# Patient Record
Sex: FEMALE | Race: WHITE | NOT HISPANIC OR LATINO | Employment: OTHER | URBAN - METROPOLITAN AREA
[De-identification: names, ages, dates, MRNs, and addresses within clinical notes are randomized per-mention and may not be internally consistent; named-entity substitution may affect disease eponyms.]

---

## 2023-02-20 LAB
C REACTIVE PROTEIN (MG/L) IN SER/PLAS: 2.32 MG/DL
ERYTHROCYTE DISTRIBUTION WIDTH (RATIO) BY AUTOMATED COUNT: 13.9 % (ref 11.5–14.5)
ERYTHROCYTE MEAN CORPUSCULAR HEMOGLOBIN CONCENTRATION (G/DL) BY AUTOMATED: 30.4 G/DL (ref 32–36)
ERYTHROCYTE MEAN CORPUSCULAR VOLUME (FL) BY AUTOMATED COUNT: 96 FL (ref 80–100)
ERYTHROCYTES (10*6/UL) IN BLOOD BY AUTOMATED COUNT: 4.6 X10E12/L (ref 4–5.2)
HEMATOCRIT (%) IN BLOOD BY AUTOMATED COUNT: 44.1 % (ref 36–46)
HEMOGLOBIN (G/DL) IN BLOOD: 13.4 G/DL (ref 12–16)
LEUKOCYTES (10*3/UL) IN BLOOD BY AUTOMATED COUNT: 7 X10E9/L (ref 4.4–11.3)
NRBC (PER 100 WBCS) BY AUTOMATED COUNT: 0 /100 WBC (ref 0–0)
PLATELETS (10*3/UL) IN BLOOD AUTOMATED COUNT: 192 X10E9/L (ref 150–450)

## 2023-02-27 LAB
C REACTIVE PROTEIN (MG/L) IN SER/PLAS: 1.8 MG/DL
ERYTHROCYTE DISTRIBUTION WIDTH (RATIO) BY AUTOMATED COUNT: 13.7 % (ref 11.5–14.5)
ERYTHROCYTE MEAN CORPUSCULAR HEMOGLOBIN CONCENTRATION (G/DL) BY AUTOMATED: 30.7 G/DL (ref 32–36)
ERYTHROCYTE MEAN CORPUSCULAR VOLUME (FL) BY AUTOMATED COUNT: 95 FL (ref 80–100)
ERYTHROCYTES (10*6/UL) IN BLOOD BY AUTOMATED COUNT: 4.61 X10E12/L (ref 4–5.2)
HEMATOCRIT (%) IN BLOOD BY AUTOMATED COUNT: 43.7 % (ref 36–46)
HEMOGLOBIN (G/DL) IN BLOOD: 13.4 G/DL (ref 12–16)
LEUKOCYTES (10*3/UL) IN BLOOD BY AUTOMATED COUNT: 6.4 X10E9/L (ref 4.4–11.3)
NRBC (PER 100 WBCS) BY AUTOMATED COUNT: 0 /100 WBC (ref 0–0)
PLATELETS (10*3/UL) IN BLOOD AUTOMATED COUNT: 218 X10E9/L (ref 150–450)

## 2023-03-06 LAB
C REACTIVE PROTEIN (MG/L) IN SER/PLAS: 1.05 MG/DL
ERYTHROCYTE DISTRIBUTION WIDTH (RATIO) BY AUTOMATED COUNT: 13.8 % (ref 11.5–14.5)
ERYTHROCYTE MEAN CORPUSCULAR HEMOGLOBIN CONCENTRATION (G/DL) BY AUTOMATED: 30.8 G/DL (ref 32–36)
ERYTHROCYTE MEAN CORPUSCULAR VOLUME (FL) BY AUTOMATED COUNT: 93 FL (ref 80–100)
ERYTHROCYTES (10*6/UL) IN BLOOD BY AUTOMATED COUNT: 4.84 X10E12/L (ref 4–5.2)
HEMATOCRIT (%) IN BLOOD BY AUTOMATED COUNT: 45.1 % (ref 36–46)
HEMOGLOBIN (G/DL) IN BLOOD: 13.9 G/DL (ref 12–16)
LEUKOCYTES (10*3/UL) IN BLOOD BY AUTOMATED COUNT: 5.8 X10E9/L (ref 4.4–11.3)
NRBC (PER 100 WBCS) BY AUTOMATED COUNT: 0 /100 WBC (ref 0–0)
PLATELETS (10*3/UL) IN BLOOD AUTOMATED COUNT: 213 X10E9/L (ref 150–450)

## 2023-03-27 LAB
C REACTIVE PROTEIN (MG/L) IN SER/PLAS: 1.13 MG/DL
ERYTHROCYTE DISTRIBUTION WIDTH (RATIO) BY AUTOMATED COUNT: 14.6 % (ref 11.5–14.5)
ERYTHROCYTE MEAN CORPUSCULAR HEMOGLOBIN CONCENTRATION (G/DL) BY AUTOMATED: 31 G/DL (ref 32–36)
ERYTHROCYTE MEAN CORPUSCULAR VOLUME (FL) BY AUTOMATED COUNT: 94 FL (ref 80–100)
ERYTHROCYTES (10*6/UL) IN BLOOD BY AUTOMATED COUNT: 4.67 X10E12/L (ref 4–5.2)
HEMATOCRIT (%) IN BLOOD BY AUTOMATED COUNT: 43.9 % (ref 36–46)
HEMOGLOBIN (G/DL) IN BLOOD: 13.6 G/DL (ref 12–16)
LEUKOCYTES (10*3/UL) IN BLOOD BY AUTOMATED COUNT: 6.3 X10E9/L (ref 4.4–11.3)
NRBC (PER 100 WBCS) BY AUTOMATED COUNT: 0 /100 WBC (ref 0–0)
PLATELETS (10*3/UL) IN BLOOD AUTOMATED COUNT: 177 X10E9/L (ref 150–450)

## 2023-05-10 LAB
C REACTIVE PROTEIN (MG/L) IN SER/PLAS: 1.1 MG/DL
ERYTHROCYTE DISTRIBUTION WIDTH (RATIO) BY AUTOMATED COUNT: 15.4 % (ref 11.5–14.5)
ERYTHROCYTE MEAN CORPUSCULAR HEMOGLOBIN CONCENTRATION (G/DL) BY AUTOMATED: 32.1 G/DL (ref 32–36)
ERYTHROCYTE MEAN CORPUSCULAR VOLUME (FL) BY AUTOMATED COUNT: 94 FL (ref 80–100)
ERYTHROCYTES (10*6/UL) IN BLOOD BY AUTOMATED COUNT: 4.51 X10E12/L (ref 4–5.2)
HEMATOCRIT (%) IN BLOOD BY AUTOMATED COUNT: 42.4 % (ref 36–46)
HEMOGLOBIN (G/DL) IN BLOOD: 13.6 G/DL (ref 12–16)
LEUKOCYTES (10*3/UL) IN BLOOD BY AUTOMATED COUNT: 5.6 X10E9/L (ref 4.4–11.3)
PLATELETS (10*3/UL) IN BLOOD AUTOMATED COUNT: 184 X10E9/L (ref 150–450)

## 2023-07-12 LAB — CANCER AG 125 (U/ML) IN SERUM: 6 U/ML (ref 0–30.2)

## 2023-08-31 ENCOUNTER — TELEPHONE (OUTPATIENT)
Dept: PRIMARY CARE | Facility: CLINIC | Age: 68
End: 2023-08-31

## 2023-09-07 LAB — THYROTROPIN (MIU/L) IN SER/PLAS BY DETECTION LIMIT <= 0.05 MIU/L: 0.92 MIU/L (ref 0.44–3.98)

## 2023-09-18 PROBLEM — F32.2 MAJOR DEPRESSIVE DISORDER, SINGLE EPISODE, SEVERE (MULTI): Status: ACTIVE | Noted: 2023-09-18

## 2023-09-18 PROBLEM — M75.21 TENDINITIS OF UPPER BICEPS TENDON OF RIGHT SHOULDER: Status: ACTIVE | Noted: 2023-09-18

## 2023-09-18 PROBLEM — I81 PORTAL VEIN THROMBOSIS: Status: ACTIVE | Noted: 2023-09-18

## 2023-09-18 PROBLEM — M16.11 OSTEOARTHRITIS OF RIGHT HIP: Status: ACTIVE | Noted: 2023-09-18

## 2023-09-18 PROBLEM — M54.31 SCIATICA OF RIGHT SIDE: Status: ACTIVE | Noted: 2023-09-18

## 2023-09-18 PROBLEM — M54.40 CHRONIC LOW BACK PAIN WITH SCIATICA: Status: ACTIVE | Noted: 2023-09-18

## 2023-09-18 PROBLEM — R41.89 COGNITIVE IMPAIRMENT: Status: ACTIVE | Noted: 2023-09-18

## 2023-09-18 PROBLEM — F50.812 BINGE-EATING DISORDER, SEVERE: Status: ACTIVE | Noted: 2017-03-23

## 2023-09-18 PROBLEM — D69.6 LOW PLATELET COUNT (CMS-HCC): Status: ACTIVE | Noted: 2023-09-18

## 2023-09-18 PROBLEM — N20.0 NEPHROLITHIASIS: Status: ACTIVE | Noted: 2023-09-18

## 2023-09-18 PROBLEM — D70.9 NEUTROPENIC FEVER (CMS-HCC): Status: ACTIVE | Noted: 2023-09-18

## 2023-09-18 PROBLEM — R52 DEAFFERENTATION PAIN: Status: ACTIVE | Noted: 2023-09-18

## 2023-09-18 PROBLEM — G89.29 CHRONIC LOW BACK PAIN WITH SCIATICA: Status: ACTIVE | Noted: 2023-09-18

## 2023-09-18 PROBLEM — M25.551 RIGHT HIP PAIN: Status: ACTIVE | Noted: 2023-09-18

## 2023-09-18 PROBLEM — R61 EXCESSIVE SWEATING: Status: ACTIVE | Noted: 2023-09-18

## 2023-09-18 PROBLEM — F32.9 MDD (MAJOR DEPRESSIVE DISORDER): Status: ACTIVE | Noted: 2023-09-18

## 2023-09-18 PROBLEM — E04.1 THYROID NODULE: Status: ACTIVE | Noted: 2023-09-18

## 2023-09-18 PROBLEM — R10.13 EPIGASTRIC PAIN: Status: ACTIVE | Noted: 2023-09-18

## 2023-09-18 PROBLEM — R42 DIZZINESS: Status: ACTIVE | Noted: 2023-09-18

## 2023-09-18 PROBLEM — Z98.890 HISTORY OF LASER REFRACTIVE SURGERY: Status: ACTIVE | Noted: 2023-09-18

## 2023-09-18 PROBLEM — Z85.72 HISTORY OF LYMPHOMA: Status: ACTIVE | Noted: 2023-09-18

## 2023-09-18 PROBLEM — M79.673 FOOT PAIN: Status: ACTIVE | Noted: 2023-09-18

## 2023-09-18 PROBLEM — C57.9: Status: ACTIVE | Noted: 2023-09-18

## 2023-09-18 PROBLEM — M79.89 SWELLING OF RIGHT LOWER EXTREMITY: Status: ACTIVE | Noted: 2023-09-18

## 2023-09-18 PROBLEM — M79.659 PAIN OF THIGH: Status: ACTIVE | Noted: 2023-09-18

## 2023-09-18 PROBLEM — N90.89 VULVAR LESION: Status: ACTIVE | Noted: 2023-09-18

## 2023-09-18 PROBLEM — K52.9 COLITIS: Status: ACTIVE | Noted: 2023-09-18

## 2023-09-18 PROBLEM — H00.019 HORDEOLUM EXTERNUM: Status: ACTIVE | Noted: 2023-09-18

## 2023-09-18 PROBLEM — T84.51XA INFECTION OF RIGHT PROSTHETIC HIP JOINT (CMS-HCC): Status: ACTIVE | Noted: 2023-09-18

## 2023-09-18 PROBLEM — M87.059 AVASCULAR NECROSIS OF BONE OF HIP (MULTI): Status: ACTIVE | Noted: 2023-09-18

## 2023-09-18 PROBLEM — N13.30 HYDRONEPHROSIS, LEFT: Status: ACTIVE | Noted: 2023-09-18

## 2023-09-18 PROBLEM — N13.2 HYDRONEPHROSIS WITH RENAL AND URETERAL CALCULUS OBSTRUCTION: Status: ACTIVE | Noted: 2023-09-18

## 2023-09-18 PROBLEM — G20.A1 PARKINSON'S DISEASE (MULTI): Status: ACTIVE | Noted: 2023-09-18

## 2023-09-18 PROBLEM — M53.3 SACROILIAC JOINT DYSFUNCTION OF RIGHT SIDE: Status: ACTIVE | Noted: 2023-09-18

## 2023-09-18 PROBLEM — G62.0 CHEMOTHERAPY-INDUCED PERIPHERAL NEUROPATHY (MULTI): Status: ACTIVE | Noted: 2023-09-18

## 2023-09-18 PROBLEM — M70.61 GREATER TROCHANTERIC BURSITIS OF RIGHT HIP: Status: ACTIVE | Noted: 2023-09-18

## 2023-09-18 PROBLEM — D07.1 VIN III (VULVAR INTRAEPITHELIAL NEOPLASIA III): Status: ACTIVE | Noted: 2023-09-18

## 2023-09-18 PROBLEM — E86.0 DEHYDRATION: Status: ACTIVE | Noted: 2023-09-18

## 2023-09-18 PROBLEM — F41.9 ANXIETY: Status: ACTIVE | Noted: 2023-09-18

## 2023-09-18 PROBLEM — Z98.890 S/P EXPLORATORY LAPAROTOMY: Status: ACTIVE | Noted: 2023-09-18

## 2023-09-18 PROBLEM — M70.60 TROCHANTERIC BURSITIS: Status: ACTIVE | Noted: 2023-09-18

## 2023-09-18 PROBLEM — R92.2 DENSE BREASTS: Status: ACTIVE | Noted: 2023-09-18

## 2023-09-18 PROBLEM — Z96.641 STATUS POST RIGHT HIP REPLACEMENT: Status: ACTIVE | Noted: 2023-09-18

## 2023-09-18 PROBLEM — E87.6 HYPOKALEMIA: Status: ACTIVE | Noted: 2023-09-18

## 2023-09-18 PROBLEM — R92.30 DENSE BREASTS: Status: ACTIVE | Noted: 2023-09-18

## 2023-09-18 PROBLEM — R26.89 SHUFFLING GAIT: Status: ACTIVE | Noted: 2023-09-18

## 2023-09-18 PROBLEM — M79.18 MYOFASCIAL PAIN: Status: ACTIVE | Noted: 2023-09-18

## 2023-09-18 PROBLEM — R45.89 DEPRESSED MOOD: Status: ACTIVE | Noted: 2023-09-18

## 2023-09-18 PROBLEM — R20.0 NUMBNESS OF LOWER EXTREMITY: Status: ACTIVE | Noted: 2023-09-18

## 2023-09-18 PROBLEM — M84.374A STRESS FRACTURE OF RIGHT FOOT: Status: ACTIVE | Noted: 2023-09-18

## 2023-09-18 PROBLEM — F33.0 MILD EPISODE OF RECURRENT MAJOR DEPRESSIVE DISORDER (CMS-HCC): Status: ACTIVE | Noted: 2023-09-18

## 2023-09-18 PROBLEM — E74.39 GLUCOSE INTOLERANCE: Status: ACTIVE | Noted: 2023-09-18

## 2023-09-18 PROBLEM — R53.83 FATIGUE: Status: ACTIVE | Noted: 2023-09-18

## 2023-09-18 PROBLEM — R59.0 SUBMANDIBULAR LYMPHADENOPATHY: Status: ACTIVE | Noted: 2023-09-18

## 2023-09-18 PROBLEM — I45.9 CARDIAC CONDUCTION DISORDER: Status: ACTIVE | Noted: 2017-03-09

## 2023-09-18 PROBLEM — G25.0 ESSENTIAL TREMOR: Status: ACTIVE | Noted: 2023-09-18

## 2023-09-18 PROBLEM — F40.240 CLAUSTROPHOBIA: Status: ACTIVE | Noted: 2023-09-18

## 2023-09-18 PROBLEM — R29.898 WEAKNESS OF EXTREMITY: Status: ACTIVE | Noted: 2023-09-18

## 2023-09-18 PROBLEM — G62.9 NEUROPATHY: Status: ACTIVE | Noted: 2023-09-18

## 2023-09-18 PROBLEM — M25.511 RIGHT SHOULDER PAIN: Status: ACTIVE | Noted: 2023-09-18

## 2023-09-18 PROBLEM — M85.80 OSTEOPENIA: Status: ACTIVE | Noted: 2023-09-18

## 2023-09-18 PROBLEM — R10.9 LEFT SIDED ABDOMINAL PAIN: Status: ACTIVE | Noted: 2023-09-18

## 2023-09-18 PROBLEM — H04.129 DRY EYE SYNDROME: Status: ACTIVE | Noted: 2023-09-18

## 2023-09-18 PROBLEM — I10 HYPERTENSION: Status: ACTIVE | Noted: 2023-09-18

## 2023-09-18 PROBLEM — R26.81 UNSTEADINESS ON FEET: Status: ACTIVE | Noted: 2023-09-18

## 2023-09-18 PROBLEM — G89.3 NEOPLASM RELATED PAIN: Status: ACTIVE | Noted: 2023-09-18

## 2023-09-18 PROBLEM — H33.322 PERIPHERAL RETINAL HOLE OF LEFT EYE: Status: ACTIVE | Noted: 2023-09-18

## 2023-09-18 PROBLEM — M76.31 ILIOTIBIAL BAND SYNDROME OF RIGHT SIDE: Status: ACTIVE | Noted: 2023-09-18

## 2023-09-18 PROBLEM — G89.29 CHRONIC PAIN: Status: ACTIVE | Noted: 2023-09-18

## 2023-09-18 PROBLEM — Z85.43 HISTORY OF OVARIAN CANCER: Status: ACTIVE | Noted: 2019-12-29

## 2023-09-18 PROBLEM — N20.0 CALCULUS OF KIDNEY: Status: ACTIVE | Noted: 2023-09-18

## 2023-09-18 PROBLEM — F32.A DEPRESSION: Status: ACTIVE | Noted: 2023-09-18

## 2023-09-18 PROBLEM — M62.81 MUSCLE WEAKNESS: Status: ACTIVE | Noted: 2023-09-18

## 2023-09-18 PROBLEM — T45.1X5A CHEMOTHERAPY-INDUCED PERIPHERAL NEUROPATHY (MULTI): Status: ACTIVE | Noted: 2023-09-18

## 2023-09-18 PROBLEM — R19.00 PELVIC MASS: Status: ACTIVE | Noted: 2023-09-18

## 2023-09-18 PROBLEM — M75.81 TENDINITIS OF RIGHT ROTATOR CUFF: Status: ACTIVE | Noted: 2023-09-18

## 2023-09-18 PROBLEM — F50.81 BINGE-EATING DISORDER, SEVERE: Status: ACTIVE | Noted: 2017-03-23

## 2023-09-18 PROBLEM — Z86.69 HISTORY OF PERIPHERAL NEUROPATHY: Status: ACTIVE | Noted: 2023-09-18

## 2023-09-18 PROBLEM — R50.81 NEUTROPENIC FEVER (CMS-HCC): Status: ACTIVE | Noted: 2023-09-18

## 2023-09-18 PROBLEM — M79.606 LEG PAIN: Status: ACTIVE | Noted: 2023-09-18

## 2023-09-18 PROBLEM — R10.2 PELVIC PAIN: Status: ACTIVE | Noted: 2023-09-18

## 2023-09-18 PROBLEM — R29.6 FALLS: Status: ACTIVE | Noted: 2023-09-18

## 2023-09-18 PROBLEM — H52.4 PRESBYOPIA: Status: ACTIVE | Noted: 2023-09-18
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
## 2023-09-18 PROBLEM — D49.7 THYROID INCIDENTALOMA: Status: ACTIVE | Noted: 2023-09-18

## 2023-09-18 PROBLEM — M53.3 SACROILIAC JOINT PAIN: Status: ACTIVE | Noted: 2023-09-18

## 2023-09-18 RX ORDER — PRIMIDONE 50 MG/1
TABLET ORAL
COMMUNITY
Start: 2023-06-21 | End: 2023-11-15 | Stop reason: SDUPTHER

## 2023-09-18 RX ORDER — ASPIRIN 81 MG/1
TABLET ORAL
COMMUNITY
Start: 2022-11-29 | End: 2023-09-21 | Stop reason: WASHOUT

## 2023-09-18 RX ORDER — PROPRANOLOL HYDROCHLORIDE 60 MG/1
1 CAPSULE, EXTENDED RELEASE ORAL DAILY
COMMUNITY
Start: 2015-12-30 | End: 2023-11-06

## 2023-09-18 RX ORDER — PREGABALIN 150 MG/1
CAPSULE ORAL
COMMUNITY
Start: 2023-06-19 | End: 2023-10-11 | Stop reason: DRUGHIGH

## 2023-09-18 RX ORDER — BUSPIRONE HYDROCHLORIDE 5 MG/1
1 TABLET ORAL 2 TIMES DAILY
COMMUNITY
Start: 2022-09-21 | End: 2023-12-14 | Stop reason: SDUPTHER

## 2023-09-18 RX ORDER — DULOXETIN HYDROCHLORIDE 60 MG/1
CAPSULE, DELAYED RELEASE ORAL
COMMUNITY
Start: 2015-11-16 | End: 2023-12-14 | Stop reason: SDUPTHER

## 2023-09-18 RX ORDER — AMOXICILLIN 250 MG
CAPSULE ORAL 2 TIMES DAILY
COMMUNITY
Start: 2022-11-26 | End: 2023-09-21 | Stop reason: WASHOUT

## 2023-09-18 RX ORDER — ACETAMINOPHEN 325 MG/1
TABLET ORAL EVERY 8 HOURS
COMMUNITY
Start: 2022-11-26

## 2023-09-18 RX ORDER — OXYCODONE HYDROCHLORIDE 5 MG/1
TABLET ORAL EVERY 6 HOURS
COMMUNITY
End: 2023-10-11 | Stop reason: ALTCHOICE

## 2023-09-18 RX ORDER — METHADONE HYDROCHLORIDE 10 MG/1
10 TABLET ORAL EVERY 12 HOURS
COMMUNITY
End: 2023-10-11 | Stop reason: SDUPTHER

## 2023-09-18 RX ORDER — BUPROPION HYDROCHLORIDE 100 MG/1
1 TABLET ORAL
COMMUNITY
Start: 2023-06-08 | End: 2023-09-21 | Stop reason: DRUGHIGH

## 2023-09-18 RX ORDER — MAG HYDROX/ALUMINUM HYD/SIMETH 200-200-20
SUSPENSION, ORAL (FINAL DOSE FORM) ORAL
COMMUNITY
Start: 2022-11-17 | End: 2023-09-21 | Stop reason: WASHOUT

## 2023-09-18 RX ORDER — ACETAMINOPHEN 500 MG
1 TABLET ORAL DAILY
COMMUNITY
Start: 2016-12-05

## 2023-09-18 RX ORDER — HYDROXYZINE HYDROCHLORIDE 25 MG/1
TABLET, FILM COATED ORAL
COMMUNITY
Start: 2022-05-31

## 2023-09-18 RX ORDER — CLONAZEPAM 0.5 MG/1
TABLET ORAL
COMMUNITY
Start: 2023-07-12

## 2023-09-18 RX ORDER — SELENIUM 50 MCG
TABLET ORAL
COMMUNITY
Start: 2022-11-28 | End: 2023-09-21 | Stop reason: WASHOUT

## 2023-09-21 ENCOUNTER — OFFICE VISIT (OUTPATIENT)
Dept: PRIMARY CARE | Facility: CLINIC | Age: 68
End: 2023-09-21
Payer: MEDICARE

## 2023-09-21 VITALS
DIASTOLIC BLOOD PRESSURE: 65 MMHG | HEART RATE: 60 BPM | WEIGHT: 154.4 LBS | SYSTOLIC BLOOD PRESSURE: 101 MMHG | HEIGHT: 63 IN | OXYGEN SATURATION: 99 % | BODY MASS INDEX: 27.36 KG/M2

## 2023-09-21 DIAGNOSIS — Z12.11 COLON CANCER SCREENING: ICD-10-CM

## 2023-09-21 DIAGNOSIS — E78.5 HYPERLIPIDEMIA, UNSPECIFIED HYPERLIPIDEMIA TYPE: ICD-10-CM

## 2023-09-21 DIAGNOSIS — Z00.00 MEDICARE ANNUAL WELLNESS VISIT, SUBSEQUENT: Primary | ICD-10-CM

## 2023-09-21 PROBLEM — R29.6 FALLS: Status: RESOLVED | Noted: 2023-09-18 | Resolved: 2023-09-21

## 2023-09-21 PROBLEM — E86.0 DEHYDRATION: Status: RESOLVED | Noted: 2023-09-18 | Resolved: 2023-09-21

## 2023-09-21 PROBLEM — E74.39 GLUCOSE INTOLERANCE: Status: RESOLVED | Noted: 2023-09-18 | Resolved: 2023-09-21

## 2023-09-21 PROBLEM — H00.019 HORDEOLUM EXTERNUM: Status: RESOLVED | Noted: 2023-09-18 | Resolved: 2023-09-21

## 2023-09-21 PROBLEM — R59.0 SUBMANDIBULAR LYMPHADENOPATHY: Status: RESOLVED | Noted: 2023-09-18 | Resolved: 2023-09-21

## 2023-09-21 PROBLEM — C57.9: Status: RESOLVED | Noted: 2023-09-18 | Resolved: 2023-09-21

## 2023-09-21 PROBLEM — I81 PORTAL VEIN THROMBOSIS: Status: RESOLVED | Noted: 2023-09-18 | Resolved: 2023-09-21

## 2023-09-21 PROBLEM — R10.9 LEFT SIDED ABDOMINAL PAIN: Status: RESOLVED | Noted: 2023-09-18 | Resolved: 2023-09-21

## 2023-09-21 PROBLEM — E87.6 HYPOKALEMIA: Status: RESOLVED | Noted: 2023-09-18 | Resolved: 2023-09-21

## 2023-09-21 PROBLEM — Z86.69 HISTORY OF PERIPHERAL NEUROPATHY: Status: RESOLVED | Noted: 2023-09-18 | Resolved: 2023-09-21

## 2023-09-21 PROBLEM — R61 EXCESSIVE SWEATING: Status: RESOLVED | Noted: 2023-09-18 | Resolved: 2023-09-21
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
## 2023-09-21 PROBLEM — R10.13 EPIGASTRIC PAIN: Status: RESOLVED | Noted: 2023-09-18 | Resolved: 2023-09-21

## 2023-09-21 PROBLEM — R42 DIZZINESS: Status: RESOLVED | Noted: 2023-09-18 | Resolved: 2023-09-21

## 2023-09-21 PROCEDURE — 1036F TOBACCO NON-USER: CPT | Performed by: NURSE PRACTITIONER

## 2023-09-21 PROCEDURE — 3078F DIAST BP <80 MM HG: CPT | Performed by: NURSE PRACTITIONER

## 2023-09-21 PROCEDURE — 1159F MED LIST DOCD IN RCRD: CPT | Performed by: NURSE PRACTITIONER

## 2023-09-21 PROCEDURE — G0439 PPPS, SUBSEQ VISIT: HCPCS | Performed by: NURSE PRACTITIONER

## 2023-09-21 PROCEDURE — 3074F SYST BP LT 130 MM HG: CPT | Performed by: NURSE PRACTITIONER

## 2023-09-21 PROCEDURE — 1170F FXNL STATUS ASSESSED: CPT | Performed by: NURSE PRACTITIONER

## 2023-09-21 PROCEDURE — 1125F AMNT PAIN NOTED PAIN PRSNT: CPT | Performed by: NURSE PRACTITIONER

## 2023-09-21 RX ORDER — BUPROPION HYDROCHLORIDE 150 MG/1
150 TABLET ORAL DAILY
COMMUNITY
End: 2023-11-30 | Stop reason: SDUPTHER

## 2023-09-21 ASSESSMENT — PATIENT HEALTH QUESTIONNAIRE - PHQ9
6. FEELING BAD ABOUT YOURSELF - OR THAT YOU ARE A FAILURE OR HAVE LET YOURSELF OR YOUR FAMILY DOWN: MORE THAN HALF THE DAYS
SUM OF ALL RESPONSES TO PHQ9 QUESTIONS 1 AND 2: 2
3. TROUBLE FALLING OR STAYING ASLEEP OR SLEEPING TOO MUCH: NEARLY EVERY DAY
7. TROUBLE CONCENTRATING ON THINGS, SUCH AS READING THE NEWSPAPER OR WATCHING TELEVISION: SEVERAL DAYS
9. THOUGHTS THAT YOU WOULD BE BETTER OFF DEAD, OR OF HURTING YOURSELF: NOT AT ALL
8. MOVING OR SPEAKING SO SLOWLY THAT OTHER PEOPLE COULD HAVE NOTICED. OR THE OPPOSITE, BEING SO FIGETY OR RESTLESS THAT YOU HAVE BEEN MOVING AROUND A LOT MORE THAN USUAL: NOT AT ALL
10. IF YOU CHECKED OFF ANY PROBLEMS, HOW DIFFICULT HAVE THESE PROBLEMS MADE IT FOR YOU TO DO YOUR WORK, TAKE CARE OF THINGS AT HOME, OR GET ALONG WITH OTHER PEOPLE: SOMEWHAT DIFFICULT
SUM OF ALL RESPONSES TO PHQ QUESTIONS 1-9: 10
4. FEELING TIRED OR HAVING LITTLE ENERGY: MORE THAN HALF THE DAYS
5. POOR APPETITE OR OVEREATING: NOT AT ALL
1. LITTLE INTEREST OR PLEASURE IN DOING THINGS: SEVERAL DAYS
2. FEELING DOWN, DEPRESSED OR HOPELESS: SEVERAL DAYS

## 2023-09-21 ASSESSMENT — ENCOUNTER SYMPTOMS
DEPRESSION: 1
LOSS OF SENSATION IN FEET: 1
OCCASIONAL FEELINGS OF UNSTEADINESS: 1

## 2023-09-21 ASSESSMENT — ACTIVITIES OF DAILY LIVING (ADL)
GROCERY_SHOPPING: INDEPENDENT
MANAGING_FINANCES: NEEDS ASSISTANCE
TAKING_MEDICATION: INDEPENDENT
DOING_HOUSEWORK: INDEPENDENT
BATHING: INDEPENDENT
DRESSING: INDEPENDENT

## 2023-09-21 NOTE — PROGRESS NOTES
Subjective   Reason for Visit: Saba Park is an 68 y.o. female here for a Medicare Wellness visit.     Reviewed all medications by prescribing practitioner or clinical pharmacist (such as prescriptions, OTCs, herbal therapies and supplements) and documented in the medical record.    HPI    Patient Care Team:  DEMETRIUS Castro-CNP as PCP - General  Dixon Mayen MD as PCP - [employer] Advantage PCP  Stu Flores MD as Consulting Physician (Orthopaedic Surgery)  Italo Harris MD as Referring Physician (Hematology and Oncology)  Krista Kenny MD as Obstetrician (Obstetrics and Gynecology)  DEMETRIUS Patel-CNP as Nurse Practitioner (Family Medicine)  Anabella Ugalde MD as Psychiatrist (Psychiatry)  Aasef G Shaikh, MD PhD as Consulting Physician (Neurology)  Mahnaz Lacy MD as Consulting Physician (Endocrinology)  Triny VINCENT MD as Consulting Physician (Infectious Diseases)  Lauren Cantu MD as Referring Physician (Physical Medicine and Rehabilitation)  Yisel Mccall MD as Surgeon (Colon and Rectal Surgery)  Karen Reddy MD as Surgeon (Urology)     Review of Systems  Gen: denies fever, chills, weight loss, fatigue  HEENT: denies sinus pressure, sinus congestion, runny nose, red eyes, itchy eyes, vision loss, ear pain, hearing loss, throat pain, trouble swallowing  Neck: denies neck pain, neck swelling or masses  Chest/breast: denies breast pain, breast lumps, nipple discharge  CV: denies chest pain, palpitations, fast heart rate, syncope  Resp: denies shortness of breath, cough, wheezing  GI: denies abdominal pain, nausea, diarrhea, constipation, hematochezia, melena  : denies dysuria, hematuria, vaginal discharge, frequency  Endo: denies polydipsia, polyuria, heat/cold intolerance, weight change, hair thinning  Heme: denies easy bruising, easy bleeding  Neuro: neuropathy. essential tremor. denies headache, numbness, tingling, memory loss, changes  "in vision  MSK: chronic right leg and hip pain with weakness. denies joint swelling  Psych: depression. denies suicidal ideation, homicidal ideation, anxiety, mood swings  Skin: denies rashes, abnormal lesions, itching, changes in moles      Objective   Vitals:  /65   Pulse 60   Ht 1.6 m (5' 3\")   Wt 70 kg (154 lb 6.4 oz)   SpO2 99%   BMI 27.35 kg/m²       Physical Exam  No physical exam was completed today.     Assessment/Plan   Medicare Annual Wellness Exam  - Due for TDAP vaccine, directed to pharmacy to complete for insurance purposes  - Mammogram due in December 2023 - states she already has an order  - Colonoscopy due in May 2024 - order given to get scheduled   - Check lipid panel, CMP  - Check CT cardiac score  - + depression screening, just had an appt with psychiatry this morning and meds were adjusted    RTC in 1 year for Medicare Wellness or sooner gary Mcdermott, APRN-CNP  [facility]          "

## 2023-10-11 ENCOUNTER — OFFICE VISIT (OUTPATIENT)
Dept: PALLIATIVE MEDICINE | Facility: CLINIC | Age: 68
End: 2023-10-11
Payer: MEDICARE

## 2023-10-11 VITALS
DIASTOLIC BLOOD PRESSURE: 70 MMHG | SYSTOLIC BLOOD PRESSURE: 102 MMHG | WEIGHT: 155.42 LBS | OXYGEN SATURATION: 96 % | HEART RATE: 66 BPM | BODY MASS INDEX: 27.53 KG/M2 | TEMPERATURE: 97.9 F | RESPIRATION RATE: 18 BRPM

## 2023-10-11 DIAGNOSIS — M79.2 NEUROPATHIC PAIN: Primary | ICD-10-CM

## 2023-10-11 DIAGNOSIS — Z51.5 PALLIATIVE CARE ENCOUNTER: ICD-10-CM

## 2023-10-11 DIAGNOSIS — G89.3 CHRONIC PAIN AFTER CANCER TREATMENT: ICD-10-CM

## 2023-10-11 PROCEDURE — 1125F AMNT PAIN NOTED PAIN PRSNT: CPT | Performed by: NURSE PRACTITIONER

## 2023-10-11 PROCEDURE — 1036F TOBACCO NON-USER: CPT | Performed by: NURSE PRACTITIONER

## 2023-10-11 PROCEDURE — 1159F MED LIST DOCD IN RCRD: CPT | Performed by: NURSE PRACTITIONER

## 2023-10-11 PROCEDURE — 99214 OFFICE O/P EST MOD 30 MIN: CPT | Performed by: NURSE PRACTITIONER

## 2023-10-11 PROCEDURE — 3078F DIAST BP <80 MM HG: CPT | Performed by: NURSE PRACTITIONER

## 2023-10-11 PROCEDURE — 3074F SYST BP LT 130 MM HG: CPT | Performed by: NURSE PRACTITIONER

## 2023-10-11 RX ORDER — METHADONE HYDROCHLORIDE 10 MG/1
10 TABLET ORAL EVERY 12 HOURS
Qty: 60 TABLET | Refills: 0 | Status: SHIPPED | OUTPATIENT
Start: 2023-10-11 | End: 2023-11-10

## 2023-10-11 RX ORDER — PREGABALIN 100 MG/1
100 CAPSULE ORAL 3 TIMES DAILY
Qty: 90 CAPSULE | Refills: 2 | Status: SHIPPED | OUTPATIENT
Start: 2023-10-11 | End: 2024-04-29 | Stop reason: DRUGHIGH

## 2023-10-11 ASSESSMENT — PAIN SCALES - GENERAL: PAINLEVEL: 4

## 2023-10-11 NOTE — PROGRESS NOTES
SUPPORTIVE AND PALLIATIVE ONCOLOGY OUTPATIENT FOLLOW-UP      SERVICE DATE: 10/11/2023    Subjective   HISTORY OF PRESENT ILLNESS: Saba Park is a 68 y.o. female who presents with history of CNS lymphoma and ovarian cancer. Currently in active surveillance and without  evidence of either of her cancers. Patient follows with Supportive Oncology/Palliative Care for chronic pain r/t disease history/treatment and further symptom management.    Pain Assessment:  Pain Score:   4  Location: Leg  Education:      Symptom Assessment:  Pain:somewhat - continues with chronic pain in the right hip. She has completed PT at this time, and both her orthopedic and physical therapist have discussed with her that due to chronic injury in the hip from necrosis and infection, she may forever have pain. Notes pain is heightened with ambulation, not able to ambulate far  Headache: none  Dizziness:none  Lack of energy: a little  Difficulty sleeping: none  Worrying: none  Anxiety: none  Depression: a little - continues to follow with Dr. Ugalde  Pain in mouth/swallowing: none  Dry mouth: a little  Taste changes: none  Shortness of breath: none  Lack of appetite: none   Nausea: none  Vomiting: none  Constipation: none  Diarrhea: none  Sore muscles: none  Numbness or tingling in hands/feet/other: somewhat - radiating pain down right leg w/ jolts of pain in b/l feet as well as numbness and tingling present. R foot > L foot  Weight loss: none  Other: none    Information obtained from: interview of patient  ______________________________________________________________________        Objective      PHYSICAL EXAMINATION   Vital Signs:   Vital signs reviewed  Vitals:    10/11/23 1138   BP: 102/70   Pulse: 66   Resp: 18   Temp: 36.6 °C (97.9 °F)   SpO2: 96%     Pain Score: 4       Physical Exam  Constitutional:       Appearance: Normal appearance.   Cardiovascular:      Rate and Rhythm: Normal rate and regular rhythm.   Pulmonary:       Effort: Pulmonary effort is normal.   Abdominal:      General: Abdomen is flat. Bowel sounds are normal.      Palpations: Abdomen is soft.   Musculoskeletal:         General: Tenderness present.      Comments: Right hip/leg   Skin:     General: Skin is warm and dry.   Neurological:      Mental Status: She is alert and oriented to person, place, and time.   Psychiatric:         Mood and Affect: Mood normal.         Behavior: Behavior normal.         Thought Content: Thought content normal.         Judgment: Judgment normal.       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 Completely anbx therapy  Type: neuropathic  Pain control: sub-optimally controlled  Home regimen:   - S/P [facility] 5/19/22  - Continue Ibuprofen PRN  - Completed PT/[facility] therapy  - Supportive care with rest, ice/heat as needed  - Continue Methadone 10mg BID - discussed adjusting to assess if contributing to fatigue  - EKG (4/5/23) QTc 431. Repeat with increase in dose  - Continue Pregabalin 100mg TID - notes increase back to 100mg TID make improvement in pain, but c/b fatigue   - Continue Duloxetine 60mg/daily  - Discussed Alpha Liproic Acid - patient would like to hold off at this time  - Completed program through chiropractor w/o change in pain  - Referral to Symptom Clinic - Dr Silverman (7/12/23) - scheduled in 1 week  - Consider Scrambler Referral  Intolerances/previously tried:   - Alpha Liproic Acid - not effective  - Gabapentin - caused excessive lethargy    Opioid Use  Medication Management:   - OARRS report reviewed with no aberrant behavior; consistent with  prescriptions/records and patient history  - MED  40.  Overdose Risk Score 270.   This has been discussed with patient.   - We will continue to closely monitor the patient for signs of prescription misuse including UDS, OARRS review and subjective reports at each visit.  - No concurrent benzodiazepine use   - I am a provider who either is or has consulted and collaborated with a provider certified in Hospice and Palliative Medicine and have conducted a face-face visit and examination for this patient.  - Routine Urine Drug Screen: 7/12/23 appropriately positive for opioids and negative for illicit substances  - Controlled Substance Agreement: 7/12/23  - Specifically discussed that controlled substance prescriptions will only be provided by our group as outlined in the completed agreement  - Prescribed naloxone: patient declined  - Red Flags: NA    Bowel  - Monitor - no concerns of diarrhea or constipation     Altered Mood  Chronic anxiety and depression related to health concerns   Home regimen:   - Continue Duloxetine 60mg once daily at bed  - Continue Buspirone 5mg TID  - Continue Buproprion 75mg daily  - Following with counselor weekly to every 2 weeks  - Established with Dr. Ugalde - Onco Psychiatry    Next Follow-Up Visit:  Return to clinic in 3 months    Signature and billing  Medical complexity was moderate level due to due to complexity of problems, extensive data review, and high risk of management/treatment.  Time was spent on the following: Prep Time, Time Directly with Patient/Family/Caregiver, Documentation Time. Total time spent: 32      Data    Some elements copied from Supportive Oncology note on 8/30/23, the elements have been updated and all reflect current decision making from today, 10/11/2023.      Plan of Care discussed with: Patient    SIGNATURE: DEMETRIUS Patel-CNP    Contact information:  [facility]ive [facility] Pall[facility]cology  Monday-Friday 8 AM-5 PM  Phone:  [phone], press option #5, then option #1.   Or Epic Secure Chat

## 2023-10-16 ENCOUNTER — ALLIED HEALTH (OUTPATIENT)
Dept: INTEGRATIVE MEDICINE | Facility: CLINIC | Age: 68
End: 2023-10-16
Payer: MEDICARE

## 2023-10-16 DIAGNOSIS — Z85.72 HISTORY OF LYMPHOMA: ICD-10-CM

## 2023-10-16 DIAGNOSIS — T45.1X5A CHEMOTHERAPY-INDUCED PERIPHERAL NEUROPATHY (MULTI): Primary | ICD-10-CM

## 2023-10-16 DIAGNOSIS — Z85.43 HISTORY OF OVARIAN CANCER: ICD-10-CM

## 2023-10-16 DIAGNOSIS — G89.3 NEOPLASM RELATED PAIN: ICD-10-CM

## 2023-10-16 DIAGNOSIS — R45.89 DEPRESSED MOOD: ICD-10-CM

## 2023-10-16 DIAGNOSIS — G62.0 CHEMOTHERAPY-INDUCED PERIPHERAL NEUROPATHY (MULTI): Primary | ICD-10-CM

## 2023-10-16 PROCEDURE — 99205 OFFICE O/P NEW HI 60 MIN: CPT | Performed by: HOSPITALIST

## 2023-10-16 NOTE — PROGRESS NOTES
Patient ID: Saba Park is a 68 y.o. female.  Referring Physician: No referring provider defined for this encounter.  Primary Care Provider: DEMETRIUS Castro-CNP    CANCER HISTORY:   67 yo woman with h/o CNS lymphoma and ovarian cancer    2014 CNS Lymphoma (Tyson Harris) - ritux/methotrexate, vincristine, procarbazine  Remission since 2015  C/b R sided weakness, R leg weakness, neuropathy, balance issues - saw PT  Chronic R leg pain, dependant on oxycodone, on methadone and seeing pain mgmt  AVN d/t steroids R hip    11/18 Ovarian ca - 2/19 lap surgery - chemo with taxol x 3 with good response  5/19 ROMY/BSO, rectal resection, ileostomy, adjuvant 3-4 mo chemo c/b C diff    5/22 THR f/b PT - c/b infection and replaced, needed long term IV abx  Kidney stones    INTEGRATIVE HISTORY:    Symptoms:  Depression - f/b psych - on welbutrin, buspar    Pain in R Hip - tried chiropractor, seen by acupuncture    Neuropathy R foot - ball of foot, toes, pain - methadone, gabapentin etc.    ROS:  no ha, visual symptoms, hearing loss  no sob, chest pain, palp  ROS o/w non contributory, please see HPI    Objective    BSA: There is no height or weight on file to calculate BSA.  There were no vitals taken for this visit.    PHYSICAL EXAM:  NAD, awake/alert  HEENT, NCAT, OP clear, no oral lesions  CTA bilat  RRR no mgr  Abd soft/nt/nd+bs  No c/c/e/ttp  Motor/sensory intact, CN 2-12 intact     RESULTS:  Lab Results   Component Value Date    WBC 5.6 05/10/2023    HGB 13.6 05/10/2023    HCT 42.4 05/10/2023     05/10/2023     10/09/2020    CREATININE 0.78 01/09/2023    AST 33 01/09/2023     6.0 07/12/2023     4.0 02/06/2023     6.3 11/27/2022     10.2 10/04/2022     8.5 07/13/2022     5.4 05/18/2022     5.6 11/17/2021     5.3 05/17/2021     4.4 02/01/2021     5.4 11/11/2020     6.3 07/16/2020     4.8 03/30/2020     3.6 12/20/2019     5.7 09/11/2019      4.7 08/19/2019     6.0 07/22/2019     5.5 07/03/2019     12.4 05/02/2019     14.1 04/11/2019     45.6 (H) 03/21/2019     151.9 (H) 02/27/2019     76.7 (H) 01/16/2019 7/21/23 MR Brain: RESULT:   Medial left perirolandic/parietal post biopsy/post treatment changes   tapering to abut the ependymal surface of the left lateral ventricle with   hemosiderin deposition/mineralization.     Mild central dominant volume loss.     Trace nonspecific foci of white matter T2/FLAIR hyperintensity.     Negative for restricted diffusion, acute hemorrhage, mass effect,   extra-axial collection, and abnormal enhancement.     The major intracranial vessels show signal characteristics typically   associated with flow voids suggesting patency.     Up to mild mastoid effusions.  Minimal paranasal sinus mucosal thickening.     Assessment/Plan   67 yo woman with h/o CNS lymphoma and ovarian cancer    2014 CNS Lymphoma (Tyson Harris) - ritux/methotrexate, vincristine, procarbazine  Remission since 2015  C/b R sided weakness, R leg weakness, neuropathy, balance issues - saw PT  Chronic R leg pain, dependant on oxycodone, on methadone and seeing pain mgmt  AVN d/t steroids R hip    11/18 Ovarian ca - 2/19 lap surgery - chemo with taxol x 3 with good response  5/19 ROMY/BSO, rectal resection, ileostomy, adjuvant 3-4 mo chemo c/b C diff    5/22 THR f/b PT - c/b infection and replaced, needed long term IV abx  Kidney stones    INTEGRATIVE HISTORY:    Symptoms:  Depression - f/b psych - on welbutrin, buspar, cont to see psych and follow up on medication reccs.    Pain in R Hip - tried chiropractor, seen by acupuncture - would set up for [facility]  Diff walking now  Has Medical Marijuana card, gummies, has CBD/THC - helps forget about it  May be also related to AVN - consider perhaps hyperbaric oxygen or cryotherapy or local cryotherapy at [facility]  Cont with pain  management    Neuropathy R foot - ball of foot, toes, pain - methadone, gabapentin etc.  Would recc acupuncture  Scrambler therapy    Balance is getting worse, 11/22 B12 780  Could be related to neuropathy, worse since ovarian ca  MR brain neg 7/23  Consider Reiki - consider at the [facility]  Meditation    Follow up:  in Symptom management clinic    Thank you for consulting me in for this patient  Denton Silverman MD

## 2023-11-01 ENCOUNTER — ALLIED HEALTH (OUTPATIENT)
Dept: INTEGRATIVE MEDICINE | Facility: CLINIC | Age: 68
End: 2023-11-01
Payer: MEDICARE

## 2023-11-01 DIAGNOSIS — G62.0 CHEMOTHERAPY-INDUCED PERIPHERAL NEUROPATHY (MULTI): ICD-10-CM

## 2023-11-01 DIAGNOSIS — R45.89 DEPRESSED MOOD: ICD-10-CM

## 2023-11-01 DIAGNOSIS — Z85.72 HISTORY OF LYMPHOMA: Primary | ICD-10-CM

## 2023-11-01 DIAGNOSIS — Z85.43 HISTORY OF OVARIAN CANCER: ICD-10-CM

## 2023-11-01 DIAGNOSIS — T45.1X5A CHEMOTHERAPY-INDUCED PERIPHERAL NEUROPATHY (MULTI): ICD-10-CM

## 2023-11-01 DIAGNOSIS — G89.3 NEOPLASM RELATED PAIN: ICD-10-CM

## 2023-11-01 PROCEDURE — 99214 OFFICE O/P EST MOD 30 MIN: CPT | Performed by: HOSPITALIST

## 2023-11-01 ASSESSMENT — ENCOUNTER SYMPTOMS
TROUBLE SWALLOWING: 0
WHEEZING: 0
FEVER: 0
CONSTIPATION: 0
COUGH: 0
ABDOMINAL DISTENTION: 0
FREQUENCY: 0
DIFFICULTY URINATING: 0
PALPITATIONS: 0
ARTHRALGIAS: 0
HEADACHES: 0
CHILLS: 0

## 2023-11-01 ASSESSMENT — PAIN SCALES - GENERAL: PAINLEVEL_OUTOF10: 4

## 2023-11-01 NOTE — PROGRESS NOTES
Acupuncture Visit:     Subjective   Patient ID: Saba Park is a 68 y.o. female who presents for No chief complaint on file.  R hip pain-  Constant, worse through the day  Worse when walking  Has had one THR then infection, then revision,   Pain in deep in hip/leg to buttock and back  3/10 now  Will get up to 7-8/10  Throbbing aching  Hx of AVN from chemo/steroids     Depression-  up and down.  Worse this past year since August  Working with psychiatrist, on meds     Neuropathy R foot- tingling, numbness, pain at night  Constant, can be worse at night  Has had PT, chiro- doing estim    Balance is poor, sometimes with dizziness, needs support  No falls    Chemo brain- trouble recalling things occ    Has essential tremors. Has gotten worse    MVI  D  Ca    Sleep- always fatigued due to meds, lyrica,   Fatigue is constant    Diet: good                  Review of Systems   Constitutional:  Negative for chills and fever.        Night sweats   HENT:  Negative for ear pain, tinnitus and trouble swallowing.    Eyes:  Negative for visual disturbance.   Respiratory:  Negative for cough and wheezing.    Cardiovascular:  Negative for chest pain and palpitations.   Gastrointestinal:  Negative for abdominal distention and constipation.   Genitourinary:  Negative for difficulty urinating and frequency.   Musculoskeletal:  Negative for arthralgias.   Skin:  Positive for rash.        psoriasisi   Neurological:  Negative for headaches.            Provider reviewed plan for the acupuncture session, precautions and contraindications. Patient/guardian/hospital staff has given consent to treat with full understanding of what to expect during the session. Before acupuncture began, provider explained to the patient to communicate at any time if the procedure was causing discomfort past their tolerance level. Patient agreed to advise acupuncturist. The acupuncturist counseled the patient on the risks of acupuncture treatment including  pain, infection, bleeding, and no relief of pain. The patient was positioned comfortably. There was no evidence of infection at the site of needle insertions.    Objective   Physical Exam              Acupuncture Treatment  Needle Guage: 36 guage /.20/ Blue seirin  Body Points: With retention  Body Points - Bilateral: Du 20, Li4, Si3, SP6, KI6, LR3  Body Points - Right: GB40, UB62, ba nadeem  Auricular Points: Yes  Auricular Points - Bilateral: BFA 1-3  Other Techniques Utilized: TDP Lamp  TDP Lamp Descripton: feet  Needle Count In: 22  Needle Count Out: 22  Needle Retention Time (min): 25 minutes  Total Face to Face Time (min): 45 minutes              Assessment/Plan

## 2023-11-01 NOTE — PROGRESS NOTES
Patient ID: Saba Park is a 68 y.o. female.  Referring Physician: No referring provider defined for this encounter.  Primary Care Provider: DEMETRIUS Castro-CNP    67 yo woman with h/o CNS lymphoma and ovarian cancer     2014 CNS Lymphoma (Tyson Harris) - ritux/methotrexate, vincristine, procarbazine  Remission since 2015  C/b R sided weakness, R leg weakness, neuropathy, balance issues - saw PT  Chronic R leg pain, dependant on oxycodone, on methadone and seeing pain mgmt  AVN d/t steroids R hip     11/18 Ovarian ca - 2/19 lap surgery - chemo with taxol x 3 with good response  5/19 ROMY/BSO, rectal resection, ileostomy, adjuvant 3-4 mo chemo c/b C diff     5/22 THR f/b PT - c/b infection and replaced, needed long term IV abx  Kidney stones     INTEGRATIVE HISTORY:     Symptoms:  Depression - f/b psych - on welbutrin, buspar     Pain in R Hip - tried chiropractor, seen by acupuncture     Neuropathy R foot - ball of foot, toes, pain - methadone, gabapentin etc.    ROS:  no ha, visual symptoms, hearing loss  no sob, chest pain, palp  ROS o/w non contributory, please see HPI    Objective    BSA: There is no height or weight on file to calculate BSA.  There were no vitals taken for this visit.    PHYSICAL EXAM:  NAD, awake/alert  HEENT, NCAT, OP clear, no oral lesions  CTA bilat  RRR no mgr  Abd soft/nt/nd+bs  No c/c/e/ttp  Motor/sensory intact, CN 2-12 intact     RESULTS:  Lab Results   Component Value Date    WBC 5.6 05/10/2023    HGB 13.6 05/10/2023    HCT 42.4 05/10/2023     05/10/2023     10/09/2020    CREATININE 0.78 01/09/2023    AST 33 01/09/2023     6.0 07/12/2023     4.0 02/06/2023     6.3 11/27/2022     10.2 10/04/2022     8.5 07/13/2022     5.4 05/18/2022     5.6 11/17/2021     5.3 05/17/2021     4.4 02/01/2021     5.4 11/11/2020     6.3 07/16/2020     4.8 03/30/2020     3.6 12/20/2019     5.7 09/11/2019     4.7  08/19/2019     6.0 07/22/2019     5.5 07/03/2019     12.4 05/02/2019     14.1 04/11/2019     45.6 (H) 03/21/2019     151.9 (H) 02/27/2019     76.7 (H) 01/16/2019       Assessment/Plan   67 yo woman with h/o CNS lymphoma and ovarian cancer     2014 CNS Lymphoma (Tyson Harris) - ritux/methotrexate, vincristine, procarbazine  Remission since 2015  C/b R sided weakness, R leg weakness, neuropathy, balance issues - saw PT  Chronic R leg pain, dependant on oxycodone, on methadone and seeing pain mgmt  AVN d/t steroids R hip     11/18 Ovarian ca - 2/19 lap surgery - chemo with taxol x 3 with good response  5/19 ROMY/BSO, rectal resection, ileostomy, adjuvant 3-4 mo chemo c/b C diff     5/22 THR f/b PT - c/b infection and replaced, needed long term IV abx  Kidney stones     INTEGRATIVE HISTORY:     Symptoms:  Depression - f/b psych - on welbutrin, buspar, cont to see psych and follow up on medication reccs.     Pain in R Hip - tried chiropractor, seen by acupuncture - would set up for [facility]  Diff walking now  Has Medical Marijuana card, gummies, has CBD/THC - helps forget about it  May be also related to AVN - consider perhaps hyperbaric oxygen or cryotherapy or local cryotherapy at [facility]  Cont with pain management     Neuropathy R foot - ball of foot, toes, pain - methadone, gabapentin etc.  Would recc acupuncture  Scrambler therapy     Balance is getting worse, 11/22 B12 780  Could be related to neuropathy, worse since ovarian ca  MR brain neg 7/23  Consider Reiki - consider at the [facility]  Meditation     Follow up:  in Symptom management clinic    SYMPTOM MANAGEMENT:  Integrative Oncology Symptom Management:    [facility] Integrative Oncology Symptom Management clinic offers multi-disciplinary supervised care of cancer patients using Integrative Modalities billed to insurance using NCCN and SIO/ASCO guideline-driven  practices.  ESAS is obtained prior to and after each treatment by the practitioner    Symptoms Managed:  R hip pain - constant, worse as day goes on    Depression - up/down    Neuropathy R foot - tingling, numbness, pain at night    Natural Products utilized:  Mvi, Vit D, ca    Integrative Treatment: Acupuncture  Session #: 1  Frequency: weekly    Referrals:   Recommendations:    Follow Up:  Symptom Management: weekly  Integrative Oncology:     I have personally seen the patient and supervised the treatment by the integrative practitioner during this visit.  Pt had symptoms discussed and I was present for the patient's 60 minutes of direct patient care.       Denton Silverman MD        Acitretin Pregnancy And Lactation Text: This medication is Pregnancy Category X and should not be given to women who are pregnant or may become pregnant in the future. This medication is excreted in breast milk.

## 2023-11-04 ENCOUNTER — ANCILLARY PROCEDURE (OUTPATIENT)
Dept: RADIOLOGY | Facility: CLINIC | Age: 68
End: 2023-11-04
Payer: MEDICARE

## 2023-11-04 DIAGNOSIS — G25.0 ESSENTIAL TREMOR: ICD-10-CM

## 2023-11-04 DIAGNOSIS — E78.5 HYPERLIPIDEMIA, UNSPECIFIED HYPERLIPIDEMIA TYPE: ICD-10-CM

## 2023-11-04 PROCEDURE — 75571 CT HRT W/O DYE W/CA TEST: CPT

## 2023-11-06 DIAGNOSIS — G25.0 ESSENTIAL TREMOR: ICD-10-CM

## 2023-11-06 RX ORDER — PROPRANOLOL HYDROCHLORIDE 60 MG/1
60 CAPSULE, EXTENDED RELEASE ORAL DAILY
Qty: 90 CAPSULE | Refills: 2 | Status: SHIPPED | OUTPATIENT
Start: 2023-11-06 | End: 2023-11-15 | Stop reason: SDUPTHER

## 2023-11-06 RX ORDER — PROPRANOLOL HYDROCHLORIDE 60 MG/1
60 CAPSULE, EXTENDED RELEASE ORAL DAILY
Qty: 90 CAPSULE | Refills: 2 | Status: SHIPPED | OUTPATIENT
Start: 2023-11-06 | End: 2023-11-06 | Stop reason: SDUPTHER

## 2023-11-15 ENCOUNTER — OFFICE VISIT (OUTPATIENT)
Dept: NEUROLOGY | Facility: CLINIC | Age: 68
End: 2023-11-15
Payer: MEDICARE

## 2023-11-15 ENCOUNTER — ANCILLARY PROCEDURE (OUTPATIENT)
Dept: RADIOLOGY | Facility: CLINIC | Age: 68
End: 2023-11-15
Payer: MEDICARE

## 2023-11-15 ENCOUNTER — OFFICE VISIT (OUTPATIENT)
Dept: ORTHOPEDIC SURGERY | Facility: CLINIC | Age: 68
End: 2023-11-15
Payer: MEDICARE

## 2023-11-15 VITALS — RESPIRATION RATE: 16 BRPM | SYSTOLIC BLOOD PRESSURE: 128 MMHG | DIASTOLIC BLOOD PRESSURE: 85 MMHG | HEART RATE: 62 BPM

## 2023-11-15 DIAGNOSIS — G25.0 ESSENTIAL TREMOR: ICD-10-CM

## 2023-11-15 DIAGNOSIS — M16.11 PRIMARY LOCALIZED OSTEOARTHRITIS OF RIGHT HIP: ICD-10-CM

## 2023-11-15 DIAGNOSIS — M25.559: ICD-10-CM

## 2023-11-15 DIAGNOSIS — M16.12 PRIMARY OSTEOARTHRITIS OF LEFT HIP: ICD-10-CM

## 2023-11-15 DIAGNOSIS — M16.12 PRIMARY OSTEOARTHRITIS OF LEFT HIP: Primary | ICD-10-CM

## 2023-11-15 DIAGNOSIS — R25.1 TREMOR: Primary | ICD-10-CM

## 2023-11-15 DIAGNOSIS — Z96.649: ICD-10-CM

## 2023-11-15 PROCEDURE — 99215 OFFICE O/P EST HI 40 MIN: CPT | Performed by: STUDENT IN AN ORGANIZED HEALTH CARE EDUCATION/TRAINING PROGRAM

## 2023-11-15 PROCEDURE — 1159F MED LIST DOCD IN RCRD: CPT | Performed by: ORTHOPAEDIC SURGERY

## 2023-11-15 PROCEDURE — 73502 X-RAY EXAM HIP UNI 2-3 VIEWS: CPT | Mod: RT

## 2023-11-15 PROCEDURE — 1160F RVW MEDS BY RX/DR IN RCRD: CPT | Performed by: STUDENT IN AN ORGANIZED HEALTH CARE EDUCATION/TRAINING PROGRAM

## 2023-11-15 PROCEDURE — 1125F AMNT PAIN NOTED PAIN PRSNT: CPT | Performed by: ORTHOPAEDIC SURGERY

## 2023-11-15 PROCEDURE — 3074F SYST BP LT 130 MM HG: CPT | Performed by: STUDENT IN AN ORGANIZED HEALTH CARE EDUCATION/TRAINING PROGRAM

## 2023-11-15 PROCEDURE — 1160F RVW MEDS BY RX/DR IN RCRD: CPT | Performed by: ORTHOPAEDIC SURGERY

## 2023-11-15 PROCEDURE — 73502 X-RAY EXAM HIP UNI 2-3 VIEWS: CPT | Mod: RIGHT SIDE | Performed by: RADIOLOGY

## 2023-11-15 PROCEDURE — 99214 OFFICE O/P EST MOD 30 MIN: CPT | Mod: 25 | Performed by: ORTHOPAEDIC SURGERY

## 2023-11-15 PROCEDURE — 1125F AMNT PAIN NOTED PAIN PRSNT: CPT | Performed by: STUDENT IN AN ORGANIZED HEALTH CARE EDUCATION/TRAINING PROGRAM

## 2023-11-15 PROCEDURE — 3079F DIAST BP 80-89 MM HG: CPT | Performed by: STUDENT IN AN ORGANIZED HEALTH CARE EDUCATION/TRAINING PROGRAM

## 2023-11-15 PROCEDURE — 1036F TOBACCO NON-USER: CPT | Performed by: ORTHOPAEDIC SURGERY

## 2023-11-15 PROCEDURE — 1159F MED LIST DOCD IN RCRD: CPT | Performed by: STUDENT IN AN ORGANIZED HEALTH CARE EDUCATION/TRAINING PROGRAM

## 2023-11-15 PROCEDURE — 99214 OFFICE O/P EST MOD 30 MIN: CPT | Performed by: ORTHOPAEDIC SURGERY

## 2023-11-15 PROCEDURE — 1036F TOBACCO NON-USER: CPT | Performed by: STUDENT IN AN ORGANIZED HEALTH CARE EDUCATION/TRAINING PROGRAM

## 2023-11-15 RX ORDER — BISMUTH SUBSALICYLATE 262 MG
1 TABLET,CHEWABLE ORAL DAILY
COMMUNITY

## 2023-11-15 RX ORDER — PROPRANOLOL HYDROCHLORIDE 60 MG/1
60 CAPSULE, EXTENDED RELEASE ORAL DAILY
Qty: 30 CAPSULE | Refills: 11 | Status: SHIPPED | OUTPATIENT
Start: 2023-11-15 | End: 2024-05-01 | Stop reason: CLARIF

## 2023-11-15 RX ORDER — PRIMIDONE 50 MG/1
TABLET ORAL
Qty: 150 TABLET | Refills: 11 | Status: SHIPPED | OUTPATIENT
Start: 2023-11-15 | End: 2024-03-22 | Stop reason: SDUPTHER

## 2023-11-15 RX ORDER — CALCIUM CARBONATE 500(1250)
1 TABLET ORAL
COMMUNITY

## 2023-11-15 RX ORDER — PRIMIDONE 50 MG/1
50 TABLET ORAL NIGHTLY
Qty: 30 TABLET | Refills: 11 | Status: SHIPPED | OUTPATIENT
Start: 2023-11-15 | End: 2023-11-15

## 2023-11-15 ASSESSMENT — PATIENT HEALTH QUESTIONNAIRE - PHQ9
SUM OF ALL RESPONSES TO PHQ9 QUESTIONS 1 AND 2: 0
2. FEELING DOWN, DEPRESSED OR HOPELESS: NOT AT ALL
1. LITTLE INTEREST OR PLEASURE IN DOING THINGS: NOT AT ALL

## 2023-11-15 ASSESSMENT — ENCOUNTER SYMPTOMS
DEPRESSION: 0
OCCASIONAL FEELINGS OF UNSTEADINESS: 0
LOSS OF SENSATION IN FEET: 0

## 2023-11-15 NOTE — PROGRESS NOTES
Subjective     Saba Park is a left handed  68 y.o. year old female who presents with Tremor.    HPIHistory of Present Illness:   Ms. Park is a 66 YO LH woman with h/o CNS lymphoma, neuropathy, ET here for f/u ET.      Past Hx: First seen by Dr. Ng in 2015 for dizziness and tremor. The tremor started in 2005 in the hands and sometimes head which worsened over time. She was on gabapentin for neuropathy without improvement in tremor. She was diagnosed with ET and started on propranolol. 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 Primidone was added in 2021 for ET. In April 2022 she was started on abilify and tremors worsened; she was noted to have parkinsonism so abilify was dc'd in Aug 2022 with improvement. Last visit Jan 2023 propranolol was lowered due to dizziness and low BP.     Interval Hx:  She still has tremor while eating and doing  make up and writing.Tremor is not constant and fluctuate.She has some imbalance but no fall.Higher dose of Primidone made her loopy.    Current ET meds:  Primidone 250mg qhs  Propranolol ER 60mg daily  Pregabalin 200mg BID          Past Medical/Surgical History:  - H/o CNS lymphoma with LF mass s/p cytarabine, procarbazine, vincristine, methotrexate [id number] c/b neuropathy  - ET  - bl avascular necrosis of the hips c/b infected R total hip replacement c/b pain and gait difficulty  - Depression  - Anxiety  - Bl nephrolithiasis with prior L ureteral stent  - Ovarian cancer s/p ileostomy in 2020  - Portal vein thrombosis     Home Meds:  - Oxycodone 5 mg q6h PRN hip pain  - Methadone 5 mg TID  - Clonazepam 0.25 mg qhs  - Duloxetine DR 60 mg qhs  - Buspar 5mg TID  - Bupropion 75mg daily  - Gabapentin 300 mg TID  - Ibuprofen 600 mg q6h PRN pain  - Propranolol 60 mg  qhs  - Vitamin D3 2000 IU daily  - Calcium citrate 500 mg daily  - Probiotic  - Medical marijuana     Social History:   - Living situation: Lives with   - Baseline function: Ambulates without assist device  - Occupation: Retired. Worked as a    - Tobacco use: Former  - Alcohol use: Drinks wine  - Illicit drug use: Medical marijuana     Family History:   - Maternal aunt and mother with PD              Patient Health Questionnaire-2 Score: 0          Review of Systems  All other system have been reviewed and are negative for complaint.  Objective   Neurological Exam    Physical Exam        Head tremor  L>R postural tremor  bl kinetic tremor  Gait is slow due to R hip pain when ambulating                        Assessment/Plan Ms. Park is a 68 YO LH woman with h/o CNS lymphoma, neuropathy, ET here for f/u ET. Tremor is stable on propranolol, primidone, pregabalin. Tremor is bothersome  cannot do at times writing and make up and eating bcz of tremor. Increasing dose of Primidone in the past made her loopy and  tired.We are not able to incrase Proparnolol bcz of HR 62.We discussed about DBS surgery. And we plan arrange educational visit with Max.  Plan:   -Take primidone to 100 mg qAM, 150 mg qPM   - Continue Propranolol ER 60mg daily  - Continue Pregabalin 200mg BID  -PT for balance  -Educational visit with Max about DBS  - RTC 6 months

## 2023-11-15 NOTE — PROGRESS NOTES
68-year-old female with a complicated history 1 year out from headliner exchange of her right hip replacement complicated by infection.  Since then she has been essentially the same since that surgery.  She is never really been happy after her hip replacement and has always had pain and a slow gait since surgery despite radiographic success.    The patient does not endorse fevers and chills. ~He/She~ does not endorse any change in her vision or hearing. ~He/She~ does not endorse chest pain, shortness of breath. ~He/She~ does not endorse any abdominal discomfort. ~He/She~ does not endorse any skin irritation or lesions. ~He/She~ does not endorse any new numbness and tingling or as otherwise stated in the history of present illness.  ~He/She~ is in no acute distress, alert and oriented x 3.    Mood and affect are appropriate.    Respirations are unlabored.    Distal limb is pink and well perfused.    Right lower extremity evaluation demonstrates well-healed surgical incision without signs of erythema or swelling.  Incision is mildly tender to palpation.  No pain to passive or active motion of the hip.  Sensation is intact to light touch in the tibial, sural, saphenous, superficial peroneal, and deep peroneal nerve distributions. Foot is warm and well-perfused.    Multiple x-rays taken of the hip and pelvis today demonstrate a well aligned well fixed total hip arthroplasty without signs of polyethylene wear.     68-year-old female 1 year out from the head/liner exchange for infection and postoperative pain after hip replacement.  She is done well since her most recent surgery and is not showing any signs of recurrence of infection but also not doing spectacular after atypically very successful operation.  I want to make sure that not missing anything and I will have her see one of my arthroplasty partners for evaluation.  She could have an occult infection which would require explantation of all of her components which  I would have to defer to one of my partners for.  Otherwise if things look good I am not sure what else could benefit her aside from may be following with the physical medicine and rehabilitation physician which I did offer her today.    Natural History reviewed. All questions answered. ~He/She~ was in agreement with the plan.      **This note was created using voice recognition software and was not corrected for typographical or grammatical errors.**    Natural History reviewed. All questions answered. ~He/She~ was in agreement with the plan.      **This note was created using voice recognition software and was not corrected for typographical or grammatical errors.**

## 2023-11-15 NOTE — PATIENT INSTRUCTIONS
Since you still have tremor which is bothersome.  The next option could be DBS surgery.  Think about that and we can arrange you with nurse practitioner and educate about DBS surgery.    We put a referral for physical therapy for balance as well.

## 2023-11-21 DIAGNOSIS — M16.12 PRIMARY OSTEOARTHRITIS OF LEFT HIP: Primary | ICD-10-CM

## 2023-11-27 ENCOUNTER — TELEPHONE (OUTPATIENT)
Dept: ADMISSION | Facility: HOSPITAL | Age: 68
End: 2023-11-27
Payer: MEDICARE

## 2023-11-27 DIAGNOSIS — G89.29 OTHER CHRONIC PAIN: Primary | ICD-10-CM

## 2023-11-27 DIAGNOSIS — T45.1X5A CHEMOTHERAPY-INDUCED PERIPHERAL NEUROPATHY (MULTI): ICD-10-CM

## 2023-11-27 DIAGNOSIS — G62.0 CHEMOTHERAPY-INDUCED PERIPHERAL NEUROPATHY (MULTI): ICD-10-CM

## 2023-11-27 RX ORDER — METHADONE HYDROCHLORIDE 10 MG/1
10 TABLET ORAL EVERY 12 HOURS
Qty: 60 TABLET | Refills: 0 | Status: SHIPPED | OUTPATIENT
Start: 2023-11-27 | End: 2023-12-27 | Stop reason: SDUPTHER

## 2023-11-27 NOTE — TELEPHONE ENCOUNTER
Pt requesting a refill of methadone 10mg every 12hrs.  She'd like prescription sent to [facility] in [facility].

## 2023-11-27 NOTE — TELEPHONE ENCOUNTER
OARRS report reviewed and reflects  prescription history, no aberrancy noted. Per OARRS, patient last filled Methadone#60/30 day supply on 10/25/23. Per last visit with Arin Castrejon NP  on 10/11/23 patient to continue Methadone. Patient with follow up visit  requested on 1/10/24 but not scheduled with Arin Castrejon  Patient updated that medication will be sent to requested Pharmacy. Refill request routed to provider.

## 2023-11-28 ENCOUNTER — APPOINTMENT (OUTPATIENT)
Dept: PAIN MEDICINE | Facility: CLINIC | Age: 68
End: 2023-11-28
Payer: MEDICARE

## 2023-11-28 PROBLEM — F32.A DEPRESSION, UNSPECIFIED: Status: ACTIVE | Noted: 2022-12-01

## 2023-11-28 PROBLEM — I10 ESSENTIAL (PRIMARY) HYPERTENSION: Status: ACTIVE | Noted: 2022-12-01

## 2023-11-28 PROBLEM — C85.89: Status: ACTIVE | Noted: 2022-12-01

## 2023-11-28 PROBLEM — Z79.891 LONG TERM (CURRENT) USE OF OPIATE ANALGESIC: Status: ACTIVE | Noted: 2022-12-01

## 2023-11-28 PROBLEM — M00.9: Status: ACTIVE | Noted: 2022-12-01

## 2023-11-28 PROBLEM — L03.115 CELLULITIS OF RIGHT LOWER LIMB: Status: ACTIVE | Noted: 2022-12-01

## 2023-11-28 PROBLEM — E78.5 HYPERLIPIDEMIA, UNSPECIFIED: Status: ACTIVE | Noted: 2022-12-01

## 2023-11-28 PROBLEM — C56.9 MALIGNANT NEOPLASM OF UNSPECIFIED OVARY (MULTI): Status: ACTIVE | Noted: 2022-12-01

## 2023-11-28 PROBLEM — G89.29 OTHER CHRONIC PAIN: Status: ACTIVE | Noted: 2022-12-01

## 2023-11-30 DIAGNOSIS — F33.0 MILD EPISODE OF RECURRENT MAJOR DEPRESSIVE DISORDER (CMS-HCC): ICD-10-CM

## 2023-11-30 RX ORDER — BUPROPION HYDROCHLORIDE 150 MG/1
150 TABLET ORAL DAILY
Qty: 90 TABLET | Refills: 0 | Status: SHIPPED | OUTPATIENT
Start: 2023-11-30 | End: 2023-12-14 | Stop reason: SDUPTHER

## 2023-12-01 ENCOUNTER — OFFICE VISIT (OUTPATIENT)
Dept: ORTHOPEDIC SURGERY | Facility: CLINIC | Age: 68
End: 2023-12-01
Payer: MEDICARE

## 2023-12-01 ENCOUNTER — LAB (OUTPATIENT)
Dept: LAB | Facility: LAB | Age: 68
End: 2023-12-01
Payer: MEDICARE

## 2023-12-01 VITALS — HEIGHT: 62 IN | WEIGHT: 150 LBS | BODY MASS INDEX: 27.6 KG/M2

## 2023-12-01 DIAGNOSIS — M25.551 RIGHT HIP PAIN: Primary | ICD-10-CM

## 2023-12-01 DIAGNOSIS — M25.551 RIGHT HIP PAIN: ICD-10-CM

## 2023-12-01 DIAGNOSIS — E78.5 HYPERLIPIDEMIA, UNSPECIFIED HYPERLIPIDEMIA TYPE: ICD-10-CM

## 2023-12-01 LAB
ALBUMIN SERPL BCP-MCNC: 4.1 G/DL (ref 3.4–5)
ALP SERPL-CCNC: 97 U/L (ref 33–136)
ALT SERPL W P-5'-P-CCNC: 22 U/L (ref 7–45)
ANION GAP SERPL CALC-SCNC: 15 MMOL/L (ref 10–20)
AST SERPL W P-5'-P-CCNC: 14 U/L (ref 9–39)
BILIRUB SERPL-MCNC: 0.5 MG/DL (ref 0–1.2)
BUN SERPL-MCNC: 20 MG/DL (ref 6–23)
CALCIUM SERPL-MCNC: 9.1 MG/DL (ref 8.6–10.3)
CHLORIDE SERPL-SCNC: 104 MMOL/L (ref 98–107)
CHOLEST SERPL-MCNC: 277 MG/DL (ref 0–199)
CHOLESTEROL/HDL RATIO: 4.6
CO2 SERPL-SCNC: 25 MMOL/L (ref 21–32)
CREAT SERPL-MCNC: 0.81 MG/DL (ref 0.5–1.05)
CRP SERPL-MCNC: 0.82 MG/DL
ERYTHROCYTE [SEDIMENTATION RATE] IN BLOOD BY WESTERGREN METHOD: 6 MM/H (ref 0–30)
GFR SERPL CREATININE-BSD FRML MDRD: 79 ML/MIN/1.73M*2
GLUCOSE SERPL-MCNC: 59 MG/DL (ref 74–99)
HDLC SERPL-MCNC: 60.4 MG/DL
LDLC SERPL CALC-MCNC: 187 MG/DL
NON HDL CHOLESTEROL: 217 MG/DL (ref 0–149)
POTASSIUM SERPL-SCNC: 5.3 MMOL/L (ref 3.5–5.3)
PROT SERPL-MCNC: 6.3 G/DL (ref 6.4–8.2)
SODIUM SERPL-SCNC: 139 MMOL/L (ref 136–145)
TRIGL SERPL-MCNC: 150 MG/DL (ref 0–149)
VLDL: 30 MG/DL (ref 0–40)

## 2023-12-01 PROCEDURE — 99215 OFFICE O/P EST HI 40 MIN: CPT | Performed by: STUDENT IN AN ORGANIZED HEALTH CARE EDUCATION/TRAINING PROGRAM

## 2023-12-01 PROCEDURE — 36415 COLL VENOUS BLD VENIPUNCTURE: CPT

## 2023-12-01 PROCEDURE — 1125F AMNT PAIN NOTED PAIN PRSNT: CPT | Performed by: STUDENT IN AN ORGANIZED HEALTH CARE EDUCATION/TRAINING PROGRAM

## 2023-12-01 PROCEDURE — 80053 COMPREHEN METABOLIC PANEL: CPT

## 2023-12-01 PROCEDURE — 1036F TOBACCO NON-USER: CPT | Performed by: STUDENT IN AN ORGANIZED HEALTH CARE EDUCATION/TRAINING PROGRAM

## 2023-12-01 PROCEDURE — 80061 LIPID PANEL: CPT

## 2023-12-01 PROCEDURE — 86140 C-REACTIVE PROTEIN: CPT

## 2023-12-01 PROCEDURE — 1159F MED LIST DOCD IN RCRD: CPT | Performed by: STUDENT IN AN ORGANIZED HEALTH CARE EDUCATION/TRAINING PROGRAM

## 2023-12-01 PROCEDURE — 1160F RVW MEDS BY RX/DR IN RCRD: CPT | Performed by: STUDENT IN AN ORGANIZED HEALTH CARE EDUCATION/TRAINING PROGRAM

## 2023-12-01 PROCEDURE — 99417 PROLNG OP E/M EACH 15 MIN: CPT | Performed by: STUDENT IN AN ORGANIZED HEALTH CARE EDUCATION/TRAINING PROGRAM

## 2023-12-01 PROCEDURE — 85652 RBC SED RATE AUTOMATED: CPT

## 2023-12-01 ASSESSMENT — PAIN SCALES - GENERAL: PAINLEVEL_OUTOF10: 3

## 2023-12-01 ASSESSMENT — PAIN - FUNCTIONAL ASSESSMENT: PAIN_FUNCTIONAL_ASSESSMENT: 0-10

## 2023-12-01 ASSESSMENT — PAIN DESCRIPTION - DESCRIPTORS: DESCRIPTORS: ACHING

## 2023-12-01 NOTE — PROGRESS NOTES
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 Stu Flores at )  11/22/2022: R NIKKI gluteal alonzo-troch abscess I&D, head/liner exchange, abx bead placement (Dr. Stu Flores at )  Intra-op cx NGF  Postop abx: 8wk IV vanc  R knee: N  L hip: N  L knee: N  Lumbar spine surgery/pathology: N  Hx of infxn in native or replaced hip/knee: N        CC/SUBJECTIVE/HPI  PCP: Zara Mcdermott, APRN-CNP  Referring provider: Dr. Stu Flores  Occupation: Retired  68yF presenting for R painful revision NIKKI.  Her index R NIKKI was 5/19/2022 for femoral head AVN, and her chronic R hip pain never subsided following surgery. On 11/20/2022, she presented to [facility] with increasing R hip pain and signs of systemic illness. She was given vanc/zosyn and transferred to [facility], where she was placed on vanc/cefepime. IR-guided aspiration of R gluteal peritrochanteric fluid collection showed purulent fluid but NGF (I see no cell count in system). Underwent 11/22/2022 R NIKKI I&D, head/liner exchange. Tissue cx results from this were also NGF. Given high clinical suspicion for infxn, it was thought that her pre-specimen broad spectrum abx may have confounded cx, and she received 8wk IV vanc with ID guidance. She most recently saw Dr. Flores 11/15/2023 with ongoing R NIKKI pain. No f/c/n/v or recurrent signs of infxn. He referred her to me for a second opinion. 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    Lower extremity MSK history also notable for  none  R hip symptoms  Pain: 3/10  Onset: chronic/gradual  Duration: >2yrs  Location: side of hip, buttock, front thigh, and back/spine  Quality: radiate  Associated symptoms: pain after activity, limp, weakness, feeling unstable, and activities the patient would like to do but can't (walk my dog)  Ambulation limit due to pain: unlimited but hurts later  Other symptoms: none  R hip treatment to date  Tried: PT and  narcotics  Most recent injection date: none  Assistive device: none  Treatment attempted for >2yrs and is never effective  Prior opinions: none        HISTORIES  Dental Health  Pt-reported on form today: Last visit 9/2023, no issues    PMH  Pt-reported on form today: Denies heart/lung/kidney/liver issues, diabetes, stroke, seizure, bariatric surgery, anticoagulants, MRSA, cancer, personal/familial coagulopathies except as noted: cancer (Type: CNS lymphoma 2020, ovarian 2020. Status: Both in remission. Ever metastatic: N)  System-generated (Note: PMH and problem list both included for completeness since recent EMR change caused discrepancies):   Past Medical History:   Diagnosis Date    Anxiety disorder, unspecified     Anxiety    Family history of malignant neoplasm of breast 02/28/2019    Family history of breast cancer    Gynecologic cancer (CMS/HCC) 09/18/2023    Other conditions influencing health status     Nephrolithiasis    Personal history of malignant neoplasm of other parts of uterus 08/14/2020    History of malignant neoplasm of other parts of uterus    Personal history of non-Hodgkin lymphomas 08/14/2020    History of non-Hodgkin's lymphoma    Personal history of other diseases of the circulatory system     History of hypertension    Personal history of other diseases of the digestive system     History of irritable bowel syndrome    Personal history of other diseases of the respiratory system 01/10/2014    History of acute bronchitis    Personal history of other endocrine, nutritional and metabolic disease     History of hypercholesterolemia    Personal history of other malignant neoplasm of skin     History of basal cell carcinoma    Personal history of other specified conditions 10/28/2014    History of fibrocystic disease of breast    Portal vein thrombosis 09/18/2023    PORTAL VEIN THROMBOSIS I81    Presence of spectacles and contact lenses 12/09/2014    Wears contact lenses      Patient Active  Problem List   Diagnosis    Anxiety    Avascular necrosis of bone of hip (CMS/HCC)    Binge-eating disorder, severe    Cardiac conduction disorder    Chemotherapy-induced peripheral neuropathy (CMS/HCC)    Chronic low back pain with sciatica    Chronic pain    Neoplasm related pain    Claustrophobia    Cognitive impairment    Colitis    Deafferentation pain    Dense breasts    Depressed mood    Dry eye syndrome    Essential tremor    Neuropathy    Fatigue    History of laser refractive surgery    History of lymphoma    History of ovarian cancer    Hydronephrosis with renal and ureteral calculus obstruction    Hydronephrosis, left    Hypertension    Infection of right prosthetic hip joint (CMS/HCC)    Osteoarthritis of right hip    Low platelet count (CMS/HCC)    MDD (major depressive disorder)    Depression    Mild episode of recurrent major depressive disorder (CMS/HCC)    Nephrolithiasis    Calculus of kidney    Weakness of extremity    Muscle weakness    Neutropenic fever (CMS/HCC)    Foot pain    Greater trochanteric bursitis of right hip    Leg pain    Myofascial pain    Numbness of lower extremity    Pain of thigh    Pelvic pain    Sciatica of right side    Trochanteric bursitis    Osteopenia    Parkinson's disease    Pelvic mass    Peripheral retinal hole of left eye    Presbyopia    Iliotibial band syndrome of right side    Right hip pain    Sacroiliac joint pain    Sacroiliac joint dysfunction of right side    S/P exploratory laparotomy    Right shoulder pain    Shuffling gait    Status post right hip replacement    Stress fracture of right foot    Swelling of right lower extremity    Tendinitis of right rotator cuff    Tendinitis of upper biceps tendon of right shoulder    Thyroid nodule    Thyroid incidentaloma    Unsteadiness on feet    RADHA III (vulvar intraepithelial neoplasia III)    Vulvar lesion    Tremor    Depression, unspecified    Hyperlipidemia, unspecified    Malignant neoplasm of unspecified  ovary (CMS/HCC)    Other chronic pain    Pyogenic arthritis, unspecified (CMS/HCC)    Essential (primary) hypertension    Long term (current) use of opiate analgesic    Oth types of non-hodg lymph, extrnod and solid organ sites (CMS/HCC)    Cellulitis of right lower limb     PSH  Pt-reported on form today: Per above.   System-generated:   Past Surgical History:   Procedure Laterality Date    LITHOTRIPSY  08/19/2013    Renal Lithotripsy    OTHER SURGICAL HISTORY  08/19/2013    Lithotomy    OTHER SURGICAL HISTORY  11/25/2019    Ileostomy closure     Family Hx  Pt-reported clot/coagulopathies on form today: none  System-generated:   Family History   Problem Relation Name Age of Onset    Breast cancer Mother      Stroke Mother      Colon cancer Father       Social Hx  Pt-reported substance use (etoh, tob, illicits) on form today: EtOH (1 glass wine/night)  System-generated:   Social History     Tobacco Use    Smoking status: Never    Smokeless tobacco: Never   Substance Use Topics    Alcohol use: Yes     Comment: 1 glass of wine daily    Drug use: Yes     Types: Marijuana     Comment: medical card / gummies     Allergies  Pt-reported (meds, metals) on form today: N  System-generated:   Allergies   Allergen Reactions    Erythromycin Other     Current Meds  Pt-reported narcotics on form today: methadone per oncology team  System-generated:   Current Outpatient Medications:     acetaminophen (Tylenol) 325 mg tablet, Take by mouth every 8 hours., Disp: , Rfl:     buPROPion XL (Wellbutrin XL) 150 mg 24 hr tablet, Take 1 tablet (150 mg) by mouth once daily. Do not crush, chew, or split., Disp: , Rfl:     busPIRone (Buspar) 5 mg tablet, Take 1 tablet (5 mg) by mouth twice a day., Disp: , Rfl:     calcium carbonate (Oscal) 500 mg calcium (1,250 mg) tablet, Take 1 tablet (1,250 mg) by mouth 2 times a day with meals., Disp: , Rfl:     cholecalciferol (Vitamin D-3) 50 mcg (2,000 unit) capsule, Take 1 capsule (50 mcg) by mouth once  "daily., Disp: , Rfl:     clonazePAM (KlonoPIN) 0.5 mg tablet, 1 TAB(S) ORALLY ONCE A DAY TO TAKE PRIOR TO MRI, Disp: , Rfl:     DULoxetine (Cymbalta) 60 mg DR capsule, Take by mouth once daily., Disp: , Rfl:     hydrOXYzine HCL (Atarax) 25 mg tablet, Take by mouth., Disp: , Rfl:     methadone (Dolophine) 10 mg tablet, Take 1 tablet (10 mg) by mouth every 12 hours., Disp: 60 tablet, Rfl: 0    multivitamin tablet, Take 1 tablet by mouth once daily., Disp: , Rfl:     pregabalin (Lyrica) 100 mg capsule, Take 1 capsule (100 mg) by mouth 3 times a day., Disp: 90 capsule, Rfl: 2    primidone (Mysoline) 50 mg tablet, Take 2 tablets in the morning and  3 tablets in the afternoon, Disp: 150 tablet, Rfl: 11    propranolol LA (Inderal LA) 60 mg 24 hr capsule, Take 1 capsule (60 mg) by mouth once daily., Disp: 30 capsule, Rfl: 11    ROS: Neg except HPI        OBJECTIVE  Physical exam  Estimated body mass index is 27.53 kg/m² as calculated from the following:    Height as of 9/21/23: 1.6 m (5' 3\").    Weight as of 10/11/23: 70.5 kg (155 lb 6.8 oz).  Gen/psych: NAD, conversational, appropriate    Ambulation  Gait: antalgic  Assistive device: none  Spine  Lumbar spine tenderness: neg  Limited ROM: neg, with no radiation or increased pain with flex/ex, lateral bending  Straight leg raise test: neg    Operated leg focused MSK exam: R  Hip  Trendelenberg test: mildly positive  Skin/incision: well healed, likely posterior approach  Tenderness: peritrochanteric only mildly  Pain with log roll: neg  ROM: within expected range, nonpainful  Neurovascular    Strength: 4+/5 hip/knee/ankle flexion and extension  Sensory (L2-S1): SILT throughout lower extremity except mild bilat distal neuropathy  Edema/stasis: no pitting edema  Pulse: DP 1+, PT 1+    Labs  12/1/ 2023  Systemic labs  CRP 0.82mg/L  ESR 6mm/h    Imaging  11/15/2023 R hip(s) radiographs (AP Pelvis, AP/Lateral) on my read: NIKKI components in acceptable position (mildly anteverted " cup) with no sign of gross implant/hardware failure.        ASSESSMENT & PLAN  Patient verbalized understanding of below A&P. All questions answered.  #1: R painful revision NIKKI  Differential dx  Unfortunately, it is not clear what continues to cause Saba's discomfort that has been present since her index procedure.  Possibly consistent with today's H&P  Unfortunately, a certain percentage of patients experience chronic pain for unknown reasons following arthroplasty. Although it is an active area of research, we do not currently have an effective way to help these patients.  Saba has never felt a window of relief following her index procedure, so this is likely playing at least some role.  With her infection, several other active health issues, and cancer concerns, Saba feels she never had a proper course of recovery from her surgery.  I agree with this, and I think deconditioning is playing a role.  Given her hx of DAIR and NGF presumed infxn, repeat and/or persistent infxn is a possibility. Off abx >2wks and using chronic PJI (>3wks known duration) criteria (serum CRP >10mg/L , serum ESR >30mm/hr), Saba has ~95% chance of not being infected.  Less consistent with today's H&P: Pain from an extra-articular source (radicular from spine, vascular claudication, neuropathy, peripheral nerve injury, hernia), alonzo-articular pain (GT bursitis, ITB syndrome, iliopsoas tendonitis, muscle weakness), instability/subluxation, component malposition, loosening, fracture, eccentric poly wear, material problem (metallosis, metal allergy), CRPS, nonorganic cause.  Shared decision making  I had a long discussion with Saba regarding these possibilities.  We are both happy to learn that she does not have signs of a recurrent infection.  She is going to continue to work hard with physical therapy.  F/u  Saba can follow-up with me or Dr. Flores at any time.  Time  I spent 90min preparing to see pt, obtaining/reviewing separately obtained hx,  performing necessary/appropriate PE/eval, independently interpreting results and communicating them with counseling/education to pt/fam/caregiver, placing orders, communicating/coordinating care with other providers, and documenting in EMR.

## 2023-12-06 DIAGNOSIS — E78.5 DYSLIPIDEMIA: Primary | ICD-10-CM

## 2023-12-13 ENCOUNTER — ALLIED HEALTH (OUTPATIENT)
Dept: INTEGRATIVE MEDICINE | Facility: CLINIC | Age: 68
End: 2023-12-13
Payer: MEDICARE

## 2023-12-13 DIAGNOSIS — R45.89 DEPRESSED MOOD: ICD-10-CM

## 2023-12-13 DIAGNOSIS — G89.3 NEOPLASM RELATED PAIN: Primary | ICD-10-CM

## 2023-12-13 DIAGNOSIS — Z85.43 HISTORY OF OVARIAN CANCER: ICD-10-CM

## 2023-12-13 DIAGNOSIS — T45.1X5A CHEMOTHERAPY-INDUCED PERIPHERAL NEUROPATHY (MULTI): ICD-10-CM

## 2023-12-13 DIAGNOSIS — G62.0 CHEMOTHERAPY-INDUCED PERIPHERAL NEUROPATHY (MULTI): ICD-10-CM

## 2023-12-13 DIAGNOSIS — Z85.72 HISTORY OF LYMPHOMA: ICD-10-CM

## 2023-12-13 PROCEDURE — 99213 OFFICE O/P EST LOW 20 MIN: CPT | Performed by: HOSPITALIST

## 2023-12-13 ASSESSMENT — ENCOUNTER SYMPTOMS
TROUBLE SWALLOWING: 0
WHEEZING: 0
ARTHRALGIAS: 0
PALPITATIONS: 0
COUGH: 0
FREQUENCY: 0
CHILLS: 0
ABDOMINAL DISTENTION: 0
DIFFICULTY URINATING: 0
CONSTIPATION: 0
HEADACHES: 0
FEVER: 0

## 2023-12-13 ASSESSMENT — PAIN SCALES - GENERAL: PAINLEVEL_OUTOF10: 3

## 2023-12-13 NOTE — PROGRESS NOTES
Acupuncture Visit:     Subjective   Patient ID: Saba Park is a 68 y.o. female who presents for No chief complaint on file.  Feeling similar  3/10  Similar location  Bad at the end of the day still.  Right foot neuropathy  Sleep good  Energy- good.         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  Worse this past year since August  Working with psychiatrist, on meds     Neuropathy R foot- tingling, numbness, pain at night  Constant, can be worse at night  Has had PT, chiro- doing estim    Balance is poor, sometimes with dizziness, needs support  No falls    Chemo brain- trouble recalling things occ    Has essential tremors. Has gotten worse    MVI  D  Ca    Sleep- always fatigued due to meds, lyrica,   Fatigue is constant    Diet: good             Pre-treatment Assessment  Pain Score: 3  Anxiety Level (0-10): 6  Stress Level (0-10): 6  Coping Level (0-10): 6  Depression Level (0-10): 5  Fatigue Level (0-10): 5  Nausea Level (0-10): 0  Wellbeing Level (0-10): 7    Review of Systems   Constitutional:  Negative for chills and fever.        Night sweats   HENT:  Negative for ear pain, tinnitus and trouble swallowing.    Eyes:  Negative for visual disturbance.   Respiratory:  Negative for cough and wheezing.    Cardiovascular:  Negative for chest pain and palpitations.   Gastrointestinal:  Negative for abdominal distention and constipation.   Genitourinary:  Negative for difficulty urinating and frequency.   Musculoskeletal:  Negative for arthralgias.   Skin:  Positive for rash.        psoriasisi   Neurological:  Negative for headaches.            Provider reviewed plan for the acupuncture session, precautions and contraindications. Patient/guardian/hospital staff has given  consent to treat with full understanding of what to expect during the session. Before acupuncture began, provider explained to the patient to communicate at any time if the procedure was causing discomfort past their tolerance level. Patient agreed to advise acupuncturist. The acupuncturist counseled the patient on the risks of acupuncture treatment including pain, infection, bleeding, and no relief of pain. The patient was positioned comfortably. There was no evidence of infection at the site of needle insertions.    Objective   Physical Exam       Acupuncture Treatment  Needle Guage: 36 guage /.20/ Blue seirin  Body Points: With retention  Body Points - Bilateral: Du 20, Li4, Si3, SP6, KI6, LR3  Body Points - Right: GB40, UB62, ba nadeem  Auricular Points: Yes  Auricular Points - Bilateral: BFA 1-3  Other Techniques Utilized: TDP Lamp  TDP Lamp Descripton: feet  Needle Count In: 22  Needle Count Out: 22  Needle Retention Time (min): 25 minutes  Total Face to Face Time (min): 45 minutes                 Post-treatment Assessment  Pain Score: 3  Anxiety Level (0-10): 3  Stress Level (0-10): 3  Coping Level (0-10): 5  Depression Level (0-10): 3  Fatigue Level (0-10): 3  Nausea Level (0-10): 0  Wellbeing Level (0-10): 5    Assessment/Plan

## 2023-12-13 NOTE — PROGRESS NOTES
Patient ID: Saba Park is a 68 y.o. female.  Referring Physician: No referring provider defined for this encounter.  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) - ritux/methotrexate, vincristine, procarbazine  Remission since 2015  C/b R sided weakness, R leg weakness, neuropathy, balance issues - saw PT  Chronic R leg pain, dependant on oxycodone, on methadone and seeing pain mgmt  AVN d/t steroids R hip     11/18 Ovarian ca - 2/19 lap surgery - chemo with taxol x 3 with good response  5/19 ROMY/BSO, rectal resection, ileostomy, adjuvant 3-4 mo chemo c/b C diff     5/22 THR f/b PT - c/b infection and replaced, needed long term IV abx  Kidney stones     INTEGRATIVE HISTORY:     Symptoms:  Depression - f/b psych - on welbutrin, buspar     Pain in R Hip - tried chiropractor, seen by acupuncture     Neuropathy R foot - ball of foot, toes, pain - methadone, gabapentin etc.    ROS:  no ha, visual symptoms, hearing loss  no sob, chest pain, palp  ROS o/w non contributory, please see HPI    Objective    BSA: There is no height or weight on file to calculate BSA.  There were no vitals taken for this visit.    PHYSICAL EXAM:  NAD, awake/alert  HEENT, NCAT, OP clear, no oral lesions  CTA bilat  RRR no mgr  Abd soft/nt/nd+bs  No c/c/e/ttp  Motor/sensory intact, CN 2-12 intact     RESULTS:  Lab Results   Component Value Date    WBC 5.6 05/10/2023    HGB 13.6 05/10/2023    HCT 42.4 05/10/2023     05/10/2023     10/09/2020    CREATININE 0.81 12/01/2023    AST 14 12/01/2023     6.0 07/12/2023     4.0 02/06/2023     6.3 11/27/2022     10.2 10/04/2022     8.5 07/13/2022     5.4 05/18/2022     5.6 11/17/2021     5.3 05/17/2021     4.4 02/01/2021     5.4 11/11/2020     6.3 07/16/2020     4.8 03/30/2020     3.6 12/20/2019     5.7  09/11/2019     4.7 08/19/2019     6.0 07/22/2019     5.5 07/03/2019     12.4 05/02/2019     14.1 04/11/2019     45.6 (H) 03/21/2019     151.9 (H) 02/27/2019     76.7 (H) 01/16/2019       Assessment/Plan   Cancer Staging   No matching staging information was found for the patient.    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 ([facility]) - ritux/methotrexate, vincristine, procarbazine  Remission since 2015  C/b R sided weakness, R leg weakness, neuropathy, balance issues - saw PT  Chronic R leg pain, dependant on oxycodone, on methadone and seeing pain mgmt  AVN d/t steroids R hip     11/18 Ovarian ca - 2/19 lap surgery - chemo with taxol x 3 with good response  5/19 ROMY/BSO, rectal resection, ileostomy, adjuvant 3-4 mo chemo c/b C diff     5/22 THR f/b PT - c/b infection and replaced, needed long term IV abx  Kidney stones     INTEGRATIVE HISTORY:     Symptoms:  Depression - f/b psych - on welbutrin, buspar, cont to see psych and follow up on medication reccs.     Pain in R Hip - tried chiropractor, seen by acupuncture - would set up for [facility]  Diff walking now  Has Medical Marijuana card, gummies, has CBD/THC - helps forget about it  May be also related to AVN - consider perhaps hyperbaric oxygen or cryotherapy or local cryotherapy at [facility]  Cont with pain management  Hurts at end of the day     Neuropathy R foot - ball of foot, toes, pain - methadone, gabapentin etc.  Would recc acupuncture  Scrambler therapy - pending soon in Jan  Bottom of foot worse     Balance is getting worse, 11/22 B12 [id number]  Could be related to neuropathy, worse since ovarian ca  MR brain neg 7/23  Consider Reiki - consider at the [facility]  Meditation     Follow up:  in Symptom management clinic     SYMPTOM MANAGEMENT:  Integrative Oncology Symptom Management:     [facility]  Integrative Oncology Symptom Management clinic offers multi-disciplinary supervised care of cancer patients using Integrative Modalities billed to insurance using NCCN and SIO/ASCO guideline-driven practices.  ESAS is obtained prior to and after each treatment by the practitioner     Symptoms Managed:  R hip pain - constant, worse as day goes on     Depression - up/down     Neuropathy R foot - tingling, numbness, pain at night     Natural Products utilized:  Mvi, Vit D, ca     Integrative Treatment: Acupuncture  Session #: 2  Frequency: weekly     Referrals:  Scrambler Therapy  Recommendations:     Follow Up:  Symptom Management: weekly  Integrative Oncology:      I have personally seen the patient and supervised the treatment by the integrative practitioner during this visit.  Pt had symptoms discussed and I was present for the patient's 45 minutes of direct patient care.   Denton Silverman MD

## 2023-12-14 ENCOUNTER — TELEMEDICINE (OUTPATIENT)
Dept: BEHAVIORAL HEALTH | Facility: HOSPITAL | Age: 68
End: 2023-12-14
Payer: MEDICARE

## 2023-12-14 DIAGNOSIS — F41.9 ANXIETY: ICD-10-CM

## 2023-12-14 DIAGNOSIS — F33.0 MILD EPISODE OF RECURRENT MAJOR DEPRESSIVE DISORDER (CMS-HCC): ICD-10-CM

## 2023-12-14 PROCEDURE — 90833 PSYTX W PT W E/M 30 MIN: CPT | Performed by: PSYCHIATRY & NEUROLOGY

## 2023-12-14 PROCEDURE — 99214 OFFICE O/P EST MOD 30 MIN: CPT | Performed by: PSYCHIATRY & NEUROLOGY

## 2023-12-14 RX ORDER — BUPROPION HYDROCHLORIDE 150 MG/1
150 TABLET ORAL DAILY
Qty: 90 TABLET | Refills: 1 | Status: SHIPPED | OUTPATIENT
Start: 2023-12-14 | End: 2024-03-21 | Stop reason: DRUGHIGH

## 2023-12-14 RX ORDER — BUSPIRONE HYDROCHLORIDE 5 MG/1
TABLET ORAL
Qty: 90 TABLET | Refills: 2 | Status: SHIPPED | OUTPATIENT
Start: 2023-12-14 | End: 2024-02-01 | Stop reason: SDUPTHER

## 2023-12-14 RX ORDER — DULOXETIN HYDROCHLORIDE 60 MG/1
60 CAPSULE, DELAYED RELEASE ORAL
Qty: 90 CAPSULE | Refills: 1 | Status: SHIPPED | OUTPATIENT
Start: 2023-12-14 | End: 2024-05-10

## 2023-12-14 ASSESSMENT — PATIENT HEALTH QUESTIONNAIRE - PHQ9
4. FEELING TIRED OR HAVING LITTLE ENERGY: MORE THAN HALF THE DAYS
1. LITTLE INTEREST OR PLEASURE IN DOING THINGS: SEVERAL DAYS
3. TROUBLE FALLING OR STAYING ASLEEP OR SLEEPING TOO MUCH: NEARLY EVERY DAY
2. FEELING DOWN, DEPRESSED OR HOPELESS: MORE THAN HALF THE DAYS
7. TROUBLE CONCENTRATING ON THINGS, SUCH AS READING THE NEWSPAPER OR WATCHING TELEVISION: MORE THAN HALF THE DAYS
9. THOUGHTS THAT YOU WOULD BE BETTER OFF DEAD, OR OF HURTING YOURSELF: NOT AT ALL
6. FEELING BAD ABOUT YOURSELF - OR THAT YOU ARE A FAILURE OR HAVE LET YOURSELF OR YOUR FAMILY DOWN: SEVERAL DAYS
8. MOVING OR SPEAKING SO SLOWLY THAT OTHER PEOPLE COULD HAVE NOTICED. OR THE OPPOSITE, BEING SO FIGETY OR RESTLESS THAT YOU HAVE BEEN MOVING AROUND A LOT MORE THAN USUAL: NOT AT ALL
5. POOR APPETITE OR OVEREATING: NOT AT ALL

## 2023-12-14 ASSESSMENT — ANXIETY QUESTIONNAIRES
GAD7 TOTAL SCORE: 5
IF YOU CHECKED OFF ANY PROBLEMS ON THIS QUESTIONNAIRE, HOW DIFFICULT HAVE THESE PROBLEMS MADE IT FOR YOU TO DO YOUR WORK, TAKE CARE OF THINGS AT HOME, OR GET ALONG WITH OTHER PEOPLE: SOMEWHAT DIFFICULT
1. FEELING NERVOUS, ANXIOUS, OR ON EDGE: NOT AT ALL
6. BECOMING EASILY ANNOYED OR IRRITABLE: SEVERAL DAYS
4. TROUBLE RELAXING: NOT AT ALL
2. NOT BEING ABLE TO STOP OR CONTROL WORRYING: MORE THAN HALF THE DAYS
3. WORRYING TOO MUCH ABOUT DIFFERENT THINGS: MORE THAN HALF THE DAYS
7. FEELING AFRAID AS IF SOMETHING AWFUL MIGHT HAPPEN: NOT AT ALL
5. BEING SO RESTLESS THAT IT IS HARD TO SIT STILL: NOT AT ALL

## 2023-12-14 NOTE — PROGRESS NOTES
"Outpatient Psychiatry FUV      Subjective   Saba Park, a 68 y.o. female, for virtual FUV        Assessment/Plan   Patient Discussion:  CONTINUE duloxetine 60mg daily at bedtime   INCREASE  buspirone 5mg 2 tablets in the AM and 1 in the PM  CONTINUE bupropion 150mg daily    CONTINUE therapy    RETURN to clinic Thursday 2/1 at 11:30AM for virtual FUV    Call with questions/concerns [phone]    Assessment:   68 y.o.  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 AVN R hip dx 2016. 2019 dx with ovarian CA in surveillance s/p resection and chemo. Pt had been see several time in 2015 ahead of MD maternity leave and transferred care to Dr. Nilay Farr, who has since left . 2021 pt re-established care and here for follow up    Virtual FUV today. Since last appt, pt reports increased ruminations and irritability/mood shifts. Will titrate buspar. Follow up 1-2 months, sooner if needed.     Diagnosis:   MDD, recurrent, mild-moderate  Other spec anxiety r/o adjustment vs part of depression     Treatment Plan/Recommendations:   1. Safety Assessment: denies active thoughts of death/self harm though feels \"dark thoughts\". no h/o SI/SA. passive death wish in the past but not recently . RF include age, W, pain, cancer. PF include , engaged in care, no hx, no substance abuse, no guns. Currently low imminent risk    2. MDD, other spec anxiety r/o element of MDD vs ISIS  CONTINUE duloxetine 60mg PO QHS   INCREASE to buspar 10mg/5mg PO BID for now  CONTINUE bupropion XL 150mg PO daily, monitor anxiety.     Continue with therapy, supportive therapy during appt--pt has also   stopped seeing therapist related to worse health and not able to keep up, discussed to re-engage    3. Medical: notes and labs reviewed, noted to have stable volume loss on MRI brain with encephalomalacia of fronto-parietal lxn from CNS " "lymphoma  currently working with Arin Castrejon for pain and seeing NM specialist, PMR for neuropathy  limits to function affect coping for patient  recent hip replacement, notes continued pain and c/b infection, now in PT, reports improvement    4. Social: lives with , continue to encourage behavioral activation. notes, \"going through the motions\"     Reason for Visit:     FUV for depression, anxiety      Subjective:  Last appt 9/2023  More anxious, feels stuck in her head, especially when alone  Mood shifts with some dysregulation  Does not feel got worse with with adjustment to bupropion  Discussed  being depression or anxiety, not clear  Agreeable to adjust buspar to target ruminative anxiety  No feelings of not wanting to go on  PHQ9/GAD7 mild sx    Notes hot flashes, wonder is from meds. Discussed less likely from psych meds    Current Medications:    Current Outpatient Medications:     acetaminophen (Tylenol) 325 mg tablet, Take by mouth every 8 hours., Disp: , Rfl:     buPROPion XL (Wellbutrin XL) 150 mg 24 hr tablet, Take 1 tablet (150 mg) by mouth once daily. Do not crush, chew, or split., Disp: 90 tablet, Rfl: 0    busPIRone (Buspar) 5 mg tablet, Take 1 tablet (5 mg) by mouth twice a day., Disp: , Rfl:     calcium carbonate (Oscal) 500 mg calcium (1,250 mg) tablet, Take 1 tablet (1,250 mg) by mouth 2 times a day with meals., Disp: , Rfl:     cholecalciferol (Vitamin D-3) 50 mcg (2,000 unit) capsule, Take 1 capsule (50 mcg) by mouth once daily., Disp: , Rfl:     clonazePAM (KlonoPIN) 0.5 mg tablet, 1 TAB(S) ORALLY ONCE A DAY TO TAKE PRIOR TO MRI, Disp: , Rfl:     DULoxetine (Cymbalta) 60 mg DR capsule, Take by mouth once daily., Disp: , Rfl:     hydrOXYzine HCL (Atarax) 25 mg tablet, Take by mouth., Disp: , Rfl:     methadone (Dolophine) 10 mg tablet, Take 1 tablet (10 mg) by mouth every 12 hours., Disp: 60 tablet, Rfl: 0    multivitamin tablet, Take 1 tablet by mouth once daily., Disp: , Rfl: "     pregabalin (Lyrica) 100 mg capsule, Take 1 capsule (100 mg) by mouth 3 times a day., Disp: 90 capsule, Rfl: 2    primidone (Mysoline) 50 mg tablet, Take 2 tablets in the morning and  3 tablets in the afternoon, Disp: 150 tablet, Rfl: 11    propranolol LA (Inderal LA) 60 mg 24 hr capsule, Take 1 capsule (60 mg) by mouth once daily., Disp: 30 capsule, Rfl: 11  Medical History:  Past Medical History:   Diagnosis Date    Anxiety disorder, unspecified     Anxiety    Family history of malignant neoplasm of breast 02/28/2019    Family history of breast cancer    Gynecologic cancer (CMS/HCC) 09/18/2023    Other conditions influencing health status     Nephrolithiasis    Personal history of malignant neoplasm of other parts of uterus 08/14/2020    History of malignant neoplasm of other parts of uterus    Personal history of non-Hodgkin lymphomas 08/14/2020    History of non-Hodgkin's lymphoma    Personal history of other diseases of the circulatory system     History of hypertension    Personal history of other diseases of the digestive system     History of irritable bowel syndrome    Personal history of other diseases of the respiratory system 01/10/2014    History of acute bronchitis    Personal history of other endocrine, nutritional and metabolic disease     History of hypercholesterolemia    Personal history of other malignant neoplasm of skin     History of basal cell carcinoma    Personal history of other specified conditions 10/28/2014    History of fibrocystic disease of breast    Portal vein thrombosis 09/18/2023    PORTAL VEIN THROMBOSIS I81    Presence of spectacles and contact lenses 12/09/2014    Wears contact lenses       Surgical History:  Past Surgical History:   Procedure Laterality Date    LITHOTRIPSY  08/19/2013    Renal Lithotripsy    OTHER SURGICAL HISTORY  08/19/2013    Lithotomy    OTHER SURGICAL HISTORY  11/25/2019    Ileostomy closure       Family History:  Family History   Problem Relation  "Name Age of Onset    Breast cancer Mother      Stroke Mother      Colon cancer Father         Social History:  Social History     Socioeconomic History    Marital status:      Spouse name: Not on file    Number of children: Not on file    Years of education: Not on file    Highest education level: Not on file   Occupational History    Not on file   Tobacco Use    Smoking status: Never    Smokeless tobacco: Never   Substance and Sexual Activity    Alcohol use: Yes     Comment: 1 glass of wine daily    Drug use: Yes     Types: Marijuana     Comment: medical card / gummies    Sexual activity: Not on file   Other Topics Concern    Not on file   Social History Narrative    Not on file     Social Determinants of Health     Financial Resource Strain: Not on file   Food Insecurity: Not on file   Transportation Needs: Not on file   Physical Activity: Not on file   Stress: Not on file   Social Connections: Not on file   Intimate Partner Violence: Not on file   Housing Stability: Not on file              Medical Review Of Systems:  +neuropathy    Psychiatric Review Of Systems:  +anxious ruminations       Objective   Mental Status Exam:     Appearance: dressed neat and clean.   Attitude: cooperative and engaged.   Behavior: appropriate eye contact via video.   Motor Activity: mild truncal tremor noted.   Speech: regular volume, prosody, no aphasia.   Mood: \"ok\".   Affect: congruent, constricted, anxious.   Thought Process: on topic.   Thought Content: no delusions, no SI/HI, less hopeless.   Thought Perception: no AVH.   Cognition: alert, oriented to person, place, time. attn intact.   Insight: fair.   Judgment: fair.       Vitals:  There were no vitals filed for this visit.  No results found for this or any previous visit (from the past 4464 hour(s)).  Lab Results   Component Value Date    WBC 5.6 05/10/2023    HGB 13.6 05/10/2023    HCT 42.4 05/10/2023    MCV 94 05/10/2023     05/10/2023     Lab Results "   Component Value Date    GLUCOSE 59 (L) 12/01/2023    CALCIUM 9.1 12/01/2023     12/01/2023    K 5.3 12/01/2023    CO2 25 12/01/2023     12/01/2023    BUN 20 12/01/2023    CREATININE 0.81 12/01/2023       Time spent in therapy 21 minutes  Type: supportive  Target: mood, anxiety  Techniques: problem solving  Goal: improved sx management  Follow up: next appt  Response: therese Ugalde MD

## 2023-12-27 ENCOUNTER — TELEPHONE (OUTPATIENT)
Dept: HEMATOLOGY/ONCOLOGY | Facility: CLINIC | Age: 68
End: 2023-12-27
Payer: MEDICARE

## 2023-12-27 ENCOUNTER — TELEPHONE (OUTPATIENT)
Dept: ADMISSION | Facility: HOSPITAL | Age: 68
End: 2023-12-27
Payer: MEDICARE

## 2023-12-27 DIAGNOSIS — G62.0 CHEMOTHERAPY-INDUCED PERIPHERAL NEUROPATHY (MULTI): ICD-10-CM

## 2023-12-27 DIAGNOSIS — G89.29 OTHER CHRONIC PAIN: ICD-10-CM

## 2023-12-27 DIAGNOSIS — T45.1X5A CHEMOTHERAPY-INDUCED PERIPHERAL NEUROPATHY (MULTI): ICD-10-CM

## 2023-12-27 RX ORDER — METHADONE HYDROCHLORIDE 10 MG/1
10 TABLET ORAL EVERY 12 HOURS
Qty: 60 TABLET | Refills: 0 | Status: SHIPPED | OUTPATIENT
Start: 2023-12-27 | End: 2024-01-29 | Stop reason: SDUPTHER

## 2023-12-27 NOTE — TELEPHONE ENCOUNTER
Patient wanted to confirm she does not have an appointment on 1/10. I let her know an order was placed for this, but it was not scheduled. DEMETRIUS Castrejon is now booked. Patient agreeable to keep her 2/14 appointment. I offered a visit on 2/7, but patient declined.

## 2023-12-27 NOTE — TELEPHONE ENCOUNTER
Medication Refill-  Methadone      Pharmacy-  [facility] [id number] in [facility]

## 2023-12-27 NOTE — TELEPHONE ENCOUNTER
OARRS report reviewed and reflects  prescription history, no aberrancy noted. Per OARRS, patient due for Methadone. Per last visit with Arin Castrejon CNP on 10/11/23 patient to continue Methadone 10mg #60/30. Patient with follow up visit scheduled with Arin Castrejon CNP on 2/14/24. Patient updated that medication will be sent to [facility]. Refill request routed to provider.

## 2023-12-29 ENCOUNTER — ANCILLARY PROCEDURE (OUTPATIENT)
Dept: RADIOLOGY | Facility: CLINIC | Age: 68
End: 2023-12-29
Payer: MEDICARE

## 2023-12-29 DIAGNOSIS — Z12.31 ENCOUNTER FOR SCREENING MAMMOGRAM FOR MALIGNANT NEOPLASM OF BREAST: ICD-10-CM

## 2023-12-29 PROCEDURE — 77067 SCR MAMMO BI INCL CAD: CPT | Performed by: RADIOLOGY

## 2023-12-29 PROCEDURE — 77067 SCR MAMMO BI INCL CAD: CPT

## 2023-12-29 PROCEDURE — 77063 BREAST TOMOSYNTHESIS BI: CPT | Performed by: RADIOLOGY

## 2024-01-02 NOTE — PROGRESS NOTES
Subjective     Saba Park is a 68 y.o. year old female who presents with Tremors, here for follow up visit.    HPI  Here with friend Gayathri.     Tremor is not great but meds are sedating, very bothersome.     Tremor only noticed in LUE (she is left handed).  Also bothered by head tremor.     PT after brain surgery helped some but balance has always been off since tumor. She did not start PT.  Treatment through [facility] for neuropathy. Will be getting accupuncture. Will stop Lyrica.     Current ET meds:  Primidone 100mg in the morning and 150mg in the evening (instead of 250mg nightly)  Propranolol ER 60mg daily  Pregabalin 200mg BID       Patient Health Questionnaire-2 Score: 0            Current Outpatient Medications:     acetaminophen (Tylenol) 325 mg tablet, Take by mouth every 8 hours., Disp: , Rfl:     buPROPion XL (Wellbutrin XL) 150 mg 24 hr tablet, Take 1 tablet (150 mg) by mouth once daily. Do not crush, chew, or split., Disp: 90 tablet, Rfl: 1    busPIRone (Buspar) 5 mg tablet, TAKE 2 TABLETS BY MOUTH IN THE AM AND 1 TABLET BY MOUTH IN THE EVENING, Disp: 90 tablet, Rfl: 2    calcium carbonate (Oscal) 500 mg calcium (1,250 mg) tablet, Take 1 tablet (1,250 mg) by mouth 2 times a day with meals., Disp: , Rfl:     cholecalciferol (Vitamin D-3) 50 mcg (2,000 unit) capsule, Take 1 capsule (50 mcg) by mouth once daily., Disp: , Rfl:     clonazePAM (KlonoPIN) 0.5 mg tablet, 1 TAB(S) ORALLY ONCE A DAY TO TAKE PRIOR TO MRI, Disp: , Rfl:     DULoxetine (Cymbalta) 60 mg DR capsule, Take 1 capsule (60 mg) by mouth once daily., Disp: 90 capsule, Rfl: 1    hydrOXYzine HCL (Atarax) 25 mg tablet, Take by mouth., Disp: , Rfl:     methadone (Dolophine) 10 mg tablet, Take 1 tablet (10 mg) by mouth every 12 hours., Disp: 60 tablet, Rfl: 0    multivitamin tablet, Take 1 tablet by mouth once daily., Disp: , Rfl:     pregabalin (Lyrica) 100 mg capsule, Take 1 capsule (100  mg) by mouth 3 times a day., Disp: 90 capsule, Rfl: 2    primidone (Mysoline) 50 mg tablet, Take 2 tablets in the morning and  3 tablets in the afternoon, Disp: 150 tablet, Rfl: 11    propranolol LA (Inderal LA) 60 mg 24 hr capsule, Take 1 capsule (60 mg) by mouth once daily., Disp: 30 capsule, Rfl: 11       Objective   Visit Vitals  /74 (BP Location: Left arm, Patient Position: Sitting, BP Cuff Size: Adult)   Pulse 66   Resp 14   OB Status Postmenopausal   Smoking Status Never                Physical Exam  Awake, alert, asking/answering questions appropriately.       Assessment/Plan   Ms. Saba Park is a 68 y.o. F who presented for Deep Brain Stimulation educational visit. See my note from  1/16/23 for more detailed patient hx. Today we reviewed a PowerPoint on DBS to discuss risks, benefits, treatment plan and followup required that may take 6 to 12 months to achieve stable benefit, also showed images of the lead and battery, discussed the 3 types of systems available and allowed the patient to ask any questions-summary in patient AVS. All questions were answered.    She wants to lower primidone for fatigue, instructions provided, she will keep me updated.     Diagnoses and all orders for this visit:  Essential tremor      #To see if helpful with sedation, decrease primidone slowly as follows:    Week 1: 100mg 2 times a day  If needed, still sedated, tremor not bothersome decrease further  Week 2: 50mg in the morning and 100mg in the evening      #Deep Brain Stimulation (DBS) helps treat tremor in essential tremor. It is best for the hand tremor, though can also help head and voice tremor too. DBS stimulation can worsen balance/falls, cognition/memory/thinking, and speech - if these are major issues, then DBS may not be the best treatment. After DBS surgery, people are usually able to reduce and sometimes eliminate their tremor medications. It may take up to 6-12 months to optimize DBS settings after  surgery. There are the steps needed prior to getting DBS  1) Neuropsychological testing (several hours of cognitive testing to evaluate for mild cognitive impairment or dementia)  2 Consultation with the neurosurgeon  3) Discussion of your case at our bi-monthly DBS meeting to evaluate if you are a candidate    Let me know if you want me to start the process for 1&2 above/send consults.     We may ask that you see psychiatry if there are significant mood issues (anxiety/depression) prior to surgery.     More information can be found at:  -<https://[email or web address]/deep-brain-stimulation/>  -<[email or web address]://[email or web address].org/en/Patients/Neurosurgical-Conditions-and-Treatments/Deep-Brain-Stimulation>       IAREN, personally performed discussion of care/treatment options taking a total time of 45 minutes for today's visit.      Max Croft NP-C  Adult/Gerontological Nurse Practitioner   [facility], Department of Neurology  [facility]  [id number] [place] [address] [place] [id number]  Phone: [phone]  Fax: [phone]

## 2024-01-03 ENCOUNTER — OFFICE VISIT (OUTPATIENT)
Dept: NEUROLOGY | Facility: CLINIC | Age: 69
End: 2024-01-03
Payer: MEDICARE

## 2024-01-03 ENCOUNTER — TELEPHONE (OUTPATIENT)
Dept: PALLIATIVE MEDICINE | Facility: HOSPITAL | Age: 69
End: 2024-01-03

## 2024-01-03 VITALS — SYSTOLIC BLOOD PRESSURE: 113 MMHG | RESPIRATION RATE: 14 BRPM | DIASTOLIC BLOOD PRESSURE: 74 MMHG | HEART RATE: 66 BPM

## 2024-01-03 DIAGNOSIS — G25.0 ESSENTIAL TREMOR: Primary | ICD-10-CM

## 2024-01-03 PROCEDURE — 3074F SYST BP LT 130 MM HG: CPT | Performed by: NURSE PRACTITIONER

## 2024-01-03 PROCEDURE — 1159F MED LIST DOCD IN RCRD: CPT | Performed by: NURSE PRACTITIONER

## 2024-01-03 PROCEDURE — 1036F TOBACCO NON-USER: CPT | Performed by: NURSE PRACTITIONER

## 2024-01-03 PROCEDURE — 3078F DIAST BP <80 MM HG: CPT | Performed by: NURSE PRACTITIONER

## 2024-01-03 PROCEDURE — 99215 OFFICE O/P EST HI 40 MIN: CPT | Performed by: NURSE PRACTITIONER

## 2024-01-03 PROCEDURE — 1125F AMNT PAIN NOTED PAIN PRSNT: CPT | Performed by: NURSE PRACTITIONER

## 2024-01-03 PROCEDURE — 1160F RVW MEDS BY RX/DR IN RCRD: CPT | Performed by: NURSE PRACTITIONER

## 2024-01-03 ASSESSMENT — ENCOUNTER SYMPTOMS
DEPRESSION: 0
LOSS OF SENSATION IN FEET: 0
OCCASIONAL FEELINGS OF UNSTEADINESS: 0

## 2024-01-03 NOTE — PATIENT INSTRUCTIONS
To see if helpful with sedation, decrease primidone slowly as follows:    Week 1: 100mg 2 times a day  If needed, still sedated, tremor not bothersome decrease further  Week 2: 50mg in the morning and 100mg in the evening      Deep Brain Stimulation (DBS) helps treat tremor in essential tremor. It is best for the hand tremor, though can also help head and voice tremor too. DBS stimulation can worsen balance/falls, cognition/memory/thinking, and speech - if these are major issues, then DBS may not be the best treatment. After DBS surgery, people are usually able to reduce and sometimes eliminate their tremor medications. It may take up to 6-12 months to optimize DBS settings after surgery. There are the steps needed prior to getting DBS  1) Neuropsychological testing (several hours of cognitive testing to evaluate for mild cognitive impairment or dementia)  2 Consultation with the neurosurgeon  3) Discussion of your case at our bi-monthly DBS meeting to evaluate if you are a candidate    Let me know if you want me to start the process for 1&2 above/send consults.     We may ask that you see psychiatry if there are significant mood issues (anxiety/depression) prior to surgery.     More information can be found at:  -<https://[email or web address]/resource/deep-brain-stimulation/>  -<https://www.aans.org/en/Patients/Neurosurgical-Conditions-and-Treatments/Deep-Brain-Stimulation>

## 2024-01-05 ENCOUNTER — CLINICAL SUPPORT (OUTPATIENT)
Dept: PRIMARY CARE | Facility: CLINIC | Age: 69
End: 2024-01-05
Payer: MEDICARE

## 2024-01-05 DIAGNOSIS — Z23 NEED FOR TDAP VACCINATION: ICD-10-CM

## 2024-01-05 DIAGNOSIS — Z11.1 PPD SCREENING TEST: ICD-10-CM

## 2024-01-05 DIAGNOSIS — Z11.1 TUBERCULOSIS SCREENING: Primary | ICD-10-CM

## 2024-01-05 PROCEDURE — 36415 COLL VENOUS BLD VENIPUNCTURE: CPT

## 2024-01-05 PROCEDURE — 90471 IMMUNIZATION ADMIN: CPT | Performed by: NURSE PRACTITIONER

## 2024-01-05 PROCEDURE — 90715 TDAP VACCINE 7 YRS/> IM: CPT | Performed by: NURSE PRACTITIONER

## 2024-01-05 PROCEDURE — 86481 TB AG RESPONSE T-CELL SUSP: CPT

## 2024-01-07 LAB
NIL(NEG) CONTROL SPOT COUNT: NORMAL
PANEL A SPOT COUNT: 0
PANEL B SPOT COUNT: 4
POS CONTROL SPOT COUNT: NORMAL
T-SPOT. TB INTERPRETATION: NEGATIVE

## 2024-01-09 ENCOUNTER — APPOINTMENT (OUTPATIENT)
Dept: PAIN MEDICINE | Facility: CLINIC | Age: 69
End: 2024-01-09
Payer: MEDICARE

## 2024-01-09 PROBLEM — R39.9 SYMPTOMS INVOLVING URINARY SYSTEM: Status: ACTIVE | Noted: 2022-11-30

## 2024-01-09 PROBLEM — R51.9 HEADACHE: Status: ACTIVE | Noted: 2024-01-09

## 2024-01-09 PROBLEM — Z20.822 CONTACT WITH AND (SUSPECTED) EXPOSURE TO COVID-19: Status: ACTIVE | Noted: 2022-11-20

## 2024-01-09 PROBLEM — F32.2 MAJOR DEPRESSIVE DISORDER, SINGLE EPISODE, SEVERE (MULTI): Status: ACTIVE | Noted: 2022-12-01

## 2024-01-09 PROBLEM — Z86.79 HISTORY OF HYPERTENSION: Status: ACTIVE | Noted: 2024-01-09

## 2024-01-09 PROBLEM — M00.9: Status: ACTIVE | Noted: 2022-11-20

## 2024-01-09 PROBLEM — C80.1 PRIMARY MALIGNANT NEOPLASM (MULTI): Status: ACTIVE | Noted: 2024-01-09

## 2024-01-09 PROBLEM — G89.3: Status: ACTIVE | Noted: 2023-07-12

## 2024-01-09 PROBLEM — J20.9 ACUTE BRONCHITIS WITH BRONCHOSPASM: Status: ACTIVE | Noted: 2024-01-09

## 2024-01-09 PROBLEM — R50.9 FEVER: Status: ACTIVE | Noted: 2022-11-20

## 2024-01-09 PROBLEM — M79.2 NEUROPATHIC PAIN: Status: ACTIVE | Noted: 2023-10-11

## 2024-01-09 PROBLEM — Z86.39 HISTORY OF HYPERCHOLESTEROLEMIA: Status: ACTIVE | Noted: 2024-01-09

## 2024-01-09 PROBLEM — R19.7 DIARRHEA: Status: ACTIVE | Noted: 2022-11-30

## 2024-01-09 PROBLEM — Z93.2 ILEOSTOMY PRESENT (MULTI): Status: ACTIVE | Noted: 2024-01-09

## 2024-01-09 PROBLEM — A04.72 CLOSTRIDIOIDES DIFFICILE DIARRHEA: Status: ACTIVE | Noted: 2024-01-09

## 2024-01-09 PROBLEM — Z85.828 HISTORY OF BASAL CELL CARCINOMA (BCC): Status: ACTIVE | Noted: 2024-01-09

## 2024-01-09 PROBLEM — E66.9 OBESITY WITH BODY MASS INDEX 30 OR GREATER: Status: ACTIVE | Noted: 2024-01-09

## 2024-01-11 ENCOUNTER — OFFICE VISIT (OUTPATIENT)
Dept: PAIN MEDICINE | Facility: CLINIC | Age: 69
End: 2024-01-11
Payer: MEDICARE

## 2024-01-11 VITALS
BODY MASS INDEX: 26.58 KG/M2 | WEIGHT: 150 LBS | HEIGHT: 63 IN | RESPIRATION RATE: 16 BRPM | SYSTOLIC BLOOD PRESSURE: 104 MMHG | DIASTOLIC BLOOD PRESSURE: 62 MMHG

## 2024-01-11 DIAGNOSIS — G89.29 OTHER CHRONIC PAIN: ICD-10-CM

## 2024-01-11 DIAGNOSIS — T45.1X5A CHEMOTHERAPY-INDUCED PERIPHERAL NEUROPATHY (MULTI): Primary | ICD-10-CM

## 2024-01-11 DIAGNOSIS — M79.671 RIGHT FOOT PAIN: ICD-10-CM

## 2024-01-11 DIAGNOSIS — G62.0 CHEMOTHERAPY-INDUCED PERIPHERAL NEUROPATHY (MULTI): Primary | ICD-10-CM

## 2024-01-11 PROCEDURE — 3074F SYST BP LT 130 MM HG: CPT | Performed by: ANESTHESIOLOGY

## 2024-01-11 PROCEDURE — 1125F AMNT PAIN NOTED PAIN PRSNT: CPT | Performed by: ANESTHESIOLOGY

## 2024-01-11 PROCEDURE — 3078F DIAST BP <80 MM HG: CPT | Performed by: ANESTHESIOLOGY

## 2024-01-11 PROCEDURE — 99214 OFFICE O/P EST MOD 30 MIN: CPT | Performed by: ANESTHESIOLOGY

## 2024-01-11 PROCEDURE — 1159F MED LIST DOCD IN RCRD: CPT | Performed by: ANESTHESIOLOGY

## 2024-01-11 PROCEDURE — 99204 OFFICE O/P NEW MOD 45 MIN: CPT | Performed by: ANESTHESIOLOGY

## 2024-01-11 PROCEDURE — 1160F RVW MEDS BY RX/DR IN RCRD: CPT | Performed by: ANESTHESIOLOGY

## 2024-01-11 PROCEDURE — 1036F TOBACCO NON-USER: CPT | Performed by: ANESTHESIOLOGY

## 2024-01-11 ASSESSMENT — ENCOUNTER SYMPTOMS
HEMATOLOGIC/LYMPHATIC NEGATIVE: 1
GASTROINTESTINAL NEGATIVE: 1
ALLERGIC/IMMUNOLOGIC NEGATIVE: 1
CARDIOVASCULAR NEGATIVE: 1
WEAKNESS: 1
ENDOCRINE NEGATIVE: 1
EYES NEGATIVE: 1
NUMBNESS: 1
RESPIRATORY NEGATIVE: 1
CONSTITUTIONAL NEGATIVE: 1
PSYCHIATRIC NEGATIVE: 1
MYALGIAS: 1

## 2024-01-11 ASSESSMENT — PAIN DESCRIPTION - DESCRIPTORS: DESCRIPTORS: BURNING;NUMBNESS;TINGLING

## 2024-01-11 ASSESSMENT — PAIN - FUNCTIONAL ASSESSMENT: PAIN_FUNCTIONAL_ASSESSMENT: 0-10

## 2024-01-11 ASSESSMENT — PATIENT HEALTH QUESTIONNAIRE - PHQ9
1. LITTLE INTEREST OR PLEASURE IN DOING THINGS: NOT AT ALL
SUM OF ALL RESPONSES TO PHQ9 QUESTIONS 1 AND 2: 0
2. FEELING DOWN, DEPRESSED OR HOPELESS: NOT AT ALL

## 2024-01-11 ASSESSMENT — LIFESTYLE VARIABLES: TOTAL SCORE: 1

## 2024-01-11 ASSESSMENT — PAIN SCALES - GENERAL: PAINLEVEL_OUTOF10: 8

## 2024-01-11 NOTE — PROGRESS NOTES
Subjective   Patient ID: Saba Park is a 68 y.o. female who presents for evaluate for scrambler therapy.  HPI  Patient here today for a new patient evaluation of her lower extremity neuropathy.  She has a history of two cancers int he past.  She had CNS lymphoma of the brain and Ovarian Cancer.  She has undergone numerous round of chemotherapy in the past.  When she came out of her brain surgery she had complete numbness and paralysis on the right leg.  She was in the hospital for several months with extensive rehab.  She had severe pain in the right hip and the neuropathy in the right mid-shin to the foot.  Her symptoms are ont he lateral shin and she will get severe pain in the bottom of her foot and on the outside of her foot and toes.  Walking and standing make the pain elevated.  She always has numbness in the foot and toes.  She has had two hip replacements but still has pain.  She had a post-op complications and had a infection and had to be revised.  She has been on pregablin 100 mg tid.  She has been seeing Dr. Silverman for acupuncture and it has not be helpful yet for her.  Her pain is worse in the evening for her.  She has not found anything helpful for this pain,  She t  Review of Systems   Constitutional: Negative.    HENT: Negative.     Eyes: Negative.    Respiratory: Negative.     Cardiovascular: Negative.    Gastrointestinal: Negative.    Endocrine: Negative.    Genitourinary: Negative.    Musculoskeletal:  Positive for myalgias.   Skin: Negative.    Allergic/Immunologic: Negative.    Neurological:  Positive for weakness and numbness.   Hematological: Negative.    Psychiatric/Behavioral: Negative.         Objective   Physical Exam  Vitals and nursing note reviewed.   Constitutional:       Appearance: Normal appearance.   HENT:      Head: Normocephalic and atraumatic.      Right Ear: Ear canal and external ear normal.      Left Ear: Ear canal and external ear normal.      Nose: Nose normal.       Mouth/Throat:      Mouth: Mucous membranes are moist.      Pharynx: Oropharynx is clear.   Eyes:      Conjunctiva/sclera: Conjunctivae normal.      Pupils: Pupils are equal, round, and reactive to light.   Cardiovascular:      Rate and Rhythm: Normal rate.   Pulmonary:      Effort: Pulmonary effort is normal. No respiratory distress.   Musculoskeletal:      Cervical back: Normal range of motion and neck supple.      Lumbar back: Tenderness present. Normal range of motion.        Feet:    Feet:      Comments: Tenderness with palpation deep into the right heel.  Skin:     General: Skin is warm and dry.   Neurological:      Mental Status: She is alert and oriented to person, place, and time.      Sensory: Sensory deficit (right foot and leg stocking glove at the shin) present.      Motor: Weakness (right hip) present.      Coordination: Coordination is intact.      Gait: Gait is intact.   Psychiatric:         Mood and Affect: Mood normal.         Thought Content: Thought content normal.       Assessment/Plan   Problem List Items Addressed This Visit             ICD-10-CM       Neuro    Chemotherapy-induced peripheral neuropathy (CMS/HCC) - Primary G62.0, T45.1X5A    Other chronic pain G89.29          I nice discussion with the patient today our plan will be as follows.    Radiology: All available imaging was reviewed today.    Physically: Patient continue home exercise program.    Psychologically: No issues at this time.    Medication: Patient will need to wean off pregabalin before scrambler therapy.    Duration: Greater than 5 years.    Intervention: Patient is an excellent candidate for scrambler therapy on the right lower extremity targeting the L5 and S1 dermatomes.  Risks, benefit, and alternatives of the procedure were discussed with the patient.  Oswestry score has been compelted and recorded.    I will refer the patient to podiatry as I do think she may have some component of plantar fasciitis.    Abner AWAD  MD Dorian 01/11/24 2:07 PM

## 2024-01-12 ENCOUNTER — TELEPHONE (OUTPATIENT)
Dept: PAIN MEDICINE | Facility: CLINIC | Age: 69
End: 2024-01-12

## 2024-01-17 ENCOUNTER — TELEPHONE (OUTPATIENT)
Dept: BEHAVIORAL HEALTH | Facility: CLINIC | Age: 69
End: 2024-01-17

## 2024-01-17 ENCOUNTER — APPOINTMENT (OUTPATIENT)
Dept: INTEGRATIVE MEDICINE | Facility: CLINIC | Age: 69
End: 2024-01-17
Payer: MEDICARE

## 2024-01-22 ENCOUNTER — TELEPHONE (OUTPATIENT)
Dept: NEUROLOGY | Facility: HOSPITAL | Age: 69
End: 2024-01-22
Payer: MEDICARE

## 2024-01-22 NOTE — TELEPHONE ENCOUNTER
I spoke with patient.     Taking 50mg in the am and 100mg at night    Much more aware and alert BUT her tremors are worse though some days better than others    PLAN  Increase hs dose as tolerated--try 150mg at hs only, then if tolerating can increase to 3.5tabs at hs x 1 week, then 4 tabs the following week if tolerated.

## 2024-01-22 NOTE — TELEPHONE ENCOUNTER
Saba Park called.  She would like to give you an update on her Primidone. Please call back at [phone]

## 2024-01-29 ENCOUNTER — TELEPHONE (OUTPATIENT)
Dept: ADMISSION | Facility: HOSPITAL | Age: 69
End: 2024-01-29
Payer: MEDICARE

## 2024-01-29 DIAGNOSIS — T45.1X5A CHEMOTHERAPY-INDUCED PERIPHERAL NEUROPATHY (MULTI): ICD-10-CM

## 2024-01-29 DIAGNOSIS — G62.0 CHEMOTHERAPY-INDUCED PERIPHERAL NEUROPATHY (MULTI): ICD-10-CM

## 2024-01-29 DIAGNOSIS — G89.29 OTHER CHRONIC PAIN: ICD-10-CM

## 2024-01-29 RX ORDER — METHADONE HYDROCHLORIDE 10 MG/1
10 TABLET ORAL EVERY 12 HOURS
Qty: 60 TABLET | Refills: 0 | Status: SHIPPED | OUTPATIENT
Start: 2024-01-29 | End: 2024-02-14 | Stop reason: SDUPTHER

## 2024-01-29 NOTE — TELEPHONE ENCOUNTER
Patient last seen by DEMETRIUS Castrejon on 10/23 with plan to continue methadone 10mg BID. Follow up visit is scheduled for 2/14. OARRS reviewed and no aberrancy noted. Patient last filled Methadone 10mg #60/30 days on 12/27. Prescription pended to provider to approve.

## 2024-01-31 ENCOUNTER — ALLIED HEALTH (OUTPATIENT)
Dept: INTEGRATIVE MEDICINE | Facility: CLINIC | Age: 69
End: 2024-01-31
Payer: MEDICARE

## 2024-01-31 DIAGNOSIS — G89.3 NEOPLASM RELATED PAIN: ICD-10-CM

## 2024-01-31 DIAGNOSIS — Z85.72 HISTORY OF LYMPHOMA: ICD-10-CM

## 2024-01-31 DIAGNOSIS — Z85.43 HISTORY OF OVARIAN CANCER: Primary | ICD-10-CM

## 2024-01-31 DIAGNOSIS — R45.89 DEPRESSED MOOD: ICD-10-CM

## 2024-01-31 DIAGNOSIS — G62.0 CHEMOTHERAPY-INDUCED PERIPHERAL NEUROPATHY (MULTI): ICD-10-CM

## 2024-01-31 DIAGNOSIS — T45.1X5A CHEMOTHERAPY-INDUCED PERIPHERAL NEUROPATHY (MULTI): ICD-10-CM

## 2024-01-31 PROCEDURE — 99213 OFFICE O/P EST LOW 20 MIN: CPT | Performed by: HOSPITALIST

## 2024-01-31 ASSESSMENT — PAIN SCALES - GENERAL: PAINLEVEL_OUTOF10: 5 - MODERATE PAIN

## 2024-01-31 ASSESSMENT — ENCOUNTER SYMPTOMS
PALPITATIONS: 0
FREQUENCY: 0
CHILLS: 0
ABDOMINAL DISTENTION: 0
COUGH: 0
HEADACHES: 0
FEVER: 0
DIFFICULTY URINATING: 0
ARTHRALGIAS: 0
TROUBLE SWALLOWING: 0
CONSTIPATION: 0
WHEEZING: 0

## 2024-01-31 NOTE — PROGRESS NOTES
Patient ID: Saba Park is a 68 y.o. female.  Referring Physician: No referring provider defined for this encounter.  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) - ritux/methotrexate, vincristine, procarbazine  Remission since 2015  C/b R sided weakness, R leg weakness, neuropathy, balance issues - saw PT  Chronic R leg pain, dependant on oxycodone, on methadone and seeing pain mgmt  AVN d/t steroids R hip     11/18 Ovarian ca - 2/19 lap surgery - chemo with taxol x 3 with good response  5/19 ROMY/BSO, rectal resection, ileostomy, adjuvant 3-4 mo chemo c/b C diff     5/22 THR f/b PT - c/b infection and replaced, needed long term IV abx  Kidney stones     INTEGRATIVE HISTORY:     Symptoms:  Depression - f/b psych - on welbutrin, buspar     Pain in R Hip - tried chiropractor, seen by acupuncture     Neuropathy R foot - ball of foot, toes, pain - methadone, gabapentin etc.    ROS:  no ha, visual symptoms, hearing loss  no sob, chest pain, palp  ROS o/w non contributory, please see HPI    Objective    BSA: There is no height or weight on file to calculate BSA.  There were no vitals taken for this visit.    PHYSICAL EXAM:  NAD, awake/alert  HEENT, NCAT, OP clear, no oral lesions  CTA bilat  RRR no mgr  Abd soft/nt/nd+bs  No c/c/e/ttp  Motor/sensory intact, CN 2-12 intact     RESULTS:  Lab Results   Component Value Date    WBC 5.6 05/10/2023    HGB 13.6 05/10/2023    HCT 42.4 05/10/2023     05/10/2023     10/09/2020    CREATININE 0.81 12/01/2023    AST 14 12/01/2023     6.0 07/12/2023     4.0 02/06/2023     6.3 11/27/2022     10.2 10/04/2022     8.5 07/13/2022     5.4 05/18/2022     5.6 11/17/2021     5.3 05/17/2021     4.4 02/01/2021     5.4 11/11/2020     6.3 07/16/2020     4.8 03/30/2020     3.6 12/20/2019     5.7  09/11/2019     4.7 08/19/2019     6.0 07/22/2019     5.5 07/03/2019     12.4 05/02/2019     14.1 04/11/2019     45.6 (H) 03/21/2019     151.9 (H) 02/27/2019     76.7 (H) 01/16/2019       Assessment/Plan   Cancer Staging   No matching staging information was found for the patient.    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 (Tyson Harris) - ritux/methotrexate, vincristine, procarbazine  Remission since 2015  C/b R sided weakness, R leg weakness, neuropathy, balance issues - saw PT  Chronic R leg pain, dependant on oxycodone, on methadone and seeing pain mgmt  AVN d/t steroids R hip     11/18 Ovarian ca - 2/19 lap surgery - chemo with taxol x 3 with good response  5/19 ROMY/BSO, rectal resection, ileostomy, adjuvant 3-4 mo chemo c/b C diff     5/22 THR f/b PT - c/b infection and replaced, needed long term IV abx  Kidney stones     INTEGRATIVE HISTORY:     Symptoms:  Depression - f/b psych - on welbutrin, buspar, cont to see psych and follow up on medication reccs.     Pain in R Hip - tried chiropractor, seen by acupuncture - would set up for [facility]  Diff walking now  Has Medical Marijuana card, gummies, has CBD/THC - helps forget about it  May be also related to AVN - consider perhaps hyperbaric oxygen or cryotherapy or local cryotherapy at [facility]  Cont with pain management  Hurts at end of the day     Neuropathy R foot - ball of foot, toes, pain - methadone, gabapentin etc.  Would recc acupuncture  Scrambler therapy - pending soon in Jan  Bottom of foot worse     Balance is getting worse, 11/22 B12 780  Could be related to neuropathy, worse since ovarian ca  MR brain neg 7/23  Consider Reiki - consider at the [facility]  Meditation     Follow up:  in Symptom management clinic     SYMPTOM MANAGEMENT:  Integrative Oncology Symptom Management:     [facility]  Integrative Oncology Symptom Management clinic offers multi-disciplinary supervised care of cancer patients using Integrative Modalities billed to insurance using NCCN and SIO/ASCO guideline-driven practices.  ESAS is obtained prior to and after each treatment by the practitioner     Symptoms Managed:  R hip pain - constant, worse as day goes on, continued     Depression - doing ok right now    Gets sweats often     Neuropathy R foot - tingling, numbness, pain at night  Bottom of foot hurting - saw podiatry, on steroids and shot, wearing a boot     Natural Products utilized:  Mvi, Vit D, ca     Integrative Treatment: Acupuncture  Session #: 3  Frequency: weekly     Referrals:  Scrambler Therapy - planning 7/24  Recommendations:  Reduced primodone, tremors worsening     Follow Up:  Symptom Management: weekly  Integrative Oncology:      I have personally seen the patient and supervised the treatment by the integrative practitioner during this visit.  Pt had symptoms discussed and I was present for the patient's 45 minutes of direct patient care.   Denton Silverman MD

## 2024-01-31 NOTE — PROGRESS NOTES
Acupuncture Visit:     Subjective   Patient ID: Saba Park is a 68 y.o. female who presents for No chief complaint on file.  Still a lot of foot pain  Saw Dr. Alarcon, referred to [facility]  Had cortisone injection, prednisone burst, started boot  Better with boot overall  Right foot only  Considering plantar fasciitis vs neuropathy          Initial visit:  R hip pain-  Constant, worse through the day  Worse when walking  Has had one THR then infection, then revision,   Pain in deep in hip/leg to buttock and back  3/10 now  Will get up to 7-8/10  Throbbing aching  Hx of AVN from chemo/steroids     Depression-  up and down.  Worse this past year since August  Working with psychiatrist, on meds     Neuropathy R foot- tingling, numbness, pain at night  Constant, can be worse at night  Has had PT, chiro- doing estim    Balance is poor, sometimes with dizziness, needs support  No falls    Chemo brain- trouble recalling things occ    Has essential tremors. Has gotten worse    MVI  D  Ca    Sleep- always fatigued due to meds, lyrica,   Fatigue is constant    Diet: good             Pre-treatment Assessment  Pain Score: 7  Anxiety Level (0-10): 6  Stress Level (0-10): 6  Coping Level (0-10): 6  Depression Level (0-10): 6  Fatigue Level (0-10): 8  Nausea Level (0-10): 0  Wellbeing Level (0-10): 3    Review of Systems   Constitutional:  Negative for chills and fever.        Night sweats   HENT:  Negative for ear pain, tinnitus and trouble swallowing.    Eyes:  Negative for visual disturbance.   Respiratory:  Negative for cough and wheezing.    Cardiovascular:  Negative for chest pain and palpitations.   Gastrointestinal:  Negative for abdominal distention and constipation.   Genitourinary:  Negative for difficulty urinating and frequency.   Musculoskeletal:  Negative for arthralgias.   Skin:  Positive for rash.        psoriasisi   Neurological:  Negative for headaches.            Provider reviewed plan for the acupuncture  session, precautions and contraindications. Patient/guardian/hospital staff has given consent to treat with full understanding of what to expect during the session. Before acupuncture began, provider explained to the patient to communicate at any time if the procedure was causing discomfort past their tolerance level. Patient agreed to advise acupuncturist. The acupuncturist counseled the patient on the risks of acupuncture treatment including pain, infection, bleeding, and no relief of pain. The patient was positioned comfortably. There was no evidence of infection at the site of needle insertions.    Objective   Physical Exam       Acupuncture Treatment  Needle Guage: 36 guage /.20/ Blue seirin  Body Points: With retention  Body Points - Bilateral: Du 20, Li4, Si3, SP6, KI6, LR3  Body Points - Right: GB40, UB62, ba nadeem  Auricular Points: Yes  Auricular Points - Bilateral: BFA 1-3  Other Techniques Utilized: TDP Lamp  TDP Lamp Descripton: feet  Needle Count In: 22  Needle Count Out: 22  Needle Retention Time (min): 25 minutes  Total Face to Face Time (min): 45 minutes                 Post-treatment Assessment  Pain Score: 5 - Moderate pain  Anxiety Level (0-10): 4  Stress Level (0-10): 5  Coping Level (0-10): 7  Depression Level (0-10): 5  Fatigue Level (0-10): 5  Nausea Level (0-10): 0  Wellbeing Level (0-10): 2    Assessment/Plan

## 2024-02-01 ENCOUNTER — TELEMEDICINE (OUTPATIENT)
Dept: BEHAVIORAL HEALTH | Facility: HOSPITAL | Age: 69
End: 2024-02-01
Payer: MEDICARE

## 2024-02-01 DIAGNOSIS — F41.9 ANXIETY: ICD-10-CM

## 2024-02-01 DIAGNOSIS — F33.0 MILD EPISODE OF RECURRENT MAJOR DEPRESSIVE DISORDER (CMS-HCC): ICD-10-CM

## 2024-02-01 PROCEDURE — 90833 PSYTX W PT W E/M 30 MIN: CPT | Performed by: PSYCHIATRY & NEUROLOGY

## 2024-02-01 PROCEDURE — 99214 OFFICE O/P EST MOD 30 MIN: CPT | Performed by: PSYCHIATRY & NEUROLOGY

## 2024-02-01 RX ORDER — BUSPIRONE HYDROCHLORIDE 5 MG/1
TABLET ORAL
Qty: 90 TABLET | Refills: 2 | Status: SHIPPED | OUTPATIENT
Start: 2024-02-01 | End: 2024-03-21 | Stop reason: SDUPTHER

## 2024-02-01 RX ORDER — BUPROPION HYDROCHLORIDE 75 MG/1
75 TABLET ORAL DAILY
Qty: 30 TABLET | Refills: 2 | Status: SHIPPED | OUTPATIENT
Start: 2024-02-01 | End: 2024-03-21 | Stop reason: DRUGHIGH

## 2024-02-01 NOTE — PROGRESS NOTES
"Outpatient Psychiatry FUV      Subjective   Saba Park, a 68 y.o. female, for virtual FUV        Assessment/Plan   Patient Discussion:  CONTINUE duloxetine 60mg daily at bedtime   CONTINUE  buspirone 5mg 2 tablets in the AM and 1 in the PM  INCREASE to  bupropion 225mg daily (150mg +75mg)    RETURN to clinic Thursday 3/21 at 11AM for virtual FUV    Call with questions/concerns [phone]    Assessment:   68 y.o.  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 AVN R hip dx 2016. 2019 dx with ovarian CA in surveillance s/p resection and chemo. Pt had been see several time in 2015 ahead of MD maternity leave and transferred care to Dr. Nilay Farr, who has since left . 2021 pt re-established care and here for follow up    Virtual FUV today. Since last appt, pt reports some improvement with buspar, however, still negative thoughts. Will titrate bupropion and consider CBT.  Follow up 1-2 months, sooner if needed.     Diagnosis:   MDD, recurrent, mild-moderate  Other spec anxiety r/o adjustment vs part of depression     Treatment Plan/Recommendations:   1. Safety Assessment: denies active thoughts of death/self harm though feels \"dark thoughts\". no h/o SI/SA. passive death wish in the past but not recently . RF include age, W, pain, cancer. PF include , engaged in care, no hx, no substance abuse, no guns. Currently low imminent risk    2. MDD, other spec anxiety r/o element of MDD vs ISIS  CONTINUE duloxetine 60mg PO QHS   CONTINUE buspar 10mg/5mg PO BID for now  INCREASE to bupropion XL 150mg + 75mg PO daily, monitor anxiety.     Continue with therapy, supportive therapy during appt--pt has also   stopped seeing therapist related to worse health and not able to keep up, discussed to re-engage vs CBT worksheets    3. 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 Social: lives with , continue to encourage behavioral activation. Does better when out and about  Reason for Visit:     FUV for depression, anxiety      Subjective:  Last appt 11/2023  Since then, had increased the buspar and has noted benefit for anxiety  Notes had been confused about the directions and had called to speak with the RN  Racing of own voice in head that affects mood, worse in the AM, trouble getting going  Ok when out and about more when alone  Anxious thoughts not worse after taking bupropion  Difficult to redirect, lasts up to an hour  Can be negative, focused on limitations  Agreed to titrate bupropion to see if improves mood, previous higher dose of buspar caused emotional blunting  Discussed behavioral techniques  Sleep is good, too much    Tremor med was causing sedation    Hasn't been going to therapy since was doing better    No feelings of not wanting to go on, no SI  No a/e to meds    Current Medications:    Current Outpatient Medications:     acetaminophen (Tylenol) 325 mg tablet, Take by mouth every 8 hours., Disp: , Rfl:     buPROPion XL (Wellbutrin XL) 150 mg 24 hr tablet, Take 1 tablet (150 mg) by mouth once daily. Do not crush, chew, or split., Disp: 90 tablet, Rfl: 1    busPIRone (Buspar) 5 mg tablet, TAKE 2 TABLETS BY MOUTH IN THE AM AND 1 TABLET BY MOUTH IN THE EVENING, Disp: 90 tablet, Rfl: 2    calcium carbonate (Oscal) 500 mg calcium (1,250 mg) tablet, Take 1 tablet (1,250 mg) by mouth 2 times a day with meals., Disp: , Rfl:     cholecalciferol (Vitamin D-3) 50 mcg (2,000 unit) capsule, Take 1 capsule (50 mcg) by mouth once daily.,  Disp: , Rfl:     clonazePAM (KlonoPIN) 0.5 mg tablet, 1 TAB(S) ORALLY ONCE A DAY TO TAKE PRIOR TO MRI, Disp: , Rfl:     DULoxetine (Cymbalta) 60 mg DR capsule, Take 1 capsule (60 mg) by mouth once daily., Disp: 90 capsule, Rfl: 1    hydrOXYzine HCL (Atarax) 25 mg tablet, Take by mouth., Disp: , Rfl:     methadone (Dolophine) 10 mg tablet, Take 1 tablet (10 mg) by mouth every 12 hours., Disp: 60 tablet, Rfl: 0    multivitamin tablet, Take 1 tablet by mouth once daily., Disp: , Rfl:     pregabalin (Lyrica) 100 mg capsule, Take 1 capsule (100 mg) by mouth 3 times a day., Disp: 90 capsule, Rfl: 2    primidone (Mysoline) 50 mg tablet, Take 2 tablets in the morning and  3 tablets in the afternoon, Disp: 150 tablet, Rfl: 11    propranolol LA (Inderal LA) 60 mg 24 hr capsule, Take 1 capsule (60 mg) by mouth once daily., Disp: 30 capsule, Rfl: 11  Medical History:  Past Medical History:   Diagnosis Date    Anxiety disorder, unspecified     Anxiety    Family history of malignant neoplasm of breast 02/28/2019    Family history of breast cancer    Gynecologic cancer (CMS/HCC) 09/18/2023    Other conditions influencing health status     Nephrolithiasis    Ovarian cancer (CMS/HCC)     Personal history of malignant neoplasm of other parts of uterus 08/14/2020    History of malignant neoplasm of other parts of uterus    Personal history of non-Hodgkin lymphomas 08/14/2020    History of non-Hodgkin's lymphoma    Personal history of other diseases of the circulatory system     History of hypertension    Personal history of other diseases of the digestive system     History of irritable bowel syndrome    Personal history of other diseases of the respiratory system 01/10/2014    History of acute bronchitis    Personal history of other endocrine, nutritional and metabolic disease     History of hypercholesterolemia    Personal history of other malignant neoplasm of skin     History of basal cell carcinoma    Personal history of other  specified conditions 10/28/2014    History of fibrocystic disease of breast    Portal vein thrombosis 09/18/2023    PORTAL VEIN THROMBOSIS I81    Presence of spectacles and contact lenses 12/09/2014    Wears contact lenses       Surgical History:  Past Surgical History:   Procedure Laterality Date    LITHOTRIPSY  08/19/2013    Renal Lithotripsy    OTHER SURGICAL HISTORY  08/19/2013    Lithotomy    OTHER SURGICAL HISTORY  11/25/2019    Ileostomy closure       Family History:  Family History   Problem Relation Name Age of Onset    Breast cancer Mother  60    Stroke Mother      Colon cancer Father      Breast cancer Mother's Sister  40    Breast cancer Cousin  50       Social History:  Social History     Socioeconomic History    Marital status:      Spouse name: Not on file    Number of children: Not on file    Years of education: Not on file    Highest education level: Not on file   Occupational History    Not on file   Tobacco Use    Smoking status: Never    Smokeless tobacco: Never   Substance and Sexual Activity    Alcohol use: Yes     Comment: 1 glass of wine daily    Drug use: Yes     Types: Marijuana     Comment: medical card / gummies    Sexual activity: Not on file   Other Topics Concern    Not on file   Social History Narrative    Not on file     Social Determinants of Health     Financial Resource Strain: Not on file   Food Insecurity: Not on file   Transportation Needs: Not on file   Physical Activity: Not on file   Stress: Not on file   Social Connections: Not on file   Intimate Partner Violence: Not on file   Housing Stability: Not on file              Medical Review Of Systems:  +neuropathy    Psychiatric Review Of Systems:  +anxious ruminations       Objective   Mental Status Exam:     Appearance: dressed neat and clean.   Attitude: cooperative and engaged.   Behavior: appropriate eye contact via video.   Motor Activity: mild truncal tremor noted.   Speech: regular volume, prosody, no aphasia.  "  Mood: \"ok\".   Affect: congruent, constricted, anxious.   Thought Process: on topic.   Thought Content: no delusions, no SI/HI, less hopeless.   Thought Perception: no AVH.   Cognition: alert, oriented to person, place, time. attn intact.   Insight: fair.   Judgment: fair.       Vitals:  There were no vitals filed for this visit.  No results found for this or any previous visit (from the past 4464 hour(s)).  Lab Results   Component Value Date    WBC 5.6 05/10/2023    HGB 13.6 05/10/2023    HCT 42.4 05/10/2023    MCV 94 05/10/2023     05/10/2023     Lab Results   Component Value Date    GLUCOSE 59 (L) 12/01/2023    CALCIUM 9.1 12/01/2023     12/01/2023    K 5.3 12/01/2023    CO2 25 12/01/2023     12/01/2023    BUN 20 12/01/2023    CREATININE 0.81 12/01/2023       Psychotherapy   Time: 22 minutes  Type: supportive  Target: mood, anxiety  Techniques: problem solving  Goal: improved sx management  Follow up: next appt  Response: fair        Anabella Ugalde MD  "

## 2024-02-07 ENCOUNTER — ALLIED HEALTH (OUTPATIENT)
Dept: INTEGRATIVE MEDICINE | Facility: CLINIC | Age: 69
End: 2024-02-07
Payer: MEDICARE

## 2024-02-07 DIAGNOSIS — G62.0 CHEMOTHERAPY-INDUCED PERIPHERAL NEUROPATHY (MULTI): ICD-10-CM

## 2024-02-07 DIAGNOSIS — G89.3 NEOPLASM RELATED PAIN: ICD-10-CM

## 2024-02-07 DIAGNOSIS — T45.1X5A CHEMOTHERAPY-INDUCED PERIPHERAL NEUROPATHY (MULTI): ICD-10-CM

## 2024-02-07 DIAGNOSIS — Z85.43 HISTORY OF OVARIAN CANCER: ICD-10-CM

## 2024-02-07 DIAGNOSIS — Z85.72 HISTORY OF LYMPHOMA: ICD-10-CM

## 2024-02-07 DIAGNOSIS — R45.89 DEPRESSED MOOD: Primary | ICD-10-CM

## 2024-02-07 PROCEDURE — 99213 OFFICE O/P EST LOW 20 MIN: CPT | Performed by: HOSPITALIST

## 2024-02-07 ASSESSMENT — ENCOUNTER SYMPTOMS
COUGH: 0
ABDOMINAL DISTENTION: 0
CHILLS: 0
DIFFICULTY URINATING: 0
PALPITATIONS: 0
CONSTIPATION: 0
HEADACHES: 0
ARTHRALGIAS: 0
WHEEZING: 0
FREQUENCY: 0
TROUBLE SWALLOWING: 0
FEVER: 0

## 2024-02-07 ASSESSMENT — PAIN SCALES - GENERAL: PAINLEVEL_OUTOF10: 2

## 2024-02-07 NOTE — PROGRESS NOTES
Patient ID: Saba Park is a 68 y.o. female.  Referring Physician: No referring provider defined for this encounter.  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 (Tyson Harris) - ritux/methotrexate, vincristine, procarbazine  Remission since 2015  C/b R sided weakness, R leg weakness, neuropathy, balance issues - saw PT  Chronic R leg pain, dependant on oxycodone, on methadone and seeing pain mgmt  AVN d/t steroids R hip     11/18 Ovarian ca - 2/19 lap surgery - chemo with taxol x 3 with good response  5/19 ROMY/BSO, rectal resection, ileostomy, adjuvant 3-4 mo chemo c/b C diff     5/22 THR f/b PT - c/b infection and replaced, needed long term IV abx  Kidney stones     INTEGRATIVE HISTORY:     Symptoms:  Depression - f/b psych - on welbutrin, buspar     Pain in R Hip - tried chiropractor, seen by acupuncture     Neuropathy R foot - ball of foot, toes, pain - methadone, gabapentin etc.    ROS:  no ha, visual symptoms, hearing loss  no sob, chest pain, palp  ROS o/w non contributory, please see HPI    Objective    BSA: There is no height or weight on file to calculate BSA.  There were no vitals taken for this visit.    PHYSICAL EXAM:  NAD, awake/alert  HEENT, NCAT, OP clear, no oral lesions  CTA bilat  RRR no mgr  Abd soft/nt/nd+bs  No c/c/e/ttp  Motor/sensory intact, CN 2-12 intact     RESULTS:  Lab Results   Component Value Date    WBC 5.6 05/10/2023    HGB 13.6 05/10/2023    HCT 42.4 05/10/2023     05/10/2023     10/09/2020    CREATININE 0.81 12/01/2023    AST 14 12/01/2023     6.0 07/12/2023     4.0 02/06/2023     6.3 11/27/2022     10.2 10/04/2022     8.5 07/13/2022     5.4 05/18/2022     5.6 11/17/2021     5.3 05/17/2021     4.4 02/01/2021     5.4 11/11/2020     6.3 07/16/2020     4.8 03/30/2020     3.6 12/20/2019     5.7  09/11/2019     4.7 08/19/2019     6.0 07/22/2019     5.5 07/03/2019     12.4 05/02/2019     14.1 04/11/2019     45.6 (H) 03/21/2019     151.9 (H) 02/27/2019     76.7 (H) 01/16/2019       Assessment/Plan   Cancer Staging   No matching staging information was found for the patient.    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 (Tyson Harris) - ritux/methotrexate, vincristine, procarbazine  Remission since 2015  C/b R sided weakness, R leg weakness, neuropathy, balance issues - saw PT  Chronic R leg pain, dependant on oxycodone, on methadone and seeing pain mgmt  AVN d/t steroids R hip     11/18 Ovarian ca - 2/19 lap surgery - chemo with taxol x 3 with good response  5/19 ROMY/BSO, rectal resection, ileostomy, adjuvant 3-4 mo chemo c/b C diff     5/22 THR f/b PT - c/b infection and replaced, needed long term IV abx  Kidney stones     INTEGRATIVE HISTORY:     Symptoms:  Depression - f/b psych - on welbutrin, buspar, cont to see psych and follow up on medication reccs.     Pain in R Hip - tried chiropractor, seen by acupuncture - would set up for [facility]  Diff walking now  Has Medical Marijuana card, gummies, has CBD/THC - helps forget about it  May be also related to AVN - consider perhaps hyperbaric oxygen or cryotherapy or local cryotherapy at [facility]  Cont with pain management  Hurts at end of the day     Neuropathy R foot - ball of foot, toes, pain - methadone, gabapentin etc.  Would recc acupuncture  Scrambler therapy - pending soon in Jan  Bottom of foot worse     Balance is getting worse, 11/22 B12 780  Could be related to neuropathy, worse since ovarian ca  MR brain neg 7/23  Consider Reiki - consider at the [facility]  Meditation     Follow up:  in Symptom management clinic     SYMPTOM MANAGEMENT:  Integrative Oncology Symptom Management:     [facility]  Integrative Oncology Symptom Management clinic offers multi-disciplinary supervised care of cancer patients using Integrative Modalities billed to insurance using NCCN and SIO/ASCO guideline-driven practices.  ESAS is obtained prior to and after each treatment by the practitioner     Symptoms Managed:  R hip pain - constant, worse as day goes on, improved     Depression - doing ok right now     Gets sweats often     Neuropathy R foot - tingling, numbness, pain at night  Bottom of foot hurting - saw podiatry, on steroids and shot, wearing a boot and improving since     Natural Products utilized:  Mvi, Vit D, ca     Integrative Treatment: Acupuncture  Session #: 4  Frequency: weekly     Referrals:  Scrambler Therapy - planning 7/24  Recommendations:  Reduced primodone, tremors worsening     Follow Up:  Symptom Management: weekly  Integrative Oncology:      I have personally seen the patient and supervised the treatment by the integrative practitioner during this visit.  Denton Silverman MD

## 2024-02-08 ENCOUNTER — TELEPHONE (OUTPATIENT)
Dept: PALLIATIVE MEDICINE | Facility: CLINIC | Age: 69
End: 2024-02-08
Payer: MEDICARE

## 2024-02-08 DIAGNOSIS — R20.0 NUMBNESS AND TINGLING OF BOTH LOWER EXTREMITIES: Primary | ICD-10-CM

## 2024-02-08 DIAGNOSIS — R20.2 POSITIVE TINEL'S SIGN: ICD-10-CM

## 2024-02-08 DIAGNOSIS — R20.2 NUMBNESS AND TINGLING OF BOTH LOWER EXTREMITIES: Primary | ICD-10-CM

## 2024-02-14 ENCOUNTER — TELEMEDICINE (OUTPATIENT)
Dept: PALLIATIVE MEDICINE | Facility: HOSPITAL | Age: 69
End: 2024-02-14
Payer: MEDICARE

## 2024-02-14 ENCOUNTER — ALLIED HEALTH (OUTPATIENT)
Dept: INTEGRATIVE MEDICINE | Facility: CLINIC | Age: 69
End: 2024-02-14
Payer: MEDICARE

## 2024-02-14 DIAGNOSIS — R53.83 OTHER FATIGUE: ICD-10-CM

## 2024-02-14 DIAGNOSIS — G62.9 NEUROPATHY: ICD-10-CM

## 2024-02-14 DIAGNOSIS — C85.89: ICD-10-CM

## 2024-02-14 DIAGNOSIS — Z51.5 PALLIATIVE CARE ENCOUNTER: ICD-10-CM

## 2024-02-14 DIAGNOSIS — G89.3 NEOPLASM RELATED PAIN: ICD-10-CM

## 2024-02-14 DIAGNOSIS — G89.29 OTHER CHRONIC PAIN: ICD-10-CM

## 2024-02-14 DIAGNOSIS — Z85.72 HISTORY OF LYMPHOMA: ICD-10-CM

## 2024-02-14 DIAGNOSIS — T45.1X5A CHEMOTHERAPY-INDUCED PERIPHERAL NEUROPATHY (MULTI): Primary | ICD-10-CM

## 2024-02-14 DIAGNOSIS — M79.2 NEUROPATHIC PAIN: ICD-10-CM

## 2024-02-14 DIAGNOSIS — G62.0 CHEMOTHERAPY-INDUCED PERIPHERAL NEUROPATHY (MULTI): ICD-10-CM

## 2024-02-14 DIAGNOSIS — C56.9 MALIGNANT NEOPLASM OF OVARY, UNSPECIFIED LATERALITY (MULTI): ICD-10-CM

## 2024-02-14 DIAGNOSIS — F33.1 MODERATE EPISODE OF RECURRENT MAJOR DEPRESSIVE DISORDER (MULTI): ICD-10-CM

## 2024-02-14 DIAGNOSIS — R45.89 DEPRESSED MOOD: ICD-10-CM

## 2024-02-14 DIAGNOSIS — Z85.43 HISTORY OF OVARIAN CANCER: ICD-10-CM

## 2024-02-14 DIAGNOSIS — T45.1X5A CHEMOTHERAPY-INDUCED PERIPHERAL NEUROPATHY (MULTI): ICD-10-CM

## 2024-02-14 DIAGNOSIS — G89.3 CHRONIC PAIN AFTER CANCER TREATMENT: Primary | ICD-10-CM

## 2024-02-14 DIAGNOSIS — G62.0 CHEMOTHERAPY-INDUCED PERIPHERAL NEUROPATHY (MULTI): Primary | ICD-10-CM

## 2024-02-14 PROCEDURE — 1159F MED LIST DOCD IN RCRD: CPT | Performed by: NURSE PRACTITIONER

## 2024-02-14 PROCEDURE — 1160F RVW MEDS BY RX/DR IN RCRD: CPT | Performed by: NURSE PRACTITIONER

## 2024-02-14 PROCEDURE — 1036F TOBACCO NON-USER: CPT | Performed by: NURSE PRACTITIONER

## 2024-02-14 PROCEDURE — 1157F ADVNC CARE PLAN IN RCRD: CPT | Performed by: NURSE PRACTITIONER

## 2024-02-14 PROCEDURE — 99215 OFFICE O/P EST HI 40 MIN: CPT | Performed by: NURSE PRACTITIONER

## 2024-02-14 PROCEDURE — 1125F AMNT PAIN NOTED PAIN PRSNT: CPT | Performed by: NURSE PRACTITIONER

## 2024-02-14 PROCEDURE — 99213 OFFICE O/P EST LOW 20 MIN: CPT | Performed by: HOSPITALIST

## 2024-02-14 RX ORDER — METHADONE HYDROCHLORIDE 10 MG/1
10 TABLET ORAL EVERY 12 HOURS
Qty: 60 TABLET | Refills: 0 | Status: SHIPPED | OUTPATIENT
Start: 2024-02-14 | End: 2024-03-22 | Stop reason: SDUPTHER

## 2024-02-14 ASSESSMENT — ENCOUNTER SYMPTOMS
PALPITATIONS: 0
CONSTIPATION: 0
COUGH: 0
FEVER: 0
FREQUENCY: 0
TROUBLE SWALLOWING: 0
DIFFICULTY URINATING: 0
HEADACHES: 0
ABDOMINAL DISTENTION: 0
WHEEZING: 0
CHILLS: 0
ARTHRALGIAS: 0

## 2024-02-14 ASSESSMENT — PAIN SCALES - GENERAL: PAINLEVEL_OUTOF10: 3

## 2024-02-14 NOTE — PROGRESS NOTES
SUPPORTIVE AND PALLIATIVE ONCOLOGY OUTPATIENT FOLLOW-UP    Virtual or Telephone Consent    An interactive audio and video telecommunication system which permits real time communications between the patient (at the originating site) and provider (at the distant site) was utilized to provide this telehealth service.   Verbal consent was requested and obtained from Saba Park on this date, 02/14/24 for a telehealth visit.     SERVICE DATE: 2/14/2024    Subjective   HISTORY OF PRESENT ILLNESS: Saba Park is a 68 y.o. female who presents with history of CNS lymphoma and ovarian cancer. Currently in active surveillance and without  evidence of either of her cancers. Patient follows with [facility]/Palliative Care for chronic pain r/t disease history/treatment and further symptom management.      Pain Assessment:  Pain Score:  4  Location:  hip/right foot  Description:      Symptom Assessment:  Pain:somewhat - continues with right hip pain which is ongoing since she had her hip surgery and many complications following. She notes it to be no better or worse but generally able to tolerate  Numbness or Tingling in hands/feet/other: somewhat - she continues with neuropathic pain and numbness/tingling of b/l feet (R>L) but generally tolerable. Since last visit she has established with Dr. Silverman and has been pursuing acupuncture which has made a notable difference, as well as continuing Methadone and Pregabalin. She also has met with a podiatrist who has been working with her on different show inserts and other interventions to try and assist but she has not noticed much difference from this yet. She also has established with Dr. Alarcon and is planning to pursue Scrambler Therapy but is scheduled out through July. Overall she is hopeful to continue to work on symptoms. She would like to start the wean process of Pregabalin to prepare for scrambler, she is concerned she is going to have side effects with  "weaning  Sore Muscles/Spasms: none  Headache: none  Dizziness:none  Constipation: none  Diarrhea: none  Nausea: none  Vomiting: none  Lack of Appetite: none   Weight Loss: none  Taste changes: none  Dry Mouth: none  Pain in Mouth/Swallowing: none  Lack of Energy: a little - general weakness and fatigue, she recently did decrease her dose of Primidone and notes it did help with her fatigue but she is now experiencing more tremors again, continues working with neurology   Difficulty Sleeping: none  Worrying: none  Anxiety: none  Depression: somewhat - she is noticing herself getting into a depressive state again, she states she feels like there is someone in her brain fighting her at all times. She continues to work with Dr. Ugalde in [facility] and notes her Wellbutrin dose was just recently increased  Shortness of breath: none  Other: none      Information obtained from: chart review and interview of patient  ______________________________________________________________________        Objective     PHYSICAL EXAMINATION   Vital Signs:       9/8/2023     1:13 PM 9/21/2023     1:39 PM 10/11/2023    11:38 AM 11/15/2023    11:17 AM 12/1/2023     1:15 PM 1/3/2024     1:10 PM 1/11/2024     2:01 PM   Vitals   Systolic 124 101 102 128  113 104   Diastolic 64 65 70 85  74 62   Heart Rate 80 60 66 62  66    Temp   36.6 °C (97.9 °F)       Resp 16  18 16  14 16   Height (in) 1.6 m (5' 3\") 1.6 m (5' 3\")   1.575 m (5' 2\")  1.588 m (5' 2.5\")   Weight (lb) 153.2 154.4 155.42  150  150   BMI 27.14 kg/m2 27.35 kg/m2 27.53 kg/m2  27.44 kg/m2  27 kg/m2   BSA (m2) 1.76 m2 1.76 m2 1.77 m2  1.72 m2  1.73 m2   Visit Report  Report Report Report    Report Report Report Report      Physical Exam  Not complete dt virtual visit    ASSESSMENT/PLAN    Chronic Right Hip/Leg Pain and Neuropathic Pain  Pain is: chronic - 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 Completely anbx therapy  Type: neuropathic  Pain control: sub-optimally controlled  Home regimen:   - S/P [facility] 5/19/22  - Continue Ibuprofen PRN  - Completed PT/[facility] therapy  - Supportive care with rest, ice/heat as needed  - Continue Methadone 10mg BID - discussed adjusting to assess if contributing to fatigue  - EKG (4/5/23) QTc 431. Repeat with increase in dose  - Continue Pregabalin 100mg BID - NEXT FILL with decrease to 75mg BID to work on wean for prep to undergo Scrambler Therapy  - Continue Duloxetine 60mg/daily  - Discussed Alpha Liproic Acid - patient would like to hold off at this time  - Completed program through chiropractor w/o change in pain  - Referral to Symptom Clinic - Dr Silverman - pursuing acupuncture with notable change in neuropathy/pain  - Referral to Dr. Alarcon - scheduled to pursue Scrambler Therapy but not scheduled until July   - Continue following with podiatry   Intolerances/previously tried:   - Alpha Liproic Acid - not effective  - Gabapentin - caused excessive lethargy     Opioid Use  Medication Management:   - OARRS report reviewed with no aberrant behavior; consistent with  prescriptions/records and patient history  - MED 40.  Overdose Risk Score 270.   This has been discussed with patient.   - We will continue to closely monitor the patient for signs of prescription misuse including UDS, OARRS review and subjective reports at each visit.  - No concurrent benzodiazepine use   - I am a provider who either is or has consulted and collaborated with a provider certified in Hospice and Palliative Medicine and have conducted a face-face visit and examination for this patient.  - Routine Urine Drug Screen: 7/12/23 appropriately positive for opioids and negative for illicit substances  - Controlled Substance  Agreement: 7/12/23  - Specifically discussed that controlled substance prescriptions will only be provided by our group as outlined in the completed agreement  - Prescribed naloxone: patient declined  - Red Flags: NA     Bowel  - Monitor - no concerns of diarrhea or constipation      Altered Mood  Chronic anxiety and depression related to health concerns   Home regimen:   - Continue Duloxetine 60mg once daily at bed  - Continue Buspirone 5mg TID  - Continue Buproprion XL 150mg daily  - Following with counselor weekly to every 2 weeks  - Established with Dr. Ugalde - [facility]    Next Follow-Up Visit:  Return to clinic in 3 months in person    Signature and billing  Medical complexity was moderate level due to due to complexity of problems, extensive data review, and high risk of management/treatment.  Time was spent on the following: Prep Time, Time Directly with Patient/Family/Caregiver, Documentation Time. Total time spent: 50    Data    Some elements copied from Palliative Care note on 10/11/23, the elements have been updated and all reflect current decision making from today, 2/14/2024.      Plan of Care discussed with: Patient    SIGNATURE: DEMETRIUS Patel-CNP    Contact information:  [facility]  Monday-Friday 8 AM-5 PM  Phone:  [phone], press option #5, then option #1.   Or Epic Secure Chat

## 2024-02-14 NOTE — PROGRESS NOTES
Acupuncture Visit:     Subjective   Patient ID: Saba Park is a 68 y.o. female who presents for No chief complaint on file.  Feeling improvement after last visit  Pain in leg improved  Foot improved, ball of foot and toes  Has been wearing boot  Fatigue is still poor  continued issues with balance            Initial visit:  R hip pain-  Constant, worse through the day  Worse when walking  Has had one THR then infection, then revision,   Pain in deep in hip/leg to buttock and back  3/10 now  Will get up to 7-8/10  Throbbing aching  Hx of AVN from chemo/steroids     Depression-  up and down.  Worse this past year since August  Working with psychiatrist, on meds     Neuropathy R foot- tingling, numbness, pain at night  Constant, can be worse at night  Has had PT, chiro- doing estim    Balance is poor, sometimes with dizziness, needs support  No falls    Chemo brain- trouble recalling things occ    Has essential tremors. Has gotten worse    MVI  D  Ca    Sleep- always fatigued due to meds, lyrica,   Fatigue is constant    Diet: good             Pre-treatment Assessment  Pain Score: 3  Anxiety Level (0-10): 3  Stress Level (0-10): 4  Coping Level (0-10): 7  Depression Level (0-10): 3  Fatigue Level (0-10): 6  Nausea Level (0-10): 0  Wellbeing Level (0-10): 3    Review of Systems   Constitutional:  Negative for chills and fever.        Night sweats   HENT:  Negative for ear pain, tinnitus and trouble swallowing.    Eyes:  Negative for visual disturbance.   Respiratory:  Negative for cough and wheezing.    Cardiovascular:  Negative for chest pain and palpitations.   Gastrointestinal:  Negative for abdominal distention and constipation.   Genitourinary:  Negative for difficulty urinating and frequency.   Musculoskeletal:  Negative for arthralgias.   Skin:  Positive for rash.        psoriasisi   Neurological:  Negative for headaches.            Provider reviewed plan for the acupuncture session, precautions and  contraindications. Patient/guardian/hospital staff has given consent to treat with full understanding of what to expect during the session. Before acupuncture began, provider explained to the patient to communicate at any time if the procedure was causing discomfort past their tolerance level. Patient agreed to advise acupuncturist. The acupuncturist counseled the patient on the risks of acupuncture treatment including pain, infection, bleeding, and no relief of pain. The patient was positioned comfortably. There was no evidence of infection at the site of needle insertions.    Objective   Physical Exam       Acupuncture Treatment  Needle Guage: 36 guage /.20/ Blue seirin  Body Points: With retention  Body Points - Bilateral: Du 20, Li4, Si3, SP6, KI6, LR3  Body Points - Right: GB40, UB62, ba nadeem  Auricular Points: Yes  Auricular Points - Bilateral: BFA 1-3  Other Techniques Utilized: TDP Lamp  TDP Lamp Descripton: feet  Needle Count In: 22  Needle Count Out: 22  Needle Retention Time (min): 25 minutes  Total Face to Face Time (min): 45 minutes                 Post-treatment Assessment  Pain Score: 3  Anxiety Level (0-10): 5  Stress Level (0-10): 4  Coping Level (0-10): 4  Depression Level (0-10): 3  Fatigue Level (0-10): 5  Nausea Level (0-10): 0  Wellbeing Level (0-10): 5    Assessment/Plan

## 2024-02-14 NOTE — PROGRESS NOTES
Patient ID: Saba Park is a 68 y.o. female.  Referring Physician: No referring provider defined for this encounter.  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) - ritux/methotrexate, vincristine, procarbazine  Remission since 2015  C/b R sided weakness, R leg weakness, neuropathy, balance issues - saw PT  Chronic R leg pain, dependant on oxycodone, on methadone and seeing pain mgmt  AVN d/t steroids R hip     11/18 Ovarian ca - 2/19 lap surgery - chemo with taxol x 3 with good response  5/19 ROMY/BSO, rectal resection, ileostomy, adjuvant 3-4 mo chemo c/b C diff     5/22 THR f/b PT - c/b infection and replaced, needed long term IV abx  Kidney stones     INTEGRATIVE HISTORY:     Symptoms:  Depression - f/b psych - on welbutrin, buspar     Pain in R Hip - tried chiropractor, seen by acupuncture     Neuropathy R foot - ball of foot, toes, pain - methadone, gabapentin etc.    ROS:  no ha, visual symptoms, hearing loss  no sob, chest pain, palp  ROS o/w non contributory, please see HPI    Objective    BSA: There is no height or weight on file to calculate BSA.  There were no vitals taken for this visit.    PHYSICAL EXAM:  NAD, awake/alert  HEENT, NCAT, OP clear, no oral lesions  CTA bilat  RRR no mgr  Abd soft/nt/nd+bs  No c/c/e/ttp  Motor/sensory intact, CN 2-12 intact     RESULTS:  Lab Results   Component Value Date    WBC 5.6 05/10/2023    HGB 13.6 05/10/2023    HCT 42.4 05/10/2023     05/10/2023     10/09/2020    CREATININE 0.81 12/01/2023    AST 14 12/01/2023     6.0 07/12/2023     4.0 02/06/2023     6.3 11/27/2022     10.2 10/04/2022     8.5 07/13/2022     5.4 05/18/2022     5.6 11/17/2021     5.3 05/17/2021     4.4 02/01/2021     5.4 11/11/2020     6.3 07/16/2020     4.8 03/30/2020     3.6 12/20/2019     5.7  09/11/2019     4.7 08/19/2019     6.0 07/22/2019     5.5 07/03/2019     12.4 05/02/2019     14.1 04/11/2019     45.6 (H) 03/21/2019     151.9 (H) 02/27/2019     76.7 (H) 01/16/2019       Assessment/Plan   Cancer Staging   No matching staging information was found for the patient.    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 (Tyson Harris) - ritux/methotrexate, vincristine, procarbazine  Remission since 2015  C/b R sided weakness, R leg weakness, neuropathy, balance issues - saw PT  Chronic R leg pain, dependant on oxycodone, on methadone and seeing pain mgmt  AVN d/t steroids R hip     11/18 Ovarian ca - 2/19 lap surgery - chemo with taxol x 3 with good response  5/19 ROMY/BSO, rectal resection, ileostomy, adjuvant 3-4 mo chemo c/b C diff     5/22 THR f/b PT - c/b infection and replaced, needed long term IV abx  Kidney stones     INTEGRATIVE HISTORY:     Symptoms:  Depression - f/b psych - on welbutrin, buspar, cont to see psych and follow up on medication reccs.     Pain in R Hip - tried chiropractor, seen by acupuncture - would set up for [facility]  Diff walking now  Has Medical Marijuana card, gummies, has CBD/THC - helps forget about it  May be also related to AVN - consider perhaps hyperbaric oxygen or cryotherapy or local cryotherapy at [facility]  Cont with pain management  Hurts at end of the day     Neuropathy R foot - ball of foot, toes, pain - methadone, gabapentin etc.  Would recc acupuncture  Scrambler therapy - pending soon in Jan  Bottom of foot worse     Balance is getting worse, 11/22 B12 780  Could be related to neuropathy, worse since ovarian ca  MR brain neg 7/23  Consider Reiki - consider at the [facility]  Meditation     Follow up:  in Symptom management clinic     SYMPTOM MANAGEMENT:  Integrative Oncology Symptom Management:     [facility]  Integrative Oncology Symptom Management clinic offers multi-disciplinary supervised care of cancer patients using Integrative Modalities billed to insurance using NCCN and SIO/ASCO guideline-driven practices.  ESAS is obtained prior to and after each treatment by the practitioner     Symptoms Managed:  R hip pain - constant, worse as day goes on, improved more     Depression - doing ok right now     Gets sweats often     Neuropathy R foot - tingling, numbness, pain at night  Bottom of foot hurting - saw podiatry, on steroids and shot, wearing a boot and improving since even more  Wearing an arch which is helping     Natural Products utilized:  Mvi, Vit D, ca     Integrative Treatment: Acupuncture  Session #: 5  Frequency: weekly     Referrals:  Scrambler Therapy - planning 7/24  Recommendations:  Reduced primodone, tremors worsening     Follow Up:  Symptom Management: weekly  Integrative Oncology:      I have personally seen the patient and supervised the treatment by the integrative practitioner during this visit.  Denton Silverman MD

## 2024-02-15 ENCOUNTER — LAB (OUTPATIENT)
Dept: LAB | Facility: LAB | Age: 69
End: 2024-02-15
Payer: MEDICARE

## 2024-02-15 DIAGNOSIS — C56.9 MALIGNANT NEOPLASM OF UNSPECIFIED OVARY (MULTI): Primary | ICD-10-CM

## 2024-02-15 LAB — CANCER AG125 SERPL-ACNC: 5.6 U/ML (ref 0–30.2)

## 2024-02-15 PROCEDURE — 86304 IMMUNOASSAY TUMOR CA 125: CPT

## 2024-02-15 PROCEDURE — 36415 COLL VENOUS BLD VENIPUNCTURE: CPT

## 2024-02-20 ENCOUNTER — HOSPITAL ENCOUNTER (OUTPATIENT)
Dept: NEUROLOGY | Facility: CLINIC | Age: 69
Discharge: HOME | End: 2024-02-20
Payer: MEDICARE

## 2024-02-20 DIAGNOSIS — R20.2 POSITIVE TINEL'S SIGN: ICD-10-CM

## 2024-02-20 DIAGNOSIS — R20.2 NUMBNESS AND TINGLING OF BOTH LOWER EXTREMITIES: ICD-10-CM

## 2024-02-20 DIAGNOSIS — R20.0 NUMBNESS AND TINGLING OF BOTH LOWER EXTREMITIES: ICD-10-CM

## 2024-02-20 PROCEDURE — 95909 NRV CNDJ TST 5-6 STUDIES: CPT | Performed by: PSYCHIATRY & NEUROLOGY

## 2024-02-20 PROCEDURE — 95886 MUSC TEST DONE W/N TEST COMP: CPT | Performed by: PSYCHIATRY & NEUROLOGY

## 2024-02-23 ENCOUNTER — APPOINTMENT (OUTPATIENT)
Dept: NEUROLOGY | Facility: CLINIC | Age: 69
End: 2024-02-23
Payer: MEDICARE

## 2024-02-29 ENCOUNTER — OFFICE VISIT (OUTPATIENT)
Dept: GYNECOLOGIC ONCOLOGY | Facility: CLINIC | Age: 69
End: 2024-02-29
Payer: MEDICARE

## 2024-02-29 VITALS
BODY MASS INDEX: 27.54 KG/M2 | OXYGEN SATURATION: 95 % | DIASTOLIC BLOOD PRESSURE: 67 MMHG | SYSTOLIC BLOOD PRESSURE: 97 MMHG | HEART RATE: 68 BPM | TEMPERATURE: 98.1 F | WEIGHT: 153 LBS | RESPIRATION RATE: 18 BRPM

## 2024-02-29 DIAGNOSIS — D07.1 VIN III (VULVAR INTRAEPITHELIAL NEOPLASIA III): ICD-10-CM

## 2024-02-29 DIAGNOSIS — C56.9 MALIGNANT NEOPLASM OF OVARY, UNSPECIFIED LATERALITY (MULTI): Primary | ICD-10-CM

## 2024-02-29 PROCEDURE — 1125F AMNT PAIN NOTED PAIN PRSNT: CPT | Performed by: NURSE PRACTITIONER

## 2024-02-29 PROCEDURE — 3074F SYST BP LT 130 MM HG: CPT | Performed by: NURSE PRACTITIONER

## 2024-02-29 PROCEDURE — 99215 OFFICE O/P EST HI 40 MIN: CPT | Performed by: NURSE PRACTITIONER

## 2024-02-29 PROCEDURE — 1157F ADVNC CARE PLAN IN RCRD: CPT | Performed by: NURSE PRACTITIONER

## 2024-02-29 PROCEDURE — 1036F TOBACCO NON-USER: CPT | Performed by: NURSE PRACTITIONER

## 2024-02-29 PROCEDURE — 3078F DIAST BP <80 MM HG: CPT | Performed by: NURSE PRACTITIONER

## 2024-02-29 PROCEDURE — 1159F MED LIST DOCD IN RCRD: CPT | Performed by: NURSE PRACTITIONER

## 2024-02-29 PROCEDURE — 1160F RVW MEDS BY RX/DR IN RCRD: CPT | Performed by: NURSE PRACTITIONER

## 2024-02-29 ASSESSMENT — PAIN SCALES - GENERAL: PAINLEVEL: 4

## 2024-02-29 NOTE — PROGRESS NOTES
Patient ID: Saba Park is a 68 y.o. female.  Referring Physician: No referring provider defined for this encounter.  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 Italo Harris      Cancer History:     -Dx 2019 -high grade serous ovarian cancerCT (1/24/19): mixed solid cystic mass in her right ovary for malignancy. Questionable invasion into the uterus.  Questionable mesenteric implants.  It looks like one of them is small bowel mesentery, maybe omentum.   The rest look more omental to me. kidney stone.  Complex lesion in her left kidney presumably cystic but can't rule out solid area.   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  Felt to be undebulkable on laparoscopy, plan for NACT  - 3 cycles NACT  -Genetics: SLX-4 gene of unk significance.  - May 2019 debulking, included ileostomy.  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     Interval History:  remains normal. Continues to have a lot of right leg pain. She is scheduled to start scrambler therapy in July. Currently doing acupuncture and reports some improvement, however overdid it with walking one day and pain returned. Denies fevers chills chest pain or shortness of breath. No changes to bowel or bladder habits. No weight loss or weight gain. No vaginal bleeding. No new vulvar lesions. Has external hemorrhoids that are currently bothersome.      PMH: Lymphoma, AVN, paralysis on the right, tendonitis, neuropathy, vestibular hypofunction, hyperesthesia, tremors, bursitits, nephrolithiasis, ovarian cancer. ileostomy reversal 11/2019     Past Surgical History: Brain biopsy, lithotripsy, L/s left salpingectomy and peritoneal biopsies. Interval exploratory laparotomy,  modified radical hysterectomy, bilateral salpingo oophorectomy, bilateral pelvic lymph node sampling, infragastric omentectomy,  loop ileostomy with low anterior resection with coloanal anastomosis on 19      Family History:  dad had colon CA in his 50s, mom with breast cancer in her 73. maternal cousin breast cancer 44yo, neg BRCA testing. Sister had testing and was BRCA negative (no cancer history in her).  Maternal aunt with late 52 breast cancer.  Otherwise  denies a history of gyn related cancers including ovarian, endometrial, breast, pancreas, and GI cancers.      Social History: Denies a history of smoking, alcohol use or recreational drug use.       OBGYN History:  The patient is a .  Entered menopause at 50 year old.  She has used OCP for 4 years.  She has never used HRT.       Screening:  -Pap smear: 3/2/20 neg / neg hpv   -Mammogram: 18 negative     Objective    BSA: 1.75 meters squared  BP 97/67   Pulse 68   Temp 36.7 °C (98.1 °F)   Resp 18   Wt 69.4 kg (153 lb)   SpO2 95%   BMI 27.54 kg/m²      Physical Exam  Vitals and nursing note reviewed.   Constitutional:       Appearance: Normal appearance. She is normal weight.   HENT:      Mouth/Throat:      Mouth: Mucous membranes are moist.      Pharynx: Oropharynx is clear.   Eyes:      Conjunctiva/sclera: Conjunctivae normal.      Pupils: Pupils are equal, round, and reactive to light.   Cardiovascular:      Rate and Rhythm: Normal rate and regular rhythm.   Pulmonary:      Effort: Pulmonary effort is normal.      Breath sounds: Normal breath sounds.   Abdominal:      General: Abdomen is flat. There is no distension.      Palpations: Abdomen is soft. There is no mass.      Tenderness: There is no abdominal tenderness.      Comments: 2 cm elongated firmness along distal portion of incision, consistent with keloid or scar tissue. Stable from prior exam.   Genitourinary:     General: Normal vulva.      Vagina: Normal.      Uterus: Absent.        Rectum: Normal.      Comments: External hemorrhoids present  Musculoskeletal:         General: Normal range of motion.   Skin:     General: Skin is warm and dry.   Neurological:      Mental Status: She is alert.   Psychiatric:         Mood and Affect: Mood normal.         Behavior: Behavior normal.       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 No evidence of disease on today's evaluation.    Plan:      1) Ovarian cancer  - CA-125 normal at 5.6. Will repeat CA-125 in 6 months.   - Physical examination was within normal limits today.  She is currently MANE.  We reviewed signs and symptoms of possible recurrence with the patient and she will call our office should she experience any of these.  - Firm area midline incision consistent with keloid or scar tissue, instructed to call office if growing in size or becoming painful. Would consider CT. Has been stable since visit 6 months ago.     2) VIN3  - no evidence of lesions on today's exam  - continue surveillance    She will follow up in 6 months for cancer surveillance.

## 2024-03-06 ENCOUNTER — ALLIED HEALTH (OUTPATIENT)
Dept: INTEGRATIVE MEDICINE | Facility: CLINIC | Age: 69
End: 2024-03-06
Payer: MEDICARE

## 2024-03-06 DIAGNOSIS — Z85.72 HISTORY OF LYMPHOMA: Primary | ICD-10-CM

## 2024-03-06 DIAGNOSIS — G89.3 NEOPLASM RELATED PAIN: ICD-10-CM

## 2024-03-06 DIAGNOSIS — R45.89 DEPRESSED MOOD: ICD-10-CM

## 2024-03-06 DIAGNOSIS — Z85.43 HISTORY OF OVARIAN CANCER: ICD-10-CM

## 2024-03-06 DIAGNOSIS — T45.1X5A CHEMOTHERAPY-INDUCED PERIPHERAL NEUROPATHY (MULTI): ICD-10-CM

## 2024-03-06 DIAGNOSIS — G62.0 CHEMOTHERAPY-INDUCED PERIPHERAL NEUROPATHY (MULTI): ICD-10-CM

## 2024-03-06 PROCEDURE — 99213 OFFICE O/P EST LOW 20 MIN: CPT | Performed by: HOSPITALIST

## 2024-03-06 ASSESSMENT — ENCOUNTER SYMPTOMS
FREQUENCY: 0
ABDOMINAL DISTENTION: 0
DIFFICULTY URINATING: 0
WHEEZING: 0
ARTHRALGIAS: 0
TROUBLE SWALLOWING: 0
CONSTIPATION: 0
FEVER: 0
COUGH: 0
CHILLS: 0
HEADACHES: 0
PALPITATIONS: 0

## 2024-03-06 ASSESSMENT — PAIN SCALES - GENERAL: PAINLEVEL_OUTOF10: 3

## 2024-03-06 NOTE — PROGRESS NOTES
Acupuncture Visit:     Subjective   Patient ID: Saba Park is a 68 y.o. female who presents for No chief complaint on file.  Had tough 2 weeks  Was on feet too much had severe neuropathy toes to mid shin, thigh to buttocks;  Took 5 days to resolve  Better today today sole of foot lateral, still pain in buttock   Did EMG- no change.                 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  Worse this past year since August  Working with psychiatrist, on meds     Neuropathy R foot- tingling, numbness, pain at night  Constant, can be worse at night  Has had PT, chiro- doing estim    Balance is poor, sometimes with dizziness, needs support  No falls    Chemo brain- trouble recalling things occ    Has essential tremors. Has gotten worse    MVI  D  Ca    Sleep- always fatigued due to meds, lyrica,   Fatigue is constant    Diet: good             Pre-treatment Assessment  Pain Score: 5 - Moderate pain  Anxiety Level (0-10): 1  Stress Level (0-10): 2  Coping Level (0-10): 6  Depression Level (0-10): 5  Fatigue Level (0-10): 3  Nausea Level (0-10): 0  Wellbeing Level (0-10): 5    Review of Systems   Constitutional:  Negative for chills and fever.        Night sweats   HENT:  Negative for ear pain, tinnitus and trouble swallowing.    Eyes:  Negative for visual disturbance.   Respiratory:  Negative for cough and wheezing.    Cardiovascular:  Negative for chest pain and palpitations.   Gastrointestinal:  Negative for abdominal distention and constipation.   Genitourinary:  Negative for difficulty urinating and frequency.   Musculoskeletal:  Negative for arthralgias.   Skin:  Positive for rash.        psoriasisi   Neurological:  Negative for headaches.            Provider reviewed  plan for the acupuncture session, precautions and contraindications. Patient/guardian/hospital staff has given consent to treat with full understanding of what to expect during the session. Before acupuncture began, provider explained to the patient to communicate at any time if the procedure was causing discomfort past their tolerance level. Patient agreed to advise acupuncturist. The acupuncturist counseled the patient on the risks of acupuncture treatment including pain, infection, bleeding, and no relief of pain. The patient was positioned comfortably. There was no evidence of infection at the site of needle insertions.    Objective   Physical Exam       Acupuncture Treatment  Needle Guage: 36 guage /.20/ Blue seirin  Body Points: With retention  Body Points - Bilateral: Du 20, Li4, Si3, SP6, KI6, LR3  Body Points - Right: GB40, UB62, ba nadeem  Auricular Points: Yes  Auricular Points - Bilateral: BFA 1-3  Other Techniques Utilized: TDP Lamp  TDP Lamp Descripton: feet  Needle Count In: 22  Needle Count Out: 22  Needle Retention Time (min): 25 minutes  Total Face to Face Time (min): 45 minutes                 Post-treatment Assessment  Pain Score: 3  Anxiety Level (0-10): 1  Stress Level (0-10): 2  Coping Level (0-10): 5  Depression Level (0-10): 3  Fatigue Level (0-10): 3  Nausea Level (0-10): 0  Wellbeing Level (0-10): 3    Assessment/Plan   Diagnoses and all orders for this visit:  History of lymphoma (Primary)  Chemotherapy-induced peripheral neuropathy (CMS/HCC)  Depressed mood  Neoplasm related pain  History of ovarian cancer

## 2024-03-06 NOTE — PROGRESS NOTES
Patient ID: Saba Park is a 68 y.o. female.  Referring Physician: No referring provider defined for this encounter.  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 ([facility]) - ritux/methotrexate, vincristine, procarbazine  Remission since 2015  C/b R sided weakness, R leg weakness, neuropathy, balance issues - saw PT  Chronic R leg pain, dependant on oxycodone, on methadone and seeing pain mgmt  AVN d/t steroids R hip     11/18 Ovarian ca - 2/19 lap surgery - chemo with taxol x 3 with good response  5/19 ROMY/BSO, rectal resection, ileostomy, adjuvant 3-4 mo chemo c/b C diff     5/22 THR f/b PT - c/b infection and replaced, needed long term IV abx  Kidney stones     INTEGRATIVE HISTORY:     Symptoms:  Depression - f/b psych - on welbutrin, buspar     Pain in R Hip - tried chiropractor, seen by acupuncture     Neuropathy R foot - ball of foot, toes, pain - methadone, gabapentin etc.    ROS:  no ha, visual symptoms, hearing loss  no sob, chest pain, palp  ROS o/w non contributory, please see HPI    Objective    BSA: There is no height or weight on file to calculate BSA.  There were no vitals taken for this visit.    PHYSICAL EXAM:  NAD, awake/alert  HEENT, NCAT, OP clear, no oral lesions  CTA bilat  RRR no mgr  Abd soft/nt/nd+bs  No c/c/e/ttp  Motor/sensory intact, CN 2-12 intact     RESULTS:  Lab Results   Component Value Date    WBC 5.6 05/10/2023    HGB 13.6 05/10/2023    HCT 42.4 05/10/2023     05/10/2023     10/09/2020    CREATININE 0.81 12/01/2023    AST 14 12/01/2023     5.6 02/15/2024     6.0 07/12/2023     4.0 02/06/2023     6.3 11/27/2022     10.2 10/04/2022     8.5 07/13/2022     5.4 05/18/2022     5.6 11/17/2021     5.3 05/17/2021     4.4 02/01/2021     5.4 11/11/2020     6.3 07/16/2020     4.8 03/30/2020     3.6  12/20/2019     5.7 09/11/2019     4.7 08/19/2019     6.0 07/22/2019     5.5 07/03/2019     12.4 05/02/2019     14.1 04/11/2019     45.6 (H) 03/21/2019     151.9 (H) 02/27/2019     76.7 (H) 01/16/2019       Assessment/Plan   Cancer Staging   No matching staging information was found for the patient.    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 ([facility]) - ritux/methotrexate, vincristine, procarbazine  Remission since 2015  C/b R sided weakness, R leg weakness, neuropathy, balance issues - saw PT  Chronic R leg pain, dependant on oxycodone, on methadone and seeing pain mgmt  AVN d/t steroids R hip     11/18 Ovarian ca - 2/19 lap surgery - chemo with taxol x 3 with good response  5/19 ROMY/BSO, rectal resection, ileostomy, adjuvant 3-4 mo chemo c/b C diff     5/22 THR f/b PT - c/b infection and replaced, needed long term IV abx  Kidney stones     INTEGRATIVE HISTORY:     Symptoms:  Depression - f/b psych - on welbutrin, buspar, cont to see psych and follow up on medication reccs.     Pain in R Hip - tried chiropractor, seen by acupuncture - would set up for [facility]  Diff walking now  Has Medical Marijuana card, gummies, has CBD/THC - helps forget about it  May be also related to AVN - consider perhaps hyperbaric oxygen or cryotherapy or local cryotherapy at [facility]  Cont with pain management  Hurts at end of the day     Neuropathy R foot - ball of foot, toes, pain - methadone, gabapentin etc.  Would recc acupuncture  Scrambler therapy - pending soon in Jan  Bottom of foot worse     Balance is getting worse, 11/22 B12 780  Could be related to neuropathy, worse since ovarian ca  MR brain neg 7/23  Consider Reiki - consider at the [facility]  Meditation     Follow up:  in Symptom management clinic     SYMPTOM MANAGEMENT:  Integrative Oncology Symptom Management:     The  [facility]ative Oncology Symptom Management clinic offers multi-disciplinary supervised care of cancer patients using Integrative Modalities billed to insurance using NCCN and SIO/ASCO guideline-driven practices.  ESAS is obtained prior to and after each treatment by the practitioner     Symptoms Managed:  R hip pain - constant, worse as day goes on, improved more     Depression - doing ok right now but worse after getting pain with volunteering     Gets sweats often     Neuropathy R foot - tingling, numbness, pain at night  Bottom of foot hurting - saw podiatry, on steroids and shot, wearing a boot and improving since even more  Wearing an arch which is helping  Had setback a few weeks ago on her feet a lot     Natural Products utilized:  Mvi, Vit D, ca     Integrative Treatment: Acupuncture  Session #: 6  Frequency: weekly     Referrals:  Scrambler Therapy - planning 7/24  Recommendations:  Reduced primodone, tremors worsening     Follow Up:  Symptom Management: weekly  Integrative Oncology:      I have personally seen the patient and supervised the treatment by the integrative practitioner during this visit.  Denton Silverman MD

## 2024-03-20 ENCOUNTER — ALLIED HEALTH (OUTPATIENT)
Dept: INTEGRATIVE MEDICINE | Facility: CLINIC | Age: 69
End: 2024-03-20
Payer: MEDICARE

## 2024-03-20 DIAGNOSIS — G89.3 NEOPLASM RELATED PAIN: Primary | ICD-10-CM

## 2024-03-20 DIAGNOSIS — R45.89 DEPRESSED MOOD: ICD-10-CM

## 2024-03-20 DIAGNOSIS — T45.1X5A CHEMOTHERAPY-INDUCED PERIPHERAL NEUROPATHY (MULTI): ICD-10-CM

## 2024-03-20 DIAGNOSIS — G62.0 CHEMOTHERAPY-INDUCED PERIPHERAL NEUROPATHY (MULTI): ICD-10-CM

## 2024-03-20 DIAGNOSIS — Z85.72 HISTORY OF LYMPHOMA: ICD-10-CM

## 2024-03-20 DIAGNOSIS — Z85.43 HISTORY OF OVARIAN CANCER: ICD-10-CM

## 2024-03-20 PROCEDURE — 99213 OFFICE O/P EST LOW 20 MIN: CPT | Performed by: HOSPITALIST

## 2024-03-20 ASSESSMENT — ENCOUNTER SYMPTOMS
CHILLS: 0
ARTHRALGIAS: 0
PALPITATIONS: 0
TROUBLE SWALLOWING: 0
HEADACHES: 0
FREQUENCY: 0
COUGH: 0
CONSTIPATION: 0
DIFFICULTY URINATING: 0
WHEEZING: 0
ABDOMINAL DISTENTION: 0
FEVER: 0

## 2024-03-20 NOTE — PROGRESS NOTES
Patient ID: Saba Park is a 68 y.o. female.  Referring Physician: No referring provider defined for this encounter.  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 ([facility]) - ritux/methotrexate, vincristine, procarbazine  Remission since 2015  C/b R sided weakness, R leg weakness, neuropathy, balance issues - saw PT  Chronic R leg pain, dependant on oxycodone, on methadone and seeing pain mgmt  AVN d/t steroids R hip     11/18 Ovarian ca - 2/19 lap surgery - chemo with taxol x 3 with good response  5/19 ROMY/BSO, rectal resection, ileostomy, adjuvant 3-4 mo chemo c/b C diff     5/22 THR f/b PT - c/b infection and replaced, needed long term IV abx  Kidney stones     INTEGRATIVE HISTORY:     Symptoms:  Depression - f/b psych - on welbutrin, buspar     Pain in R Hip - tried chiropractor, seen by acupuncture     Neuropathy R foot - ball of foot, toes, pain - methadone, gabapentin etc.       ROS:  no ha, visual symptoms, hearing loss  no sob, chest pain, palp  ROS o/w non contributory, please see HPI    Objective    BSA: There is no height or weight on file to calculate BSA.  There were no vitals taken for this visit.    PHYSICAL EXAM:  NAD, awake/alert  HEENT, NCAT, OP clear, no oral lesions  CTA bilat  RRR no mgr  Abd soft/nt/nd+bs  No c/c/e/ttp  Motor/sensory intact, CN 2-12 intact     RESULTS:  Lab Results   Component Value Date    WBC 5.6 05/10/2023    HGB 13.6 05/10/2023    HCT 42.4 05/10/2023     05/10/2023     10/09/2020    CREATININE 0.81 12/01/2023    AST 14 12/01/2023     5.6 02/15/2024     6.0 07/12/2023     4.0 02/06/2023     6.3 11/27/2022     10.2 10/04/2022     8.5 07/13/2022     5.4 05/18/2022     5.6 11/17/2021     5.3 05/17/2021     4.4 02/01/2021     5.4 11/11/2020     6.3 07/16/2020     4.8 03/30/2020     3.6  12/20/2019     5.7 09/11/2019     4.7 08/19/2019     6.0 07/22/2019     5.5 07/03/2019     12.4 05/02/2019     14.1 04/11/2019     45.6 (H) 03/21/2019     151.9 (H) 02/27/2019     76.7 (H) 01/16/2019       Assessment/Plan   Cancer Staging   No matching staging information was found for the patient.    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 ([facility]) - ritux/methotrexate, vincristine, procarbazine  Remission since 2015  C/b R sided weakness, R leg weakness, neuropathy, balance issues - saw PT  Chronic R leg pain, dependant on oxycodone, on methadone and seeing pain mgmt  AVN d/t steroids R hip     11/18 Ovarian ca - 2/19 lap surgery - chemo with taxol x 3 with good response  5/19 ROMY/BSO, rectal resection, ileostomy, adjuvant 3-4 mo chemo c/b C diff     5/22 THR f/b PT - c/b infection and replaced, needed long term IV abx  Kidney stones     INTEGRATIVE HISTORY:     Symptoms:  Depression - f/b psych - on welbutrin, buspar, cont to see psych and follow up on medication reccs.     Pain in R Hip - tried chiropractor, seen by acupuncture - would set up for [facility]  Diff walking now  Has Medical Marijuana card, gummies, has CBD/THC - helps forget about it  May be also related to AVN - consider perhaps hyperbaric oxygen or cryotherapy or local cryotherapy at [facility]  Cont with pain management  Hurts at end of the day severiano with walking, intermittent     Neuropathy R foot - ball of foot, toes, pain - methadone, gabapentin etc.  Would recc acupuncture  Scrambler therapy - will discuss with Dr. Alarcon further  EMG sugg not neuropathy - CRPS likely     Balance is getting worse, 11/22 B12 780  Could be related to neuropathy, worse since ovarian ca  MR brain neg 7/23  Consider Reiki - consider at the [facility]  Meditation     Follow up:  in Symptom management clinic     SYMPTOM  MANAGEMENT:  Integrative Oncology Symptom Management:     The [facility] Integrative Oncology Symptom Management clinic offers multi-disciplinary supervised care of cancer patients using Integrative Modalities billed to insurance using NCCN and SIO/ASCO guideline-driven practices.  ESAS is obtained prior to and after each treatment by the practitioner     Symptoms Managed:  R hip pain - constant, worse as day goes on, improved more     Depression - doing ok right now but worse after getting pain with volunteering     Gets sweats often     Neuropathy R foot - tingling, numbness, pain at night  Bottom of foot hurting - saw podiatry, on steroids and shot, wearing a boot and improving since even more  Wearing an arch which is helping  Had setback a few weeks ago on her feet a lot     Natural Products utilized:  Mvi, Vit D, ca     Integrative Treatment: Acupuncture  Session #: 7  Frequency: weekly     Referrals:  Scrambler Therapy - planning 7/24  Recommendations:  Reduced primodone, tremors worsening     Follow Up:  Symptom Management: weekly  Integrative Oncology:      I have personally seen the patient and supervised the treatment by the integrative practitioner during this visit.  Denton Silverman MD

## 2024-03-20 NOTE — PROGRESS NOTES
Acupuncture Visit:     Subjective   Patient ID: Saba Park is a 68 y.o. female who presents for No chief complaint on file.  Foot/Leg pain still on right  Worse when walking     .                 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  Worse this past year since August  Working with psychiatrist, on meds     Neuropathy R foot- tingling, numbness, pain at night  Constant, can be worse at night  Has had PT, chiro- doing estim    Balance is poor, sometimes with dizziness, needs support  No falls    Chemo brain- trouble recalling things occ    Has essential tremors. Has gotten worse    MVI  D  Ca    Sleep- always fatigued due to meds, lyrica,   Fatigue is constant    Diet: good             Pre-treatment Assessment  Pain Score: 5 - Moderate pain  Anxiety Level (0-10): 5  Stress Level (0-10): 5  Coping Level (0-10): 5  Depression Level (0-10): 5  Fatigue Level (0-10): 6  Nausea Level (0-10): 0  Wellbeing Level (0-10): 5    Review of Systems   Constitutional:  Negative for chills and fever.        Night sweats   HENT:  Negative for ear pain, tinnitus and trouble swallowing.    Eyes:  Negative for visual disturbance.   Respiratory:  Negative for cough and wheezing.    Cardiovascular:  Negative for chest pain and palpitations.   Gastrointestinal:  Negative for abdominal distention and constipation.   Genitourinary:  Negative for difficulty urinating and frequency.   Musculoskeletal:  Negative for arthralgias.   Skin:  Positive for rash.        psoriasisi   Neurological:  Negative for headaches.            Provider reviewed plan for the acupuncture session, precautions and contraindications. Patient/guardian/hospital staff has given consent to treat with full understanding of  what to expect during the session. Before acupuncture began, provider explained to the patient to communicate at any time if the procedure was causing discomfort past their tolerance level. Patient agreed to advise acupuncturist. The acupuncturist counseled the patient on the risks of acupuncture treatment including pain, infection, bleeding, and no relief of pain. The patient was positioned comfortably. There was no evidence of infection at the site of needle insertions.    Objective   Physical Exam       Acupuncture Treatment  Needle Guage: 36 guage /.20/ Blue seirin  Body Points: With retention  Body Points - Bilateral: Du 20, Li4, Si3, SP6, KI6, LR3  Body Points - Right: GB40, UB62, ba nadeem  Auricular Points: Yes  Auricular Points - Bilateral: BFA 1-3  Other Techniques Utilized: TDP Lamp  TDP Lamp Descripton: feet  Needle Count In: 22  Needle Count Out: 22  Needle Retention Time (min): 25 minutes  Total Face to Face Time (min): 45 minutes                 Post-treatment Assessment  Pain Score: 4  Anxiety Level (0-10): 3  Stress Level (0-10): 5  Coping Level (0-10): 5  Depression Level (0-10): 4  Fatigue Level (0-10): 6  Nausea Level (0-10): 0  Wellbeing Level (0-10): 3    Assessment/Plan   Diagnoses and all orders for this visit:  Neoplasm related pain (Primary)  History of lymphoma  History of ovarian cancer  Depressed mood  Chemotherapy-induced peripheral neuropathy (CMS/HCC)

## 2024-03-21 ENCOUNTER — TELEMEDICINE (OUTPATIENT)
Dept: BEHAVIORAL HEALTH | Facility: HOSPITAL | Age: 69
End: 2024-03-21
Payer: MEDICARE

## 2024-03-21 DIAGNOSIS — F33.0 MILD EPISODE OF RECURRENT MAJOR DEPRESSIVE DISORDER (CMS-HCC): ICD-10-CM

## 2024-03-21 DIAGNOSIS — F41.9 ANXIETY: ICD-10-CM

## 2024-03-21 PROCEDURE — 1160F RVW MEDS BY RX/DR IN RCRD: CPT | Performed by: PSYCHIATRY & NEUROLOGY

## 2024-03-21 PROCEDURE — 1036F TOBACCO NON-USER: CPT | Performed by: PSYCHIATRY & NEUROLOGY

## 2024-03-21 PROCEDURE — 90833 PSYTX W PT W E/M 30 MIN: CPT | Performed by: PSYCHIATRY & NEUROLOGY

## 2024-03-21 PROCEDURE — 99214 OFFICE O/P EST MOD 30 MIN: CPT | Performed by: PSYCHIATRY & NEUROLOGY

## 2024-03-21 PROCEDURE — 1159F MED LIST DOCD IN RCRD: CPT | Performed by: PSYCHIATRY & NEUROLOGY

## 2024-03-21 PROCEDURE — 1157F ADVNC CARE PLAN IN RCRD: CPT | Performed by: PSYCHIATRY & NEUROLOGY

## 2024-03-21 RX ORDER — BUPROPION HYDROCHLORIDE 300 MG/1
300 TABLET ORAL DAILY
Qty: 90 TABLET | Refills: 1 | Status: SHIPPED | OUTPATIENT
Start: 2024-03-21

## 2024-03-21 RX ORDER — BUSPIRONE HYDROCHLORIDE 5 MG/1
5 TABLET ORAL 2 TIMES DAILY
Qty: 60 TABLET | Refills: 2 | Status: SHIPPED | OUTPATIENT
Start: 2024-03-21 | End: 2024-04-29

## 2024-03-21 ASSESSMENT — PAIN SCALES - GENERAL: PAINLEVEL_OUTOF10: 4

## 2024-03-21 NOTE — PROGRESS NOTES
"Outpatient Psychiatry FUV      Subjective   Saba Park, a 68 y.o. female, for virtual FUV        Assessment/Plan   Patient Discussion:  CONTINUE duloxetine 60mg daily at bedtime   CONTINUE  buspirone 5mg 2x/day  INCREASE to  bupropion XL 300mg daily     RETURN to clinic Thursday 5/22 at 4:30PM for virtual FUV    Call with questions/concerns [phone]    Assessment:   68 y.o.  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 AVN R hip dx 2016. 2019 dx with ovarian CA in surveillance s/p resection and chemo. Pt had been see several time in 2015 ahead of MD maternity leave and transferred care to Dr. Nilay Farr, who has since left . 2021 pt re-established care and here for follow up    Virtual FUV today. Since last appt, reports depressive periods for 2 weeks in Feb, discussed not likely sufficient time on medication. Still more down so will further titrate wellbutrin, hold buspar dose, has been taking lower dose with no change to anxiety.  Follow up 2 months, sooner if needed.     Diagnosis:   MDD, recurrent, mild-moderate  Other spec anxiety r/o adjustment vs part of depression     Treatment Plan/Recommendations:   1. Safety Assessment: denies active thoughts of death/self harm though feels \"dark thoughts\". no h/o SI/SA. passive death wish in the past but not recently . RF include age, W, pain, cancer. PF include , engaged in care, no hx, no substance abuse, no guns. Currently low imminent risk    2. MDD, other spec anxiety r/o element of MDD vs ISIS  CONTINUE duloxetine 60mg PO QHS   CONTINUE buspar 5mg PO BID for now--consider tapering  INCREASE to bupropion XL 300mg PO daily, monitor anxiety.     Continue with therapy, supportive therapy during appt--pt has also   stopped seeing therapist related to worse health and not able to keep up, discussed to re-engage vs CBT worksheets    3. Medical: notes and " labs reviewed, noted to have stable volume loss on MRI brain with encephalomalacia of fronto-parietal lxn from CNS lymphoma  currently working with Arin Castrejno for pain and seeing NM specialist, PMR for neuropathy  limits to function affect coping for patient  recent hip replacement, notes continued pain and c/b infection, now in PT, reports improvement  Neuro adjusted med for tremor, fatigue better    4. Social: lives with , continue to encourage behavioral activation. Does better when out and about but frustrated by physical limitations    Reason for Visit:     FUV for depression, anxiety      Subjective:  Last appt 2/2024  Since then notes depressed period in Feb and disappointed that meds didn't keep mood up.  Started after trying to volunteer, unable to be on feet  Was depressed about 2 weeks.   Discussed was only on higher dose 2 weeks prior so insufficient time  However can further titrate since tolerated  Resumed lower dose of buspar, taking from old rx bottle  No change in anxiety so will continue this dose  Could consider tapering    Occ feeling overwhelmed about circumstance but no SI   No a/e to meds    Current Medications:    Current Outpatient Medications:     acetaminophen (Tylenol) 325 mg tablet, Take by mouth every 8 hours., Disp: , Rfl:     buPROPion (Wellbutrin) 75 mg tablet, Take 1 tablet (75 mg) by mouth once daily. Take with bupropion 150mg, Disp: 30 tablet, Rfl: 2    buPROPion XL (Wellbutrin XL) 150 mg 24 hr tablet, Take 1 tablet (150 mg) by mouth once daily. Do not crush, chew, or split., Disp: 90 tablet, Rfl: 1    busPIRone (Buspar) 5 mg tablet, TAKE 2 TABLETS BY MOUTH IN THE AM AND 1 TABLET BY MOUTH IN THE EVENING, Disp: 90 tablet, Rfl: 2    calcium carbonate (Oscal) 500 mg calcium (1,250 mg) tablet, Take 1 tablet (1,250 mg) by mouth 2 times a day with meals., Disp: , Rfl:     cholecalciferol (Vitamin D-3) 50 mcg (2,000 unit) capsule, Take 1 capsule (50 mcg) by mouth once daily.,  Disp: , Rfl:     clonazePAM (KlonoPIN) 0.5 mg tablet, 1 TAB(S) ORALLY ONCE A DAY TO TAKE PRIOR TO MRI, Disp: , Rfl:     DULoxetine (Cymbalta) 60 mg DR capsule, Take 1 capsule (60 mg) by mouth once daily., Disp: 90 capsule, Rfl: 1    hydrOXYzine HCL (Atarax) 25 mg tablet, Take by mouth., Disp: , Rfl:     multivitamin tablet, Take 1 tablet by mouth once daily., Disp: , Rfl:     pregabalin (Lyrica) 100 mg capsule, Take 1 capsule (100 mg) by mouth 3 times a day., Disp: 90 capsule, Rfl: 2    primidone (Mysoline) 50 mg tablet, Take 2 tablets in the morning and  3 tablets in the afternoon, Disp: 150 tablet, Rfl: 11    propranolol LA (Inderal LA) 60 mg 24 hr capsule, Take 1 capsule (60 mg) by mouth once daily., Disp: 30 capsule, Rfl: 11  Medical History:  Past Medical History:   Diagnosis Date    Anxiety disorder, unspecified     Anxiety    Family history of malignant neoplasm of breast 02/28/2019    Family history of breast cancer    Gynecologic cancer (CMS/HCC) 09/18/2023    Other conditions influencing health status     Nephrolithiasis    Ovarian cancer (CMS/HCC)     Personal history of malignant neoplasm of other parts of uterus 08/14/2020    History of malignant neoplasm of other parts of uterus    Personal history of non-Hodgkin lymphomas 08/14/2020    History of non-Hodgkin's lymphoma    Personal history of other diseases of the circulatory system     History of hypertension    Personal history of other diseases of the digestive system     History of irritable bowel syndrome    Personal history of other diseases of the respiratory system 01/10/2014    History of acute bronchitis    Personal history of other endocrine, nutritional and metabolic disease     History of hypercholesterolemia    Personal history of other malignant neoplasm of skin     History of basal cell carcinoma    Personal history of other specified conditions 10/28/2014    History of fibrocystic disease of breast    Portal vein thrombosis 09/18/2023  "   PORTAL VEIN THROMBOSIS I81    Presence of spectacles and contact lenses 12/09/2014    Wears contact lenses       Surgical History:  Past Surgical History:   Procedure Laterality Date    LITHOTRIPSY  08/19/2013    Renal Lithotripsy    OTHER SURGICAL HISTORY  08/19/2013    Lithotomy    OTHER SURGICAL HISTORY  11/25/2019    Ileostomy closure       Family History:  Family History   Problem Relation Name Age of Onset    Breast cancer Mother  60    Stroke Mother      Colon cancer Father      Breast cancer Mother's Sister  40    Breast cancer Cousin  50       Social History:  Social History     Socioeconomic History    Marital status:      Spouse name: Not on file    Number of children: Not on file    Years of education: Not on file    Highest education level: Not on file   Occupational History    Not on file   Tobacco Use    Smoking status: Never    Smokeless tobacco: Never   Substance and Sexual Activity    Alcohol use: Yes     Comment: 1 glass of wine daily    Drug use: Yes     Types: Marijuana     Comment: medical card / gummies    Sexual activity: Not on file   Other Topics Concern    Not on file   Social History Narrative    Not on file     Social Determinants of Health     Financial Resource Strain: Not on file   Food Insecurity: Not on file   Transportation Needs: Not on file   Physical Activity: Not on file   Stress: Not on file   Social Connections: Not on file   Intimate Partner Violence: Not on file   Housing Stability: Not on file              Medical Review Of Systems:  +neuropathy    Psychiatric Review Of Systems:  +anxious ruminations       Objective   Mental Status Exam:     Appearance: dressed neat and clean.   Attitude: cooperative and engaged.   Behavior: appropriate eye contact via video.   Motor Activity: mild truncal tremor noted.   Speech: regular volume, prosody, no aphasia.   Mood: \"ok\".   Affect: congruent, constricted, dysthymic  Thought Process: on topic.   Thought Content: no " delusions, no SI/HI, less hopeless.   Thought Perception: no AVH.   Cognition: alert, oriented to person, place, time. attn intact.   Insight: fair.   Judgment: fair.       Vitals:  There were no vitals filed for this visit.  No results found. However, due to the size of the patient record, not all encounters were searched. Please check Results Review for a complete set of results.  Lab Results   Component Value Date    WBC 5.6 05/10/2023    HGB 13.6 05/10/2023    HCT 42.4 05/10/2023    MCV 94 05/10/2023     05/10/2023     Lab Results   Component Value Date    GLUCOSE 59 (L) 12/01/2023    CALCIUM 9.1 12/01/2023     12/01/2023    K 5.3 12/01/2023    CO2 25 12/01/2023     12/01/2023    BUN 20 12/01/2023    CREATININE 0.81 12/01/2023       Psychotherapy   Time: 23 minutes  Type: supportive  Target: mood, anxiety  Techniques: problem solving  Goal: improved sx management  Follow up: next appt  Response: fair        Anabella Ugalde MD

## 2024-03-22 ENCOUNTER — TELEPHONE (OUTPATIENT)
Dept: ADMISSION | Facility: HOSPITAL | Age: 69
End: 2024-03-22
Payer: MEDICARE

## 2024-03-22 ENCOUNTER — TELEPHONE (OUTPATIENT)
Dept: NEUROLOGY | Facility: HOSPITAL | Age: 69
End: 2024-03-22
Payer: MEDICARE

## 2024-03-22 DIAGNOSIS — G89.29 OTHER CHRONIC PAIN: ICD-10-CM

## 2024-03-22 DIAGNOSIS — Z51.81 THERAPEUTIC DRUG MONITORING: Primary | ICD-10-CM

## 2024-03-22 DIAGNOSIS — T45.1X5A CHEMOTHERAPY-INDUCED PERIPHERAL NEUROPATHY (MULTI): ICD-10-CM

## 2024-03-22 DIAGNOSIS — G25.0 ESSENTIAL TREMOR: ICD-10-CM

## 2024-03-22 DIAGNOSIS — G62.0 CHEMOTHERAPY-INDUCED PERIPHERAL NEUROPATHY (MULTI): ICD-10-CM

## 2024-03-22 RX ORDER — METHADONE HYDROCHLORIDE 10 MG/1
10 TABLET ORAL EVERY 12 HOURS
Qty: 60 TABLET | Refills: 0 | Status: SHIPPED | OUTPATIENT
Start: 2024-03-22 | End: 2024-04-22 | Stop reason: SDUPTHER

## 2024-03-22 RX ORDER — PRIMIDONE 50 MG/1
TABLET ORAL
Qty: 315 TABLET | Refills: 0 | Status: SHIPPED | OUTPATIENT
Start: 2024-03-22

## 2024-03-22 NOTE — TELEPHONE ENCOUNTER
"OARRS report reviewed and reflects  prescription history, no aberrancy noted. Per OARRS, patient due for Methadone 10mg BID on 3/26/24. Per last visit with Arin Hirsch CNP on 2/14/24 patient to \"Continue Methadone 10mg BID - discussed adjusting to assess if contributing to fatigue.\" Patient with follow up visit scheduled with Arin Hirsch CNP on 5/17/24 at [facility]. Patient updated that medication will be sent to Pharmacy and confirmed she would like to stay at current Methadone dose still as doesn't think it is contributing to fatigue. Refill request routed to provider.   "

## 2024-03-22 NOTE — TELEPHONE ENCOUNTER
Saba Park called. She would like to request a refill of Primidone 50mg 3.5 tabs at [facility] only. She is quite drowsy and would rather not increase further at this time but her tremor is not resolved. Please send 30 day supply to [facility] (on file) with a few refills

## 2024-03-22 NOTE — TELEPHONE ENCOUNTER
Saba Park called the refill line for Methadone 10mg. Would like refills to be sent to [facility] pharmacy on file. Message sent to Palliative Care team to send in.

## 2024-03-28 ENCOUNTER — TELEPHONE (OUTPATIENT)
Dept: NEUROLOGY | Facility: CLINIC | Age: 69
End: 2024-03-28
Payer: MEDICARE

## 2024-03-28 NOTE — TELEPHONE ENCOUNTER
I called both the pts cell and home phones today and left aVM regarding the labs ordered by JEANNETTE Croft NP      Primidone and labs    From  DEMETRIUS Nuñez-QUYNH To  Saba Park Sent and Delivered  3/22/2024  4:38 PM       Hi Ms Park,     I have ordered folate and CBC labs to be monitored while taking primidone (can cause low levels of both), in December you had recent liver panel checked so I will not reorder that. Refill provided.      You can go to any  lab, the order is there.      Thank you,     Max      [facility] [place]

## 2024-04-03 ENCOUNTER — ALLIED HEALTH (OUTPATIENT)
Dept: INTEGRATIVE MEDICINE | Facility: CLINIC | Age: 69
End: 2024-04-03
Payer: MEDICARE

## 2024-04-03 ENCOUNTER — LAB (OUTPATIENT)
Dept: LAB | Facility: CLINIC | Age: 69
End: 2024-04-03
Payer: MEDICARE

## 2024-04-03 DIAGNOSIS — T45.1X5A CHEMOTHERAPY-INDUCED PERIPHERAL NEUROPATHY (MULTI): ICD-10-CM

## 2024-04-03 DIAGNOSIS — Z85.43 HISTORY OF OVARIAN CANCER: ICD-10-CM

## 2024-04-03 DIAGNOSIS — R45.89 DEPRESSED MOOD: ICD-10-CM

## 2024-04-03 DIAGNOSIS — G25.0 ESSENTIAL TREMOR: ICD-10-CM

## 2024-04-03 DIAGNOSIS — Z85.72 HISTORY OF LYMPHOMA: ICD-10-CM

## 2024-04-03 DIAGNOSIS — Z51.81 THERAPEUTIC DRUG MONITORING: ICD-10-CM

## 2024-04-03 DIAGNOSIS — G89.3 NEOPLASM RELATED PAIN: Primary | ICD-10-CM

## 2024-04-03 DIAGNOSIS — G62.0 CHEMOTHERAPY-INDUCED PERIPHERAL NEUROPATHY (MULTI): ICD-10-CM

## 2024-04-03 LAB
BASOPHILS # BLD AUTO: 0.04 X10*3/UL (ref 0–0.1)
BASOPHILS NFR BLD AUTO: 0.7 %
EOSINOPHIL # BLD AUTO: 0.08 X10*3/UL (ref 0–0.7)
EOSINOPHIL NFR BLD AUTO: 1.3 %
ERYTHROCYTE [DISTWIDTH] IN BLOOD BY AUTOMATED COUNT: 12.3 % (ref 11.5–14.5)
FOLATE SERPL-MCNC: >24 NG/ML
HCT VFR BLD AUTO: 41.6 % (ref 36–46)
HGB BLD-MCNC: 13.6 G/DL (ref 12–16)
IMM GRANULOCYTES # BLD AUTO: 0 X10*3/UL (ref 0–0.7)
IMM GRANULOCYTES NFR BLD AUTO: 0 % (ref 0–0.9)
LYMPHOCYTES # BLD AUTO: 2.2 X10*3/UL (ref 1.2–4.8)
LYMPHOCYTES NFR BLD AUTO: 36.5 %
MCH RBC QN AUTO: 31.9 PG (ref 26–34)
MCHC RBC AUTO-ENTMCNC: 32.7 G/DL (ref 32–36)
MCV RBC AUTO: 98 FL (ref 80–100)
MONOCYTES # BLD AUTO: 0.52 X10*3/UL (ref 0.1–1)
MONOCYTES NFR BLD AUTO: 8.6 %
NEUTROPHILS # BLD AUTO: 3.19 X10*3/UL (ref 1.2–7.7)
NEUTROPHILS NFR BLD AUTO: 52.9 %
NRBC BLD-RTO: NORMAL /100{WBCS}
PLATELET # BLD AUTO: 195 X10*3/UL (ref 150–450)
RBC # BLD AUTO: 4.26 X10*6/UL (ref 4–5.2)
WBC # BLD AUTO: 6 X10*3/UL (ref 4.4–11.3)

## 2024-04-03 PROCEDURE — 85025 COMPLETE CBC W/AUTO DIFF WBC: CPT

## 2024-04-03 PROCEDURE — 36415 COLL VENOUS BLD VENIPUNCTURE: CPT

## 2024-04-03 PROCEDURE — 99213 OFFICE O/P EST LOW 20 MIN: CPT | Performed by: HOSPITALIST

## 2024-04-03 PROCEDURE — 82746 ASSAY OF FOLIC ACID SERUM: CPT

## 2024-04-03 ASSESSMENT — ENCOUNTER SYMPTOMS
ABDOMINAL DISTENTION: 0
TROUBLE SWALLOWING: 0
ARTHRALGIAS: 0
FREQUENCY: 0
CONSTIPATION: 0
WHEEZING: 0
FEVER: 0
PALPITATIONS: 0
DIFFICULTY URINATING: 0
COUGH: 0
HEADACHES: 0
CHILLS: 0

## 2024-04-03 ASSESSMENT — PAIN SCALES - GENERAL: PAINLEVEL_OUTOF10: 3

## 2024-04-03 NOTE — PROGRESS NOTES
Acupuncture Visit:     Subjective   Patient ID: Saba Park is a 68 y.o. female who presents for No chief complaint on file.  Numbness in toes is better  Foot still an issue    Hip is similar, changes though the day.                   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  Worse this past year since August  Working with psychiatrist, on meds     Neuropathy R foot- tingling, numbness, pain at night  Constant, can be worse at night  Has had PT, chiro- doing estim    Balance is poor, sometimes with dizziness, needs support  No falls    Chemo brain- trouble recalling things occ    Has essential tremors. Has gotten worse    MVI  D  Ca    Sleep- always fatigued due to meds, lyrica,   Fatigue is constant    Diet: good             Pre-treatment Assessment  Pain Score: 5 - Moderate pain  Anxiety Level (0-10): 4  Stress Level (0-10): 2  Coping Level (0-10): 3  Depression Level (0-10): 3  Fatigue Level (0-10): 7  Nausea Level (0-10): 0  Wellbeing Level (0-10): 7    Review of Systems   Constitutional:  Negative for chills and fever.        Night sweats   HENT:  Negative for ear pain, tinnitus and trouble swallowing.    Eyes:  Negative for visual disturbance.   Respiratory:  Negative for cough and wheezing.    Cardiovascular:  Negative for chest pain and palpitations.   Gastrointestinal:  Negative for abdominal distention and constipation.   Genitourinary:  Negative for difficulty urinating and frequency.   Musculoskeletal:  Negative for arthralgias.   Skin:  Positive for rash.        psoriasisi   Neurological:  Negative for headaches.            Provider reviewed plan for the acupuncture session, precautions and contraindications. Patient/guardian/hospital staff has given consent  to treat with full understanding of what to expect during the session. Before acupuncture began, provider explained to the patient to communicate at any time if the procedure was causing discomfort past their tolerance level. Patient agreed to advise acupuncturist. The acupuncturist counseled the patient on the risks of acupuncture treatment including pain, infection, bleeding, and no relief of pain. The patient was positioned comfortably. There was no evidence of infection at the site of needle insertions.    Objective   Physical Exam       Acupuncture Treatment  Needle Guage: 36 guage /.20/ Blue seirin  Body Points: With retention  Body Points - Bilateral: Du 20, Li4, Si3, SP6, KI6, LR3  Body Points - Right: GB40, UB62, ba nadeem  Auricular Points: Yes  Auricular Points - Bilateral: BFA 1-3  Other Techniques Utilized: TDP Lamp  TDP Lamp Descripton: feet  Needle Count In: 22  Needle Count Out: 22  Needle Retention Time (min): 25 minutes  Total Face to Face Time (min): 45 minutes                 Post-treatment Assessment  Pain Score: 3  Anxiety Level (0-10): 2  Stress Level (0-10): 1  Coping Level (0-10): 5  Depression Level (0-10): 3  Fatigue Level (0-10): 3  Nausea Level (0-10): 0  Wellbeing Level (0-10): 2    Assessment/Plan   Diagnoses and all orders for this visit:  Neoplasm related pain (Primary)  Depressed mood  History of lymphoma  Chemotherapy-induced peripheral neuropathy (CMS/HCC)  History of ovarian cancer

## 2024-04-03 NOTE — PROGRESS NOTES
Patient ID: Saba Park is a 68 y.o. female.  Referring Physician: No referring provider defined for this encounter.  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) - ritux/methotrexate, vincristine, procarbazine  Remission since 2015  C/b R sided weakness, R leg weakness, neuropathy, balance issues - saw PT  Chronic R leg pain, dependant on oxycodone, on methadone and seeing pain mgmt  AVN d/t steroids R hip     11/18 Ovarian ca - 2/19 lap surgery - chemo with taxol x 3 with good response  5/19 ROMY/BSO, rectal resection, ileostomy, adjuvant 3-4 mo chemo c/b C diff     5/22 THR f/b PT - c/b infection and replaced, needed long term IV abx  Kidney stones     INTEGRATIVE HISTORY:     Symptoms:  Depression - f/b psych - on welbutrin, buspar     Pain in R Hip - tried chiropractor, seen by acupuncture     Neuropathy R foot - ball of foot, toes, pain - methadone, gabapentin etc.    ROS:  no ha, visual symptoms, hearing loss  no sob, chest pain, palp  ROS o/w non contributory, please see HPI    Objective    BSA: There is no height or weight on file to calculate BSA.  There were no vitals taken for this visit.    PHYSICAL EXAM:  NAD, awake/alert  HEENT, NCAT, OP clear, no oral lesions  CTA bilat  RRR no mgr  Abd soft/nt/nd+bs  No c/c/e/ttp  Motor/sensory intact, CN 2-12 intact     RESULTS:  Lab Results   Component Value Date    WBC 5.6 05/10/2023    HGB 13.6 05/10/2023    HCT 42.4 05/10/2023     05/10/2023     10/09/2020    CREATININE 0.81 12/01/2023    AST 14 12/01/2023     5.6 02/15/2024     6.0 07/12/2023     4.0 02/06/2023     6.3 11/27/2022     10.2 10/04/2022     8.5 07/13/2022     5.4 05/18/2022     5.6 11/17/2021     5.3 05/17/2021     4.4 02/01/2021     5.4 11/11/2020     6.3 07/16/2020     4.8 03/30/2020     3.6  12/20/2019     5.7 09/11/2019     4.7 08/19/2019     6.0 07/22/2019     5.5 07/03/2019     12.4 05/02/2019     14.1 04/11/2019     45.6 (H) 03/21/2019     151.9 (H) 02/27/2019     76.7 (H) 01/16/2019       Assessment/Plan   Cancer Staging   No matching staging information was found for the patient.    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 (Tyson Harris) - ritux/methotrexate, vincristine, procarbazine  Remission since 2015  C/b R sided weakness, R leg weakness, neuropathy, balance issues - saw PT  Chronic R leg pain, dependant on oxycodone, on methadone and seeing pain mgmt  AVN d/t steroids R hip     11/18 Ovarian ca - 2/19 lap surgery - chemo with taxol x 3 with good response  5/19 ROMY/BSO, rectal resection, ileostomy, adjuvant 3-4 mo chemo c/b C diff     5/22 THR f/b PT - c/b infection and replaced, needed long term IV abx  Kidney stones     INTEGRATIVE HISTORY:     Symptoms:  Depression - f/b psych - on welbutrin, buspar, cont to see psych and follow up on medication reccs.     Pain in R Hip - tried chiropractor, seen by acupuncture - would set up for [facility]  Diff walking now  Has Medical Marijuana card, gummies, has CBD/THC - helps forget about it  May be also related to AVN - consider perhaps hyperbaric oxygen or cryotherapy or local cryotherapy at [facility]  Cont with pain management  Hurts at end of the day severiano with walking, intermittent     Neuropathy R foot - ball of foot, toes, pain - methadone, gabapentin etc.  Would recc acupuncture  Scrambler therapy - will discuss with Dr. Alarcon further  EMG sugg not neuropathy - CRPS likely     Balance is getting worse, 11/22 B12 780  Could be related to neuropathy, worse since ovarian ca  MR brain neg 7/23  Consider Reiki - consider at the [facility]  Meditation     Follow up:  in Symptom management clinic     SYMPTOM  MANAGEMENT:  Integrative Oncology Symptom Management:     The [facility] Integrative Oncology Symptom Management clinic offers multi-disciplinary supervised care of cancer patients using Integrative Modalities billed to insurance using NCCN and SIO/ASCO guideline-driven practices.  ESAS is obtained prior to and after each treatment by the practitioner     Symptoms Managed:  R hip pain - constant, worse as day goes on, improved more, same today     Depression - doing ok right now but worse after getting pain with volunteering     Gets sweats often     Neuropathy R foot - tingling, numbness, pain at night  Bottom of foot hurting - saw podiatry, on steroids and shot, wearing a boot and improving since even more  Wearing an arch which is helping  Had setback a few weeks ago on her feet a lot  Less numbness in toes     Natural Products utilized:  Mvi, Vit D, ca     Integrative Treatment: Acupuncture  Session #: 8  Frequency: weekly     Referrals:  Scrambler Therapy - planning 7/24  Recommendations:  Reduced primodone, tremors worsening     Follow Up:  Symptom Management: weekly  Integrative Oncology:      I have personally seen the patient and supervised the treatment by the integrative practitioner during this visit.  Denton Silverman MD

## 2024-04-17 ENCOUNTER — ALLIED HEALTH (OUTPATIENT)
Dept: INTEGRATIVE MEDICINE | Facility: CLINIC | Age: 69
End: 2024-04-17

## 2024-04-17 DIAGNOSIS — G62.0 CHEMOTHERAPY-INDUCED PERIPHERAL NEUROPATHY (MULTI): Primary | ICD-10-CM

## 2024-04-17 DIAGNOSIS — T45.1X5A CHEMOTHERAPY-INDUCED PERIPHERAL NEUROPATHY (MULTI): Primary | ICD-10-CM

## 2024-04-17 PROCEDURE — 97139 UNLISTED THERAPEUTIC PX: CPT | Performed by: NATUROPATH

## 2024-04-17 ASSESSMENT — ENCOUNTER SYMPTOMS
FEVER: 0
CONSTIPATION: 0
DIFFICULTY URINATING: 0
WHEEZING: 0
FREQUENCY: 0
CHILLS: 0
TROUBLE SWALLOWING: 0
COUGH: 0
ABDOMINAL DISTENTION: 0
HEADACHES: 0
ARTHRALGIAS: 0
PALPITATIONS: 0

## 2024-04-17 NOTE — PROGRESS NOTES
Acupuncture Visit:     Subjective   Patient ID: Saba Park is a 68 y.o. female who presents for No chief complaint on file.  Feeling better  Numbness in toes is less, mostly lateral right foot  Hip no change  Feel pain pain in thigh.                       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  Worse this past year since August  Working with psychiatrist, on meds     Neuropathy R foot- tingling, numbness, pain at night  Constant, can be worse at night  Has had PT, chiro- doing estim    Balance is poor, sometimes with dizziness, needs support  No falls    Chemo brain- trouble recalling things occ    Has essential tremors. Has gotten worse    MVI  D  Ca    Sleep- always fatigued due to meds, lyrica,   Fatigue is constant    Diet: good             Pre-treatment Assessment  Pain Score: 5 - Moderate pain  Anxiety Level (0-10): 5  Stress Level (0-10): 3  Coping Level (0-10): 7  Depression Level (0-10): 4  Fatigue Level (0-10): 4  Nausea Level (0-10): 0  Wellbeing Level (0-10): 3    Review of Systems   Constitutional:  Negative for chills and fever.        Night sweats   HENT:  Negative for ear pain, tinnitus and trouble swallowing.    Eyes:  Negative for visual disturbance.   Respiratory:  Negative for cough and wheezing.    Cardiovascular:  Negative for chest pain and palpitations.   Gastrointestinal:  Negative for abdominal distention and constipation.   Genitourinary:  Negative for difficulty urinating and frequency.   Musculoskeletal:  Negative for arthralgias.   Skin:  Positive for rash.        psoriasisi   Neurological:  Negative for headaches.            Provider reviewed plan for the acupuncture session, precautions and contraindications. Patient/guardian/hospital  staff has given consent to treat with full understanding of what to expect during the session. Before acupuncture began, provider explained to the patient to communicate at any time if the procedure was causing discomfort past their tolerance level. Patient agreed to advise acupuncturist. The acupuncturist counseled the patient on the risks of acupuncture treatment including pain, infection, bleeding, and no relief of pain. The patient was positioned comfortably. There was no evidence of infection at the site of needle insertions.    Objective   Physical Exam       Acupuncture Treatment  Needle Guage: 36 guage /.20/ Blue seirin  Body Points: With retention  Body Points - Bilateral: Du 20, Li4, Si3, SP6, KI6, LR3  Body Points - Right: GB40, UB62, ba nadeem  Auricular Points: Yes  Auricular Points - Bilateral: BFA 1-3  Other Techniques Utilized: TDP Lamp  TDP Lamp Descripton: feet  Needle Count In: 22  Needle Count Out: 22  Needle Retention Time (min): 25 minutes  Total Face to Face Time (min): 45 minutes                 Post-treatment Assessment  Pain Score: 6  Anxiety Level (0-10): 3  Stress Level (0-10): 5  Coping Level (0-10): 8  Depression Level (0-10): 5  Fatigue Level (0-10): 6  Nausea Level (0-10): 0  Wellbeing Level (0-10): 2    Assessment/Plan   Diagnoses and all orders for this visit:  Chemotherapy-induced peripheral neuropathy (Multi) (Primary)

## 2024-04-18 ENCOUNTER — OFFICE VISIT (OUTPATIENT)
Dept: PAIN MEDICINE | Facility: CLINIC | Age: 69
End: 2024-04-18
Payer: MEDICARE

## 2024-04-18 VITALS
BODY MASS INDEX: 27.11 KG/M2 | RESPIRATION RATE: 16 BRPM | HEART RATE: 63 BPM | WEIGHT: 153 LBS | SYSTOLIC BLOOD PRESSURE: 108 MMHG | HEIGHT: 63 IN | DIASTOLIC BLOOD PRESSURE: 70 MMHG | OXYGEN SATURATION: 94 %

## 2024-04-18 DIAGNOSIS — G89.29 OTHER CHRONIC PAIN: ICD-10-CM

## 2024-04-18 DIAGNOSIS — G90.521 COMPLEX REGIONAL PAIN SYNDROME I OF RIGHT LOWER LIMB: ICD-10-CM

## 2024-04-18 DIAGNOSIS — T45.1X5A CHEMOTHERAPY-INDUCED PERIPHERAL NEUROPATHY (MULTI): Primary | ICD-10-CM

## 2024-04-18 DIAGNOSIS — G62.0 CHEMOTHERAPY-INDUCED PERIPHERAL NEUROPATHY (MULTI): Primary | ICD-10-CM

## 2024-04-18 PROCEDURE — 1157F ADVNC CARE PLAN IN RCRD: CPT | Performed by: ANESTHESIOLOGY

## 2024-04-18 PROCEDURE — 1036F TOBACCO NON-USER: CPT | Performed by: ANESTHESIOLOGY

## 2024-04-18 PROCEDURE — 1160F RVW MEDS BY RX/DR IN RCRD: CPT | Performed by: ANESTHESIOLOGY

## 2024-04-18 PROCEDURE — 3078F DIAST BP <80 MM HG: CPT | Performed by: ANESTHESIOLOGY

## 2024-04-18 PROCEDURE — 1125F AMNT PAIN NOTED PAIN PRSNT: CPT | Performed by: ANESTHESIOLOGY

## 2024-04-18 PROCEDURE — 3074F SYST BP LT 130 MM HG: CPT | Performed by: ANESTHESIOLOGY

## 2024-04-18 PROCEDURE — 99214 OFFICE O/P EST MOD 30 MIN: CPT | Performed by: ANESTHESIOLOGY

## 2024-04-18 PROCEDURE — 1159F MED LIST DOCD IN RCRD: CPT | Performed by: ANESTHESIOLOGY

## 2024-04-18 ASSESSMENT — ENCOUNTER SYMPTOMS
CONSTITUTIONAL NEGATIVE: 1
PSYCHIATRIC NEGATIVE: 1
HEMATOLOGIC/LYMPHATIC NEGATIVE: 1
RESPIRATORY NEGATIVE: 1
MYALGIAS: 1
ALLERGIC/IMMUNOLOGIC NEGATIVE: 1
EYES NEGATIVE: 1
WEAKNESS: 1
CARDIOVASCULAR NEGATIVE: 1
GASTROINTESTINAL NEGATIVE: 1
NUMBNESS: 1
ENDOCRINE NEGATIVE: 1

## 2024-04-18 ASSESSMENT — PAIN - FUNCTIONAL ASSESSMENT: PAIN_FUNCTIONAL_ASSESSMENT: 0-10

## 2024-04-18 ASSESSMENT — PAIN SCALES - GENERAL: PAINLEVEL_OUTOF10: 4

## 2024-04-18 NOTE — PROGRESS NOTES
Subjective   Patient ID: Saba Park is a 68 y.o. female who presents for right foot.  HPI  Patient here today for follow-up.  She suffers with right lower extremity pain that occurred after her chemotherapy treatment.  However she had EMG that did show no large fiber neuropathy but the neurologist felt that she has several symptoms of complex regional pain syndrome.  She has no pain in the left lower extremity.  She has severe pain in the top of the foot.  She does report shiny atrophic skin.  She has had skin color changes in the past.  She has been working with Dr. Jama of podiatry to help with some of the tendon issues and support with different shoes.  She is scheduled for scrambler therapy in July and wanted instructions on how to wean off of her gabapentin.  Review of Systems   Constitutional: Negative.    HENT: Negative.     Eyes: Negative.    Respiratory: Negative.     Cardiovascular: Negative.    Gastrointestinal: Negative.    Endocrine: Negative.    Genitourinary: Negative.    Musculoskeletal:  Positive for myalgias.   Skin: Negative.    Allergic/Immunologic: Negative.    Neurological:  Positive for weakness and numbness.   Hematological: Negative.    Psychiatric/Behavioral: Negative.         Objective   Physical Exam  Vitals and nursing note reviewed.   Constitutional:       Appearance: Normal appearance.   HENT:      Head: Normocephalic and atraumatic.      Right Ear: Ear canal and external ear normal.      Left Ear: Ear canal and external ear normal.      Nose: Nose normal.      Mouth/Throat:      Mouth: Mucous membranes are moist.      Pharynx: Oropharynx is clear.   Eyes:      Conjunctiva/sclera: Conjunctivae normal.      Pupils: Pupils are equal, round, and reactive to light.   Cardiovascular:      Rate and Rhythm: Normal rate.   Pulmonary:      Effort: Pulmonary effort is normal. No respiratory distress.   Musculoskeletal:      Cervical back: Normal range of motion and neck supple.       Lumbar back: Tenderness present. Normal range of motion.        Feet:    Feet:      Comments: Tenderness with palpation deep into the right heel.  There skin around the ankle and calf is shiney an atrophic appearing. L   Skin:     General: Skin is warm and dry.   Neurological:      Mental Status: She is alert and oriented to person, place, and time.      Sensory: Sensory deficit (right foot and leg stocking glove at the shin) present.      Motor: Weakness (right hip) present.      Coordination: Coordination is intact.      Gait: Gait is intact.   Psychiatric:         Mood and Affect: Mood normal.         Thought Content: Thought content normal.       Assessment/Plan   Problem List Items Addressed This Visit             ICD-10-CM    Chemotherapy-induced peripheral neuropathy (Multi) - Primary G62.0, T45.1X5A    Other chronic pain G89.29     Other Visit Diagnoses         Codes    Complex regional pain syndrome i of right lower limb     G90.521          I nice discussion with the patient today our plan will be as follows.    Radiology: Patient to have EMG and there was no evidence of SFN or Large Fiber neuropathy.     Physically:  Continue home exercise program.     Psychologically:  No issues at this time.     Medication: Patient to wean off her Gabapentin 3 weeks before starting scrambler therapy.     Duration:  > 2 years    Intervention:  Patient scheduled for Scrambler therapy in July.           Abner Alarcon MD 04/18/24 1:14 PM

## 2024-04-19 ASSESSMENT — PAIN SCALES - GENERAL: PAINLEVEL_OUTOF10: 6

## 2024-04-22 ENCOUNTER — TELEPHONE (OUTPATIENT)
Dept: HEMATOLOGY/ONCOLOGY | Facility: HOSPITAL | Age: 69
End: 2024-04-22
Payer: MEDICARE

## 2024-04-22 DIAGNOSIS — G62.0 CHEMOTHERAPY-INDUCED PERIPHERAL NEUROPATHY (MULTI): ICD-10-CM

## 2024-04-22 DIAGNOSIS — G89.29 OTHER CHRONIC PAIN: ICD-10-CM

## 2024-04-22 DIAGNOSIS — T45.1X5A CHEMOTHERAPY-INDUCED PERIPHERAL NEUROPATHY (MULTI): ICD-10-CM

## 2024-04-22 RX ORDER — METHADONE HYDROCHLORIDE 10 MG/1
10 TABLET ORAL EVERY 12 HOURS
Qty: 60 TABLET | Refills: 0 | Status: SHIPPED | OUTPATIENT
Start: 2024-04-22 | End: 2024-05-17 | Stop reason: SDUPTHER

## 2024-04-22 NOTE — TELEPHONE ENCOUNTER
Refill request received for Methadone 10mg.  Preferred pharmacy is [facility] in [employer] at [address] in [place].  Message sent to Palliative Care team.

## 2024-04-22 NOTE — TELEPHONE ENCOUNTER
OARRS report reviewed and reflects  prescription history, no aberrancy noted. Per OARRS, patient last filled Methadone #60/30 day supply on 3/26/24. Per last visit with Arin Hirsch NP on 2/14/24 patient to continue Methadone 10 mg BID . Patient with follow up visit scheduled with Arin Hirsch NP on 5/17/24. Refill request routed to provider.

## 2024-04-24 ENCOUNTER — ALLIED HEALTH (OUTPATIENT)
Dept: INTEGRATIVE MEDICINE | Facility: CLINIC | Age: 69
End: 2024-04-24

## 2024-04-24 DIAGNOSIS — T45.1X5A CHEMOTHERAPY-INDUCED PERIPHERAL NEUROPATHY (MULTI): Primary | ICD-10-CM

## 2024-04-24 DIAGNOSIS — G62.0 CHEMOTHERAPY-INDUCED PERIPHERAL NEUROPATHY (MULTI): Primary | ICD-10-CM

## 2024-04-24 PROCEDURE — 97139 UNLISTED THERAPEUTIC PX: CPT | Performed by: NATUROPATH

## 2024-04-24 ASSESSMENT — ENCOUNTER SYMPTOMS
DIFFICULTY URINATING: 0
COUGH: 0
CONSTIPATION: 0
ABDOMINAL DISTENTION: 0
FEVER: 0
PALPITATIONS: 0
HEADACHES: 0
TROUBLE SWALLOWING: 0
ARTHRALGIAS: 0
WHEEZING: 0
CHILLS: 0
FREQUENCY: 0

## 2024-04-24 NOTE — PROGRESS NOTES
Acupuncture Visit:     Subjective   Patient ID: Saba Park is a 68 y.o. female who presents for No chief complaint on file.  Numbness is OK, felt ok walking today  Hip- good today but uips and downs.   Energy- lower  Sleep- good        Feeling better  Numbness in toes is less, mostly lateral right foot  Hip no change  Feel pain pain in thigh.                       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  Worse this past year since August  Working with psychiatrist, on meds     Neuropathy R foot- tingling, numbness, pain at night  Constant, can be worse at night  Has had PT, chiro- doing estim    Balance is poor, sometimes with dizziness, needs support  No falls    Chemo brain- trouble recalling things occ    Has essential tremors. Has gotten worse    MVI  D  Ca    Sleep- always fatigued due to meds, lyrica,   Fatigue is constant    Diet: good             Pre-treatment Assessment  Pain Score: 3  Anxiety Level (0-10): 3  Stress Level (0-10): 2  Coping Level (0-10): 5  Depression Level (0-10): 3  Fatigue Level (0-10): 4  Nausea Level (0-10): 0  Wellbeing Level (0-10): 3    Review of Systems   Constitutional:  Negative for chills and fever.        Night sweats   HENT:  Negative for ear pain, tinnitus and trouble swallowing.    Eyes:  Negative for visual disturbance.   Respiratory:  Negative for cough and wheezing.    Cardiovascular:  Negative for chest pain and palpitations.   Gastrointestinal:  Negative for abdominal distention and constipation.   Genitourinary:  Negative for difficulty urinating and frequency.   Musculoskeletal:  Negative for arthralgias.   Skin:  Positive for rash.        psoriasisi   Neurological:  Negative for headaches.            Provider reviewed  plan for the acupuncture session, precautions and contraindications. Patient/guardian/hospital staff has given consent to treat with full understanding of what to expect during the session. Before acupuncture began, provider explained to the patient to communicate at any time if the procedure was causing discomfort past their tolerance level. Patient agreed to advise acupuncturist. The acupuncturist counseled the patient on the risks of acupuncture treatment including pain, infection, bleeding, and no relief of pain. The patient was positioned comfortably. There was no evidence of infection at the site of needle insertions.    Objective   Physical Exam       Acupuncture Treatment  Needle Guage: 36 guage /.20/ Blue seirin  Body Points: With retention  Body Points - Bilateral: Du 20, Li4, Si3, SP6, KI6, LR3  Body Points - Right: GB40, UB62, ba nadeem  Auricular Points: Yes  Auricular Points - Bilateral: BFA 1-3  Other Techniques Utilized: TDP Lamp  TDP Lamp Descripton: feet  Needle Count In: 22  Needle Count Out: 22  Needle Retention Time (min): 25 minutes  Total Face to Face Time (min): 45 minutes                 Post-treatment Assessment  Pain Score: 3  Anxiety Level (0-10): 1  Stress Level (0-10): 0  Coping Level (0-10): 6  Depression Level (0-10): 2  Fatigue Level (0-10): 4  Nausea Level (0-10): 0  Wellbeing Level (0-10): 2    Assessment/Plan   Diagnoses and all orders for this visit:  Chemotherapy-induced peripheral neuropathy (Multi) (Primary)

## 2024-04-29 ENCOUNTER — TELEPHONE (OUTPATIENT)
Dept: PALLIATIVE MEDICINE | Facility: CLINIC | Age: 69
End: 2024-04-29
Payer: MEDICARE

## 2024-04-29 DIAGNOSIS — M79.2 NEUROPATHIC PAIN: ICD-10-CM

## 2024-04-29 DIAGNOSIS — F33.0 MILD EPISODE OF RECURRENT MAJOR DEPRESSIVE DISORDER (CMS-HCC): ICD-10-CM

## 2024-04-29 DIAGNOSIS — F41.9 ANXIETY: ICD-10-CM

## 2024-04-29 DIAGNOSIS — G89.3 CHRONIC PAIN AFTER CANCER TREATMENT: ICD-10-CM

## 2024-04-29 DIAGNOSIS — Z51.5 PALLIATIVE CARE ENCOUNTER: ICD-10-CM

## 2024-04-29 RX ORDER — BUSPIRONE HYDROCHLORIDE 5 MG/1
5 TABLET ORAL 2 TIMES DAILY
Qty: 180 TABLET | Refills: 1 | Status: SHIPPED | OUTPATIENT
Start: 2024-04-29

## 2024-04-29 RX ORDER — PREGABALIN 75 MG/1
75 CAPSULE ORAL 2 TIMES DAILY
Qty: 60 CAPSULE | Refills: 0 | Status: SHIPPED | OUTPATIENT
Start: 2024-04-29 | End: 2024-05-17 | Stop reason: SDUPTHER

## 2024-04-29 NOTE — TELEPHONE ENCOUNTER
Patient last seen by DEMETRIUS Hirsch on 2/14 with plan to continue lyrica 100mg BID with plan to decrease to 75mg BID in preparation for Scrambler therapy. Follow up visit is scheduled for 5/17. OARRS reviewed and no aberrancy noted. Patient last filled lyrica 100mg #90/30 days on 3/8. Prescription pended to provider to approve.

## 2024-04-30 ASSESSMENT — PAIN SCALES - GENERAL: PAINLEVEL_OUTOF10: 3

## 2024-05-01 ENCOUNTER — OFFICE VISIT (OUTPATIENT)
Dept: NEUROLOGY | Facility: CLINIC | Age: 69
End: 2024-05-01
Payer: MEDICARE

## 2024-05-01 ENCOUNTER — DOCUMENTATION (OUTPATIENT)
Dept: NEUROLOGY | Facility: CLINIC | Age: 69
End: 2024-05-01

## 2024-05-01 VITALS
DIASTOLIC BLOOD PRESSURE: 74 MMHG | RESPIRATION RATE: 14 BRPM | BODY MASS INDEX: 27.36 KG/M2 | SYSTOLIC BLOOD PRESSURE: 114 MMHG | HEART RATE: 66 BPM | WEIGHT: 152 LBS

## 2024-05-01 DIAGNOSIS — R25.1 TREMOR: Primary | ICD-10-CM

## 2024-05-01 PROCEDURE — 1157F ADVNC CARE PLAN IN RCRD: CPT | Performed by: STUDENT IN AN ORGANIZED HEALTH CARE EDUCATION/TRAINING PROGRAM

## 2024-05-01 PROCEDURE — 3074F SYST BP LT 130 MM HG: CPT | Performed by: STUDENT IN AN ORGANIZED HEALTH CARE EDUCATION/TRAINING PROGRAM

## 2024-05-01 PROCEDURE — 1159F MED LIST DOCD IN RCRD: CPT | Performed by: STUDENT IN AN ORGANIZED HEALTH CARE EDUCATION/TRAINING PROGRAM

## 2024-05-01 PROCEDURE — 3078F DIAST BP <80 MM HG: CPT | Performed by: STUDENT IN AN ORGANIZED HEALTH CARE EDUCATION/TRAINING PROGRAM

## 2024-05-01 PROCEDURE — 99214 OFFICE O/P EST MOD 30 MIN: CPT | Performed by: STUDENT IN AN ORGANIZED HEALTH CARE EDUCATION/TRAINING PROGRAM

## 2024-05-01 PROCEDURE — 1160F RVW MEDS BY RX/DR IN RCRD: CPT | Performed by: STUDENT IN AN ORGANIZED HEALTH CARE EDUCATION/TRAINING PROGRAM

## 2024-05-01 RX ORDER — PROPRANOLOL HYDROCHLORIDE 60 MG/1
TABLET ORAL
Qty: 60 TABLET | Refills: 11 | Status: SHIPPED | OUTPATIENT
Start: 2024-05-01

## 2024-05-01 ASSESSMENT — PATIENT HEALTH QUESTIONNAIRE - PHQ9
1. LITTLE INTEREST OR PLEASURE IN DOING THINGS: NOT AT ALL
2. FEELING DOWN, DEPRESSED OR HOPELESS: NOT AT ALL
SUM OF ALL RESPONSES TO PHQ9 QUESTIONS 1 AND 2: 0

## 2024-05-01 ASSESSMENT — ENCOUNTER SYMPTOMS
DEPRESSION: 0
LOSS OF SENSATION IN FEET: 0
OCCASIONAL FEELINGS OF UNSTEADINESS: 1

## 2024-05-01 NOTE — PROGRESS NOTES
Subjective     Saba Park is Lt handed   68 y.o. year old female who presents with FUV of ET.  Visit type: follow up visit     HPI  Interval HX:  Still has tremor mostly in Lt hand,doing make up and eating soup is challenging.  Primidone during day made her sleepy and switched it to bedtime.  Had educational visit with Max about DBS and is still hesitant about DBS.          Prior HX First seen by Dr. Ng in 2015 for dizziness and tremor. The tremor started in 2005 in the hands and sometimes head which worsened over time. She was on gabapentin for neuropathy without improvement in tremor. She was diagnosed with ET and started on propranolol. 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 Primidone was added in 2021 for ET. In April 2022 she was started on abilify and tremors worsened; she was noted to have parkinsonism so abilify was dc'd in Aug 2022 with improvement. Last visit Jan 2023 propranolol was lowered due to dizziness and low BP.      Interval Hx:  She still has tremor while eating and doing  make up and writing.Tremor is not constant and fluctuate.She has some imbalance but no fall.Higher dose of Primidone made her loopy.     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 c/b pain and gait difficulty  - Depression  - Anxiety  - Bl nephrolithiasis with prior L ureteral stent  - Ovarian cancer s/p ileostomy in 2020  - Portal vein thrombosis     Home Meds:  - Oxycodone 5 mg q6h PRN hip pain  - Methadone 5 mg TID  - Clonazepam 0.25  mg qhs  - Duloxetine DR 60 mg qhs  - Buspar 5mg TID  - Bupropion 75mg daily  - Gabapentin 300 mg TID  - Ibuprofen 600 mg q6h PRN pain  - Propranolol 60 mg qhs  - Vitamin D3 2000 IU daily  - Calcium citrate 500 mg daily  - Probiotic  - Medical marijuana     Social History:   - Living situation: Lives with   - Baseline function: Ambulates without assist device  - Occupation: Retired. Worked as a    - Tobacco use: Former  - Alcohol use: Drinks wine  - Illicit drug use: Medical marijuana     Family History:   - Maternal aunt and mother with PD                      Review of Systems  All other system have been reviewed and are negative for complaint.  Objective   Neurological Exam    Physical Exam                 Head tremor  L>R postural tremor  bl kinetic tremor  No leg tremor                              Assessment/Plan Ms. Park is a 68 YO LH woman with h/o CNS lymphoma, neuropathy, ET here for f/u ET. Tremor is stable on propranolol, primidone, pregabalin. Tremor is bothersome  cannot do at times writing and make up and eating bcz of tremor. Increasing dose of Primidone in the past made her loopy and  tired.We plan increase Propranolol to 60 mg BID.  Plan:   -Continue  primidone to 175  mg at bedtime(3.5 tablet at bedtime)  - Increase Propranolol regular  60mg BID      - RTC 6 months

## 2024-05-01 NOTE — RESEARCH NOTES
"Saba Park is a 68 y.o. year old female who was enrolled today in the [employer] funded study: \"Saliva collection for RT-QuIC analysis in parkinsonism and controls and other neurodegenerative disorders\" as a healthy volunteer. She gave written informed consent and completed the screening/baseline visit. About 6.0cc of saliva was collected to be sent to Dr. Jaydon Hamilton's laboratory at [facility] for RT-QuIC analysis. Seen and registered by Darcie Potter.  "

## 2024-05-01 NOTE — PATIENT INSTRUCTIONS
First week take propranolol 60 mg in the morning and half tablet in the after noon(3 PM))  Check your blood pressure and heart rate  regularly  If the heart rate the third number in your device is above 60 or around 60 then increase propranolol to 60 mg twice a day one in the morning and one in the afternoon  second week   Continue Primidone 3.5 tablet at bedtime  Follow up in 4 months

## 2024-05-10 DIAGNOSIS — F41.9 ANXIETY: ICD-10-CM

## 2024-05-10 DIAGNOSIS — F33.0 MILD EPISODE OF RECURRENT MAJOR DEPRESSIVE DISORDER (CMS-HCC): ICD-10-CM

## 2024-05-10 RX ORDER — DULOXETIN HYDROCHLORIDE 60 MG/1
CAPSULE, DELAYED RELEASE ORAL
Qty: 90 CAPSULE | Refills: 2 | Status: SHIPPED | OUTPATIENT
Start: 2024-05-10

## 2024-05-14 NOTE — PROGRESS NOTES
SUPPORTIVE AND PALLIATIVE ONCOLOGY OUTPATIENT FOLLOW-UP      SERVICE DATE: 5/17/2024    Subjective   HISTORY OF PRESENT ILLNESS: Saba Park is a 68 y.o. female who presents with history of CNS lymphoma and ovarian cancer. Currently in active surveillance and without evidence of either of her cancers. Patient follows with Supportive Oncology/Palliative Care for chronic pain r/t disease history/treatment and further symptom management.      Pain Assessment:  Pain Score:  5  Location:  right leg  Description:  constant throb, intermittent sharp in upper thigh and right hip    Symptom Assessment:  Pain:somewhat - patient reports overall doing well, pain in the right leg and hip continues to be bothersome when she is more active. The other day she did take an Oxycodone to help with after trying to walk through [facility] and it was just too much for her. She continues Methadone BID, and has been working to wean Lyrica in preparation for scrambler therapy. Continues to go to acupuncture which she truly finds helpful for her neuropathy in her feet. She is scheduled for Scrambler in July  Numbness or Tingling in hands/feet/other: a little  Sore Muscles/Spasms: none  Headache: none  Dizziness:none  Constipation: none  Diarrhea: none  Nausea: none  Vomiting: none  Lack of Appetite: none   Weight Loss: none  Taste changes: none  Dry Mouth: none  Pain in Mouth/Swallowing: none  Lack of Energy: a little  Difficulty Sleeping: none  Worrying: none  Anxiety: none  Depression: a little  Shortness of breath: none  Other: none      Information obtained from: chart review and interview of patient  ______________________________________________________________________        Objective   Lab on 04/03/2024   Component Date Value Ref Range Status    WBC 04/03/2024 6.0  4.4 - 11.3 x10*3/uL Final    nRBC 04/03/2024    Final    RBC 04/03/2024 4.26  4.00 - 5.20 x10*6/uL Final    Hemoglobin 04/03/2024 13.6  12.0 - 16.0 g/dL Final     "Hematocrit 04/03/2024 41.6  36.0 - 46.0 % Final    MCV 04/03/2024 98  80 - 100 fL Final    MCH 04/03/2024 31.9  26.0 - 34.0 pg Final    MCHC 04/03/2024 32.7  32.0 - 36.0 g/dL Final    RDW 04/03/2024 12.3  11.5 - 14.5 % Final    Platelets 04/03/2024 195  150 - 450 x10*3/uL Final    Neutrophils % 04/03/2024 52.9  40.0 - 80.0 % Final    Immature Granulocytes %, Automated 04/03/2024 0.0  0.0 - 0.9 % Final    Lymphocytes % 04/03/2024 36.5  13.0 - 44.0 % Final    Monocytes % 04/03/2024 8.6  2.0 - 10.0 % Final    Eosinophils % 04/03/2024 1.3  0.0 - 6.0 % Final    Basophils % 04/03/2024 0.7  0.0 - 2.0 % Final    Neutrophils Absolute 04/03/2024 3.19  1.20 - 7.70 x10*3/uL Final    Immature Granulocytes Absolute, Au* 04/03/2024 0.00  0.00 - 0.70 x10*3/uL Final    Lymphocytes Absolute 04/03/2024 2.20  1.20 - 4.80 x10*3/uL Final    Monocytes Absolute 04/03/2024 0.52  0.10 - 1.00 x10*3/uL Final    Eosinophils Absolute 04/03/2024 0.08  0.00 - 0.70 x10*3/uL Final    Basophils Absolute 04/03/2024 0.04  0.00 - 0.10 x10*3/uL Final    Folate, Serum 04/03/2024 >24.0  >5.0 ng/mL Final     PHYSICAL EXAMINATION   Vital Signs:   Vital signs reviewed      12/1/2023     1:15 PM 1/3/2024     1:10 PM 1/11/2024     2:01 PM 2/29/2024     1:21 PM 4/18/2024    12:54 PM 5/1/2024     1:05 PM 5/17/2024    11:49 AM   Vitals   Systolic  113 104 97 108 114 122   Diastolic  74 62 67 70 74 71   Heart Rate  66  68 63 66 67   Temp    36.7 °C (98.1 °F)   36.1 °C (97 °F)   Resp  14 16 18 16 14 16   Height (in) 1.575 m (5' 2\")  1.588 m (5' 2.5\")  1.588 m (5' 2.5\")     Weight (lb) 150  150 153 153 152    BMI 27.44 kg/m2  27 kg/m2 27.54 kg/m2 27.54 kg/m2 27.36 kg/m2    BSA (m2) 1.72 m2  1.73 m2 1.75 m2 1.75 m2 1.74 m2    Visit Report Report Report Report Report Report Report Report      Physical Exam  Vitals reviewed.   Constitutional:       Appearance: Normal appearance.   HENT:      Head: Normocephalic.   Cardiovascular:      Rate and Rhythm: Normal rate and " regular rhythm.   Pulmonary:      Effort: Pulmonary effort is normal.   Abdominal:      General: Abdomen is flat. Bowel sounds are normal.      Palpations: Abdomen is soft.   Musculoskeletal:         General: Normal range of motion.   Skin:     General: Skin is warm and dry.   Neurological:      General: No focal deficit present.      Mental Status: She is alert and oriented to person, place, and time. Mental status is at baseline.   Psychiatric:         Mood and Affect: Mood normal.         Behavior: Behavior normal.         Thought Content: Thought content normal.         Judgment: Judgment normal.       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 Completely anbx therapy  Type: neuropathic  Pain control: sub-optimally controlled  Home regimen:   - S/P [facility] 5/19/22  - Continue Ibuprofen PRN  - Completed PT/[facility] therapy  - Supportive care with rest, ice/heat as needed  - Continue Methadone 10mg BID  - EKG (4/5/23) QTc 431. Repeat with increase in dose  - Continue Pregabalin 75mg BID - NEXT FILL with decrease to 50mg BID to work on wean for prep to undergo Scrambler Therapy  - Start Oxycodone 5-10mg [id number] PRN for breakthrough pain  - Continue Duloxetine 60mg/daily  - Discussed Alpha Liproic Acid - patient would like to hold off at this time  - Completed program through chiropractor w/o change in pain  - Referral to [facility] - Dr Silverman - pursuing acupuncture with notable change in neuropathy/pain  - Referral to Dr. Alarcon - scheduled to pursue Scrambler Therapy but not scheduled until July   - Continue following with podiatry   Intolerances/previously tried:   - Alpha  Liproic Acid - not effective  - Gabapentin - caused excessive lethargy     Opioid Use  Medication Management:   - OARRS report reviewed with no aberrant behavior; consistent with  prescriptions/records and patient history  - MED 90.  Overdose Risk Score 100.   This has been discussed with patient.   - We will continue to closely monitor the patient for signs of prescription misuse including UDS, OARRS review and subjective reports at each visit.  - No concurrent benzodiazepine use   - I am a provider who either is or has consulted and collaborated with a provider certified in Hospice and Palliative Medicine and have conducted a face-face visit and examination for this patient.  - Routine Urine Drug Screen: 7/12/23 appropriately positive for opioids and negative for illicit substances  - Controlled Substance Agreement: 7/12/23  - Specifically discussed that controlled substance prescriptions will only be provided by our group as outlined in the completed agreement  - Prescribed naloxone: patient declined  - Red Flags: NA     Bowel  - Monitor - no concerns of diarrhea or constipation      Altered Mood  Chronic anxiety and depression related to health concerns   Home regimen:   - Continue Duloxetine 60mg once daily at bed  - Continue Buspirone 5mg TID  - Continue Buproprion XL 150mg daily  - Following with counselor weekly to every 2 weeks  - Established with Dr. Ugalde - Onco Psychiatry    Next Follow-Up Visit:  Return to clinic in 3 months    Signature and billing  Medical complexity was moderate level due to due to complexity of problems, extensive data review, and high risk of management/treatment.  Time was spent on the following: Prep Time, Time Directly with Patient/Family/Caregiver, Documentation Time. Total time spent: 45    Data  Diagnostic tests and information reviewed for today's visit:  Most recent labs and imaging results, Medications     Some elements copied from Palliative Care note on  2/14/2024, the elements have been updated and all reflect current decision making from today, 5/17/2024.    Plan of Care discussed with: Patient    SIGNATURE: DANILO Orourke    Contact information:  [facility] Pall[facility]cology  Monday-Friday 8 AM-5 PM  Phone:  [phone], press option #5, then option #1.   Or Epic Secure Chat

## 2024-05-17 ENCOUNTER — OFFICE VISIT (OUTPATIENT)
Dept: PALLIATIVE MEDICINE | Facility: CLINIC | Age: 69
End: 2024-05-17
Payer: MEDICARE

## 2024-05-17 VITALS
SYSTOLIC BLOOD PRESSURE: 122 MMHG | TEMPERATURE: 97 F | DIASTOLIC BLOOD PRESSURE: 71 MMHG | RESPIRATION RATE: 16 BRPM | HEART RATE: 67 BPM | OXYGEN SATURATION: 96 %

## 2024-05-17 DIAGNOSIS — C56.9 MALIGNANT NEOPLASM OF OVARY, UNSPECIFIED LATERALITY (MULTI): ICD-10-CM

## 2024-05-17 DIAGNOSIS — Z51.5 PALLIATIVE CARE ENCOUNTER: Primary | ICD-10-CM

## 2024-05-17 DIAGNOSIS — C85.89: ICD-10-CM

## 2024-05-17 DIAGNOSIS — G62.9 NEUROPATHY: ICD-10-CM

## 2024-05-17 DIAGNOSIS — M79.2 NEUROPATHIC PAIN: ICD-10-CM

## 2024-05-17 DIAGNOSIS — T45.1X5A CHEMOTHERAPY-INDUCED PERIPHERAL NEUROPATHY (MULTI): ICD-10-CM

## 2024-05-17 DIAGNOSIS — G89.3 CHRONIC PAIN AFTER CANCER TREATMENT: ICD-10-CM

## 2024-05-17 DIAGNOSIS — F33.1 MODERATE EPISODE OF RECURRENT MAJOR DEPRESSIVE DISORDER (MULTI): ICD-10-CM

## 2024-05-17 DIAGNOSIS — G62.0 CHEMOTHERAPY-INDUCED PERIPHERAL NEUROPATHY (MULTI): ICD-10-CM

## 2024-05-17 DIAGNOSIS — R53.83 OTHER FATIGUE: ICD-10-CM

## 2024-05-17 DIAGNOSIS — G89.29 OTHER CHRONIC PAIN: ICD-10-CM

## 2024-05-17 PROCEDURE — 1159F MED LIST DOCD IN RCRD: CPT | Performed by: NURSE PRACTITIONER

## 2024-05-17 PROCEDURE — 3074F SYST BP LT 130 MM HG: CPT | Performed by: NURSE PRACTITIONER

## 2024-05-17 PROCEDURE — 1160F RVW MEDS BY RX/DR IN RCRD: CPT | Performed by: NURSE PRACTITIONER

## 2024-05-17 PROCEDURE — 99215 OFFICE O/P EST HI 40 MIN: CPT | Performed by: NURSE PRACTITIONER

## 2024-05-17 PROCEDURE — 1157F ADVNC CARE PLAN IN RCRD: CPT | Performed by: NURSE PRACTITIONER

## 2024-05-17 PROCEDURE — 3078F DIAST BP <80 MM HG: CPT | Performed by: NURSE PRACTITIONER

## 2024-05-17 PROCEDURE — 1125F AMNT PAIN NOTED PAIN PRSNT: CPT | Performed by: NURSE PRACTITIONER

## 2024-05-17 RX ORDER — IBUPROFEN 600 MG/1
600 TABLET ORAL 4 TIMES DAILY PRN
Qty: 120 TABLET | Refills: 2 | Status: SHIPPED | OUTPATIENT
Start: 2024-05-17 | End: 2024-08-15

## 2024-05-17 RX ORDER — OXYCODONE HYDROCHLORIDE 5 MG/1
5-10 TABLET ORAL EVERY 8 HOURS PRN
Qty: 90 TABLET | Refills: 0 | Status: SHIPPED | OUTPATIENT
Start: 2024-05-17 | End: 2024-06-01

## 2024-05-17 RX ORDER — PREGABALIN 50 MG/1
50 CAPSULE ORAL 2 TIMES DAILY
Qty: 60 CAPSULE | Refills: 0 | Status: SHIPPED | OUTPATIENT
Start: 2024-05-17 | End: 2024-06-10 | Stop reason: DRUGHIGH

## 2024-05-17 RX ORDER — METHADONE HYDROCHLORIDE 10 MG/1
10 TABLET ORAL EVERY 12 HOURS
Qty: 60 TABLET | Refills: 0 | Status: SHIPPED | OUTPATIENT
Start: 2024-05-17 | End: 2024-06-16

## 2024-05-17 ASSESSMENT — PAIN SCALES - GENERAL: PAINLEVEL: 5

## 2024-05-22 ENCOUNTER — TELEMEDICINE (OUTPATIENT)
Dept: BEHAVIORAL HEALTH | Facility: CLINIC | Age: 69
End: 2024-05-22
Payer: MEDICARE

## 2024-05-22 DIAGNOSIS — F41.9 ANXIETY: ICD-10-CM

## 2024-05-22 DIAGNOSIS — F33.0 MILD EPISODE OF RECURRENT MAJOR DEPRESSIVE DISORDER (CMS-HCC): ICD-10-CM

## 2024-05-22 DIAGNOSIS — F33.41 RECURRENT MAJOR DEPRESSIVE DISORDER, IN PARTIAL REMISSION (CMS-HCC): ICD-10-CM

## 2024-05-22 PROCEDURE — 1157F ADVNC CARE PLAN IN RCRD: CPT | Performed by: PSYCHIATRY & NEUROLOGY

## 2024-05-22 PROCEDURE — 1036F TOBACCO NON-USER: CPT | Performed by: PSYCHIATRY & NEUROLOGY

## 2024-05-22 PROCEDURE — 99214 OFFICE O/P EST MOD 30 MIN: CPT | Performed by: PSYCHIATRY & NEUROLOGY

## 2024-05-22 PROCEDURE — 1160F RVW MEDS BY RX/DR IN RCRD: CPT | Performed by: PSYCHIATRY & NEUROLOGY

## 2024-05-22 PROCEDURE — 90833 PSYTX W PT W E/M 30 MIN: CPT | Performed by: PSYCHIATRY & NEUROLOGY

## 2024-05-22 PROCEDURE — 1159F MED LIST DOCD IN RCRD: CPT | Performed by: PSYCHIATRY & NEUROLOGY

## 2024-05-22 NOTE — PROGRESS NOTES
"Outpatient Psychiatry FUV      Subjective   Saba Park, a 68 y.o. female, for virtual FUV        Assessment/Plan   Patient Discussion:  CONTINUE duloxetine 60mg daily at bedtime   CONTINUE  buspirone 5mg 2x/day  CONTINUE  bupropion XL 300mg daily     RETURN to clinic Thursday 8/28 at 4:30PM for virtual FUV    Call with questions/concerns [phone]    Assessment:   68 y.o.  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 AVN R hip dx 2016. 2019 dx with ovarian CA in surveillance s/p resection and chemo. Pt had been see several time in 2015 ahead of MD maternity leave and transferred care to Dr. Nilay Farr, who has since left . 2021 pt re-established care and here for follow up    Virtual FUV today. Since last appt, reports improved mood on higher dose of bupropion. Going to have scrambler therapy in July. Holding off on DBS for ET though this has worsened.  Follow up 3 months, sooner if needed.     Diagnosis:   MDD, recurrent, p remission  Other spec anxiety r/o adjustment vs part of depression     Treatment Plan/Recommendations:   1. Safety Assessment: denies active thoughts of death/self harm though feels \"dark thoughts\". no h/o SI/SA. passive death wish in the past but not recently . RF include age, W, pain, cancer. PF include , engaged in care, no hx, no substance abuse, no guns. Currently low imminent risk    2. MDD, other spec anxiety r/o element of MDD vs ISIS  CONTINUE duloxetine 60mg PO QHS   CONTINUE buspar 5mg PO BID for now--consider tapering  CONTIUE bupropion XL 300mg PO daily, monitor anxiety.     Continue with therapy, supportive therapy during appt--pt has also   stopped seeing therapist related to worse health and not able to keep up, discussed to re-engage vs CBT worksheets    3. Medical: notes and labs reviewed, noted to have stable volume loss on MRI brain with encephalomalacia of " fronto-parietal lxn from CNS lymphoma  currently working with Arin Castrejon for pain and seeing NM specialist, PMR for neuropathy  limits to function affect coping for patient  S/p hip replacement, notes continued pain and c/b infection, now in PT, reports improvement  Neuro adjusted med for tremor, fatigue better considering DBS  Scrambler therapy in July 4. Social: lives with , continue to encourage behavioral activation. Does better when out and about but frustrated by physical limitations    Reason for Visit:     FUV for depression, anxiety      Subjective:  Last appt 3/2024  Since then notes mood has been better on higher dose of bupropion  Anxiety stable on buspar  Sleep fine    Leg still bothers, referred to scrambler, will have done in July, daily for 2 weeks  Can stand but not walk for extended periods    Tremors getting worse, discussed DBS with neuro  Primidone caused fog  Doesn't feel ready for DBS  Notes able to go out with friends for lunch    Notes much better in terms of feeling overwhelmed, no SI  No a/e to meds    Current Medications:    Current Outpatient Medications:     acetaminophen (Tylenol) 325 mg tablet, Take by mouth every 8 hours., Disp: , Rfl:     buPROPion XL (Wellbutrin XL) 300 mg 24 hr tablet, Take 1 tablet (300 mg) by mouth once daily. Do not crush, chew, or split., Disp: 90 tablet, Rfl: 1    busPIRone (Buspar) 5 mg tablet, TAKE 1 TABLET BY MOUTH TWICE A DAY, Disp: 180 tablet, Rfl: 1    calcium carbonate (Oscal) 500 mg calcium (1,250 mg) tablet, Take 1 tablet (1,250 mg) by mouth 2 times daily (morning and late afternoon)., Disp: , Rfl:     cholecalciferol (Vitamin D-3) 50 mcg (2,000 unit) capsule, Take 1 capsule (50 mcg) by mouth once daily., Disp: , Rfl:     clonazePAM (KlonoPIN) 0.5 mg tablet, 1 TAB(S) ORALLY ONCE A DAY TO TAKE PRIOR TO MRI, Disp: , Rfl:     DULoxetine (Cymbalta) 60 mg DR capsule, TAKE 1 CAPSULE DAILY IN THE EVENING, Disp: 90 capsule, Rfl: 2    hydrOXYzine  HCL (Atarax) 25 mg tablet, Take by mouth., Disp: , Rfl:     ibuprofen 600 mg tablet, Take 1 tablet (600 mg) by mouth 4 times a day as needed for mild pain (1 - 3) (pain)., Disp: 120 tablet, Rfl: 2    methadone (Dolophine) 10 mg tablet, Take 1 tablet (10 mg) by mouth every 12 hours., Disp: 60 tablet, Rfl: 0    multivitamin tablet, Take 1 tablet by mouth once daily., Disp: , Rfl:     oxyCODONE (Roxicodone) 5 mg immediate release tablet, Take 1-2 tablets (5-10 mg) by mouth every 8 hours if needed for severe pain (7 - 10) for up to 15 days., Disp: 90 tablet, Rfl: 0    pregabalin (Lyrica) 50 mg capsule, Take 1 capsule (50 mg) by mouth 2 times a day., Disp: 60 capsule, Rfl: 0    primidone (Mysoline) 50 mg tablet, 3.5 tabs at bedtime., Disp: 315 tablet, Rfl: 0    propranolol (Inderal) 60 mg tablet, Take one tablet in the morning and half tablet in the afternoon after a week if heart rate is above 60 after that increase to one tablet twice a day after one week, Disp: 60 tablet, Rfl: 11  Medical History:  Past Medical History:   Diagnosis Date    Anxiety disorder, unspecified     Anxiety    Family history of malignant neoplasm of breast 02/28/2019    Family history of breast cancer    Gynecologic cancer (Multi) 09/18/2023    Other conditions influencing health status     Nephrolithiasis    Ovarian cancer (Multi)     Personal history of malignant neoplasm of other parts of uterus 08/14/2020    History of malignant neoplasm of other parts of uterus    Personal history of non-Hodgkin lymphomas 08/14/2020    History of non-Hodgkin's lymphoma    Personal history of other diseases of the circulatory system     History of hypertension    Personal history of other diseases of the digestive system     History of irritable bowel syndrome    Personal history of other diseases of the respiratory system 01/10/2014    History of acute bronchitis    Personal history of other endocrine, nutritional and metabolic disease     History of  hypercholesterolemia    Personal history of other malignant neoplasm of skin     History of basal cell carcinoma    Personal history of other specified conditions 10/28/2014    History of fibrocystic disease of breast    Portal vein thrombosis 09/18/2023    PORTAL VEIN THROMBOSIS I81    Presence of spectacles and contact lenses 12/09/2014    Wears contact lenses       Surgical History:  Past Surgical History:   Procedure Laterality Date    LITHOTRIPSY  08/19/2013    Renal Lithotripsy    OTHER SURGICAL HISTORY  08/19/2013    Lithotomy    OTHER SURGICAL HISTORY  11/25/2019    Ileostomy closure       Family History:  Family History   Problem Relation Name Age of Onset    Breast cancer Mother  60    Stroke Mother      Colon cancer Father      Breast cancer Mother's Sister  40    Breast cancer Cousin  50       Social History:  Social History     Socioeconomic History    Marital status:      Spouse name: Not on file    Number of children: Not on file    Years of education: Not on file    Highest education level: Not on file   Occupational History    Not on file   Tobacco Use    Smoking status: Never    Smokeless tobacco: Never   Substance and Sexual Activity    Alcohol use: Yes     Comment: 1 glass of wine daily    Drug use: Yes     Types: Marijuana     Comment: medical card / gummies    Sexual activity: Not on file   Other Topics Concern    Not on file   Social History Narrative    Not on file     Social Determinants of Health     Financial Resource Strain: Not on file   Food Insecurity: Not on file   Transportation Needs: Not on file   Physical Activity: Not on file   Stress: Not on file   Social Connections: Not on file   Intimate Partner Violence: Not on file   Housing Stability: Not on file              Medical Review Of Systems:  +neuropathy    Psychiatric Review Of Systems:  +anxious ruminations       Objective   Mental Status Exam:     Appearance: dressed neat and clean.   Attitude: cooperative and engaged.  "  Behavior: appropriate eye contact via video.   Motor Activity: mild truncal tremor noted.   Speech: regular volume, prosody, no aphasia.   Mood: \"ok\".   Affect: congruent, improved range  Thought Process: on topic.   Thought Content: no delusions, no SI/HI, less hopeless.   Thought Perception: no AVH.   Cognition: alert, oriented to person, place, time. attn intact.   Insight: fair.   Judgment: fair.       Vitals:  There were no vitals filed for this visit.  No results found. However, due to the size of the patient record, not all encounters were searched. Please check Results Review for a complete set of results.  Lab Results   Component Value Date    WBC 6.0 04/03/2024    HGB 13.6 04/03/2024    HCT 41.6 04/03/2024    MCV 98 04/03/2024     04/03/2024     Lab Results   Component Value Date    GLUCOSE 59 (L) 12/01/2023    CALCIUM 9.1 12/01/2023     12/01/2023    K 5.3 12/01/2023    CO2 25 12/01/2023     12/01/2023    BUN 20 12/01/2023    CREATININE 0.81 12/01/2023       Psychotherapy   Time: 17 minutes  Type: supportive  Target: mood, anxiety  Techniques: problem solving  Goal: improved sx management  Follow up: next appt  Response: therese Ugalde MD  "

## 2024-06-10 ENCOUNTER — TELEPHONE (OUTPATIENT)
Dept: PALLIATIVE MEDICINE | Facility: CLINIC | Age: 69
End: 2024-06-10
Payer: MEDICARE

## 2024-06-10 DIAGNOSIS — G89.3 CANCER RELATED PAIN: ICD-10-CM

## 2024-06-10 RX ORDER — PREGABALIN 25 MG/1
25 CAPSULE ORAL DAILY
Qty: 17 CAPSULE | Refills: 0 | Status: SHIPPED | OUTPATIENT
Start: 2024-06-10 | End: 2024-06-27

## 2024-06-10 NOTE — TELEPHONE ENCOUNTER
Patient reports she starts the scrambler therapy in July. She needs to be weaned off of her lyrica. She met with her pharmacist who suggested the following schedule. She is currently on lyrica 50mg BID. She will decrease today to 50mg daily through 6/15. She will then decrease to 25mg daily through 6/25. She will then take 25mg every other day through 7/7. She needs our team to send in 25mg tablets. Ok per DEMETRIUS Hirsch to wean per this schedule. Prescription pended to provider for approval.

## 2024-06-17 ENCOUNTER — TELEPHONE (OUTPATIENT)
Dept: PALLIATIVE MEDICINE | Facility: CLINIC | Age: 69
End: 2024-06-17
Payer: MEDICARE

## 2024-06-17 ENCOUNTER — APPOINTMENT (OUTPATIENT)
Dept: PAIN MEDICINE | Facility: CLINIC | Age: 69
End: 2024-06-17
Payer: MEDICARE

## 2024-06-17 DIAGNOSIS — R11.0 NAUSEA: ICD-10-CM

## 2024-06-17 RX ORDER — ONDANSETRON HYDROCHLORIDE 8 MG/1
8 TABLET, FILM COATED ORAL EVERY 8 HOURS PRN
Qty: 60 TABLET | Refills: 0 | Status: SHIPPED | OUTPATIENT
Start: 2024-06-17

## 2024-06-17 RX ORDER — ONDANSETRON HYDROCHLORIDE 8 MG/1
8 TABLET, FILM COATED ORAL EVERY 8 HOURS PRN
COMMUNITY
End: 2024-06-17 | Stop reason: SDUPTHER

## 2024-06-17 NOTE — TELEPHONE ENCOUNTER
Patient reports she is weaning off of the lyrica for scrambler therapy. She is having nausea and her neuropathy is terrible. The nausea has improved slightly but she has no appetite. She is currently taking lyrica 25mg daily. She starts scrambler therapy in July. She has tried oxycodone for pain which was helpful, but is unsure if she is allowed to continue this. Per DEMETRIUS Hirsch, patient can continue oxycodone for pain. We will send in a prescription for zofran for nausea. The withdrawal can last 10-14 days. Patient verbalized understanding of plan.

## 2024-06-18 ENCOUNTER — APPOINTMENT (OUTPATIENT)
Dept: PAIN MEDICINE | Facility: CLINIC | Age: 69
End: 2024-06-18
Payer: MEDICARE

## 2024-06-19 ENCOUNTER — APPOINTMENT (OUTPATIENT)
Dept: PAIN MEDICINE | Facility: CLINIC | Age: 69
End: 2024-06-19
Payer: MEDICARE

## 2024-06-19 ENCOUNTER — APPOINTMENT (OUTPATIENT)
Dept: INTEGRATIVE MEDICINE | Facility: CLINIC | Age: 69
End: 2024-06-19

## 2024-06-19 ENCOUNTER — TELEPHONE (OUTPATIENT)
Dept: PAIN MEDICINE | Facility: CLINIC | Age: 69
End: 2024-06-19

## 2024-06-20 ENCOUNTER — APPOINTMENT (OUTPATIENT)
Dept: PAIN MEDICINE | Facility: CLINIC | Age: 69
End: 2024-06-20
Payer: MEDICARE

## 2024-06-20 ENCOUNTER — TELEPHONE (OUTPATIENT)
Dept: ADMISSION | Facility: HOSPITAL | Age: 69
End: 2024-06-20
Payer: MEDICARE

## 2024-06-20 DIAGNOSIS — T45.1X5A CHEMOTHERAPY-INDUCED PERIPHERAL NEUROPATHY (MULTI): ICD-10-CM

## 2024-06-20 DIAGNOSIS — G62.0 CHEMOTHERAPY-INDUCED PERIPHERAL NEUROPATHY (MULTI): ICD-10-CM

## 2024-06-20 DIAGNOSIS — G89.29 OTHER CHRONIC PAIN: ICD-10-CM

## 2024-06-20 DIAGNOSIS — G25.0 ESSENTIAL TREMOR: ICD-10-CM

## 2024-06-20 RX ORDER — METHADONE HYDROCHLORIDE 10 MG/1
10 TABLET ORAL EVERY 12 HOURS
Qty: 60 TABLET | Refills: 0 | Status: SHIPPED | OUTPATIENT
Start: 2024-06-23 | End: 2024-07-23

## 2024-06-20 RX ORDER — PRIMIDONE 50 MG/1
175 TABLET ORAL NIGHTLY
Qty: 315 TABLET | Refills: 3 | Status: SHIPPED | OUTPATIENT
Start: 2024-06-20 | End: 2025-06-20

## 2024-06-20 NOTE — TELEPHONE ENCOUNTER
[facility] reviewed. Last Methadone fill 5/25/24. Follow-up scheduled 8/23/24. Refill submitted to for fill on 6/23/24

## 2024-06-24 ENCOUNTER — APPOINTMENT (OUTPATIENT)
Dept: PAIN MEDICINE | Facility: CLINIC | Age: 69
End: 2024-06-24
Payer: MEDICARE

## 2024-06-25 ENCOUNTER — APPOINTMENT (OUTPATIENT)
Dept: PAIN MEDICINE | Facility: CLINIC | Age: 69
End: 2024-06-25
Payer: MEDICARE

## 2024-06-26 ENCOUNTER — APPOINTMENT (OUTPATIENT)
Dept: PAIN MEDICINE | Facility: CLINIC | Age: 69
End: 2024-06-26
Payer: MEDICARE

## 2024-06-26 ENCOUNTER — APPOINTMENT (OUTPATIENT)
Dept: INTEGRATIVE MEDICINE | Facility: CLINIC | Age: 69
End: 2024-06-26
Payer: MEDICARE

## 2024-06-27 ENCOUNTER — APPOINTMENT (OUTPATIENT)
Dept: PAIN MEDICINE | Facility: CLINIC | Age: 69
End: 2024-06-27
Payer: MEDICARE

## 2024-07-02 PROBLEM — M79.671 RIGHT FOOT PAIN: Status: ACTIVE | Noted: 2024-07-02

## 2024-07-02 PROBLEM — M25.551 PAIN IN RIGHT HIP: Status: ACTIVE | Noted: 2023-09-18

## 2024-07-02 PROBLEM — Z96.649 INFECTION OF PROSTHETIC TOTAL HIP JOINT (CMS-HCC): Status: ACTIVE | Noted: 2023-09-18

## 2024-07-02 PROBLEM — R20.2 PARESTHESIA OF LOWER EXTREMITY: Status: ACTIVE | Noted: 2024-02-20

## 2024-07-02 PROBLEM — E07.9 MASS OF THYROID GLAND: Status: ACTIVE | Noted: 2023-09-18

## 2024-07-02 PROBLEM — Z51.5 ENCOUNTER FOR PALLIATIVE CARE: Status: ACTIVE | Noted: 2023-10-11

## 2024-07-02 PROBLEM — M54.30 SCIATICA: Status: ACTIVE | Noted: 2023-09-18

## 2024-07-02 PROBLEM — L03.115 CELLULITIS OF RIGHT LOWER EXTREMITY: Status: ACTIVE | Noted: 2022-12-01

## 2024-07-02 PROBLEM — T84.59XA INFECTION OF PROSTHETIC TOTAL HIP JOINT (CMS-HCC): Status: ACTIVE | Noted: 2023-09-18

## 2024-07-02 PROBLEM — E78.5 HYPERLIPIDEMIA: Status: ACTIVE | Noted: 2022-12-01

## 2024-07-02 RX ORDER — CEPHALEXIN 500 MG/1
TABLET ORAL EVERY 12 HOURS
Status: ON HOLD | COMMUNITY
Start: 2015-03-30 | End: 2024-07-25 | Stop reason: WASHOUT

## 2024-07-02 RX ORDER — CHLORHEXIDINE GLUCONATE 40 MG/ML
118 SOLUTION TOPICAL
Status: ON HOLD | COMMUNITY
Start: 2015-01-21 | End: 2024-07-25 | Stop reason: WASHOUT

## 2024-07-02 RX ORDER — INDOMETHACIN 25 MG/1
CAPSULE ORAL
Status: ON HOLD | COMMUNITY
Start: 2017-02-23 | End: 2024-07-25 | Stop reason: WASHOUT

## 2024-07-02 RX ORDER — BISACODYL 10 MG/1
SUPPOSITORY RECTAL
Status: ON HOLD | COMMUNITY
Start: 2014-12-15 | End: 2024-07-25 | Stop reason: WASHOUT

## 2024-07-02 RX ORDER — IMIQUIMOD 12.5 MG/.25G
CREAM TOPICAL
COMMUNITY
Start: 2024-04-04

## 2024-07-02 RX ORDER — HYDROCHLOROTHIAZIDE 25 MG/1
TABLET ORAL
Status: ON HOLD | COMMUNITY
Start: 2014-12-23 | End: 2024-07-25 | Stop reason: WASHOUT

## 2024-07-02 RX ORDER — FLUOXETINE 10 MG/1
TABLET ORAL
Status: ON HOLD | COMMUNITY
Start: 2022-09-15 | End: 2024-07-25 | Stop reason: WASHOUT

## 2024-07-02 RX ORDER — CLOBETASOL PROPIONATE 0.5 MG/G
CREAM TOPICAL
COMMUNITY
Start: 2024-04-02

## 2024-07-02 RX ORDER — HYDROCODONE BITARTRATE AND ACETAMINOPHEN 5; 325 MG/1; MG/1
TABLET ORAL EVERY 6 HOURS
COMMUNITY
Start: 2013-06-05 | End: 2024-07-08 | Stop reason: WASHOUT

## 2024-07-02 RX ORDER — ESCITALOPRAM OXALATE 5 MG/1
TABLET ORAL
Status: ON HOLD | COMMUNITY
End: 2024-07-25 | Stop reason: ALTCHOICE

## 2024-07-08 ENCOUNTER — OFFICE VISIT (OUTPATIENT)
Dept: PAIN MEDICINE | Facility: CLINIC | Age: 69
End: 2024-07-08
Payer: MEDICARE

## 2024-07-08 VITALS
HEIGHT: 62 IN | BODY MASS INDEX: 27.97 KG/M2 | DIASTOLIC BLOOD PRESSURE: 62 MMHG | SYSTOLIC BLOOD PRESSURE: 106 MMHG | HEART RATE: 76 BPM | OXYGEN SATURATION: 99 % | WEIGHT: 152 LBS

## 2024-07-08 DIAGNOSIS — T45.1X5A CHEMOTHERAPY-INDUCED PERIPHERAL NEUROPATHY (MULTI): Primary | ICD-10-CM

## 2024-07-08 DIAGNOSIS — G62.0 CHEMOTHERAPY-INDUCED PERIPHERAL NEUROPATHY (MULTI): Primary | ICD-10-CM

## 2024-07-08 PROCEDURE — 99215 OFFICE O/P EST HI 40 MIN: CPT | Performed by: NURSE PRACTITIONER

## 2024-07-08 PROCEDURE — 1159F MED LIST DOCD IN RCRD: CPT | Performed by: NURSE PRACTITIONER

## 2024-07-08 PROCEDURE — 1125F AMNT PAIN NOTED PAIN PRSNT: CPT | Performed by: NURSE PRACTITIONER

## 2024-07-08 PROCEDURE — 3078F DIAST BP <80 MM HG: CPT | Performed by: NURSE PRACTITIONER

## 2024-07-08 PROCEDURE — 1036F TOBACCO NON-USER: CPT | Performed by: NURSE PRACTITIONER

## 2024-07-08 PROCEDURE — 1157F ADVNC CARE PLAN IN RCRD: CPT | Performed by: NURSE PRACTITIONER

## 2024-07-08 PROCEDURE — 3074F SYST BP LT 130 MM HG: CPT | Performed by: NURSE PRACTITIONER

## 2024-07-08 RX ORDER — OXYCODONE HYDROCHLORIDE 5 MG/1
5 CAPSULE ORAL AS NEEDED
COMMUNITY

## 2024-07-08 ASSESSMENT — PATIENT HEALTH QUESTIONNAIRE - PHQ9
SUM OF ALL RESPONSES TO PHQ9 QUESTIONS 1 AND 2: 2
1. LITTLE INTEREST OR PLEASURE IN DOING THINGS: NOT AT ALL
2. FEELING DOWN, DEPRESSED OR HOPELESS: SEVERAL DAYS
SUM OF ALL RESPONSES TO PHQ9 QUESTIONS 1 AND 2: 0
2. FEELING DOWN, DEPRESSED OR HOPELESS: NOT AT ALL
1. LITTLE INTEREST OR PLEASURE IN DOING THINGS: SEVERAL DAYS
10. IF YOU CHECKED OFF ANY PROBLEMS, HOW DIFFICULT HAVE THESE PROBLEMS MADE IT FOR YOU TO DO YOUR WORK, TAKE CARE OF THINGS AT HOME, OR GET ALONG WITH OTHER PEOPLE: VERY DIFFICULT

## 2024-07-08 ASSESSMENT — PAIN SCALES - GENERAL
PAINLEVEL: 7
PAINLEVEL_OUTOF10: 7

## 2024-07-08 ASSESSMENT — PAIN DESCRIPTION - DESCRIPTORS: DESCRIPTORS: NUMBNESS;TINGLING

## 2024-07-08 ASSESSMENT — PAIN - FUNCTIONAL ASSESSMENT: PAIN_FUNCTIONAL_ASSESSMENT: 0-10

## 2024-07-08 NOTE — PROGRESS NOTES
Subjective   Patient ID: Saba Park is a 69 y.o. female who presents for Scrambler Therapy.    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 Her neuropathy is primarily in her Lower extremities. Saba underwent several rounds of chemotherapy as well as surgery. She reports when she came out of her brain surgery, she had complete numbness and paralysis to the right leg. She was in the hospital for several months with extensive rehab.     Saba reports severe pain to the right hip and neuropathy in the right mid-shin casper through to the foot. She reports pain to the lateral shin as well as severe pain to the bottom of her foot, the outside of her foot and her toes. Prolonged sitting &/or standing causes the pain to worsen. The numbness to her foot and toes is constant. She has undergone 2 total hip replacements but continues to have pain. She has tried acupuncture as well as Pregabalin. She has not seen any true benefit from either. Her pain is the worst at night.     She presents today to start Scrambler Therapy during which we will be specifically addressing the right L5 an S1 dermatomes. She reports her last dose of Pregabalin was last Friday. She has been fully off for at least 72 hours.     Review of Systems Unless noted in the HPI all other systems have been reviewed and are negative for complaint.     Objective   Physical Exam  General- No acute distress, well appearing and well nourished.   Eyes Conjunctiva and lids: No erythema, swelling or discharge  Neck - Supple, no cervical lymphadenopathy.   Pulmonary - Respiratory effort: Normal respiration.   Cardiovascular - Normal rate and rhythm.  Examination of extremities for edema and/or varicosities: TTP to the right heel.   Abdomen: Soft, Non-tender, non-distended, no abdominal masses.   Musculoskeletal - Range of motion: weakness to the right hip. Decreased ROM to the right hip and lower  "extremity.   Skin - Skin and subcutaneous tissue: Normal without rashes or lesions.  Neurologic - Coordination: Antalgic gait. Sensory deficit: stocking glove loss of sensation at the right thigh, shin and foot is present.   Psychiatric - Orientation to person, place, and time: Normal. Mood and affect: Normal.    Assessment/Plan   Problem List Items Addressed This Visit       Chemotherapy-induced peripheral neuropathy (Multi) - Primary     TREATMENT PLAN:  Day 1 of Scrambler Therapy.   Verbal consent obtained.  Session initiated by myself with proper electrode/lead placement to applicable dermatomes.   Patient tolerated treatment well (see \"procedure note\" below).  Encouraged patient to keep a diary or log of symptoms to monitor for any changes in pain, sensation, etc.   >30 minutes spent face-to-face with patient.     PROCEDURE NOTE:  Treatment 1 with Scrambler therapy was initiated by myself.   Verbal consent obtained from patient.  Therapy start time:   Leads were applied to: 1100  RLE:  Base of great toe and ball of foot (L5); therapeutic dose 1600.  Lateral side of foot and posterior calf (S1); therapeutic dose 1600.  Therapy end time: 1200  Patient tolerated treatment well.  Pain reported at start of therapy session was about a 7/10.  Pain reported as \"about the same´´ at end of therapy session.  Leads were removed and patient left the office without any difficulty.  Patient will return tomorrow for additional treatment.   60 min total scrambler treatment time.    "

## 2024-07-09 ENCOUNTER — OFFICE VISIT (OUTPATIENT)
Dept: PAIN MEDICINE | Facility: CLINIC | Age: 69
End: 2024-07-09
Payer: MEDICARE

## 2024-07-09 VITALS
DIASTOLIC BLOOD PRESSURE: 75 MMHG | HEART RATE: 63 BPM | OXYGEN SATURATION: 96 % | WEIGHT: 152 LBS | BODY MASS INDEX: 27.97 KG/M2 | HEIGHT: 62 IN | SYSTOLIC BLOOD PRESSURE: 101 MMHG

## 2024-07-09 DIAGNOSIS — T45.1X5A CHEMOTHERAPY-INDUCED PERIPHERAL NEUROPATHY (MULTI): Primary | ICD-10-CM

## 2024-07-09 DIAGNOSIS — G62.0 CHEMOTHERAPY-INDUCED PERIPHERAL NEUROPATHY (MULTI): Primary | ICD-10-CM

## 2024-07-09 PROCEDURE — 1159F MED LIST DOCD IN RCRD: CPT | Performed by: NURSE PRACTITIONER

## 2024-07-09 PROCEDURE — 3074F SYST BP LT 130 MM HG: CPT | Performed by: NURSE PRACTITIONER

## 2024-07-09 PROCEDURE — 99215 OFFICE O/P EST HI 40 MIN: CPT | Performed by: NURSE PRACTITIONER

## 2024-07-09 PROCEDURE — 1036F TOBACCO NON-USER: CPT | Performed by: NURSE PRACTITIONER

## 2024-07-09 PROCEDURE — 1157F ADVNC CARE PLAN IN RCRD: CPT | Performed by: NURSE PRACTITIONER

## 2024-07-09 PROCEDURE — 1125F AMNT PAIN NOTED PAIN PRSNT: CPT | Performed by: NURSE PRACTITIONER

## 2024-07-09 PROCEDURE — 3078F DIAST BP <80 MM HG: CPT | Performed by: NURSE PRACTITIONER

## 2024-07-09 PROCEDURE — 99417 PROLNG OP E/M EACH 15 MIN: CPT | Performed by: NURSE PRACTITIONER

## 2024-07-09 ASSESSMENT — PAIN - FUNCTIONAL ASSESSMENT: PAIN_FUNCTIONAL_ASSESSMENT: 0-10

## 2024-07-09 ASSESSMENT — PAIN SCALES - GENERAL
PAINLEVEL_OUTOF10: 8
PAINLEVEL: 8

## 2024-07-09 NOTE — PROGRESS NOTES
Subjective   Patient ID: Saba Park is a 69 y.o. female who presents for Scrambler Therapy.     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 Her neuropathy is primarily in her Lower extremities. Saba underwent several rounds of chemotherapy as well as surgery. She reports when she came out of her brain surgery, she had complete numbness and paralysis to the right leg. She was in the hospital for several months with extensive rehab.     Saba reports severe pain to the right hip and neuropathy in the right mid-shin casper through to the foot. She reports pain to the lateral shin as well as severe pain to the bottom of her foot, the outside of her foot and her toes. Prolonged sitting &/or standing causes the pain to worsen. The numbness to her foot and toes is constant. She has undergone 2 total hip replacements but continues to have pain. She has tried acupuncture as well as Pregabalin. She has not seen any true benefit from either. Her pain is the worst at night.     She presents today for Scrambler Therapy during which we will be specifically addressing the right L5 an S1 dermatomes. She reports her last dose of Pregabalin was last Friday. She has been fully off for at least 72 hours. Yesterday was her first session. She reports some increased pain yesterday though she has been having this since weaning off the Gabapentin. She also did not take her Oxy as she thought it may interfere with the efficacy of the Scrambler Therapy.  She does report that the anterior, lateral aspect of her foot is sore today and she notes that it was a bit uncomfortable during the treatment yesterday (despite me telling her to let me know if it was uncomfortable at any point during her session). She would still like to proceed with today's treatment.    Review of Systems Unless noted in the HPI all other systems have been reviewed and are negative for complaint.  "    Objective   Physical Exam  General- No acute distress, well appearing and well nourished.   Eyes Conjunctiva and lids: No erythema, swelling or discharge  Neck - Supple, no cervical lymphadenopathy.   Pulmonary - Respiratory effort: Normal respiration.   Cardiovascular - Normal rate and rhythm.  Examination of extremities for edema and/or varicosities: TTP to the right heel.   Abdomen: Soft, Non-tender, non-distended, no abdominal masses.   Musculoskeletal - Range of motion: weakness to the right hip. Decreased ROM to the right hip and lower extremity.   Skin - Skin and subcutaneous tissue: Normal without rashes or lesions.  Neurologic - Coordination: Antalgic gait. Sensory deficit: stocking glove loss of sensation at the right thigh, shin and foot is present.   Psychiatric - Orientation to person, place, and time: Normal. Mood and affect: Normal.    Assessment/Plan   Problem List Items Addressed This Visit       Chemotherapy-induced peripheral neuropathy (Multi) - Primary     TREATMENT PLAN:  Day 2 of Scrambler Therapy.   Verbal consent obtained.  Session initiated by myself with proper electrode/lead placement to applicable dermatomes.   Patient tolerated treatment well (see \"procedure note\" below).  Encouraged patient to keep a diary or log of symptoms to monitor for any changes in pain, sensation, etc.   >30 minutes spent face-to-face with patient.     PROCEDURE NOTE:  Treatment 2 with Scrambler therapy was initiated by myself.   Verbal consent obtained from patient.  Therapy start time: 1100.  Leads were applied to:   RLE:  2 fingerbreadth's above the lateral malleolus and the ball of foot (L5); therapeutic dose 1600.  Lateral side of calf and posterior calf (S1); therapeutic dose 1600.  Therapy end time: 1200  Patient tolerated treatment well.  Pain reported at start of therapy session was about a 7-8/10.  Pain reported as \"about the same´´ at end of therapy session.  Leads were removed and patient left " the office without any difficulty.  Patient will return tomorrow for additional treatment.   60 min total scrambler treatment time.

## 2024-07-10 ENCOUNTER — OFFICE VISIT (OUTPATIENT)
Dept: PAIN MEDICINE | Facility: CLINIC | Age: 69
End: 2024-07-10
Payer: MEDICARE

## 2024-07-10 VITALS
HEIGHT: 62 IN | SYSTOLIC BLOOD PRESSURE: 115 MMHG | OXYGEN SATURATION: 98 % | HEART RATE: 64 BPM | WEIGHT: 152 LBS | BODY MASS INDEX: 27.97 KG/M2 | DIASTOLIC BLOOD PRESSURE: 72 MMHG

## 2024-07-10 DIAGNOSIS — T45.1X5A CHEMOTHERAPY-INDUCED PERIPHERAL NEUROPATHY (MULTI): Primary | ICD-10-CM

## 2024-07-10 DIAGNOSIS — G62.0 CHEMOTHERAPY-INDUCED PERIPHERAL NEUROPATHY (MULTI): Primary | ICD-10-CM

## 2024-07-10 PROCEDURE — 3074F SYST BP LT 130 MM HG: CPT | Performed by: NURSE PRACTITIONER

## 2024-07-10 PROCEDURE — 1157F ADVNC CARE PLAN IN RCRD: CPT | Performed by: NURSE PRACTITIONER

## 2024-07-10 PROCEDURE — 1125F AMNT PAIN NOTED PAIN PRSNT: CPT | Performed by: NURSE PRACTITIONER

## 2024-07-10 PROCEDURE — 1159F MED LIST DOCD IN RCRD: CPT | Performed by: NURSE PRACTITIONER

## 2024-07-10 PROCEDURE — 3078F DIAST BP <80 MM HG: CPT | Performed by: NURSE PRACTITIONER

## 2024-07-10 PROCEDURE — 99215 OFFICE O/P EST HI 40 MIN: CPT | Performed by: NURSE PRACTITIONER

## 2024-07-10 PROCEDURE — 1036F TOBACCO NON-USER: CPT | Performed by: NURSE PRACTITIONER

## 2024-07-10 ASSESSMENT — PATIENT HEALTH QUESTIONNAIRE - PHQ9
SUM OF ALL RESPONSES TO PHQ9 QUESTIONS 1 AND 2: 0
1. LITTLE INTEREST OR PLEASURE IN DOING THINGS: NOT AT ALL
2. FEELING DOWN, DEPRESSED OR HOPELESS: NOT AT ALL

## 2024-07-10 ASSESSMENT — PAIN SCALES - GENERAL
PAINLEVEL: 4
PAINLEVEL_OUTOF10: 4

## 2024-07-10 NOTE — PROGRESS NOTES
Subjective   Patient ID: Saba Park is a 69 y.o. female who presents for Scrambler Therapy.     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 Her neuropathy is primarily in her Lower extremities. Saba underwent several rounds of chemotherapy as well as surgery. She reports when she came out of her brain surgery, she had complete numbness and paralysis to the right leg. She was in the hospital for several months with extensive rehab.     Saba reports severe pain to the right hip and neuropathy in the right mid-shin casper through to the foot. She reports pain to the lateral shin as well as severe pain to the bottom of her foot, the outside of her foot and her toes. Prolonged sitting &/or standing causes the pain to worsen. The numbness to her foot and toes is constant. She has undergone 2 total hip replacements but continues to have pain. She has tried acupuncture as well as Pregabalin. She has not seen any true benefit from either. Her pain is the worst at night.     She presents today for Scrambler Therapy during which we will be specifically addressing the right L5 an S1 dermatomes. She reports her last dose of Pregabalin was last Friday. She has been fully off for at least 72 hours. Yesterday was her second session. She reports that the lower haf of her right leg is starting to show some improvement in pain. Unfortunately she reports significant discomfort to the thigh and buttock region of the RLE. She would still like to proceed with today's treatment though as she does feel that changes are happening.     Review of Systems Unless noted in the HPI all other systems have been reviewed and are negative for complaint.     Objective   Physical Exam  General- No acute distress, well appearing and well nourished.   Eyes Conjunctiva and lids: No erythema, swelling or discharge  Neck - Supple, no cervical lymphadenopathy.   Pulmonary - Respiratory effort:  "Normal respiration.   Cardiovascular - Normal rate and rhythm.  Examination of extremities for edema and/or varicosities: TTP to the right heel.   Abdomen: Soft, Non-tender, non-distended, no abdominal masses.   Musculoskeletal - Range of motion: weakness to the right hip. Decreased ROM to the right hip and lower extremity.   Skin - Skin and subcutaneous tissue: Normal without rashes or lesions.  Neurologic - Coordination: Antalgic gait. Sensory deficit: stocking glove loss of sensation at the right thigh, shin and foot is present.   Psychiatric - Orientation to person, place, and time: Normal. Mood and affect: Normal.    Assessment/Plan   Problem List Items Addressed This Visit       Chemotherapy-induced peripheral neuropathy (Multi) - Primary     TREATMENT PLAN:  Day 3 of Scrambler Therapy.   Verbal consent obtained.  Session initiated by myself with proper electrode/lead placement to applicable dermatomes.   Patient tolerated treatment well (see \"procedure note\" below).  Encouraged patient to keep a diary or log of symptoms to monitor for any changes in pain, sensation, etc.   >30 minutes spent face-to-face with patient.     PROCEDURE NOTE:  Treatment 3 with Scrambler therapy was initiated by myself.   Verbal consent obtained from patient.  Therapy start time: 1100.  Leads were applied to:   RLE:  2 fingerbreadth's above the lateral malleolus and the ball of foot (L5); therapeutic dose 1600.  Lateral side of calf and posterior calf (S1); therapeutic dose 1600.  *Added today* Right lower buttock and right lateral hip (S1); therapeutic deose 1600.  Therapy end time: 1200  Patient tolerated treatment well.  Pain reported at start of therapy session was about a 4/10.  Pain reported as \"about the same´´ at end of therapy session.  Leads were removed and patient left the office without any difficulty.  Patient will return tomorrow for additional treatment.   60 min total scrambler treatment time.    "

## 2024-07-11 ENCOUNTER — OFFICE VISIT (OUTPATIENT)
Dept: PAIN MEDICINE | Facility: CLINIC | Age: 69
End: 2024-07-11
Payer: MEDICARE

## 2024-07-11 VITALS
WEIGHT: 152 LBS | BODY MASS INDEX: 27.97 KG/M2 | HEART RATE: 66 BPM | SYSTOLIC BLOOD PRESSURE: 135 MMHG | HEIGHT: 62 IN | DIASTOLIC BLOOD PRESSURE: 80 MMHG

## 2024-07-11 DIAGNOSIS — T45.1X5A CHEMOTHERAPY-INDUCED PERIPHERAL NEUROPATHY (MULTI): Primary | ICD-10-CM

## 2024-07-11 DIAGNOSIS — G62.0 CHEMOTHERAPY-INDUCED PERIPHERAL NEUROPATHY (MULTI): Primary | ICD-10-CM

## 2024-07-11 PROCEDURE — 1159F MED LIST DOCD IN RCRD: CPT | Performed by: NURSE PRACTITIONER

## 2024-07-11 PROCEDURE — 99215 OFFICE O/P EST HI 40 MIN: CPT | Performed by: NURSE PRACTITIONER

## 2024-07-11 PROCEDURE — 3075F SYST BP GE 130 - 139MM HG: CPT | Performed by: NURSE PRACTITIONER

## 2024-07-11 PROCEDURE — 3079F DIAST BP 80-89 MM HG: CPT | Performed by: NURSE PRACTITIONER

## 2024-07-11 PROCEDURE — 1125F AMNT PAIN NOTED PAIN PRSNT: CPT | Performed by: NURSE PRACTITIONER

## 2024-07-11 PROCEDURE — 1157F ADVNC CARE PLAN IN RCRD: CPT | Performed by: NURSE PRACTITIONER

## 2024-07-11 PROCEDURE — 1036F TOBACCO NON-USER: CPT | Performed by: NURSE PRACTITIONER

## 2024-07-11 ASSESSMENT — PAIN SCALES - GENERAL
PAINLEVEL_OUTOF10: 2
PAINLEVEL_OUTOF10: 3
PAINLEVEL: 2

## 2024-07-11 ASSESSMENT — PATIENT HEALTH QUESTIONNAIRE - PHQ9
2. FEELING DOWN, DEPRESSED OR HOPELESS: NOT AT ALL
1. LITTLE INTEREST OR PLEASURE IN DOING THINGS: NOT AT ALL
SUM OF ALL RESPONSES TO PHQ9 QUESTIONS 1 AND 2: 0

## 2024-07-11 ASSESSMENT — PAIN DESCRIPTION - DESCRIPTORS: DESCRIPTORS: NUMBNESS;TINGLING

## 2024-07-11 NOTE — PROGRESS NOTES
Subjective   Patient ID: Saba Park is a 69 y.o. female who presents for Scrambler Therapy.     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 Her neuropathy is primarily in her Lower extremities. Saba underwent several rounds of chemotherapy as well as surgery. She reports when she came out of her brain surgery, she had complete numbness and paralysis to the right leg. She was in the hospital for several months with extensive rehab.     Saba reports severe pain to the right hip and neuropathy in the right mid-shin casper through to the foot. She reports pain to the lateral shin as well as severe pain to the bottom of her foot, the outside of her foot and her toes. Prolonged sitting &/or standing causes the pain to worsen. The numbness to her foot and toes is constant. She has undergone 2 total hip replacements but continues to have pain. She has tried acupuncture as well as Pregabalin. She has not seen any true benefit from either. Her pain is the worst at night.     She presents today for Scrambler Therapy during which we will be specifically addressing the right L5 an S1 dermatomes. She reports her last dose of Pregabalin was last Friday. She has been fully off for at least 72 hours. Yesterday was her third session. She reports that the lower haf of her right leg continues to show some improvement in pain. Unfortunately she reports significant discomfort to the thigh and buttock region of the RLE. She would still like to proceed with today's treatment though as she does feel that changes are happening.     Review of Systems Unless noted in the HPI all other systems have been reviewed and are negative for complaint.     Objective   Physical Exam  General- No acute distress, well appearing and well nourished.   Eyes Conjunctiva and lids: No erythema, swelling or discharge  Neck - Supple, no cervical lymphadenopathy.   Pulmonary - Respiratory effort:  "Normal respiration.   Cardiovascular - Normal rate and rhythm.  Examination of extremities for edema and/or varicosities: TTP to the right heel.   Abdomen: Soft, Non-tender, non-distended, no abdominal masses.   Musculoskeletal - Range of motion: weakness to the right hip. Decreased ROM to the right hip and lower extremity.   Skin - Skin and subcutaneous tissue: Normal without rashes or lesions.  Neurologic - Coordination: Antalgic gait. Sensory deficit: stocking glove loss of sensation at the right thigh, shin and foot is present.   Psychiatric - Orientation to person, place, and time: Normal. Mood and affect: Normal.    Assessment/Plan   Problem List Items Addressed This Visit       Chemotherapy-induced peripheral neuropathy (Multi) - Primary     TREATMENT PLAN:  Day 4 of Scrambler Therapy.   Verbal consent obtained.  Session initiated by myself with proper electrode/lead placement to applicable dermatomes.   Patient tolerated treatment well (see \"procedure note\" below).  Encouraged patient to keep a diary or log of symptoms to monitor for any changes in pain, sensation, etc.   >30 minutes spent face-to-face with patient.     PROCEDURE NOTE:  Treatment 4 with Scrambler therapy was initiated by myself.   Verbal consent obtained from patient.  Therapy start time: 1120.  Leads were applied to:   RLE:  2 fingerbreadth's above the lateral malleolus and the ball of foot (L5); therapeutic dose 1600.  Lateral side of calf and posterior calf (S1); therapeutic dose 1600.  Right lower buttock and right lateral hip (S1); therapeutic deose 1600.  Therapy end time: 1220  Patient tolerated treatment well.  Pain reported at start of therapy session was about a 2/10.  Pain reported as \"about the same´´ at end of therapy session.  Leads were removed and patient left the office without any difficulty.  Patient will return tomorrow for additional treatment.   60 min total scrambler treatment time.    "

## 2024-07-15 ENCOUNTER — OFFICE VISIT (OUTPATIENT)
Dept: PAIN MEDICINE | Facility: CLINIC | Age: 69
End: 2024-07-15
Payer: MEDICARE

## 2024-07-15 VITALS
BODY MASS INDEX: 27.97 KG/M2 | HEART RATE: 64 BPM | DIASTOLIC BLOOD PRESSURE: 78 MMHG | RESPIRATION RATE: 18 BRPM | WEIGHT: 152 LBS | HEIGHT: 62 IN | SYSTOLIC BLOOD PRESSURE: 127 MMHG

## 2024-07-15 DIAGNOSIS — G62.0 CHEMOTHERAPY-INDUCED PERIPHERAL NEUROPATHY (MULTI): Primary | ICD-10-CM

## 2024-07-15 DIAGNOSIS — T45.1X5A CHEMOTHERAPY-INDUCED PERIPHERAL NEUROPATHY (MULTI): Primary | ICD-10-CM

## 2024-07-15 PROCEDURE — 1125F AMNT PAIN NOTED PAIN PRSNT: CPT | Performed by: ANESTHESIOLOGY

## 2024-07-15 PROCEDURE — 1159F MED LIST DOCD IN RCRD: CPT | Performed by: ANESTHESIOLOGY

## 2024-07-15 PROCEDURE — 99215 OFFICE O/P EST HI 40 MIN: CPT | Performed by: ANESTHESIOLOGY

## 2024-07-15 PROCEDURE — 3078F DIAST BP <80 MM HG: CPT | Performed by: ANESTHESIOLOGY

## 2024-07-15 PROCEDURE — 1036F TOBACCO NON-USER: CPT | Performed by: ANESTHESIOLOGY

## 2024-07-15 PROCEDURE — 3074F SYST BP LT 130 MM HG: CPT | Performed by: ANESTHESIOLOGY

## 2024-07-15 PROCEDURE — 1157F ADVNC CARE PLAN IN RCRD: CPT | Performed by: ANESTHESIOLOGY

## 2024-07-15 ASSESSMENT — PAIN SCALES - GENERAL
PAINLEVEL: 8
PAINLEVEL_OUTOF10: 8

## 2024-07-15 ASSESSMENT — PAIN - FUNCTIONAL ASSESSMENT: PAIN_FUNCTIONAL_ASSESSMENT: 0-10

## 2024-07-15 ASSESSMENT — PAIN DESCRIPTION - DESCRIPTORS: DESCRIPTORS: NUMBNESS;TINGLING

## 2024-07-15 NOTE — PROGRESS NOTES
Treatment 5 with Scrambler therapy was initiated.   Verbal consent obtained from patient.  Therapy start time: 1113.  Leads were applied to:   RLE:  2 fingerbreadth's above the lateral malleolus and the ball of foot (L5); therapeutic dose 1600.  Lateral side of calf and posterior calf (S1); therapeutic dose 1600.  Right lower buttock and right lateral hip (S1); therapeutic dose 1600.  Medial aspect of the right lower extremity  Therapy end time: 1213  Patient tolerated treatment well.  Pain reported at start of therapy session was about a 2/10.  Pain reported as somewhat improved at end of 5 sessions  Leads were removed and patient left the office without any difficulty.  Patient will return tomorrow for additional treatment.   60 min total scrambler treatment time.

## 2024-07-16 ENCOUNTER — OFFICE VISIT (OUTPATIENT)
Dept: PAIN MEDICINE | Facility: CLINIC | Age: 69
End: 2024-07-16
Payer: MEDICARE

## 2024-07-16 VITALS
SYSTOLIC BLOOD PRESSURE: 104 MMHG | BODY MASS INDEX: 27.97 KG/M2 | WEIGHT: 152 LBS | DIASTOLIC BLOOD PRESSURE: 64 MMHG | HEART RATE: 66 BPM | HEIGHT: 62 IN | OXYGEN SATURATION: 99 %

## 2024-07-16 DIAGNOSIS — T45.1X5A CHEMOTHERAPY-INDUCED PERIPHERAL NEUROPATHY (MULTI): Primary | ICD-10-CM

## 2024-07-16 DIAGNOSIS — G62.0 CHEMOTHERAPY-INDUCED PERIPHERAL NEUROPATHY (MULTI): Primary | ICD-10-CM

## 2024-07-16 PROCEDURE — 3074F SYST BP LT 130 MM HG: CPT | Performed by: NURSE PRACTITIONER

## 2024-07-16 PROCEDURE — 99215 OFFICE O/P EST HI 40 MIN: CPT | Performed by: NURSE PRACTITIONER

## 2024-07-16 PROCEDURE — 1159F MED LIST DOCD IN RCRD: CPT | Performed by: NURSE PRACTITIONER

## 2024-07-16 PROCEDURE — 3008F BODY MASS INDEX DOCD: CPT | Performed by: NURSE PRACTITIONER

## 2024-07-16 PROCEDURE — 1157F ADVNC CARE PLAN IN RCRD: CPT | Performed by: NURSE PRACTITIONER

## 2024-07-16 PROCEDURE — 3078F DIAST BP <80 MM HG: CPT | Performed by: NURSE PRACTITIONER

## 2024-07-16 ASSESSMENT — PAIN DESCRIPTION - DESCRIPTORS: DESCRIPTORS: ACHING;TIGHTNESS;NAGGING

## 2024-07-16 ASSESSMENT — PAIN - FUNCTIONAL ASSESSMENT: PAIN_FUNCTIONAL_ASSESSMENT: 0-10

## 2024-07-16 ASSESSMENT — PAIN SCALES - GENERAL
PAINLEVEL_OUTOF10: 5 - MODERATE PAIN
PAINLEVEL: 5

## 2024-07-17 ENCOUNTER — TELEPHONE (OUTPATIENT)
Dept: PALLIATIVE MEDICINE | Facility: HOSPITAL | Age: 69
End: 2024-07-17

## 2024-07-17 ENCOUNTER — OFFICE VISIT (OUTPATIENT)
Dept: PAIN MEDICINE | Facility: CLINIC | Age: 69
End: 2024-07-17
Payer: MEDICARE

## 2024-07-17 VITALS — HEART RATE: 79 BPM | WEIGHT: 152 LBS | HEIGHT: 62 IN | BODY MASS INDEX: 27.97 KG/M2 | OXYGEN SATURATION: 100 %

## 2024-07-17 DIAGNOSIS — G62.0 CHEMOTHERAPY-INDUCED PERIPHERAL NEUROPATHY (MULTI): Primary | ICD-10-CM

## 2024-07-17 DIAGNOSIS — T45.1X5A CHEMOTHERAPY-INDUCED PERIPHERAL NEUROPATHY (MULTI): Primary | ICD-10-CM

## 2024-07-17 DIAGNOSIS — R11.0 NAUSEA: ICD-10-CM

## 2024-07-17 PROCEDURE — 1159F MED LIST DOCD IN RCRD: CPT | Performed by: NURSE PRACTITIONER

## 2024-07-17 PROCEDURE — 99215 OFFICE O/P EST HI 40 MIN: CPT | Performed by: NURSE PRACTITIONER

## 2024-07-17 PROCEDURE — 3008F BODY MASS INDEX DOCD: CPT | Performed by: NURSE PRACTITIONER

## 2024-07-17 PROCEDURE — 1157F ADVNC CARE PLAN IN RCRD: CPT | Performed by: NURSE PRACTITIONER

## 2024-07-17 PROCEDURE — 1036F TOBACCO NON-USER: CPT | Performed by: NURSE PRACTITIONER

## 2024-07-17 RX ORDER — METOCLOPRAMIDE 10 MG/1
10 TABLET ORAL 4 TIMES DAILY PRN
Qty: 40 TABLET | Refills: 0 | Status: SHIPPED | OUTPATIENT
Start: 2024-07-17 | End: 2024-07-27

## 2024-07-17 ASSESSMENT — PAIN DESCRIPTION - DESCRIPTORS
DESCRIPTORS: THROBBING;TINGLING
DESCRIPTORS: ACHING;THROBBING

## 2024-07-17 ASSESSMENT — PAIN - FUNCTIONAL ASSESSMENT: PAIN_FUNCTIONAL_ASSESSMENT: 0-10

## 2024-07-17 ASSESSMENT — PAIN SCALES - GENERAL
PAINLEVEL_OUTOF10: 6
PAINLEVEL_OUTOF10: 5 - MODERATE PAIN
PAINLEVEL: 6

## 2024-07-17 NOTE — TELEPHONE ENCOUNTER
Saba is calling. DEMETRIUS Hirsch had prescribed zofran 8mg for nausea and she spoke with a friend who suggested reglan. She has had issues with constipation, so she started a stool softener. She feels the nausea/lack of appetite worsened/weight loss when she stopped the lyrica. She took her last dose 6/7. Per DEMETRIUS Hirsch, we will send in Reglan 10mg QID PRN. This may help with constipation and nausea. I have also arranged a phone visit next week. Saba verbalized understanding of plan.

## 2024-07-17 NOTE — PROGRESS NOTES
Subjective   Patient ID: Saba Park is a 69 y.o. female who presents for Scrambler Therapy.     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 Her neuropathy is primarily in her Lower extremities. Saba underwent several rounds of chemotherapy as well as surgery. She reports when she came out of her brain surgery, she had complete numbness and paralysis to the right leg. She was in the hospital for several months with extensive rehab.     Saba reports severe pain to the right hip and neuropathy in the right mid-shin casper through to the foot. She reports pain to the lateral shin as well as severe pain to the bottom of her foot, the outside of her foot and her toes. Prolonged sitting &/or standing causes the pain to worsen. The numbness to her foot and toes is constant. She has undergone 2 total hip replacements but continues to have pain. She has tried acupuncture as well as Pregabalin. She has not seen any true benefit from either. Her pain is the worst at night.     She presents today for Scrambler Therapy during which we will be specifically addressing the right L5 an S1 dermatomes as well as adding L2 and L3.  She reports that the lower haf of her right leg continues to show some improvement in pain. Unfortunately she reports significant discomfort to the thigh and buttock region of the RLE. She would still like to proceed with today's treatment though as she does feel that changes are happening.     Review of Systems Unless noted in the HPI all other systems have been reviewed and are negative for complaint.     Objective   Physical Exam  General- No acute distress, well appearing and well nourished.   Eyes Conjunctiva and lids: No erythema, swelling or discharge  Neck - Supple, no cervical lymphadenopathy.   Pulmonary - Respiratory effort: Normal respiration.   Cardiovascular - Normal rate and rhythm.  Examination of extremities for edema and/or  "varicosities: TTP to the right heel.   Abdomen: Soft, Non-tender, non-distended, no abdominal masses.   Musculoskeletal - Range of motion: weakness to the right hip. Decreased ROM to the right hip and lower extremity.   Skin - Skin and subcutaneous tissue: Normal without rashes or lesions.  Neurologic - Coordination: Antalgic gait. Sensory deficit: stocking glove loss of sensation at the right thigh, shin and foot is present.   Psychiatric - Orientation to person, place, and time: Normal. Mood and affect: Normal.    Assessment/Plan   Problem List Items Addressed This Visit       Chemotherapy-induced peripheral neuropathy (Multi) - Primary     TREATMENT PLAN:  Day 6 of Scrambler Therapy.   Verbal consent obtained.  Session initiated by myself with proper electrode/lead placement to applicable dermatomes.   Patient tolerated treatment well (see \"procedure note\" below).  Encouraged patient to keep a diary or log of symptoms to monitor for any changes in pain, sensation, etc.   >30 minutes spent face-to-face with patient.     PROCEDURE NOTE:  Treatment 6 with Scrambler therapy was initiated by myself.   Verbal consent obtained from patient.  Therapy start time: 1120.  Leads were applied to:   RLE:  Upper Medial thigh, Lateral Thigh (L2); therapeutic dose 1600.   Medial Thigh and Lateral Thigh (L3); therapeutic dose 1600.  2 fingerbreadth's above the lateral malleolus and the top of foot (L5); therapeutic dose 1600.  Bottom of heel and posterior calf (S1); therapeutic dose 1600.  Right lower buttock and right lateral hip (S1); therapeutic deose 1600.  Therapy end time: 1220  Patient tolerated treatment well.  Pain reported at start of therapy session was about a 5/10.  Pain reported as \"about the same´´ at end of therapy session.  Leads were removed and patient left the office without any difficulty.  Patient will return tomorrow for additional treatment.   60 min total scrambler treatment time.    "

## 2024-07-17 NOTE — PROGRESS NOTES
Subjective   Patient ID: Saba Park is a 69 y.o. female who presents for Scrambler Therapy.     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 Her neuropathy is primarily in her Lower extremities. Saba underwent several rounds of chemotherapy as well as surgery. She reports when she came out of her brain surgery, she had complete numbness and paralysis to the right leg. She was in the hospital for several months with extensive rehab.     Saba reports severe pain to the right hip and neuropathy in the right mid-shin casper through to the foot. She reports pain to the lateral shin as well as severe pain to the bottom of her foot, the outside of her foot and her toes. Prolonged sitting &/or standing causes the pain to worsen. The numbness to her foot and toes is constant. She has undergone 2 total hip replacements but continues to have pain. She has tried acupuncture as well as Pregabalin. She has not seen any true benefit from either. Her pain is the worst at night.     She presents today for Scrambler Therapy during which we will be specifically addressing the right L2, L3,. L5 and S1 dermatomes. She reports that the lower half of her right leg continues to show some improvement in pain. Unfortunately she reports significant discomfort to the thigh and buttock region of the RLE. She would still like to proceed with today's treatment though as she does feel that changes are happening.     Review of Systems Unless noted in the HPI all other systems have been reviewed and are negative for complaint.     Objective   Physical Exam  General- No acute distress, well appearing and well nourished.   Eyes Conjunctiva and lids: No erythema, swelling or discharge  Neck - Supple, no cervical lymphadenopathy.   Pulmonary - Respiratory effort: Normal respiration.   Cardiovascular - Normal rate and rhythm.  Examination of extremities for edema and/or varicosities: TTP  "to the right heel.   Abdomen: Soft, Non-tender, non-distended, no abdominal masses.   Musculoskeletal - Range of motion: weakness to the right hip. Decreased ROM to the right hip and lower extremity.   Skin - Skin and subcutaneous tissue: Normal without rashes or lesions.  Neurologic - Coordination: Antalgic gait. Sensory deficit: stocking glove loss of sensation at the right thigh, shin and foot is present.   Psychiatric - Orientation to person, place, and time: Normal. Mood and affect: Normal.    Assessment/Plan   Problem List Items Addressed This Visit       Chemotherapy-induced peripheral neuropathy (Multi) - Primary     TREATMENT PLAN:  Day 7 of Scrambler Therapy.   Verbal consent obtained.  Session initiated by myself with proper electrode/lead placement to applicable dermatomes.   Patient tolerated treatment well (see \"procedure note\" below).  Encouraged patient to keep a diary or log of symptoms to monitor for any changes in pain, sensation, etc.   >30 minutes spent face-to-face with patient.     PROCEDURE NOTE:  Treatment 7 with Scrambler therapy was initiated by myself.   Verbal consent obtained from patient.  Therapy start time: 1120.  Leads were applied to:   RLE:  Upper Medial thigh, Lateral Thigh (L2); therapeutic dose 1600.   Medial Thigh and Lateral Thigh (L3); therapeutic dose 1600.  2 fingerbreadth's above the lateral malleolus and the top of foot (L5); therapeutic dose 1600.  Bottom of heel and posterior calf (S1); therapeutic dose 1600.  Right lower buttock and right lateral hip (S1); therapeutic deose 1600.  Therapy end time: 1220  Patient tolerated treatment well.  Pain reported at start of therapy session was about a 6/10.  Pain reported as \"about the same´´ at end of therapy session.  Leads were removed and patient left the office without any difficulty.  Patient will return tomorrow for additional treatment.   60 min total scrambler treatment time.    "

## 2024-07-17 NOTE — TELEPHONE ENCOUNTER
Patient stated she had questions regarding her medications, she would like to discuss with the care team.

## 2024-07-18 ENCOUNTER — OFFICE VISIT (OUTPATIENT)
Dept: PAIN MEDICINE | Facility: CLINIC | Age: 69
End: 2024-07-18
Payer: MEDICARE

## 2024-07-18 VITALS
HEIGHT: 62 IN | WEIGHT: 113 LBS | BODY MASS INDEX: 20.8 KG/M2 | HEART RATE: 80 BPM | DIASTOLIC BLOOD PRESSURE: 64 MMHG | SYSTOLIC BLOOD PRESSURE: 113 MMHG | OXYGEN SATURATION: 98 %

## 2024-07-18 DIAGNOSIS — T45.1X5A CHEMOTHERAPY-INDUCED PERIPHERAL NEUROPATHY (MULTI): Primary | ICD-10-CM

## 2024-07-18 DIAGNOSIS — G62.0 CHEMOTHERAPY-INDUCED PERIPHERAL NEUROPATHY (MULTI): Primary | ICD-10-CM

## 2024-07-18 PROCEDURE — 1036F TOBACCO NON-USER: CPT | Performed by: NURSE PRACTITIONER

## 2024-07-18 PROCEDURE — 1159F MED LIST DOCD IN RCRD: CPT | Performed by: NURSE PRACTITIONER

## 2024-07-18 PROCEDURE — 1157F ADVNC CARE PLAN IN RCRD: CPT | Performed by: NURSE PRACTITIONER

## 2024-07-18 PROCEDURE — 3078F DIAST BP <80 MM HG: CPT | Performed by: NURSE PRACTITIONER

## 2024-07-18 PROCEDURE — 99215 OFFICE O/P EST HI 40 MIN: CPT | Performed by: NURSE PRACTITIONER

## 2024-07-18 PROCEDURE — 3008F BODY MASS INDEX DOCD: CPT | Performed by: NURSE PRACTITIONER

## 2024-07-18 PROCEDURE — 1125F AMNT PAIN NOTED PAIN PRSNT: CPT | Performed by: NURSE PRACTITIONER

## 2024-07-18 PROCEDURE — 3074F SYST BP LT 130 MM HG: CPT | Performed by: NURSE PRACTITIONER

## 2024-07-18 ASSESSMENT — PAIN SCALES - GENERAL
PAINLEVEL: 3
PAINLEVEL_OUTOF10: 3

## 2024-07-18 ASSESSMENT — PAIN DESCRIPTION - DESCRIPTORS: DESCRIPTORS: TINGLING;NUMBNESS

## 2024-07-18 ASSESSMENT — PATIENT HEALTH QUESTIONNAIRE - PHQ9
2. FEELING DOWN, DEPRESSED OR HOPELESS: NOT AT ALL
SUM OF ALL RESPONSES TO PHQ9 QUESTIONS 1 AND 2: 0
1. LITTLE INTEREST OR PLEASURE IN DOING THINGS: NOT AT ALL

## 2024-07-18 NOTE — PROGRESS NOTES
Subjective   Patient ID: Saba Park is a 69 y.o. female who presents for Scrambler Therapy.     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 Her neuropathy is primarily in her Lower extremities. Saba underwent several rounds of chemotherapy as well as surgery. She reports when she came out of her brain surgery, she had complete numbness and paralysis to the right leg. She was in the hospital for several months with extensive rehab.     Saba reports severe pain to the right hip and neuropathy in the right mid-shin casper through to the foot. She reports pain to the lateral shin as well as severe pain to the bottom of her foot, the outside of her foot and her toes. Prolonged sitting &/or standing causes the pain to worsen. The numbness to her foot and toes is constant. She has undergone 2 total hip replacements but continues to have pain. She has tried acupuncture as well as Pregabalin. She has not seen any true benefit from either. Her pain is the worst at night.     She presents today for Scrambler Therapy during which we will be specifically addressing the right L2, L3,. L5 and S1 dermatomes. She reports that the lower half of her right leg continues to show definite improvement in pain. She continues to have discomfort to the thigh and buttock region of the RLE though she does feel the new lead placements are in the correct spots. She would like to proceed with today's treatment as she does feel that changes are happening.     Review of Systems Unless noted in the HPI all other systems have been reviewed and are negative for complaint.     Objective   Physical Exam  General- No acute distress, well appearing and well nourished.   Eyes Conjunctiva and lids: No erythema, swelling or discharge  Neck - Supple, no cervical lymphadenopathy.   Pulmonary - Respiratory effort: Normal respiration.   Cardiovascular - Normal rate and rhythm.  Examination of  "extremities for edema and/or varicosities: TTP to the right heel.   Abdomen: Soft, Non-tender, non-distended, no abdominal masses.   Musculoskeletal - Range of motion: weakness to the right hip. Decreased ROM to the right hip and lower extremity.   Skin - Skin and subcutaneous tissue: Normal without rashes or lesions.  Neurologic - Coordination: Antalgic gait. Sensory deficit: stocking glove loss of sensation at the right thigh, shin and foot is present.   Psychiatric - Orientation to person, place, and time: Normal. Mood and affect: Normal.    Assessment/Plan   Problem List Items Addressed This Visit       Chemotherapy-induced peripheral neuropathy (Multi) - Primary     TREATMENT PLAN:  Day 7 of Scrambler Therapy.   Verbal consent obtained.  Session initiated by myself with proper electrode/lead placement to applicable dermatomes.   Patient tolerated treatment well (see \"procedure note\" below).  Encouraged patient to keep a diary or log of symptoms to monitor for any changes in pain, sensation, etc.   >30 minutes spent face-to-face with patient.     PROCEDURE NOTE:  Treatment 7 with Scrambler therapy was initiated by myself.   Verbal consent obtained from patient.  Therapy start time: 1115.  Leads were applied to:   RLE:  Upper Medial thigh, Lateral Thigh (L2); therapeutic dose 1600.   Medial Thigh and Lateral Thigh (L3); therapeutic dose 1600.  2 fingerbreadth's above the lateral malleolus and the top of foot (L5); therapeutic dose 1600.  Bottom of heel and posterior calf (S1); therapeutic dose 1600.  Right lower buttock and right lateral hip (S1); therapeutic deose 1600.  Therapy end time: 1215  Patient tolerated treatment well.  Pain reported at start of therapy session was about a 3/10.  Pain reported as \"about the same´´ at end of therapy session.  Leads were removed and patient left the office without any difficulty.  Patient will return next week for additional treatment.   60 min total scrambler treatment " time.

## 2024-07-19 ENCOUNTER — TELEPHONE (OUTPATIENT)
Dept: PALLIATIVE MEDICINE | Facility: CLINIC | Age: 69
End: 2024-07-19
Payer: MEDICARE

## 2024-07-19 NOTE — TELEPHONE ENCOUNTER
Called Saba to check on her pain. I spoke with her spouse who reports her pain is now tolerable. She took her second oxycodone/tylenol at 345 and is doing much better. They were very appreciative of the call. They will continue the oxy/tylenol and ibuprofen rotation through this weekend and have a virtual FUV with Arin on Tuesday.

## 2024-07-19 NOTE — TELEPHONE ENCOUNTER
Kasi states patient is in extreme pain, her pain level is 10.  He would like a call back to discuss.      Thanks

## 2024-07-19 NOTE — TELEPHONE ENCOUNTER
I returned Saba/ Ron call. She is aduibly in severe pain, she and her spouse report she has not been able to get comfortable. Her pain is 10/10 and shooting down her right leg, with numbness and tingling. She took 10mg oxycodone last night with minimal relief. She said her pain starting getting worse mid-week but every day is different. She said it has not been this bad in a while. She is still taking her methadone, duloxetine. Currently off her pregabalin due to being on scrambler therapy, she has had 8 sessions so far. She also called Dr. Collinstics office to make them aware. She has not taken any ibuprofen/ tylenol yet.  Discussed with Arin Hirsch who would like her to take 10mg oxy plus 500mg [id number] and rotate with 600mg ibuprofen ([id number]).  Patient/ spouse decline ED due to her being unable to sit still due to severity of pain  I   will call her this afternoon to check on her pain.

## 2024-07-19 NOTE — PROGRESS NOTES
SUPPORTIVE AND PALLIATIVE ONCOLOGY OUTPATIENT FOLLOW-UP    Virtual or Telephone Consent    A telephone visit (audio only) between the patient (at the originating site) and the provider (at the distant site) was utilized to provide this telehealth service.   Verbal consent was requested and obtained from Saba Park on this date, 7/23/2024 for a telehealth visit.     SERVICE DATE: 7/23/2024    Subjective   HISTORY OF PRESENT ILLNESS: Saba Park is a 69 y.o. female who presents with history of CNS lymphoma and ovarian cancer. Currently in active surveillance and without evidence of either of her cancers. Patient follows with Supportive Oncology/Palliative Care for chronic pain r/t disease history/treatment and further symptom management.      Pain Assessment:  Pain Score:  8  Location:  right hip down right leg  Description:  sharp, shooting    Symptom Assessment:  Pain:very much  - as below  Numbness or Tingling in hands/feet/other: very much - reports since starting Scrambler she had 1 bad reaction to the Scrambler the first week which caused her to be in a wheelchair unable to walk. Electrodes were rearranged and this helped some but following on the second week pain incredibly spiked to 10/10. Started in right buttock and shot down her leg, patient called this office wincing in pain. Was told to increase Oxycodone to 10mg [id number] paired with Acetaminophen 500mg, and alternate with Motrin 600mg PRN. This assisted until midday Saturday and extreme pain came back. Over the weekend she had taken Pregabalin 25mg 1x which she felt helped some but she did not take it again, and pain returned. At this point she has canceled the remainder of her Scrambler treatments and is planning to see Dr. Alarcon on 8/5 for other considerations for assistance in pain  Sore Muscles/Spasms: none  Headache: none  Dizziness:none  Constipation: somewhat - started taking a stool softener every day and this helped  Diarrhea:  none  Nausea: a little - comes and goes throughout the day and she has felt this started ever since she stopped the Pregabalin about 1 month ago. Over the last month she has had overall decrease in appetite due to nausea, and has lost about 10lbs. She also reports feeling more depressed due to pain as well as she had to put her 16 year old dog down which was very hard on her  Vomiting: none  Lack of Appetite: somewhat   Weight Loss: somewhat  Taste changes: a little  Dry Mouth: none  Pain in Mouth/Swallowing: none  Lack of Energy: somewhat  Difficulty Sleeping: none  Worrying: a little  Anxiety: a little  Depression: somewhat - plans to further talk with Dr. Ugalde regarding worsening depression  Shortness of breath: none  Other: none      Information obtained from: chart review and interview of patient  ______________________________________________________________________        Objective     Lab on 04/03/2024   Component Date Value Ref Range Status    WBC 04/03/2024 6.0  4.4 - 11.3 x10*3/uL Final    nRBC 04/03/2024    Final    RBC 04/03/2024 4.26  4.00 - 5.20 x10*6/uL Final    Hemoglobin 04/03/2024 13.6  12.0 - 16.0 g/dL Final    Hematocrit 04/03/2024 41.6  36.0 - 46.0 % Final    MCV 04/03/2024 98  80 - 100 fL Final    MCH 04/03/2024 31.9  26.0 - 34.0 pg Final    MCHC 04/03/2024 32.7  32.0 - 36.0 g/dL Final    RDW 04/03/2024 12.3  11.5 - 14.5 % Final    Platelets 04/03/2024 195  150 - 450 x10*3/uL Final    Neutrophils % 04/03/2024 52.9  40.0 - 80.0 % Final    Immature Granulocytes %, Automated 04/03/2024 0.0  0.0 - 0.9 % Final    Lymphocytes % 04/03/2024 36.5  13.0 - 44.0 % Final    Monocytes % 04/03/2024 8.6  2.0 - 10.0 % Final    Eosinophils % 04/03/2024 1.3  0.0 - 6.0 % Final    Basophils % 04/03/2024 0.7  0.0 - 2.0 % Final    Neutrophils Absolute 04/03/2024 3.19  1.20 - 7.70 x10*3/uL Final    Immature Granulocytes Absolute, Au* 04/03/2024 0.00  0.00 - 0.70 x10*3/uL Final    Lymphocytes Absolute  "04/03/2024 2.20  1.20 - 4.80 x10*3/uL Final    Monocytes Absolute 04/03/2024 0.52  0.10 - 1.00 x10*3/uL Final    Eosinophils Absolute 04/03/2024 0.08  0.00 - 0.70 x10*3/uL Final    Basophils Absolute 04/03/2024 0.04  0.00 - 0.10 x10*3/uL Final    Folate, Serum 04/03/2024 >24.0  >5.0 ng/mL Final       PHYSICAL EXAMINATION   Vital Signs:   Vital signs reviewed      7/9/2024    10:49 AM 7/10/2024    11:08 AM 7/11/2024    11:12 AM 7/15/2024    10:56 AM 7/16/2024    11:10 AM 7/17/2024    11:13 AM 7/18/2024    10:54 AM   Vitals   Systolic 101 115 135 127 104  113   Diastolic 75 72 80 78 64  64   Heart Rate 63 64 66 64 66 79 80   Resp    18      Height (in) 1.575 m (5' 2\") 1.575 m (5' 2\") 1.575 m (5' 2\") 1.575 m (5' 2\") 1.575 m (5' 2\") 1.575 m (5' 2\") 1.575 m (5' 2\")   Weight (lb) 152 152 152 152 152 152 113   BMI 27.8 kg/m2 27.8 kg/m2 27.8 kg/m2 27.8 kg/m2 27.8 kg/m2 27.8 kg/m2 20.67 kg/m2   BSA (m2) 1.74 m2 1.74 m2 1.74 m2 1.74 m2 1.74 m2 1.74 m2 1.5 m2   Visit Report Report Report Report Report Report Report Report     Physical Exam  Not complete dt telephone visit     ASSESSMENT/PLAN    Chronic Right Hip/Leg Pain and Neuropathic Pain  Pain is: chronic - 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 Completely anbx therapy  Type: neuropathic  Pain control: sub-optimally controlled  Home regimen:   - S/P [facility] 5/19/22  - Continue Ibuprofen  600mg q6 hours PRN  - Completed PT/[facility] therapy  - Supportive care with rest, ice/heat as needed  - Continue Methadone 10mg BID  - EKG (4/5/23) QTc 431. Repeat with increase in dose  - Pursed Scrambler Therapy 7/8/24 to 7/18/24 (total of 8 sessions) - noted worsening radiating pain from hip, but slight improvement in numbness/tingling " of her foot. Did not complete further sessions due to extreme hip/leg pain  - Restart Pregabalin 50mg TID  - Start Medrol Dustin (7/23/2024)  - Continue Acetaminophen 500mg d0ocosp PRN  - Continue Oxycodone 5-10mg [id number] PRN for breakthrough pain  - Continue Duloxetine 60mg/daily  - Completed program through chiropractor w/o change in pain  - Referral to Symptom Clinic - Dr Silverman - pursuing acupuncture with notable change in neuropathy/pain  - Continue following with Chronic Pain (Dr. Alarcon) - next follow-up 8/5/24   - Continue following with podiatry   Intolerances/previously tried:   - Alpha Liproic Acid - not effective  - Gabapentin - caused excessive lethargy     Opioid Use  Medication Management:   - OARRS report reviewed with no aberrant behavior; consistent with  prescriptions/records and patient history  - MED 90.  Overdose Risk Score 100.   This has been discussed with patient.   - We will continue to closely monitor the patient for signs of prescription misuse including UDS, OARRS review and subjective reports at each visit.  - No concurrent benzodiazepine use   - I am a provider who either is or has consulted and collaborated with a provider certified in Hospice and Palliative Medicine and have conducted a face-face visit and examination for this patient.  - Routine Urine Drug Screen: 7/12/23 appropriately positive for opioids and negative for illicit substances  - Controlled Substance Agreement: 7/12/23  - Specifically discussed that controlled substance prescriptions will only be provided by our group as outlined in the completed agreement  - Prescribed naloxone: patient declined  - Red Flags: NA     Bowel  - Continue Docusate 100mg 1-2x daily    Nausea/Loss of Appetite  - Continue to monitor - may pursue imaging/lab work-up in near future  - Start Omeprazole 20mg daily  - Continue Ondansetron 8mg [id number] PRN  - Continue Metoclopramide 10mg [id number] PRN     Altered Mood  Chronic anxiety and  depression related to health concerns   Home regimen:   - Continue Duloxetine 60mg once daily at bed  - Continue Buspirone 5mg TID  - Continue Buproprion XL 150mg daily  - Following with counselor weekly to every 2 weeks  - Established with Dr. Ugalde - Onco Psychiatry    Next Follow-Up Visit:  Return to clinic in 2 weeks by telephone    Signature and billing  Medical complexity was high level due to due to complexity of problems, extensive data review, and high risk of management/treatment.  Time was spent on the following: Prep Time, Time Directly with Patient/Family/Caregiver, Documentation Time. Total time spent: 50    Data  Diagnostic tests and information reviewed for today's visit:  Most recent labs and imaging results, Medications     Some elements copied from Palliative Care note on 5/17/2024, the elements have been updated and all reflect current decision making from today, 7/23/2024.      Plan of Care discussed with: Patient    SIGNATURE: DEMETRIUS Orourke-CNP    Contact information:  Supportive and Palliative Oncology  Monday-Friday 8 AM-5 PM  Phone:  [phone], press option #5, then option #1.   Or Epic Secure Chat

## 2024-07-20 ENCOUNTER — TELEPHONE (OUTPATIENT)
Dept: ADMISSION | Facility: HOSPITAL | Age: 69
End: 2024-07-20
Payer: MEDICARE

## 2024-07-20 NOTE — TELEPHONE ENCOUNTER
The patient  was notified of the on-call fellow recommendation. Was very appreciative and agreeable. Utilized teachback and will also call on Monday if pain persists. Aware of appt with palliative care on Tuesday. Recommended ER if pain persists over the weekend with no relief from the pain regimen described by On-call fellow and palliative care team.

## 2024-07-20 NOTE — TELEPHONE ENCOUNTER
Received message from nursing line that pt was experiencing severe, 10/10 pain in the RLE into the leg. On chart review, pt is a 68yo F w PMHx lymphoma with CNS involvement, now s/p treatment since 2014 but with persistent neurologic side effects of chronic pain in the RLE that is neuropathic in nature. Currently managed closely by palliative care with opiate and non-opiate medications (oxycodone, methadone, duloxetine, acetaminophen, ibuprofen). Recently started on scrambler electrocutaneous stimulation therapy, for which pregabalin had been weaned off. Yesterday, pt had worsening pain temporarily responsive to NSAID therapy, but then got much worse at night and was unbearable and she took one of her remaining pregabalin pills with some relief. Asking for further advice for pain relief prior to her palliative care appointment next Tuesday.     Recommended continuing on recommended regimen of oxy + methadone + duloxetine and alternating tylenol + ibuprofen around the clock. If she again experiences unbearable, 10/10 pain, reasonable to use pregabalin as a breakthrough, or simply to proceed to the ED for further evaluation and treatment. Will update palliative care provider.

## 2024-07-20 NOTE — TELEPHONE ENCOUNTER
The patient  called in, chief complaint: Pain and questions about medication    Per the phone note yesterday from palliative care RN:     I returned Saba/ Ron call. She is aduibly in severe pain, she and her spouse report she has not been able to get comfortable. Her pain is 10/10 and shooting down her right leg, with numbness and tingling. She took 10mg oxycodone last night with minimal relief. She said her pain starting getting worse mid-week but every day is different. She said it has not been this bad in a while. She is still taking her methadone, duloxetine. Currently off her pregabalin due to being on scrambler therapy, she has had 8 sessions so far. She also called Dr. Corbin office to make them aware. She has not taken any ibuprofen/ tylenol yet.  Discussed with Arin Hirsch who would like her to take 10mg oxy plus 500mg [id number] and rotate with 600mg ibuprofen (q6h).  Patient/ spouse decline ED due to her being unable to sit still due to severity of pain        The patient and patient wife stated that they began the above regimen yesterday around 11:45. Around 6PM last night, the pain returned with vengeance. States that she was desparate so she took a Pregamblin (although she was advised to stop that), that worked and helped with the pain. She is wondering if she can now take that daily until her apt with palliative care on Tuesday. She has 5 tablets left. Message sent to the on-call fellow to advise.

## 2024-07-22 ENCOUNTER — TELEPHONE (OUTPATIENT)
Dept: PAIN MEDICINE | Facility: CLINIC | Age: 69
End: 2024-07-22
Payer: MEDICARE

## 2024-07-22 ENCOUNTER — APPOINTMENT (OUTPATIENT)
Dept: PAIN MEDICINE | Facility: CLINIC | Age: 69
End: 2024-07-22
Payer: MEDICARE

## 2024-07-22 NOTE — TELEPHONE ENCOUNTER
Phone call from the patient's  stating her pain level is a 10/10 and that she can't stand and can't sit. Return call to the patient. She states the pain started Thursday and returned Friday. She has been able to calm the pain but cancelled her remaining appointments with [facility]. Made Vanita aware who suggested a follow up with Dr. Alarcon be scheduled. Patient scheduled for 8/27/24, The patient is requesting to speak to Vanita. She is also hoping to be seen sooner. Advised that this nurse would also send message to Dr. Alarcon.

## 2024-07-23 ENCOUNTER — APPOINTMENT (OUTPATIENT)
Dept: PAIN MEDICINE | Facility: CLINIC | Age: 69
End: 2024-07-23
Payer: MEDICARE

## 2024-07-23 ENCOUNTER — TELEMEDICINE (OUTPATIENT)
Dept: PALLIATIVE MEDICINE | Facility: CLINIC | Age: 69
End: 2024-07-23
Payer: MEDICARE

## 2024-07-23 DIAGNOSIS — C85.89: ICD-10-CM

## 2024-07-23 DIAGNOSIS — T45.1X5A CHEMOTHERAPY-INDUCED PERIPHERAL NEUROPATHY (MULTI): ICD-10-CM

## 2024-07-23 DIAGNOSIS — G62.0 CHEMOTHERAPY-INDUCED PERIPHERAL NEUROPATHY (MULTI): ICD-10-CM

## 2024-07-23 DIAGNOSIS — C56.9 MALIGNANT NEOPLASM OF OVARY, UNSPECIFIED LATERALITY (MULTI): ICD-10-CM

## 2024-07-23 DIAGNOSIS — G62.9 NEUROPATHY: ICD-10-CM

## 2024-07-23 DIAGNOSIS — Z51.5 PALLIATIVE CARE ENCOUNTER: Primary | ICD-10-CM

## 2024-07-23 DIAGNOSIS — M79.2 NEUROPATHIC PAIN: ICD-10-CM

## 2024-07-23 DIAGNOSIS — R53.83 OTHER FATIGUE: ICD-10-CM

## 2024-07-23 DIAGNOSIS — G89.29 OTHER CHRONIC PAIN: ICD-10-CM

## 2024-07-23 DIAGNOSIS — F33.1 MODERATE EPISODE OF RECURRENT MAJOR DEPRESSIVE DISORDER (MULTI): ICD-10-CM

## 2024-07-23 DIAGNOSIS — R11.0 NAUSEA: ICD-10-CM

## 2024-07-23 DIAGNOSIS — G89.3 CANCER RELATED PAIN: ICD-10-CM

## 2024-07-23 DIAGNOSIS — G89.3 CHRONIC PAIN AFTER CANCER TREATMENT: ICD-10-CM

## 2024-07-23 PROCEDURE — 1157F ADVNC CARE PLAN IN RCRD: CPT | Performed by: NURSE PRACTITIONER

## 2024-07-23 PROCEDURE — 99215 OFFICE O/P EST HI 40 MIN: CPT | Performed by: NURSE PRACTITIONER

## 2024-07-23 RX ORDER — METHYLPREDNISOLONE 4 MG/1
TABLET ORAL
Qty: 21 TABLET | Refills: 0 | Status: ON HOLD | OUTPATIENT
Start: 2024-07-23 | End: 2024-07-30

## 2024-07-23 RX ORDER — PREGABALIN 50 MG/1
50 CAPSULE ORAL 3 TIMES DAILY
Qty: 45 CAPSULE | Refills: 0 | Status: ON HOLD | OUTPATIENT
Start: 2024-07-23 | End: 2024-08-07

## 2024-07-23 RX ORDER — OMEPRAZOLE 20 MG/1
20 CAPSULE, DELAYED RELEASE ORAL
Qty: 30 CAPSULE | Refills: 2 | Status: ON HOLD | OUTPATIENT
Start: 2024-07-23 | End: 2024-07-25 | Stop reason: WASHOUT

## 2024-07-23 RX ORDER — METHADONE HYDROCHLORIDE 10 MG/1
10 TABLET ORAL EVERY 12 HOURS
Qty: 60 TABLET | Refills: 0 | Status: ON HOLD | OUTPATIENT
Start: 2024-07-23 | End: 2024-08-22

## 2024-07-23 RX ORDER — OXYCODONE HYDROCHLORIDE 5 MG/1
5-10 TABLET ORAL EVERY 4 HOURS PRN
Qty: 180 TABLET | Refills: 0 | Status: ON HOLD | OUTPATIENT
Start: 2024-07-23 | End: 2024-08-07

## 2024-07-24 ENCOUNTER — APPOINTMENT (OUTPATIENT)
Dept: PAIN MEDICINE | Facility: CLINIC | Age: 69
End: 2024-07-24
Payer: MEDICARE

## 2024-07-25 ENCOUNTER — APPOINTMENT (OUTPATIENT)
Dept: RADIOLOGY | Facility: HOSPITAL | Age: 69
DRG: 416 | End: 2024-07-25
Payer: MEDICARE

## 2024-07-25 ENCOUNTER — HOSPITAL ENCOUNTER (INPATIENT)
Facility: HOSPITAL | Age: 69
DRG: 416 | End: 2024-07-25
Attending: EMERGENCY MEDICINE | Admitting: INTERNAL MEDICINE
Payer: MEDICARE

## 2024-07-25 ENCOUNTER — APPOINTMENT (OUTPATIENT)
Dept: PAIN MEDICINE | Facility: CLINIC | Age: 69
End: 2024-07-25
Payer: MEDICARE

## 2024-07-25 ENCOUNTER — APPOINTMENT (OUTPATIENT)
Dept: CARDIOLOGY | Facility: HOSPITAL | Age: 69
DRG: 416 | End: 2024-07-25
Payer: MEDICARE

## 2024-07-25 ENCOUNTER — ANESTHESIA (OUTPATIENT)
Dept: OPERATING ROOM | Facility: HOSPITAL | Age: 69
End: 2024-07-25
Payer: MEDICARE

## 2024-07-25 ENCOUNTER — ANESTHESIA EVENT (OUTPATIENT)
Dept: OPERATING ROOM | Facility: HOSPITAL | Age: 69
End: 2024-07-25
Payer: MEDICARE

## 2024-07-25 DIAGNOSIS — K81.9 CHOLECYSTITIS: Primary | ICD-10-CM

## 2024-07-25 LAB
ALBUMIN SERPL BCP-MCNC: 4.1 G/DL (ref 3.4–5)
ALP SERPL-CCNC: 90 U/L (ref 33–136)
ALT SERPL W P-5'-P-CCNC: 28 U/L (ref 7–45)
ANION GAP SERPL CALC-SCNC: 15 MMOL/L (ref 10–20)
APPEARANCE UR: CLEAR
AST SERPL W P-5'-P-CCNC: 12 U/L (ref 9–39)
ATRIAL RATE: 61 BPM
BASOPHILS # BLD AUTO: 0.04 X10*3/UL (ref 0–0.1)
BASOPHILS NFR BLD AUTO: 0.4 %
BILIRUB SERPL-MCNC: 0.3 MG/DL (ref 0–1.2)
BILIRUB UR STRIP.AUTO-MCNC: NEGATIVE MG/DL
BUN SERPL-MCNC: 25 MG/DL (ref 6–23)
CALCIUM SERPL-MCNC: 9.3 MG/DL (ref 8.6–10.3)
CARDIAC TROPONIN I PNL SERPL HS: 4 NG/L (ref 0–13)
CHLORIDE SERPL-SCNC: 104 MMOL/L (ref 98–107)
CO2 SERPL-SCNC: 27 MMOL/L (ref 21–32)
COLOR UR: ABNORMAL
CREAT SERPL-MCNC: 0.67 MG/DL (ref 0.5–1.05)
EGFRCR SERPLBLD CKD-EPI 2021: >90 ML/MIN/1.73M*2
EOSINOPHIL # BLD AUTO: 0.03 X10*3/UL (ref 0–0.7)
EOSINOPHIL NFR BLD AUTO: 0.3 %
ERYTHROCYTE [DISTWIDTH] IN BLOOD BY AUTOMATED COUNT: 12.4 % (ref 11.5–14.5)
GLUCOSE SERPL-MCNC: 143 MG/DL (ref 74–99)
GLUCOSE UR STRIP.AUTO-MCNC: NORMAL MG/DL
HCT VFR BLD AUTO: 38.8 % (ref 36–46)
HGB BLD-MCNC: 13.3 G/DL (ref 12–16)
HOLD SPECIMEN: NORMAL
IMM GRANULOCYTES # BLD AUTO: 0.05 X10*3/UL (ref 0–0.7)
IMM GRANULOCYTES NFR BLD AUTO: 0.5 % (ref 0–0.9)
KETONES UR STRIP.AUTO-MCNC: ABNORMAL MG/DL
LEUKOCYTE ESTERASE UR QL STRIP.AUTO: NEGATIVE
LIPASE SERPL-CCNC: 10 U/L (ref 9–82)
LYMPHOCYTES # BLD AUTO: 1.73 X10*3/UL (ref 1.2–4.8)
LYMPHOCYTES NFR BLD AUTO: 15.8 %
MCH RBC QN AUTO: 32.1 PG (ref 26–34)
MCHC RBC AUTO-ENTMCNC: 34.3 G/DL (ref 32–36)
MCV RBC AUTO: 94 FL (ref 80–100)
MONOCYTES # BLD AUTO: 0.55 X10*3/UL (ref 0.1–1)
MONOCYTES NFR BLD AUTO: 5 %
MUCOUS THREADS #/AREA URNS AUTO: NORMAL /LPF
NEUTROPHILS # BLD AUTO: 8.56 X10*3/UL (ref 1.2–7.7)
NEUTROPHILS NFR BLD AUTO: 78 %
NITRITE UR QL STRIP.AUTO: NEGATIVE
NRBC BLD-RTO: 0 /100 WBCS (ref 0–0)
P AXIS: 49 DEGREES
P OFFSET: 201 MS
P ONSET: 152 MS
PH UR STRIP.AUTO: 6.5 [PH]
PLATELET # BLD AUTO: 230 X10*3/UL (ref 150–450)
POTASSIUM SERPL-SCNC: 5.4 MMOL/L (ref 3.5–5.3)
PR INTERVAL: 126 MS
PROT SERPL-MCNC: 6.3 G/DL (ref 6.4–8.2)
PROT UR STRIP.AUTO-MCNC: ABNORMAL MG/DL
Q ONSET: 215 MS
QRS COUNT: 10 BEATS
QRS DURATION: 84 MS
QT INTERVAL: 458 MS
QTC CALCULATION(BAZETT): 461 MS
QTC FREDERICIA: 460 MS
R AXIS: 45 DEGREES
RBC # BLD AUTO: 4.14 X10*6/UL (ref 4–5.2)
RBC # UR STRIP.AUTO: NEGATIVE /UL
RBC #/AREA URNS AUTO: NORMAL /HPF
SODIUM SERPL-SCNC: 141 MMOL/L (ref 136–145)
SP GR UR STRIP.AUTO: 1.02
T AXIS: 36 DEGREES
T OFFSET: 444 MS
UROBILINOGEN UR STRIP.AUTO-MCNC: NORMAL MG/DL
VENTRICULAR RATE: 61 BPM
WBC # BLD AUTO: 11 X10*3/UL (ref 4.4–11.3)
WBC #/AREA URNS AUTO: NORMAL /HPF

## 2024-07-25 PROCEDURE — 2500000002 HC RX 250 W HCPCS SELF ADMINISTERED DRUGS (ALT 637 FOR MEDICARE OP, ALT 636 FOR OP/ED): Performed by: STUDENT IN AN ORGANIZED HEALTH CARE EDUCATION/TRAINING PROGRAM

## 2024-07-25 PROCEDURE — 93005 ELECTROCARDIOGRAM TRACING: CPT

## 2024-07-25 PROCEDURE — 2500000004 HC RX 250 GENERAL PHARMACY W/ HCPCS (ALT 636 FOR OP/ED): Performed by: STUDENT IN AN ORGANIZED HEALTH CARE EDUCATION/TRAINING PROGRAM

## 2024-07-25 PROCEDURE — 2500000004 HC RX 250 GENERAL PHARMACY W/ HCPCS (ALT 636 FOR OP/ED): Performed by: NURSE ANESTHETIST, CERTIFIED REGISTERED

## 2024-07-25 PROCEDURE — 71250 CT THORAX DX C-: CPT | Performed by: RADIOLOGY

## 2024-07-25 PROCEDURE — 2500000005 HC RX 250 GENERAL PHARMACY W/O HCPCS: Performed by: ANESTHESIOLOGY

## 2024-07-25 PROCEDURE — 76705 ECHO EXAM OF ABDOMEN: CPT | Performed by: RADIOLOGY

## 2024-07-25 PROCEDURE — 81001 URINALYSIS AUTO W/SCOPE: CPT | Performed by: EMERGENCY MEDICINE

## 2024-07-25 PROCEDURE — 2500000005 HC RX 250 GENERAL PHARMACY W/O HCPCS: Performed by: NURSE ANESTHETIST, CERTIFIED REGISTERED

## 2024-07-25 PROCEDURE — S0109 METHADONE ORAL 5MG: HCPCS | Performed by: NURSE PRACTITIONER

## 2024-07-25 PROCEDURE — 2550000001 HC RX 255 CONTRASTS: Performed by: EMERGENCY MEDICINE

## 2024-07-25 PROCEDURE — S0109 METHADONE ORAL 5MG: HCPCS | Performed by: STUDENT IN AN ORGANIZED HEALTH CARE EDUCATION/TRAINING PROGRAM

## 2024-07-25 PROCEDURE — 1200000002 HC GENERAL ROOM WITH TELEMETRY DAILY

## 2024-07-25 PROCEDURE — G0378 HOSPITAL OBSERVATION PER HR: HCPCS

## 2024-07-25 PROCEDURE — 2500000004 HC RX 250 GENERAL PHARMACY W/ HCPCS (ALT 636 FOR OP/ED): Performed by: SURGERY

## 2024-07-25 PROCEDURE — 7100000002 HC RECOVERY ROOM TIME - EACH INCREMENTAL 1 MINUTE: Performed by: SURGERY

## 2024-07-25 PROCEDURE — 99221 1ST HOSP IP/OBS SF/LOW 40: CPT | Performed by: SURGERY

## 2024-07-25 PROCEDURE — 76705 ECHO EXAM OF ABDOMEN: CPT

## 2024-07-25 PROCEDURE — 2500000004 HC RX 250 GENERAL PHARMACY W/ HCPCS (ALT 636 FOR OP/ED): Performed by: EMERGENCY MEDICINE

## 2024-07-25 PROCEDURE — 2500000001 HC RX 250 WO HCPCS SELF ADMINISTERED DRUGS (ALT 637 FOR MEDICARE OP): Performed by: NURSE PRACTITIONER

## 2024-07-25 PROCEDURE — 3600000008 HC OR TIME - EACH INCREMENTAL 1 MINUTE - PROCEDURE LEVEL THREE: Performed by: SURGERY

## 2024-07-25 PROCEDURE — 74177 CT ABD & PELVIS W/CONTRAST: CPT

## 2024-07-25 PROCEDURE — 2720000007 HC OR 272 NO HCPCS: Performed by: SURGERY

## 2024-07-25 PROCEDURE — 2500000004 HC RX 250 GENERAL PHARMACY W/ HCPCS (ALT 636 FOR OP/ED): Performed by: ANESTHESIOLOGY

## 2024-07-25 PROCEDURE — 99285 EMERGENCY DEPT VISIT HI MDM: CPT | Mod: 25

## 2024-07-25 PROCEDURE — 47600 CHOLECYSTECTOMY: CPT | Performed by: SURGERY

## 2024-07-25 PROCEDURE — 96374 THER/PROPH/DIAG INJ IV PUSH: CPT

## 2024-07-25 PROCEDURE — 2500000001 HC RX 250 WO HCPCS SELF ADMINISTERED DRUGS (ALT 637 FOR MEDICARE OP): Performed by: STUDENT IN AN ORGANIZED HEALTH CARE EDUCATION/TRAINING PROGRAM

## 2024-07-25 PROCEDURE — 83690 ASSAY OF LIPASE: CPT | Performed by: EMERGENCY MEDICINE

## 2024-07-25 PROCEDURE — 2500000005 HC RX 250 GENERAL PHARMACY W/O HCPCS: Performed by: SURGERY

## 2024-07-25 PROCEDURE — 2780000003 HC OR 278 NO HCPCS: Performed by: SURGERY

## 2024-07-25 PROCEDURE — 36415 COLL VENOUS BLD VENIPUNCTURE: CPT | Performed by: EMERGENCY MEDICINE

## 2024-07-25 PROCEDURE — 74177 CT ABD & PELVIS W/CONTRAST: CPT | Mod: FOREIGN READ | Performed by: RADIOLOGY

## 2024-07-25 PROCEDURE — 96375 TX/PRO/DX INJ NEW DRUG ADDON: CPT

## 2024-07-25 PROCEDURE — 2500000002 HC RX 250 W HCPCS SELF ADMINISTERED DRUGS (ALT 637 FOR MEDICARE OP, ALT 636 FOR OP/ED): Performed by: NURSE PRACTITIONER

## 2024-07-25 PROCEDURE — 85025 COMPLETE CBC W/AUTO DIFF WBC: CPT | Performed by: EMERGENCY MEDICINE

## 2024-07-25 PROCEDURE — 2500000004 HC RX 250 GENERAL PHARMACY W/ HCPCS (ALT 636 FOR OP/ED): Performed by: NURSE PRACTITIONER

## 2024-07-25 PROCEDURE — 99222 1ST HOSP IP/OBS MODERATE 55: CPT | Performed by: NURSE PRACTITIONER

## 2024-07-25 PROCEDURE — 99222 1ST HOSP IP/OBS MODERATE 55: CPT

## 2024-07-25 PROCEDURE — 84484 ASSAY OF TROPONIN QUANT: CPT | Performed by: EMERGENCY MEDICINE

## 2024-07-25 PROCEDURE — 88304 TISSUE EXAM BY PATHOLOGIST: CPT | Mod: TC,AHULAB

## 2024-07-25 PROCEDURE — 71250 CT THORAX DX C-: CPT

## 2024-07-25 PROCEDURE — 3700000002 HC GENERAL ANESTHESIA TIME - EACH INCREMENTAL 1 MINUTE: Performed by: SURGERY

## 2024-07-25 PROCEDURE — 3600000003 HC OR TIME - INITIAL BASE CHARGE - PROCEDURE LEVEL THREE: Performed by: SURGERY

## 2024-07-25 PROCEDURE — 84075 ASSAY ALKALINE PHOSPHATASE: CPT | Performed by: EMERGENCY MEDICINE

## 2024-07-25 PROCEDURE — 3700000001 HC GENERAL ANESTHESIA TIME - INITIAL BASE CHARGE: Performed by: SURGERY

## 2024-07-25 PROCEDURE — 7100000001 HC RECOVERY ROOM TIME - INITIAL BASE CHARGE: Performed by: SURGERY

## 2024-07-25 RX ORDER — ACETAMINOPHEN 325 MG/1
650 TABLET ORAL EVERY 4 HOURS PRN
Status: DISCONTINUED | OUTPATIENT
Start: 2024-07-25 | End: 2024-07-25

## 2024-07-25 RX ORDER — MORPHINE SULFATE 2 MG/ML
2 INJECTION, SOLUTION INTRAMUSCULAR; INTRAVENOUS EVERY 4 HOURS PRN
Status: DISCONTINUED | OUTPATIENT
Start: 2024-07-25 | End: 2024-07-26

## 2024-07-25 RX ORDER — ACETAMINOPHEN 325 MG/1
650 TABLET ORAL 4 TIMES DAILY
Status: DISCONTINUED | OUTPATIENT
Start: 2024-07-25 | End: 2024-07-29 | Stop reason: HOSPADM

## 2024-07-25 RX ORDER — BUPROPION HYDROCHLORIDE 150 MG/1
300 TABLET ORAL DAILY
Status: DISCONTINUED | OUTPATIENT
Start: 2024-07-25 | End: 2024-07-29 | Stop reason: HOSPADM

## 2024-07-25 RX ORDER — PROPRANOLOL HYDROCHLORIDE 10 MG/1
60 TABLET ORAL 2 TIMES DAILY
Status: DISCONTINUED | OUTPATIENT
Start: 2024-07-25 | End: 2024-07-29 | Stop reason: HOSPADM

## 2024-07-25 RX ORDER — MIDAZOLAM HYDROCHLORIDE 1 MG/ML
INJECTION INTRAMUSCULAR; INTRAVENOUS AS NEEDED
Status: DISCONTINUED | OUTPATIENT
Start: 2024-07-25 | End: 2024-07-25

## 2024-07-25 RX ORDER — IBUPROFEN 400 MG/1
400 TABLET ORAL EVERY 4 HOURS PRN
Status: DISCONTINUED | OUTPATIENT
Start: 2024-07-25 | End: 2024-07-25

## 2024-07-25 RX ORDER — SODIUM CHLORIDE, SODIUM LACTATE, POTASSIUM CHLORIDE, CALCIUM CHLORIDE 600; 310; 30; 20 MG/100ML; MG/100ML; MG/100ML; MG/100ML
100 INJECTION, SOLUTION INTRAVENOUS CONTINUOUS
Status: DISCONTINUED | OUTPATIENT
Start: 2024-07-25 | End: 2024-07-29

## 2024-07-25 RX ORDER — ONDANSETRON 4 MG/1
4 TABLET, FILM COATED ORAL EVERY 8 HOURS PRN
Status: DISCONTINUED | OUTPATIENT
Start: 2024-07-25 | End: 2024-07-29 | Stop reason: HOSPADM

## 2024-07-25 RX ORDER — PROPOFOL 10 MG/ML
INJECTION, EMULSION INTRAVENOUS AS NEEDED
Status: DISCONTINUED | OUTPATIENT
Start: 2024-07-25 | End: 2024-07-25

## 2024-07-25 RX ORDER — KETOROLAC TROMETHAMINE 30 MG/ML
15 INJECTION, SOLUTION INTRAMUSCULAR; INTRAVENOUS EVERY 6 HOURS
Status: COMPLETED | OUTPATIENT
Start: 2024-07-25 | End: 2024-07-28

## 2024-07-25 RX ORDER — ALBUTEROL SULFATE 0.83 MG/ML
2.5 SOLUTION RESPIRATORY (INHALATION) ONCE AS NEEDED
Status: DISCONTINUED | OUTPATIENT
Start: 2024-07-25 | End: 2024-07-25 | Stop reason: HOSPADM

## 2024-07-25 RX ORDER — POLYETHYLENE GLYCOL 3350 17 G/17G
17 POWDER, FOR SOLUTION ORAL DAILY
Status: DISCONTINUED | OUTPATIENT
Start: 2024-07-25 | End: 2024-07-29 | Stop reason: HOSPADM

## 2024-07-25 RX ORDER — ONDANSETRON HYDROCHLORIDE 2 MG/ML
4 INJECTION, SOLUTION INTRAVENOUS ONCE
Status: COMPLETED | OUTPATIENT
Start: 2024-07-25 | End: 2024-07-25

## 2024-07-25 RX ORDER — IPRATROPIUM BROMIDE 0.5 MG/2.5ML
500 SOLUTION RESPIRATORY (INHALATION) ONCE
Status: DISCONTINUED | OUTPATIENT
Start: 2024-07-25 | End: 2024-07-25 | Stop reason: HOSPADM

## 2024-07-25 RX ORDER — BUSPIRONE HYDROCHLORIDE 5 MG/1
5 TABLET ORAL 2 TIMES DAILY
Status: DISCONTINUED | OUTPATIENT
Start: 2024-07-25 | End: 2024-07-29 | Stop reason: HOSPADM

## 2024-07-25 RX ORDER — LIDOCAINE HYDROCHLORIDE 10 MG/ML
0.1 INJECTION, SOLUTION EPIDURAL; INFILTRATION; INTRACAUDAL; PERINEURAL ONCE
Status: DISCONTINUED | OUTPATIENT
Start: 2024-07-25 | End: 2024-07-25 | Stop reason: HOSPADM

## 2024-07-25 RX ORDER — PROCHLORPERAZINE 25 MG/1
25 SUPPOSITORY RECTAL EVERY 12 HOURS PRN
Status: DISCONTINUED | OUTPATIENT
Start: 2024-07-25 | End: 2024-07-29 | Stop reason: HOSPADM

## 2024-07-25 RX ORDER — LIDOCAINE HYDROCHLORIDE 20 MG/ML
INJECTION, SOLUTION INFILTRATION; PERINEURAL AS NEEDED
Status: DISCONTINUED | OUTPATIENT
Start: 2024-07-25 | End: 2024-07-25

## 2024-07-25 RX ORDER — ONDANSETRON HYDROCHLORIDE 2 MG/ML
4 INJECTION, SOLUTION INTRAVENOUS ONCE AS NEEDED
Status: DISCONTINUED | OUTPATIENT
Start: 2024-07-25 | End: 2024-07-25 | Stop reason: HOSPADM

## 2024-07-25 RX ORDER — SODIUM CHLORIDE, SODIUM LACTATE, POTASSIUM CHLORIDE, CALCIUM CHLORIDE 600; 310; 30; 20 MG/100ML; MG/100ML; MG/100ML; MG/100ML
INJECTION, SOLUTION INTRAVENOUS CONTINUOUS PRN
Status: DISCONTINUED | OUTPATIENT
Start: 2024-07-25 | End: 2024-07-25

## 2024-07-25 RX ORDER — ACETAMINOPHEN 160 MG/5ML
650 SOLUTION ORAL EVERY 4 HOURS PRN
Status: DISCONTINUED | OUTPATIENT
Start: 2024-07-25 | End: 2024-07-25

## 2024-07-25 RX ORDER — ROCURONIUM BROMIDE 10 MG/ML
INJECTION, SOLUTION INTRAVENOUS AS NEEDED
Status: DISCONTINUED | OUTPATIENT
Start: 2024-07-25 | End: 2024-07-25

## 2024-07-25 RX ORDER — DULOXETIN HYDROCHLORIDE 30 MG/1
60 CAPSULE, DELAYED RELEASE ORAL EVERY EVENING
Status: DISCONTINUED | OUTPATIENT
Start: 2024-07-25 | End: 2024-07-29 | Stop reason: HOSPADM

## 2024-07-25 RX ORDER — METHADONE HYDROCHLORIDE 5 MG/1
5 TABLET ORAL
Status: DISCONTINUED | OUTPATIENT
Start: 2024-07-25 | End: 2024-07-25

## 2024-07-25 RX ORDER — PROCHLORPERAZINE MALEATE 10 MG
10 TABLET ORAL EVERY 6 HOURS PRN
Status: DISCONTINUED | OUTPATIENT
Start: 2024-07-25 | End: 2024-07-29 | Stop reason: HOSPADM

## 2024-07-25 RX ORDER — HYDROMORPHONE HYDROCHLORIDE 1 MG/ML
1 INJECTION, SOLUTION INTRAMUSCULAR; INTRAVENOUS; SUBCUTANEOUS ONCE
Status: COMPLETED | OUTPATIENT
Start: 2024-07-25 | End: 2024-07-25

## 2024-07-25 RX ORDER — SODIUM CHLORIDE, SODIUM LACTATE, POTASSIUM CHLORIDE, CALCIUM CHLORIDE 600; 310; 30; 20 MG/100ML; MG/100ML; MG/100ML; MG/100ML
100 INJECTION, SOLUTION INTRAVENOUS CONTINUOUS
Status: CANCELLED | OUTPATIENT
Start: 2024-07-25

## 2024-07-25 RX ORDER — ONDANSETRON HYDROCHLORIDE 2 MG/ML
4 INJECTION, SOLUTION INTRAVENOUS EVERY 8 HOURS PRN
Status: DISCONTINUED | OUTPATIENT
Start: 2024-07-25 | End: 2024-07-29 | Stop reason: HOSPADM

## 2024-07-25 RX ORDER — MORPHINE SULFATE 4 MG/ML
4 INJECTION, SOLUTION INTRAMUSCULAR; INTRAVENOUS ONCE
Status: COMPLETED | OUTPATIENT
Start: 2024-07-25 | End: 2024-07-25

## 2024-07-25 RX ORDER — PRIMIDONE 50 MG/1
175 TABLET ORAL NIGHTLY
Status: DISCONTINUED | OUTPATIENT
Start: 2024-07-25 | End: 2024-07-29 | Stop reason: HOSPADM

## 2024-07-25 RX ORDER — OXYCODONE HYDROCHLORIDE 5 MG/1
10 TABLET ORAL EVERY 4 HOURS PRN
Status: DISCONTINUED | OUTPATIENT
Start: 2024-07-25 | End: 2024-07-25

## 2024-07-25 RX ORDER — ONDANSETRON HYDROCHLORIDE 2 MG/ML
INJECTION, SOLUTION INTRAVENOUS AS NEEDED
Status: DISCONTINUED | OUTPATIENT
Start: 2024-07-25 | End: 2024-07-25

## 2024-07-25 RX ORDER — PHENYLEPHRINE HCL IN 0.9% NACL 1 MG/10 ML
SYRINGE (ML) INTRAVENOUS AS NEEDED
Status: DISCONTINUED | OUTPATIENT
Start: 2024-07-25 | End: 2024-07-25

## 2024-07-25 RX ORDER — MORPHINE SULFATE 2 MG/ML
1 INJECTION, SOLUTION INTRAMUSCULAR; INTRAVENOUS EVERY 4 HOURS PRN
Status: DISCONTINUED | OUTPATIENT
Start: 2024-07-25 | End: 2024-07-26

## 2024-07-25 RX ORDER — DROPERIDOL 2.5 MG/ML
0.62 INJECTION, SOLUTION INTRAMUSCULAR; INTRAVENOUS ONCE AS NEEDED
Status: DISCONTINUED | OUTPATIENT
Start: 2024-07-25 | End: 2024-07-25 | Stop reason: HOSPADM

## 2024-07-25 RX ORDER — ACETAMINOPHEN 650 MG/1
650 SUPPOSITORY RECTAL EVERY 4 HOURS PRN
Status: DISCONTINUED | OUTPATIENT
Start: 2024-07-25 | End: 2024-07-25

## 2024-07-25 RX ORDER — CEFAZOLIN 1 G/1
INJECTION, POWDER, FOR SOLUTION INTRAVENOUS AS NEEDED
Status: DISCONTINUED | OUTPATIENT
Start: 2024-07-25 | End: 2024-07-25

## 2024-07-25 RX ORDER — SODIUM CHLORIDE, SODIUM LACTATE, POTASSIUM CHLORIDE, CALCIUM CHLORIDE 600; 310; 30; 20 MG/100ML; MG/100ML; MG/100ML; MG/100ML
100 INJECTION, SOLUTION INTRAVENOUS CONTINUOUS
Status: DISCONTINUED | OUTPATIENT
Start: 2024-07-25 | End: 2024-07-25 | Stop reason: HOSPADM

## 2024-07-25 RX ORDER — METHADONE HYDROCHLORIDE 5 MG/1
10 TABLET ORAL 2 TIMES DAILY
Status: DISCONTINUED | OUTPATIENT
Start: 2024-07-25 | End: 2024-07-29 | Stop reason: HOSPADM

## 2024-07-25 RX ORDER — OXYCODONE HYDROCHLORIDE 5 MG/1
5 TABLET ORAL EVERY 4 HOURS PRN
Status: DISCONTINUED | OUTPATIENT
Start: 2024-07-25 | End: 2024-07-25 | Stop reason: HOSPADM

## 2024-07-25 RX ORDER — PREGABALIN 50 MG/1
50 CAPSULE ORAL 3 TIMES DAILY
Status: DISCONTINUED | OUTPATIENT
Start: 2024-07-25 | End: 2024-07-29 | Stop reason: HOSPADM

## 2024-07-25 RX ORDER — FENTANYL CITRATE 50 UG/ML
INJECTION, SOLUTION INTRAMUSCULAR; INTRAVENOUS AS NEEDED
Status: DISCONTINUED | OUTPATIENT
Start: 2024-07-25 | End: 2024-07-25

## 2024-07-25 RX ORDER — ACETAMINOPHEN 325 MG/1
650 TABLET ORAL EVERY 4 HOURS PRN
Status: DISCONTINUED | OUTPATIENT
Start: 2024-07-25 | End: 2024-07-25 | Stop reason: HOSPADM

## 2024-07-25 RX ORDER — SODIUM CHLORIDE 9 MG/ML
100 INJECTION, SOLUTION INTRAVENOUS CONTINUOUS
Status: DISCONTINUED | OUTPATIENT
Start: 2024-07-25 | End: 2024-07-25

## 2024-07-25 RX ORDER — PROCHLORPERAZINE EDISYLATE 5 MG/ML
10 INJECTION INTRAMUSCULAR; INTRAVENOUS EVERY 6 HOURS PRN
Status: DISCONTINUED | OUTPATIENT
Start: 2024-07-25 | End: 2024-07-29 | Stop reason: HOSPADM

## 2024-07-25 RX ORDER — OXYCODONE HYDROCHLORIDE 5 MG/1
10 TABLET ORAL EVERY 6 HOURS PRN
Status: DISCONTINUED | OUTPATIENT
Start: 2024-07-25 | End: 2024-07-25

## 2024-07-25 RX ORDER — OXYCODONE HYDROCHLORIDE 5 MG/1
5 TABLET ORAL EVERY 6 HOURS PRN
Status: DISCONTINUED | OUTPATIENT
Start: 2024-07-25 | End: 2024-07-25

## 2024-07-25 RX ORDER — HYDROMORPHONE HYDROCHLORIDE 1 MG/ML
INJECTION, SOLUTION INTRAMUSCULAR; INTRAVENOUS; SUBCUTANEOUS AS NEEDED
Status: DISCONTINUED | OUTPATIENT
Start: 2024-07-25 | End: 2024-07-25

## 2024-07-25 RX ORDER — KETOROLAC TROMETHAMINE 30 MG/ML
7.5 INJECTION, SOLUTION INTRAMUSCULAR; INTRAVENOUS EVERY 6 HOURS PRN
Status: DISCONTINUED | OUTPATIENT
Start: 2024-07-25 | End: 2024-07-25

## 2024-07-25 SDOH — SOCIAL STABILITY: SOCIAL INSECURITY: ARE YOU OR HAVE YOU BEEN THREATENED OR ABUSED PHYSICALLY, EMOTIONALLY, OR SEXUALLY BY ANYONE?: NO

## 2024-07-25 SDOH — SOCIAL STABILITY: SOCIAL INSECURITY: HAS ANYONE EVER THREATENED TO HURT YOUR FAMILY OR YOUR PETS?: NO

## 2024-07-25 SDOH — SOCIAL STABILITY: SOCIAL INSECURITY: DOES ANYONE TRY TO KEEP YOU FROM HAVING/CONTACTING OTHER FRIENDS OR DOING THINGS OUTSIDE YOUR HOME?: NO

## 2024-07-25 SDOH — SOCIAL STABILITY: SOCIAL INSECURITY: ARE THERE ANY APPARENT SIGNS OF INJURIES/BEHAVIORS THAT COULD BE RELATED TO ABUSE/NEGLECT?: NO

## 2024-07-25 SDOH — SOCIAL STABILITY: SOCIAL INSECURITY: DO YOU FEEL ANYONE HAS EXPLOITED OR TAKEN ADVANTAGE OF YOU FINANCIALLY OR OF YOUR PERSONAL PROPERTY?: NO

## 2024-07-25 SDOH — SOCIAL STABILITY: SOCIAL INSECURITY: HAVE YOU HAD ANY THOUGHTS OF HARMING ANYONE ELSE?: NO

## 2024-07-25 SDOH — SOCIAL STABILITY: SOCIAL INSECURITY: WERE YOU ABLE TO COMPLETE ALL THE BEHAVIORAL HEALTH SCREENINGS?: YES

## 2024-07-25 SDOH — SOCIAL STABILITY: SOCIAL INSECURITY: DO YOU FEEL UNSAFE GOING BACK TO THE PLACE WHERE YOU ARE LIVING?: NO

## 2024-07-25 SDOH — SOCIAL STABILITY: SOCIAL INSECURITY: HAVE YOU HAD THOUGHTS OF HARMING ANYONE ELSE?: NO

## 2024-07-25 SDOH — SOCIAL STABILITY: SOCIAL INSECURITY: ABUSE: ADULT

## 2024-07-25 ASSESSMENT — COGNITIVE AND FUNCTIONAL STATUS - GENERAL
DAILY ACTIVITIY SCORE: 24
MOBILITY SCORE: 24
MOBILITY SCORE: 23
CLIMB 3 TO 5 STEPS WITH RAILING: A LITTLE
DAILY ACTIVITIY SCORE: 24
PATIENT BASELINE BEDBOUND: NO

## 2024-07-25 ASSESSMENT — PAIN DESCRIPTION - LOCATION
LOCATION: ABDOMEN

## 2024-07-25 ASSESSMENT — ACTIVITIES OF DAILY LIVING (ADL)
PATIENT'S MEMORY ADEQUATE TO SAFELY COMPLETE DAILY ACTIVITIES?: YES
FEEDING YOURSELF: INDEPENDENT
BATHING: INDEPENDENT
TOILETING: INDEPENDENT
ASSISTIVE_DEVICE: EYEGLASSES
LACK_OF_TRANSPORTATION: NO
JUDGMENT_ADEQUATE_SAFELY_COMPLETE_DAILY_ACTIVITIES: YES
DRESSING YOURSELF: INDEPENDENT
GROOMING: INDEPENDENT
HEARING - RIGHT EAR: FUNCTIONAL
ADEQUATE_TO_COMPLETE_ADL: YES
WALKS IN HOME: INDEPENDENT
HEARING - LEFT EAR: FUNCTIONAL

## 2024-07-25 ASSESSMENT — COLUMBIA-SUICIDE SEVERITY RATING SCALE - C-SSRS
2. HAVE YOU ACTUALLY HAD ANY THOUGHTS OF KILLING YOURSELF?: NO
2. HAVE YOU ACTUALLY HAD ANY THOUGHTS OF KILLING YOURSELF?: NO
6. HAVE YOU EVER DONE ANYTHING, STARTED TO DO ANYTHING, OR PREPARED TO DO ANYTHING TO END YOUR LIFE?: NO
1. IN THE PAST MONTH, HAVE YOU WISHED YOU WERE DEAD OR WISHED YOU COULD GO TO SLEEP AND NOT WAKE UP?: NO
6. HAVE YOU EVER DONE ANYTHING, STARTED TO DO ANYTHING, OR PREPARED TO DO ANYTHING TO END YOUR LIFE?: NO
1. IN THE PAST MONTH, HAVE YOU WISHED YOU WERE DEAD OR WISHED YOU COULD GO TO SLEEP AND NOT WAKE UP?: NO

## 2024-07-25 ASSESSMENT — LIFESTYLE VARIABLES
HOW MANY STANDARD DRINKS CONTAINING ALCOHOL DO YOU HAVE ON A TYPICAL DAY: 1 OR 2
PRESCIPTION_ABUSE_PAST_12_MONTHS: NO
SUBSTANCE_ABUSE_PAST_12_MONTHS: NO
AUDIT-C TOTAL SCORE: 1
SKIP TO QUESTIONS 9-10: 1
HOW OFTEN DO YOU HAVE 6 OR MORE DRINKS ON ONE OCCASION: NEVER
HOW OFTEN DO YOU HAVE A DRINK CONTAINING ALCOHOL: MONTHLY OR LESS
AUDIT-C TOTAL SCORE: 1

## 2024-07-25 ASSESSMENT — PAIN DESCRIPTION - ORIENTATION
ORIENTATION: RIGHT
ORIENTATION: RIGHT
ORIENTATION: RIGHT;UPPER
ORIENTATION: RIGHT
ORIENTATION: RIGHT
ORIENTATION: RIGHT;UPPER
ORIENTATION: RIGHT;UPPER

## 2024-07-25 ASSESSMENT — PAIN SCALES - GENERAL
PAINLEVEL_OUTOF10: 9
PAINLEVEL_OUTOF10: 3
PAINLEVEL_OUTOF10: 10 - WORST POSSIBLE PAIN
PAINLEVEL_OUTOF10: 5 - MODERATE PAIN
PAINLEVEL_OUTOF10: 3
PAINLEVEL_OUTOF10: 9
PAINLEVEL_OUTOF10: 4
PAINLEVEL_OUTOF10: 8
PAINLEVEL_OUTOF10: 9
PAINLEVEL_OUTOF10: 8
PAINLEVEL_OUTOF10: 8
PAINLEVEL_OUTOF10: 10 - WORST POSSIBLE PAIN
PAINLEVEL_OUTOF10: 10 - WORST POSSIBLE PAIN
PAINLEVEL_OUTOF10: 9

## 2024-07-25 ASSESSMENT — PAIN - FUNCTIONAL ASSESSMENT
PAIN_FUNCTIONAL_ASSESSMENT: 0-10

## 2024-07-25 ASSESSMENT — PATIENT HEALTH QUESTIONNAIRE - PHQ9
2. FEELING DOWN, DEPRESSED OR HOPELESS: NOT AT ALL
SUM OF ALL RESPONSES TO PHQ9 QUESTIONS 1 & 2: 0
1. LITTLE INTEREST OR PLEASURE IN DOING THINGS: NOT AT ALL

## 2024-07-25 ASSESSMENT — PAIN DESCRIPTION - DESCRIPTORS
DESCRIPTORS: SHARP;STABBING
DESCRIPTORS: SHOOTING;SHARP
DESCRIPTORS: SHARP;SHOOTING
DESCRIPTORS: STABBING;SHARP
DESCRIPTORS: SHARP;SHOOTING

## 2024-07-25 ASSESSMENT — PAIN SCALES - PAIN ASSESSMENT IN ADVANCED DEMENTIA (PAINAD): TOTALSCORE: MEDICATION (SEE MAR)

## 2024-07-25 ASSESSMENT — PAIN DESCRIPTION - ONSET: ONSET: ONGOING

## 2024-07-25 ASSESSMENT — PAIN DESCRIPTION - PROGRESSION: CLINICAL_PROGRESSION: GRADUALLY WORSENING

## 2024-07-25 ASSESSMENT — PAIN DESCRIPTION - PAIN TYPE: TYPE: ACUTE PAIN

## 2024-07-25 ASSESSMENT — PAIN DESCRIPTION - FREQUENCY: FREQUENCY: CONSTANT/CONTINUOUS

## 2024-07-25 NOTE — ED PROVIDER NOTES
HPI   Chief Complaint   Patient presents with    Abdominal Pain     RUQ    Nausea       Patient presents with severe sharp upper quadrant pain that began around 9 PM.  She states that she ate dinner today without difficulty.  Denies any nausea or vomiting.  She states that she has chronic pain after a complex neurologic problem involving neuropathy of her right side.  She takes methadone at home for pain.  She states that she had to go off gabapentin for try some new therapy and has been having increasing pain in her right leg but no pain in her abdomen until tonight.  She has a history of kidney stones.  She still has her gallbladder.            Patient History   Past Medical History:   Diagnosis Date    Anxiety disorder, unspecified     Anxiety    Family history of malignant neoplasm of breast 02/28/2019    Family history of breast cancer    Gynecologic cancer (Multi) 09/18/2023    Other conditions influencing health status     Nephrolithiasis    Ovarian cancer (Multi)     Personal history of malignant neoplasm of other parts of uterus 08/14/2020    History of malignant neoplasm of other parts of uterus    Personal history of non-Hodgkin lymphomas 08/14/2020    History of non-Hodgkin's lymphoma    Personal history of other diseases of the circulatory system     History of hypertension    Personal history of other diseases of the digestive system     History of irritable bowel syndrome    Personal history of other diseases of the respiratory system 01/10/2014    History of acute bronchitis    Personal history of other endocrine, nutritional and metabolic disease     History of hypercholesterolemia    Personal history of other malignant neoplasm of skin     History of basal cell carcinoma    Personal history of other specified conditions 10/28/2014    History of fibrocystic disease of breast    Portal vein thrombosis 09/18/2023    PORTAL VEIN THROMBOSIS I81    Presence of spectacles and contact lenses 12/09/2014     Wears contact lenses     Past Surgical History:   Procedure Laterality Date    LITHOTRIPSY  08/19/2013    Renal Lithotripsy    OTHER SURGICAL HISTORY  08/19/2013    Lithotomy    OTHER SURGICAL HISTORY  11/25/2019    Ileostomy closure     Family History   Problem Relation Name Age of Onset    Breast cancer Mother  60    Stroke Mother      Colon cancer Father      Breast cancer Mother's Sister  40    Breast cancer Cousin  50     Social History     Tobacco Use    Smoking status: Never    Smokeless tobacco: Never   Substance Use Topics    Alcohol use: Yes     Comment: 1 glass of wine daily    Drug use: Yes     Types: Marijuana     Comment: medical card / gummies       Physical Exam   ED Triage Vitals [07/25/24 0239]   Temperature Heart Rate Respirations BP   36.3 °C (97.3 °F) 66 (!) 22 (!) 136/112      Pulse Ox Temp Source Heart Rate Source Patient Position   100 % Temporal Monitor Sitting      BP Location FiO2 (%)     Right arm --       Physical Exam  Vitals and nursing note reviewed.   Constitutional:       General: She is not in acute distress.     Appearance: She is well-developed.   HENT:      Head: Normocephalic and atraumatic.   Eyes:      Conjunctiva/sclera: Conjunctivae normal.   Cardiovascular:      Rate and Rhythm: Normal rate and regular rhythm.      Heart sounds: No murmur heard.  Pulmonary:      Effort: Pulmonary effort is normal. No respiratory distress.      Breath sounds: Normal breath sounds.   Abdominal:      Palpations: Abdomen is soft.      Tenderness: There is abdominal tenderness in the right upper quadrant. There is guarding. Positive signs include Al's sign.   Musculoskeletal:         General: No swelling.      Cervical back: Neck supple.   Skin:     General: Skin is warm and dry.      Capillary Refill: Capillary refill takes less than 2 seconds.   Neurological:      Mental Status: She is alert.   Psychiatric:         Mood and Affect: Mood normal.         ED Course & MDM   Diagnoses as of  07/25/24 2153   Cholecystitis                       [place] Coma Scale Score: 15                        Medical Decision Making  Patient is localized guarding without rebound.  Patient has unremarkable labs.  On her CT scan I see a distended gallbladder with inflammation and stones at the neck.  I am concerned for cholecystitis.  I ordered the patient a dose of antibiotics.  The patient to be admitted to the hospitalist.    EKG interpreted by myself.  Normal sinus rhythm at a rate of 61 bpm.  Normal intervals.  Normal axis.  No signs of acute ischemia.      Procedure  Procedures     Azael Tobar MD  07/25/24 8660

## 2024-07-25 NOTE — PROGRESS NOTES
Home with       07/25/24 0403   Current Planned Discharge Disposition   Current Planned Discharge Disposition Home

## 2024-07-25 NOTE — H&P
History Of Present Illness  Saba Park is a 69 y.o. female PMH: Chemotherapy-induced peripheral neuropathy current with Pain management, h/o CNS lymphoma, hx of ovarian ca, presenting with right abdominal pain with radiation to the right upper back with associated nausea/vomiting that started yesterday (07/24/25) evening at around at 2200. CT abdomen/pelvis revealed cholecystitis ; general surgery has subsequently been consulted with plans to add patient to the OR with  for leptoscopic cholecystectomy. Last BM yesterday evening.       ED Course:  CT abdomen/pelvis: The gallbladder contains several small stones and sludge. There is a small quantity of pericholecystic fluid. The appearance is   suggestive of cholecystitis.  The findings are new from the prior CT. No dilated bile ducts.   2.  There are 2 lymph nodes at the right cardiophrenic angle.  The   larger lymph node measures 2.2 cm.  These are new from the prior CT.   Consideration could be given to follow-up with chest CT.   3.  There is a 3 to 4 mm stone of the right renal pelvis and several   small right-sided calyceal stones.  No hydronephrosis.   4.  No ascites, free air or bowel obstruction.   CT Chest:There are 2 right-sided pericardial lymph nodes, neither of which was   present on 09/11/2019. The can be seen in retrospect on 11/04/2023.   The larger lymph node is mildly larger today. The smaller lymph node   is stable since 11/04/2023. Exact etiology and clinical significance   are uncertain. However, recommend PET-CT scan to evaluate for   possible lymphoma.   US Rt upper quad:Sonographic findings consistent with cholelithiasis and acute   cholecystitis. Of the several nonobstructing right renal stones evident on earlier   CT scan, only the 8 mm upper pole stone could be identified. No   hydronephrosis. Obscuration of the pancreas by bowel gas and body habitus.  Potassium: 5.4  Glucose: 143  Creatinine: 0.67  WBC: 11.0   ED Medications  Administered:  Dilaudid 1mg IV push x1  Morphine 4mg IV push x1  Zosyn 3.375g x1  NS 0.9% 1000cc bolus x1              Past Medical History  Past Medical History:   Diagnosis Date    Anxiety disorder, unspecified     Anxiety    Family history of malignant neoplasm of breast 02/28/2019    Family history of breast cancer    Gynecologic cancer (Multi) 09/18/2023    Other conditions influencing health status     Nephrolithiasis    Ovarian cancer (Multi)     Personal history of malignant neoplasm of other parts of uterus 08/14/2020    History of malignant neoplasm of other parts of uterus    Personal history of non-Hodgkin lymphomas 08/14/2020    History of non-Hodgkin's lymphoma    Personal history of other diseases of the circulatory system     History of hypertension    Personal history of other diseases of the digestive system     History of irritable bowel syndrome    Personal history of other diseases of the respiratory system 01/10/2014    History of acute bronchitis    Personal history of other endocrine, nutritional and metabolic disease     History of hypercholesterolemia    Personal history of other malignant neoplasm of skin     History of basal cell carcinoma    Personal history of other specified conditions 10/28/2014    History of fibrocystic disease of breast    Portal vein thrombosis 09/18/2023    PORTAL VEIN THROMBOSIS I81    Presence of spectacles and contact lenses 12/09/2014    Wears contact lenses       Surgical History  Past Surgical History:   Procedure Laterality Date    LITHOTRIPSY  08/19/2013    Renal Lithotripsy    OTHER SURGICAL HISTORY  08/19/2013    Lithotomy    OTHER SURGICAL HISTORY  11/25/2019    Ileostomy closure        Social History  She reports that she has never smoked. She has never used smokeless tobacco. She reports current alcohol use. She reports current drug use. Drug: Marijuana.    Family History  Family History   Problem Relation Name Age of Onset    Breast cancer Mother  60  "   Stroke Mother      Colon cancer Father      Breast cancer Mother's Sister  40    Breast cancer Cousin  50        Allergies  Erythromycin    Review of Systems     Physical Exam  Vitals and nursing note reviewed.   Constitutional:       Appearance: Normal appearance.   HENT:      Head: Normocephalic.      Nose: Nose normal.      Mouth/Throat:      Mouth: Mucous membranes are moist.   Eyes:      Pupils: Pupils are equal, round, and reactive to light.   Cardiovascular:      Rate and Rhythm: Normal rate.   Pulmonary:      Effort: Pulmonary effort is normal.   Abdominal:      General: Abdomen is flat. There is no distension.      Tenderness: There is abdominal tenderness. There is right CVA tenderness.   Genitourinary:     Comments: deferred  Musculoskeletal:         General: Normal range of motion.      Cervical back: Normal range of motion.   Skin:     General: Skin is warm.      Capillary Refill: Capillary refill takes less than 2 seconds.   Neurological:      General: No focal deficit present.      Mental Status: She is alert and oriented to person, place, and time. Mental status is at baseline.   Psychiatric:         Mood and Affect: Mood normal.         Behavior: Behavior normal.         Thought Content: Thought content normal.         Judgment: Judgment normal.          Last Recorded Vitals  Blood pressure 144/68, pulse 69, temperature 36.6 °C (97.9 °F), temperature source Temporal, resp. rate 17, height 1.6 m (5' 3\"), weight 63 kg (139 lb), SpO2 98%.    Relevant Results  Results for orders placed or performed during the hospital encounter of 07/25/24 (from the past 24 hour(s))   CBC and Auto Differential   Result Value Ref Range    WBC 11.0 4.4 - 11.3 x10*3/uL    nRBC 0.0 0.0 - 0.0 /100 WBCs    RBC 4.14 4.00 - 5.20 x10*6/uL    Hemoglobin 13.3 12.0 - 16.0 g/dL    Hematocrit 38.8 36.0 - 46.0 %    MCV 94 80 - 100 fL    MCH 32.1 26.0 - 34.0 pg    MCHC 34.3 32.0 - 36.0 g/dL    RDW 12.4 11.5 - 14.5 %    Platelets 230 " 150 - 450 x10*3/uL    Neutrophils % 78.0 40.0 - 80.0 %    Immature Granulocytes %, Automated 0.5 0.0 - 0.9 %    Lymphocytes % 15.8 13.0 - 44.0 %    Monocytes % 5.0 2.0 - 10.0 %    Eosinophils % 0.3 0.0 - 6.0 %    Basophils % 0.4 0.0 - 2.0 %    Neutrophils Absolute 8.56 (H) 1.20 - 7.70 x10*3/uL    Immature Granulocytes Absolute, Automated 0.05 0.00 - 0.70 x10*3/uL    Lymphocytes Absolute 1.73 1.20 - 4.80 x10*3/uL    Monocytes Absolute 0.55 0.10 - 1.00 x10*3/uL    Eosinophils Absolute 0.03 0.00 - 0.70 x10*3/uL    Basophils Absolute 0.04 0.00 - 0.10 x10*3/uL   Comprehensive metabolic panel   Result Value Ref Range    Glucose 143 (H) 74 - 99 mg/dL    Sodium 141 136 - 145 mmol/L    Potassium 5.4 (H) 3.5 - 5.3 mmol/L    Chloride 104 98 - 107 mmol/L    Bicarbonate 27 21 - 32 mmol/L    Anion Gap 15 10 - 20 mmol/L    Urea Nitrogen 25 (H) 6 - 23 mg/dL    Creatinine 0.67 0.50 - 1.05 mg/dL    eGFR >90 >60 mL/min/1.73m*2    Calcium 9.3 8.6 - 10.3 mg/dL    Albumin 4.1 3.4 - 5.0 g/dL    Alkaline Phosphatase 90 33 - 136 U/L    Total Protein 6.3 (L) 6.4 - 8.2 g/dL    AST 12 9 - 39 U/L    Bilirubin, Total 0.3 0.0 - 1.2 mg/dL    ALT 28 7 - 45 U/L   Lipase   Result Value Ref Range    Lipase 10 9 - 82 U/L   Troponin I, High Sensitivity   Result Value Ref Range    Troponin I, High Sensitivity 4 0 - 13 ng/L   Urinalysis with Reflex Culture and Microscopic   Result Value Ref Range    Color, Urine Light-Yellow Light-Yellow, Yellow, Dark-Yellow    Appearance, Urine Clear Clear    Specific Gravity, Urine 1.020 1.005 - 1.035    pH, Urine 6.5 5.0, 5.5, 6.0, 6.5, 7.0, 7.5, 8.0    Protein, Urine 10 (TRACE) NEGATIVE, 10 (TRACE), 20 (TRACE) mg/dL    Glucose, Urine Normal Normal mg/dL    Blood, Urine NEGATIVE NEGATIVE    Ketones, Urine 40 (2+) (A) NEGATIVE mg/dL    Bilirubin, Urine NEGATIVE NEGATIVE    Urobilinogen, Urine Normal Normal mg/dL    Nitrite, Urine NEGATIVE NEGATIVE    Leukocyte Esterase, Urine NEGATIVE NEGATIVE   Urinalysis Microscopic    Result Value Ref Range    WBC, Urine 1-5 1-5, NONE /HPF    RBC, Urine 3-5 NONE, 1-2, 3-5 /HPF    Mucus, Urine FEW Reference range not established. /LPF   ECG 12 lead   Result Value Ref Range    Ventricular Rate 61 BPM    Atrial Rate 61 BPM    GA Interval 126 ms    QRS Duration 84 ms    QT Interval 458 ms    QTC Calculation(Bazett) 461 ms    P Axis 49 degrees    R Axis 45 degrees    T Axis 36 degrees    QRS Count 10 beats    Q Onset 215 ms    P Onset 152 ms    P Offset 201 ms    T Offset 444 ms    QTC Fredericia 460 ms     *Note: Due to a large number of results and/or encounters for the requested time period, some results have not been displayed. A complete set of results can be found in Results Review.          Assessment/Plan   Principal Problem:    Cholecystitis  Cholecystitis  -CT abdomen/pelvis: The gallbladder contains several small stones and sludge. There is a small quantity of pericholecystic fluid. The appearance is   suggestive of cholecystitis. There are 2 lymph nodes at the right cardiophrenic angle.  The larger lymph node measures 2.2 cm.  These are new from the prior CT.  -CT chest :There are 2 right-sided pericardial lymph nodes, neither of which was present on 09/11/2019. The can be seen in retrospect on 11/04/2023.   The larger lymph node is mildly larger today. The smaller lymph node   is stable since 11/04/2023. Exact etiology and clinical significance   are uncertain. However, recommend PET-CT scan to evaluate for   possible lymphoma.   -Outpt referral placed to follow up on dc with  for above findings.  -NPO  -Acute Care surgery consulted; patient to be added to OR with Dr. Adamson for leptoscopic cholecystectomy .  -IVF  -IV abts  -PRN pain control  -PRN antiemetics    Secondary Problems:    2.Chemotherapy-induced peripheral neuropathy current with Pain management  -Continue home medications  -hx of CNS lymphoma    3. DVT prophylaxis  -scds    DC plan:  DC home when medically stable       Interdisciplinary rounding completed with Attending Provider, Bedside RN and TCC.  Labs, results and plan of care discussed and reviewed with Dr. Rivera.    I spent 35 minutes in the professional and overall care of this patient.      Anabella Minor, DEMETRIUS-CNP

## 2024-07-25 NOTE — CONSULTS
"Reason For Consult  Cholecystitis    History Of Present Illness  Saba Park is a 69 y.o. female presenting with abrupt onset on abdominal pain that started last night around [id number]. She denies eating anything different than her normal. She denies any history of pain like this in the past. She reports having some constipation but had normal bowel movements yesterday prior to pain starting. She report pain is in RUQ and epigastric area.     In ED LFTs were normal. WBC 11.0. CT showing: \"The gallbladder contains several small stones and sludge.  There is a small quantity of pericholecystic fluid.  The appearance is suggestive of cholecystitis.\" RUQ US: \"Sonographic findings consistent with cholelithiasis and acute cholecystitis\"      Past Medical History  She has a past medical history of Anxiety disorder, unspecified, Family history of malignant neoplasm of breast (02/28/2019), Gynecologic cancer (Multi) (09/18/2023), Other conditions influencing health status, Ovarian cancer (Multi), Personal history of malignant neoplasm of other parts of uterus (08/14/2020), Personal history of non-Hodgkin lymphomas (08/14/2020), Personal history of other diseases of the circulatory system, Personal history of other diseases of the digestive system, Personal history of other diseases of the respiratory system (01/10/2014), Personal history of other endocrine, nutritional and metabolic disease, Personal history of other malignant neoplasm of skin, Personal history of other specified conditions (10/28/2014), Portal vein thrombosis (09/18/2023), and Presence of spectacles and contact lenses (12/09/2014).    Surgical History  She has a past surgical history that includes Lithotripsy (08/19/2013); Other surgical history (08/19/2013); and Other surgical history (11/25/2019).     Social History  She reports that she has never smoked. She has never used smokeless tobacco. She reports current alcohol use. She reports current drug " "use. Drug: Marijuana.    Family History  Family History   Problem Relation Name Age of Onset    Breast cancer Mother  60    Stroke Mother      Colon cancer Father      Breast cancer Mother's Sister  40    Breast cancer Cousin  50        Allergies  Erythromycin    Review of Systems  A full 10 point ROS was completed. Nothing positive other than what was mentioned in the HPI.      Physical Exam  PE:  Constitutional: A&Ox3, calm and cooperative  Head/Neck: Neck supple, no JVD  Cardiovascular: RRR  Respiratory/Thorax: Non labored breathing on RA  Gastrointestinal: Abdomen nondistended, soft, Tender in RUQ  Genitourinary: Voiding independently   Musculoskeletal: ROM intact, no joint swelling, normal strength. History of complex regional pain syndrome and has neuropathy  Extremities: DIAZ, No peripheral edema  Neurological: No focal deficits   Psychological: Appropriate mood and behavior  Skin: Warm and dry.       Last Recorded Vitals  Blood pressure 144/68, pulse 69, temperature 36.6 °C (97.9 °F), temperature source Temporal, resp. rate 17, height 1.6 m (5' 3\"), weight 63 kg (139 lb), SpO2 98%.    Relevant Results  Results for orders placed or performed during the hospital encounter of 07/25/24 (from the past 24 hour(s))   CBC and Auto Differential   Result Value Ref Range    WBC 11.0 4.4 - 11.3 x10*3/uL    nRBC 0.0 0.0 - 0.0 /100 WBCs    RBC 4.14 4.00 - 5.20 x10*6/uL    Hemoglobin 13.3 12.0 - 16.0 g/dL    Hematocrit 38.8 36.0 - 46.0 %    MCV 94 80 - 100 fL    MCH 32.1 26.0 - 34.0 pg    MCHC 34.3 32.0 - 36.0 g/dL    RDW 12.4 11.5 - 14.5 %    Platelets 230 150 - 450 x10*3/uL    Neutrophils % 78.0 40.0 - 80.0 %    Immature Granulocytes %, Automated 0.5 0.0 - 0.9 %    Lymphocytes % 15.8 13.0 - 44.0 %    Monocytes % 5.0 2.0 - 10.0 %    Eosinophils % 0.3 0.0 - 6.0 %    Basophils % 0.4 0.0 - 2.0 %    Neutrophils Absolute 8.56 (H) 1.20 - 7.70 x10*3/uL    Immature Granulocytes Absolute, Automated 0.05 0.00 - 0.70 x10*3/uL    " Lymphocytes Absolute 1.73 1.20 - 4.80 x10*3/uL    Monocytes Absolute 0.55 0.10 - 1.00 x10*3/uL    Eosinophils Absolute 0.03 0.00 - 0.70 x10*3/uL    Basophils Absolute 0.04 0.00 - 0.10 x10*3/uL   Comprehensive metabolic panel   Result Value Ref Range    Glucose 143 (H) 74 - 99 mg/dL    Sodium 141 136 - 145 mmol/L    Potassium 5.4 (H) 3.5 - 5.3 mmol/L    Chloride 104 98 - 107 mmol/L    Bicarbonate 27 21 - 32 mmol/L    Anion Gap 15 10 - 20 mmol/L    Urea Nitrogen 25 (H) 6 - 23 mg/dL    Creatinine 0.67 0.50 - 1.05 mg/dL    eGFR >90 >60 mL/min/1.73m*2    Calcium 9.3 8.6 - 10.3 mg/dL    Albumin 4.1 3.4 - 5.0 g/dL    Alkaline Phosphatase 90 33 - 136 U/L    Total Protein 6.3 (L) 6.4 - 8.2 g/dL    AST 12 9 - 39 U/L    Bilirubin, Total 0.3 0.0 - 1.2 mg/dL    ALT 28 7 - 45 U/L   Lipase   Result Value Ref Range    Lipase 10 9 - 82 U/L   Troponin I, High Sensitivity   Result Value Ref Range    Troponin I, High Sensitivity 4 0 - 13 ng/L   Urinalysis with Reflex Culture and Microscopic   Result Value Ref Range    Color, Urine Light-Yellow Light-Yellow, Yellow, Dark-Yellow    Appearance, Urine Clear Clear    Specific Gravity, Urine 1.020 1.005 - 1.035    pH, Urine 6.5 5.0, 5.5, 6.0, 6.5, 7.0, 7.5, 8.0    Protein, Urine 10 (TRACE) NEGATIVE, 10 (TRACE), 20 (TRACE) mg/dL    Glucose, Urine Normal Normal mg/dL    Blood, Urine NEGATIVE NEGATIVE    Ketones, Urine 40 (2+) (A) NEGATIVE mg/dL    Bilirubin, Urine NEGATIVE NEGATIVE    Urobilinogen, Urine Normal Normal mg/dL    Nitrite, Urine NEGATIVE NEGATIVE    Leukocyte Esterase, Urine NEGATIVE NEGATIVE   Urinalysis Microscopic   Result Value Ref Range    WBC, Urine 1-5 1-5, NONE /HPF    RBC, Urine 3-5 NONE, 1-2, 3-5 /HPF    Mucus, Urine FEW Reference range not established. /LPF   ECG 12 lead   Result Value Ref Range    Ventricular Rate 61 BPM    Atrial Rate 61 BPM    KS Interval 126 ms    QRS Duration 84 ms    QT Interval 458 ms    QTC Calculation(Bazett) 461 ms    P Axis 49 degrees    R  Axis 45 degrees    T Axis 36 degrees    QRS Count 10 beats    Q Onset 215 ms    P Onset 152 ms    P Offset 201 ms    T Offset 444 ms    QTC Fredericia 460 ms     *Note: Due to a large number of results and/or encounters for the requested time period, some results have not been displayed. A complete set of results can be found in Results Review.     US right upper quadrant   Final Result   Sonographic findings consistent with cholelithiasis and acute   cholecystitis.        Of the several nonobstructing right renal stones evident on earlier   CT scan, only the 8 mm upper pole stone could be identified. No   hydronephrosis.        Obscuration of the pancreas by bowel gas and body habitus.        MACRO:   None        Signed by: Dwight Elkins 7/25/2024 8:36 AM   Dictation workstation:   [id number]      CT chest wo IV contrast   Final Result   There are 2 right-sided pericardial lymph nodes, neither of which was   present on 09/11/2019. The can be seen in retrospect on 11/04/2023.   The larger lymph node is mildly larger today. The smaller lymph node   is stable since 11/04/2023. Exact etiology and clinical significance   are uncertain. However, recommend PET-CT scan to evaluate for   possible lymphoma.        No other thoracic adenopathy in this exam.        Small fat containing posteromedial right-sided diaphragmatic hernia.   No mass or pneumonia in either lung.        Multilevel vertebral body hemangiomas of bone. See above for details.        Stone and sludge filled gallbladder.        Nonobstructing right renal stones.        MACRO:   None        Signed by: Dwight Elkins 7/25/2024 8:51 AM   Dictation workstation:   [id number]      CT abdomen pelvis w IV contrast   Final Result   1.  The gallbladder contains several small stones and sludge.  There   is a small quantity of pericholecystic fluid.  The appearance is   suggestive of cholecystitis.  The findings are new from the prior CT.    No dilated bile ducts.   2.   There are 2 lymph nodes at the right cardiophrenic angle.  The   larger lymph node measures 2.2 cm.  These are new from the prior CT.    Consideration could be given to follow-up with chest CT.   3.  There is a 3 to 4 mm stone of the right renal pelvis and several   small right-sided calyceal stones.  No hydronephrosis.   4.  No ascites, free air or bowel obstruction.   Signed by Morteza Santiago MD            Assessment/Plan     Plan: Cholecystitis  - NPO  - Admit to medicine  - PRN pain control  - PRN antiemetic  - Will plan for OR with Dr. Adamson    Dispo: Added on for surgery for leptoscopic cholecystectomy. Discussed with Dr. Adamson     I spent 60 minutes in the professional and overall care of this patient.      Tiago Orellana PA-C

## 2024-07-25 NOTE — ED TRIAGE NOTES
"Patient presents to ED from Home with primary complaint of Right Upper Quadrant Abdominal pain that started last night around 22:00. The pain is constant, \"10 out of 10\", and described as \"sharp\" and \"shooting\". The pain radiates to the Right Upper Back. The Patient appears to be in distress due to her pain. She is also complaining of nausea, but denies any vomiting or diarrhea. The nausea has been ongoing for the past month. PMHx is significant for Kidney Stones, Brain Cancer, and Ovarian Cancer. She denies any Chest pain or dyspnea.   "

## 2024-07-25 NOTE — CARE PLAN
Problem: Fall/Injury  Goal: Not fall by end of shift  Outcome: Progressing  Goal: Be free from injury by end of the shift  Outcome: Progressing  Goal: Verbalize understanding of personal risk factors for fall in the hospital  Outcome: Progressing  Goal: Verbalize understanding of risk factor reduction measures to prevent injury from fall in the home  Outcome: Progressing  Goal: Use assistive devices by end of the shift  Outcome: Progressing  Goal: Pace activities to prevent fatigue by end of the shift  Outcome: Progressing     Problem: Pain  Goal: Takes deep breaths with improved pain control throughout the shift  Outcome: Progressing  Goal: Turns in bed with improved pain control throughout the shift  Outcome: Progressing  Goal: Walks with improved pain control throughout the shift  Outcome: Progressing  Goal: Performs ADL's with improved pain control throughout shift  Outcome: Progressing  Goal: Participates in PT with improved pain control throughout the shift  Outcome: Progressing  Goal: Free from opioid side effects throughout the shift  Outcome: Progressing  Goal: Free from acute confusion related to pain meds throughout the shift  Outcome: Progressing   The patient's goals for the shift include      The clinical goals for the shift include Prevent fall throughout shift

## 2024-07-25 NOTE — ANESTHESIA PREPROCEDURE EVALUATION
Patient: Saba Park    Procedure Information       Date: 07/25/24    Procedure: Cholecystectomy Laparoscopy    Location: [facility] A OR    Surgeons: Db Blackwell MD            Relevant Problems   Cardiac   (+) Cardiac conduction disorder   (+) Essential (primary) hypertension   (+) Hyperlipidemia   (+) Hyperlipidemia, unspecified   (+) Hypertension      Neuro   (+) Anxiety   (+) Chemotherapy-induced peripheral neuropathy (Multi)   (+) Claustrophobia   (+) Depression   (+) Depression, unspecified   (+) MDD (major depressive disorder)   (+) Major depressive disorder, single episode, severe (Multi)   (+) Mild episode of recurrent major depressive disorder (CMS-HCC)   (+) Sciatica   (+) Sciatica of right side      /Renal   (+) Calculus of kidney   (+) Hydronephrosis with renal and ureteral calculus obstruction   (+) Hydronephrosis, left   (+) Nephrolithiasis      Liver   (+) Cholecystitis      Hematology   (+) Oth types of non-hodg lymph, extrnod and solid organ sites (Multi)      Musculoskeletal   (+) Chronic low back pain with sciatica   (+) Deafferentation pain   (+) Osteoarthritis of right hip      ID   (+) Clostridioides difficile diarrhea   (+) 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)   (+) 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)   (+) Pyogenic arthritis of hip (Multi)   (+) Pyogenic arthritis, unspecified (Multi)      GYN   (+) Malignant neoplasm of unspecified ovary (Multi)       Clinical information reviewed:    Allergies  Meds     OB Status           NPO Detail:  No data recorded     Physical Exam    Airway  Mallampati: II     Cardiovascular   Rhythm: regular  Rate: normal     Dental    Pulmonary    Abdominal            Anesthesia Plan    History of general anesthesia?: yes  History of complications of general anesthesia?: no    ASA 3     general     intravenous induction   Anesthetic plan and risks discussed with patient.    Plan discussed with CRNA.

## 2024-07-25 NOTE — ANESTHESIA PROCEDURE NOTES
Peripheral IV  Date/Time: 7/25/2024 4:18 PM      Placement  Needle size: 22 G  Laterality: left  Location: wrist  Local anesthetic: none  Site prep: alcohol  Technique: anatomical landmarks  Attempts: 1

## 2024-07-25 NOTE — OP NOTE
Cholecystectomy Laparoscopy Operative Note     Date: 2024  OR Location: [facility] OR    Name: Saba Park, : 1955, Age: 69 y.o., MRN: [id number], Sex: female    Diagnosis  Pre-op Diagnosis      * Cholecystitis, Cholelithiasis  Post-op Diagnosis     ** Cholecystitis, Cholelithiasis      Procedures  Laparoscopic cholecystectomy converted to open       Surgeons      * Db Blackwell - Primary    Resident/Fellow/Other Assistant:  Surgeons and Role:  * No surgeons found with a matching role *    Procedure Summary  Anesthesia: General  ASA: III  Anesthesia Staff: Anesthesiologist: Phani Hardy MD  CRNA: DEMETRIUS Ly-CRNA  Estimated Blood Loss: 10 mL  Intra-op Medications: Administrations occurring from 1525 to 1645 on 24:  * No intraprocedure medications in log *           Anesthesia Record               Intraprocedure I/O Totals          Output    Est. Blood Loss 250 mL    Total Output 250 mL          Specimen:   ID Type Source Tests Collected by Time   1 : Gallbladder Tissue GALLBLADDER CHOLECYSTECTOMY SURGICAL PATHOLOGY EXAM Db Blackwell MD 2024 1540        Staff:   Circulator: Noah Chaudhari Person: Maira  Circulator: Liyah    Findings: Dense intra-abdominal adhesions, inflamed gallbladder    Indications: Saba Park is an 69 y.o. female who is having surgery for Cholecystitis [K81.9].     The patient was seen in the preoperative area. The risks, benefits, complications, treatment options, non-operative alternatives, expected recovery and outcomes were discussed with the patient. The possibilities of reaction to medication, pulmonary aspiration, injury to surrounding structures, bleeding, recurrent infection, the need for additional procedures, failure to diagnose a condition, and creating a complication requiring transfusion or operation were discussed with the patient. The patient concurred with the proposed plan, giving informed consent.  The site of surgery was properly  noted/marked if necessary per policy. The patient has been actively warmed in preoperative area. Preoperative antibiotics have been ordered and given within 1 hours of incision. Venous thrombosis prophylaxis have been ordered including bilateral sequential compression devices     Procedure Details:     Patient is a 69-year-old female with multiple medical conditions to include ovarian cancer status post laparotomy, TSH/BSO, rectal resection, ileostomy, reversal of ileostomy, CNS lymphoma.  She comes in with 24-hour complaint of worsening right upper quadrant abdominal pain.  Workup is included a right upper quadrant ultrasound that shows gallstones with evidence of acute cholecystitis.  Liver function test normal.  She is taken to surgery for laparoscopic cholecystectomy, possible open.  Risk, benefits and alternatives were discussed preoperatively and she agrees to the operation.      Description of procedure:  Patient taken operating room, placed supine on the operating table.  Timeout was established, general anesthesia was induced.  She did receive antibiotics.  The abdomen was prepped and draped in a sterile fashion.  Noting a large previous midline incision we elected to enter the abdomen using a 5 mm optical port placed in the left subcostal margin at Patel's point.  All layers of the abdominal wall were visualized with placement of this port.  We established insufflation.  There were no injuries related to our port placement however she was noted to have diffuse adhesions of small bowel loops to the anterior abdominal wall.  We were not able to identify a clear space that we can place any additional trocars.  Further attempt at a laparoscopic approach would be fraught with potential bowel injury, fistula formation.  We therefore elected to proceed with an open approach.  We removed our trocar.  We made an incision 2 finger breaths below the right costal margin.  We incised the anterior rectus sheath, rectus  muscle and posterior rectus sheath and then entered the abdomen with out difficulty.  There were no adhesions in the right upper abdomen.  Gallbladder was thick-walled and showed signs of acute inflammation.  We placed a Bookwalter retractor to facilitate exposure.  We dissected the gallbladder free from the inferior aspect of the liver using the cautery.  We dissected down to the base of the gallbladder and identified the cystic duct and cystic artery.  We ligated the cystic duct using a 2-0 silk stick tie.  In the similar manner we ligated the cystic artery using a stick tie.  Gallbladder specimen was handed off the field.  We irrigated the gallbladder fossa and controlled some oozing with cautery and some Heather.  We then closed the wound in the layers, closing both the posterior rectus sheath followed by the anterior rectus sheath using #1 PDS suture in a running fashion.  Skin was reapproximated using staples.  Wounds were infiltrated with solution 1% lidocaine, quarter percent Marcaine.  Dry sterile dressing was applied.  Patient tolerated procedure without difficulty and was returned to the recovery room in stable condition      Complications:  None; patient tolerated the procedure well.    Disposition: PACU - hemodynamically stable.  Condition: stable           Attending Attestation: I was present and scrubbed for the entire procedure.    Db Blackwell  Phone Number: [phone]

## 2024-07-25 NOTE — PROGRESS NOTES
Transitional Care Coordination Progress Note:  Plan per Medical/Surgical team: treatment of cholecystitis with IV ATB, Dilaudid, IV fluids, Surgery consult   Status: Observation  Payor source: [employer] medicare ADV  Discharge disposition: Home with    Potential Barriers: surgery  ADOD: 7/26/2024   GEE Sousa RN, BSN Transitional Care Coordinator ED# [phone]      07/25/24 0706   Discharge Planning   Living Arrangements Spouse/significant other   Support Systems Spouse/significant other   Assistance Needed surgery   Type of Residence Private residence   Number of Stairs to Enter Residence 2   Number of Stairs Within Residence 15   Home or Post Acute Services None   Expected Discharge Disposition Home   Does the patient need discharge transport arranged? No   Financial Resource Strain   How hard is it for you to pay for the very basics like food, housing, medical care, and heating? Not hard   Housing Stability   In the last 12 months, was there a time when you were not able to pay the mortgage or rent on time? N   In the past 12 months, how many times have you moved where you were living? 1   At any time in the past 12 months, were you homeless or living in a shelter (including now)? N   Transportation Needs   In the past 12 months, has lack of transportation kept you from medical appointments or from getting medications? no   In the past 12 months, has lack of transportation kept you from meetings, work, or from getting things needed for daily living? No

## 2024-07-25 NOTE — PROGRESS NOTES
Pharmacy Medication History Review    Saba Prak is a 69 y.o. female admitted for Cholecystitis. Pharmacy reviewed the patient's [id number] medications and allergies for accuracy.    Below are additional concerns with the patient's PTA list.  Collected from patient.  Methadone confirmed with [place]. Patient had a handwritten list that said methadone 5mg BID, but she had the physical medication with her and it was identified as methadone 10mg tablet and she takes the whole tablet twice a day (which correlates with the [place]AR[place] report). She took Day 1 of the Medrol Dose Pack yesterday 7/24.     The list below reflectives the updated PTA list. Please review each medication in order reconciliation for additional clarification and justification.    Prior to Admission Medications   Prescriptions   DULoxetine (Cymbalta) 60 mg DR capsule   Sig: TAKE 1 CAPSULE DAILY IN THE EVENING   buPROPion XL (Wellbutrin XL) 300 mg 24 hr tablet   Sig: Take 1 tablet (300 mg) by mouth once daily. Do not crush, chew, or split.   busPIRone (Buspar) 5 mg tablet   Sig: TAKE 1 TABLET BY MOUTH TWICE A DAY   calcium carbonate (Oscal) 500 mg calcium (1,250 mg) tablet   Sig: Take 1 tablet (1,250 mg) by mouth 2 times daily (morning and late afternoon).   cholecalciferol (Vitamin D-3) 50 mcg (2,000 unit) capsule   Sig: Take 1 capsule (50 mcg) by mouth once daily.   clobetasol (Temovate) 0.05 % cream   Sig: APPLY AS DIRECTED EVERY DAY   imiquimod (Aldara) 5 % cream   Sig: APPLY AT BEDTIME MONDAY, WED, FRIDAY FOR 4 WEEKS TO UPPER FOREHEAD AND BRIDGE OF NOSE   methadone (Dolophine) 10 mg tablet   Sig: Take 1 tablet (10 mg) by mouth every 12 hours.   methylPREDNISolone (Medrol Dospak) 4 mg tablets   Sig: Take as directed on package.   metoclopramide (Reglan) 10 mg tablet   Sig: Take 1 tablet (10 mg) by mouth 4 times a day as needed (nausea) for up to 10 days.   ondansetron (Zofran) 8 mg tablet   Sig: Take 1 tablet (8 mg) by mouth every 8 hours if  needed for nausea or vomiting.   oxyCODONE (Roxicodone) 5 mg immediate release tablet   Sig: Take 1-2 tablets (5-10 mg) by mouth every 4 hours if needed for severe pain (7 - 10) for up to 15 days.   pregabalin (Lyrica) 50 mg capsule   Sig: Take 1 capsule (50 mg) by mouth 3 times a day for 15 days.   primidone (Mysoline) 50 mg tablet   Sig: Take 3.5 tablets (175 mg) by mouth once daily at bedtime. 3.5 tabs at bedtime.   propranolol (Inderal) 60 mg tablet   Sig: Take one tablet in the morning and half tablet in the afternoon after a week if heart rate is above 60 after that increase to one tablet twice a day after one week      Facility-Administered Medications: None          Sonal Palma, PharmD

## 2024-07-25 NOTE — CONSULTS
Patient is a 69-year-old female with multiple medical conditions to include ovarian cancer status post laparotomy, TSH BSO, rectal resection, ileostomy, reversal of ileostomy, CNS lymphoma.    She comes in with 24-hour complaint of worsening right upper quadrant abdominal pain with some nausea and vomiting.  Workup is included a right upper quadrant ultrasound that shows gallstones with evidence of acute cholecystitis.    PMH:  chronic pain, ovarian ca, CNS lymphona, anxiety,     PSH: as above     On exam she is comfortable,   A&O x 3  afebrile with normal vital signs  Abdomen is soft, large midline incision  Tender right upper quadrant.    === 07/25/24 ===    US RIGHT UPPER QUADRANT    - Impression -  Sonographic findings consistent with cholelithiasis and acute  cholecystitis.    Of the several nonobstructing right renal stones evident on earlier  CT scan, only the 8 mm upper pole stone could be identified. No  hydronephrosis.     Lab Results   Component Value Date    GLUCOSE 143 (H) 07/25/2024     07/25/2024    K 5.4 (H) 07/25/2024     07/25/2024    CO2 27 07/25/2024    ANIONGAP 15 07/25/2024    BUN 25 (H) 07/25/2024    CREATININE 0.67 07/25/2024    EGFR >90 07/25/2024    CALCIUM 9.3 07/25/2024    ALBUMIN 4.1 07/25/2024    ALKPHOS 90 07/25/2024    PROT 6.3 (L) 07/25/2024    AST 12 07/25/2024    BILITOT 0.3 07/25/2024    ALT 28 07/25/2024       Lab Results   Component Value Date    WBC 11.0 07/25/2024    HGB 13.3 07/25/2024    HCT 38.8 07/25/2024    MCV 94 07/25/2024     07/25/2024          Impression: Acute cholecystitis, cholelithiasis    She has been placed on IV antibiotics.  Plan to take to surgery for laparoscopic cholecystectomy.  We discussed the procedure to include risk, benefits and alternatives.  She agrees to the operation.

## 2024-07-25 NOTE — ANESTHESIA POSTPROCEDURE EVALUATION
Patient: Saba Park    Procedure Summary       Date: 07/25/24 Room / Location: [facility] A OR 08 / Virtual U A OR    Anesthesia Start: 1600 Anesthesia Stop: 1729    Procedure: Open Cholecystectomy Diagnosis:       Cholecystitis      (Cholecystitis [K81.9])    Surgeons: Db Blackwell MD Responsible Provider: Phani Hardy MD    Anesthesia Type: general ASA Status: 3            Anesthesia Type: general    Vitals Value Taken Time   /73 07/25/24 1816   Temp 36.4 °C (97.5 °F) 07/25/24 1723   Pulse 67 07/25/24 1830   Resp 14 07/25/24 1750   SpO2 96 % 07/25/24 1830   Vitals shown include unfiled device data.    Anesthesia Post Evaluation    Patient location during evaluation: bedside  Patient participation: complete - patient participated  Level of consciousness: awake  Pain management: adequate  Airway patency: patent  Cardiovascular status: acceptable  Respiratory status: acceptable  Hydration status: acceptable  Postoperative Nausea and Vomiting: none        No notable events documented.

## 2024-07-25 NOTE — ANESTHESIA PROCEDURE NOTES
Airway  Date/Time: 7/25/2024 4:10 PM  Urgency: elective    Airway not difficult    Staffing  Performed: CRNA   Authorized by: Phani Hardy MD    Performed by: DEMETRIUS Ly-KAZ  Patient location during procedure: OR    Indications and Patient Condition  Indications for airway management: anesthesia  Spontaneous Ventilation: absent  Sedation level: deep  Preoxygenated: yes  Patient position: sniffing  Mask difficulty assessment: 1 - vent by mask    Final Airway Details  Final airway type: endotracheal airway      Successful airway: ETT  Cuffed: yes   Successful intubation technique: direct laryngoscopy  Endotracheal tube insertion site: oral  Blade: Hal  Blade size: #3  ETT size (mm): 7.0  Cormack-Lehane Classification: grade I - full view of glottis  Placement verified by: capnometry   Measured from: lips  ETT to lips (cm): 21  Number of attempts at approach: 1

## 2024-07-26 LAB
ALBUMIN SERPL BCP-MCNC: 3.3 G/DL (ref 3.4–5)
ALBUMIN SERPL BCP-MCNC: 3.4 G/DL (ref 3.4–5)
ALP SERPL-CCNC: 199 U/L (ref 33–136)
ALP SERPL-CCNC: 213 U/L (ref 33–136)
ALT SERPL W P-5'-P-CCNC: 862 U/L (ref 7–45)
ALT SERPL W P-5'-P-CCNC: 985 U/L (ref 7–45)
ANION GAP SERPL CALC-SCNC: 11 MMOL/L (ref 10–20)
ANION GAP SERPL CALC-SCNC: 12 MMOL/L (ref 10–20)
APPEARANCE UR: CLEAR
AST SERPL W P-5'-P-CCNC: 395 U/L (ref 9–39)
AST SERPL W P-5'-P-CCNC: 614 U/L (ref 9–39)
BACTERIA #/AREA URNS AUTO: ABNORMAL /HPF
BILIRUB SERPL-MCNC: 1 MG/DL (ref 0–1.2)
BILIRUB SERPL-MCNC: 1.1 MG/DL (ref 0–1.2)
BILIRUB UR STRIP.AUTO-MCNC: NEGATIVE MG/DL
BUN SERPL-MCNC: 14 MG/DL (ref 6–23)
BUN SERPL-MCNC: 15 MG/DL (ref 6–23)
CALCIUM SERPL-MCNC: 8.1 MG/DL (ref 8.6–10.3)
CALCIUM SERPL-MCNC: 8.4 MG/DL (ref 8.6–10.3)
CERULOPLASMIN SERPL-MCNC: 28.7 MG/DL (ref 20–60)
CHLORIDE SERPL-SCNC: 101 MMOL/L (ref 98–107)
CHLORIDE SERPL-SCNC: 101 MMOL/L (ref 98–107)
CO2 SERPL-SCNC: 28 MMOL/L (ref 21–32)
CO2 SERPL-SCNC: 31 MMOL/L (ref 21–32)
COLOR UR: YELLOW
CREAT SERPL-MCNC: 0.8 MG/DL (ref 0.5–1.05)
CREAT SERPL-MCNC: 0.82 MG/DL (ref 0.5–1.05)
EGFRCR SERPLBLD CKD-EPI 2021: 78 ML/MIN/1.73M*2
EGFRCR SERPLBLD CKD-EPI 2021: 80 ML/MIN/1.73M*2
ERYTHROCYTE [DISTWIDTH] IN BLOOD BY AUTOMATED COUNT: 12.8 % (ref 11.5–14.5)
ERYTHROCYTE [DISTWIDTH] IN BLOOD BY AUTOMATED COUNT: 12.9 % (ref 11.5–14.5)
GLUCOSE SERPL-MCNC: 110 MG/DL (ref 74–99)
GLUCOSE SERPL-MCNC: 111 MG/DL (ref 74–99)
GLUCOSE UR STRIP.AUTO-MCNC: NORMAL MG/DL
HAV AB SER QL IA: REACTIVE
HAV IGM SER QL: NONREACTIVE
HBV SURFACE AB SER-ACNC: 25.4 MIU/ML
HBV SURFACE AG SERPL QL IA: NONREACTIVE
HCT VFR BLD AUTO: 32.3 % (ref 36–46)
HCT VFR BLD AUTO: 32.6 % (ref 36–46)
HCV AB SER QL: NONREACTIVE
HGB BLD-MCNC: 10.9 G/DL (ref 12–16)
HGB BLD-MCNC: 10.9 G/DL (ref 12–16)
HOLD SPECIMEN: NORMAL
INR PPP: 1.2 (ref 0.9–1.1)
KETONES UR STRIP.AUTO-MCNC: NEGATIVE MG/DL
LEUKOCYTE ESTERASE UR QL STRIP.AUTO: ABNORMAL
MCH RBC QN AUTO: 31.9 PG (ref 26–34)
MCH RBC QN AUTO: 32.4 PG (ref 26–34)
MCHC RBC AUTO-ENTMCNC: 33.4 G/DL (ref 32–36)
MCHC RBC AUTO-ENTMCNC: 33.7 G/DL (ref 32–36)
MCV RBC AUTO: 95 FL (ref 80–100)
MCV RBC AUTO: 96 FL (ref 80–100)
NITRITE UR QL STRIP.AUTO: NEGATIVE
NRBC BLD-RTO: 0 /100 WBCS (ref 0–0)
NRBC BLD-RTO: 0 /100 WBCS (ref 0–0)
PH UR STRIP.AUTO: 6 [PH]
PLATELET # BLD AUTO: 172 X10*3/UL (ref 150–450)
PLATELET # BLD AUTO: 175 X10*3/UL (ref 150–450)
POTASSIUM SERPL-SCNC: 3.5 MMOL/L (ref 3.5–5.3)
POTASSIUM SERPL-SCNC: 3.8 MMOL/L (ref 3.5–5.3)
PROT SERPL-MCNC: 4.8 G/DL (ref 6.4–8.2)
PROT SERPL-MCNC: 5.3 G/DL (ref 6.4–8.2)
PROT UR STRIP.AUTO-MCNC: ABNORMAL MG/DL
PROTHROMBIN TIME: 13.2 SECONDS (ref 9.8–12.8)
RBC # BLD AUTO: 3.36 X10*6/UL (ref 4–5.2)
RBC # BLD AUTO: 3.42 X10*6/UL (ref 4–5.2)
RBC # UR STRIP.AUTO: ABNORMAL /UL
RBC #/AREA URNS AUTO: >20 /HPF
SODIUM SERPL-SCNC: 137 MMOL/L (ref 136–145)
SODIUM SERPL-SCNC: 139 MMOL/L (ref 136–145)
SP GR UR STRIP.AUTO: 1.01
UROBILINOGEN UR STRIP.AUTO-MCNC: NORMAL MG/DL
WBC # BLD AUTO: 6.3 X10*3/UL (ref 4.4–11.3)
WBC # BLD AUTO: 6.9 X10*3/UL (ref 4.4–11.3)
WBC #/AREA URNS AUTO: ABNORMAL /HPF

## 2024-07-26 PROCEDURE — 86709 HEPATITIS A IGM ANTIBODY: CPT | Mod: AHULAB | Performed by: NURSE PRACTITIONER

## 2024-07-26 PROCEDURE — 86015 ACTIN ANTIBODY EACH: CPT | Mod: AHULAB | Performed by: NURSE PRACTITIONER

## 2024-07-26 PROCEDURE — 0FT40ZZ RESECTION OF GALLBLADDER, OPEN APPROACH: ICD-10-PCS | Performed by: FAMILY MEDICINE

## 2024-07-26 PROCEDURE — 96372 THER/PROPH/DIAG INJ SC/IM: CPT | Performed by: STUDENT IN AN ORGANIZED HEALTH CARE EDUCATION/TRAINING PROGRAM

## 2024-07-26 PROCEDURE — 0FJ44ZZ INSPECTION OF GALLBLADDER, PERCUTANEOUS ENDOSCOPIC APPROACH: ICD-10-PCS | Performed by: FAMILY MEDICINE

## 2024-07-26 PROCEDURE — 99222 1ST HOSP IP/OBS MODERATE 55: CPT | Performed by: INTERNAL MEDICINE

## 2024-07-26 PROCEDURE — 1200000002 HC GENERAL ROOM WITH TELEMETRY DAILY

## 2024-07-26 PROCEDURE — 2500000004 HC RX 250 GENERAL PHARMACY W/ HCPCS (ALT 636 FOR OP/ED): Performed by: STUDENT IN AN ORGANIZED HEALTH CARE EDUCATION/TRAINING PROGRAM

## 2024-07-26 PROCEDURE — 86038 ANTINUCLEAR ANTIBODIES: CPT | Mod: AHULAB | Performed by: NURSE PRACTITIONER

## 2024-07-26 PROCEDURE — 36415 COLL VENOUS BLD VENIPUNCTURE: CPT | Performed by: NURSE PRACTITIONER

## 2024-07-26 PROCEDURE — 80053 COMPREHEN METABOLIC PANEL: CPT | Performed by: NURSE PRACTITIONER

## 2024-07-26 PROCEDURE — 86381 MITOCHONDRIAL ANTIBODY EACH: CPT | Mod: AHULAB | Performed by: NURSE PRACTITIONER

## 2024-07-26 PROCEDURE — 86708 HEPATITIS A ANTIBODY: CPT | Mod: AHULAB | Performed by: NURSE PRACTITIONER

## 2024-07-26 PROCEDURE — 36415 COLL VENOUS BLD VENIPUNCTURE: CPT | Performed by: STUDENT IN AN ORGANIZED HEALTH CARE EDUCATION/TRAINING PROGRAM

## 2024-07-26 PROCEDURE — 81001 URINALYSIS AUTO W/SCOPE: CPT | Performed by: NURSE PRACTITIONER

## 2024-07-26 PROCEDURE — 87086 URINE CULTURE/COLONY COUNT: CPT | Mod: AHULAB | Performed by: NURSE PRACTITIONER

## 2024-07-26 PROCEDURE — 99232 SBSQ HOSP IP/OBS MODERATE 35: CPT | Performed by: NURSE PRACTITIONER

## 2024-07-26 PROCEDURE — 2500000001 HC RX 250 WO HCPCS SELF ADMINISTERED DRUGS (ALT 637 FOR MEDICARE OP): Performed by: STUDENT IN AN ORGANIZED HEALTH CARE EDUCATION/TRAINING PROGRAM

## 2024-07-26 PROCEDURE — 82390 ASSAY OF CERULOPLASMIN: CPT | Mod: AHULAB | Performed by: NURSE PRACTITIONER

## 2024-07-26 PROCEDURE — 2500000002 HC RX 250 W HCPCS SELF ADMINISTERED DRUGS (ALT 637 FOR MEDICARE OP, ALT 636 FOR OP/ED): Performed by: STUDENT IN AN ORGANIZED HEALTH CARE EDUCATION/TRAINING PROGRAM

## 2024-07-26 PROCEDURE — 86803 HEPATITIS C AB TEST: CPT | Mod: AHULAB | Performed by: NURSE PRACTITIONER

## 2024-07-26 PROCEDURE — 86235 NUCLEAR ANTIGEN ANTIBODY: CPT | Performed by: NURSE PRACTITIONER

## 2024-07-26 PROCEDURE — 85027 COMPLETE CBC AUTOMATED: CPT | Performed by: NURSE PRACTITIONER

## 2024-07-26 PROCEDURE — 85027 COMPLETE CBC AUTOMATED: CPT | Performed by: STUDENT IN AN ORGANIZED HEALTH CARE EDUCATION/TRAINING PROGRAM

## 2024-07-26 PROCEDURE — 86706 HEP B SURFACE ANTIBODY: CPT | Mod: AHULAB | Performed by: NURSE PRACTITIONER

## 2024-07-26 PROCEDURE — 85610 PROTHROMBIN TIME: CPT | Performed by: NURSE PRACTITIONER

## 2024-07-26 PROCEDURE — S0109 METHADONE ORAL 5MG: HCPCS | Performed by: STUDENT IN AN ORGANIZED HEALTH CARE EDUCATION/TRAINING PROGRAM

## 2024-07-26 PROCEDURE — 80053 COMPREHEN METABOLIC PANEL: CPT | Performed by: STUDENT IN AN ORGANIZED HEALTH CARE EDUCATION/TRAINING PROGRAM

## 2024-07-26 PROCEDURE — 87340 HEPATITIS B SURFACE AG IA: CPT | Mod: AHULAB | Performed by: NURSE PRACTITIONER

## 2024-07-26 RX ORDER — OXYCODONE HYDROCHLORIDE 5 MG/1
5 TABLET ORAL EVERY 6 HOURS PRN
Status: DISCONTINUED | OUTPATIENT
Start: 2024-07-26 | End: 2024-07-27

## 2024-07-26 RX ORDER — ENOXAPARIN SODIUM 100 MG/ML
40 INJECTION SUBCUTANEOUS EVERY 24 HOURS
Status: DISCONTINUED | OUTPATIENT
Start: 2024-07-26 | End: 2024-07-29 | Stop reason: HOSPADM

## 2024-07-26 RX ORDER — HYDROMORPHONE HYDROCHLORIDE 0.2 MG/ML
0.2 INJECTION INTRAMUSCULAR; INTRAVENOUS; SUBCUTANEOUS
Status: DISCONTINUED | OUTPATIENT
Start: 2024-07-26 | End: 2024-07-29 | Stop reason: HOSPADM

## 2024-07-26 RX ORDER — OXYCODONE HYDROCHLORIDE 5 MG/1
10 TABLET ORAL EVERY 6 HOURS PRN
Status: DISCONTINUED | OUTPATIENT
Start: 2024-07-26 | End: 2024-07-27

## 2024-07-26 SDOH — HEALTH STABILITY: PHYSICAL HEALTH: ON AVERAGE, HOW MANY DAYS PER WEEK DO YOU ENGAGE IN MODERATE TO STRENUOUS EXERCISE (LIKE A BRISK WALK)?: 0 DAYS

## 2024-07-26 SDOH — ECONOMIC STABILITY: INCOME INSECURITY: IN THE PAST 12 MONTHS, HAS THE ELECTRIC, GAS, OIL, OR WATER COMPANY THREATENED TO SHUT OFF SERVICE IN YOUR HOME?: NO

## 2024-07-26 SDOH — SOCIAL STABILITY: SOCIAL INSECURITY: WITHIN THE LAST YEAR, HAVE YOU BEEN AFRAID OF YOUR PARTNER OR EX-PARTNER?: NO

## 2024-07-26 SDOH — SOCIAL STABILITY: SOCIAL NETWORK
DO YOU BELONG TO ANY CLUBS OR ORGANIZATIONS SUCH AS CHURCH GROUPS UNIONS, FRATERNAL OR ATHLETIC GROUPS, OR SCHOOL GROUPS?: NO

## 2024-07-26 SDOH — HEALTH STABILITY: MENTAL HEALTH
HOW OFTEN DO YOU NEED TO HAVE SOMEONE HELP YOU WHEN YOU READ INSTRUCTIONS, PAMPHLETS, OR OTHER WRITTEN MATERIAL FROM YOUR DOCTOR OR PHARMACY?: NEVER

## 2024-07-26 SDOH — HEALTH STABILITY: MENTAL HEALTH
STRESS IS WHEN SOMEONE FEELS TENSE, NERVOUS, ANXIOUS, OR CAN'T SLEEP AT NIGHT BECAUSE THEIR MIND IS TROUBLED. HOW STRESSED ARE YOU?: TO SOME EXTENT

## 2024-07-26 SDOH — SOCIAL STABILITY: SOCIAL NETWORK: ARE YOU MARRIED, WIDOWED, DIVORCED, SEPARATED, NEVER MARRIED, OR LIVING WITH A PARTNER?: MARRIED

## 2024-07-26 SDOH — SOCIAL STABILITY: SOCIAL INSECURITY
WITHIN THE LAST YEAR, HAVE YOU BEEN KICKED, HIT, SLAPPED, OR OTHERWISE PHYSICALLY HURT BY YOUR PARTNER OR EX-PARTNER?: NO

## 2024-07-26 SDOH — SOCIAL STABILITY: SOCIAL NETWORK
IN A TYPICAL WEEK, HOW MANY TIMES DO YOU TALK ON THE PHONE WITH FAMILY, FRIENDS, OR NEIGHBORS?: MORE THAN THREE TIMES A WEEK

## 2024-07-26 SDOH — SOCIAL STABILITY: SOCIAL INSECURITY
WITHIN THE LAST YEAR, HAVE TO BEEN RAPED OR FORCED TO HAVE ANY KIND OF SEXUAL ACTIVITY BY YOUR PARTNER OR EX-PARTNER?: NO

## 2024-07-26 SDOH — ECONOMIC STABILITY: FOOD INSECURITY: WITHIN THE PAST 12 MONTHS, THE FOOD YOU BOUGHT JUST DIDN'T LAST AND YOU DIDN'T HAVE MONEY TO GET MORE.: NEVER TRUE

## 2024-07-26 SDOH — ECONOMIC STABILITY: FOOD INSECURITY: WITHIN THE PAST 12 MONTHS, YOU WORRIED THAT YOUR FOOD WOULD RUN OUT BEFORE YOU GOT MONEY TO BUY MORE.: NEVER TRUE

## 2024-07-26 SDOH — SOCIAL STABILITY: SOCIAL NETWORK: HOW OFTEN DO YOU GET TOGETHER WITH FRIENDS OR RELATIVES?: ONCE A WEEK

## 2024-07-26 SDOH — SOCIAL STABILITY: SOCIAL NETWORK: HOW OFTEN DO YOU ATTEND CHURCH OR RELIGIOUS SERVICES?: NEVER

## 2024-07-26 SDOH — SOCIAL STABILITY: SOCIAL INSECURITY: WITHIN THE LAST YEAR, HAVE YOU BEEN HUMILIATED OR EMOTIONALLY ABUSED IN OTHER WAYS BY YOUR PARTNER OR EX-PARTNER?: NO

## 2024-07-26 SDOH — HEALTH STABILITY: PHYSICAL HEALTH: ON AVERAGE, HOW MANY MINUTES DO YOU ENGAGE IN EXERCISE AT THIS LEVEL?: 0 MIN

## 2024-07-26 SDOH — SOCIAL STABILITY: SOCIAL NETWORK: HOW OFTEN DO YOU ATTENT MEETINGS OF THE CLUB OR ORGANIZATION YOU BELONG TO?: NEVER

## 2024-07-26 ASSESSMENT — PAIN SCALES - WONG BAKER
WONGBAKER_NUMERICALRESPONSE: HURTS LITTLE BIT
WONGBAKER_NUMERICALRESPONSE: HURTS LITTLE MORE
WONGBAKER_NUMERICALRESPONSE: HURTS EVEN MORE
WONGBAKER_NUMERICALRESPONSE: HURTS WHOLE LOT
WONGBAKER_NUMERICALRESPONSE: HURTS WHOLE LOT
WONGBAKER_NUMERICALRESPONSE: HURTS LITTLE BIT

## 2024-07-26 ASSESSMENT — PAIN SCALES - GENERAL
PAINLEVEL_OUTOF10: 3
PAINLEVEL_OUTOF10: 4
PAINLEVEL_OUTOF10: 8
PAINLEVEL_OUTOF10: 0 - NO PAIN
PAINLEVEL_OUTOF10: 5 - MODERATE PAIN
PAINLEVEL_OUTOF10: 7
PAINLEVEL_OUTOF10: 3
PAINLEVEL_OUTOF10: 5 - MODERATE PAIN
PAINLEVEL_OUTOF10: 7
PAINLEVEL_OUTOF10: 3

## 2024-07-26 ASSESSMENT — COGNITIVE AND FUNCTIONAL STATUS - GENERAL
CLIMB 3 TO 5 STEPS WITH RAILING: A LITTLE
DAILY ACTIVITIY SCORE: 24
MOBILITY SCORE: 23
CLIMB 3 TO 5 STEPS WITH RAILING: A LITTLE
MOBILITY SCORE: 22
DAILY ACTIVITIY SCORE: 24
WALKING IN HOSPITAL ROOM: A LITTLE

## 2024-07-26 ASSESSMENT — PAIN - FUNCTIONAL ASSESSMENT
PAIN_FUNCTIONAL_ASSESSMENT: 0-10

## 2024-07-26 ASSESSMENT — PAIN DESCRIPTION - ORIENTATION
ORIENTATION: RIGHT;LEFT;MID
ORIENTATION: RIGHT;LEFT

## 2024-07-26 ASSESSMENT — PAIN DESCRIPTION - LOCATION: LOCATION: ABDOMEN

## 2024-07-26 NOTE — PROGRESS NOTES
24 1510   Discharge Planning   Home or Post Acute Services   (Has had HHC in the past. None needed upon discharge)     Returned call to patient .  Introduced myself and my role. Confirmed Name, , phone, address, insurance and PCP. Has someone she sees for her anxiety.  Decline offer of LSW. [place] going needs.

## 2024-07-26 NOTE — PROGRESS NOTES
"Saba Park is a 69 y.o. female on day 1 of admission presenting with Cholecystitis.    Subjective   Patient alert sitting up in bed. Passing gas. Pt endorses that pain is well controlled. VSS on RA.       Objective     Physical Exam  Vitals and nursing note reviewed.   HENT:      Head: Normocephalic.      Nose: Nose normal.      Mouth/Throat:      Mouth: Mucous membranes are moist.   Eyes:      Pupils: Pupils are equal, round, and reactive to light.   Cardiovascular:      Rate and Rhythm: Normal rate.   Pulmonary:      Effort: Pulmonary effort is normal.   Abdominal:      General: Abdomen is flat.      Comments: DSD intact to right abdominal area   Genitourinary:     Comments: deferred  Musculoskeletal:         General: Normal range of motion.      Cervical back: Normal range of motion.   Skin:     General: Skin is warm.      Capillary Refill: Capillary refill takes less than 2 seconds.   Neurological:      General: No focal deficit present.      Mental Status: She is alert and oriented to person, place, and time. Mental status is at baseline.   Psychiatric:         Mood and Affect: Mood normal.         Behavior: Behavior normal.         Thought Content: Thought content normal.         Judgment: Judgment normal.         Last Recorded Vitals  Blood pressure 124/66, pulse 63, temperature 36.3 °C (97.3 °F), temperature source Temporal, resp. rate 18, height 1.6 m (5' 3\"), weight 63 kg (139 lb), SpO2 92%.  Intake/Output last 3 Shifts:  I/O last 3 completed shifts:  In: 2686.7 (42.6 mL/kg) [P.O.:360; I.V.:1276.7 (20.2 mL/kg); IV Piggyback:1050]  Out: 650 (10.3 mL/kg) [Urine:400 (0.2 mL/kg/hr); Blood:250]  Weight: 63 kg     Relevant Results  Results for orders placed or performed during the hospital encounter of 07/25/24 (from the past 24 hour(s))   CBC   Result Value Ref Range    WBC 6.3 4.4 - 11.3 x10*3/uL    nRBC 0.0 0.0 - 0.0 /100 WBCs    RBC 3.36 (L) 4.00 - 5.20 x10*6/uL    Hemoglobin 10.9 (L) 12.0 - 16.0 g/dL "    Hematocrit 32.3 (L) 36.0 - 46.0 %    MCV 96 80 - 100 fL    MCH 32.4 26.0 - 34.0 pg    MCHC 33.7 32.0 - 36.0 g/dL    RDW 12.9 11.5 - 14.5 %    Platelets 175 150 - 450 x10*3/uL   Comprehensive metabolic panel   Result Value Ref Range    Glucose 110 (H) 74 - 99 mg/dL    Sodium 137 136 - 145 mmol/L    Potassium 3.8 3.5 - 5.3 mmol/L    Chloride 101 98 - 107 mmol/L    Bicarbonate 28 21 - 32 mmol/L    Anion Gap 12 10 - 20 mmol/L    Urea Nitrogen 15 6 - 23 mg/dL    Creatinine 0.80 0.50 - 1.05 mg/dL    eGFR 80 >60 mL/min/1.73m*2    Calcium 8.1 (L) 8.6 - 10.3 mg/dL    Albumin 3.3 (L) 3.4 - 5.0 g/dL    Alkaline Phosphatase 213 (H) 33 - 136 U/L    Total Protein 4.8 (L) 6.4 - 8.2 g/dL     (H) 9 - 39 U/L    Bilirubin, Total 1.0 0.0 - 1.2 mg/dL     (H) 7 - 45 U/L     *Note: Due to a large number of results and/or encounters for the requested time period, some results have not been displayed. A complete set of results can be found in Results Review.           Assessment/Plan   Principal Problem:    Cholecystitis  Cholecystitis  -CT abdomen/pelvis: The gallbladder contains several small stones and sludge. There is a small quantity of pericholecystic fluid. The appearance is   suggestive of cholecystitis. There are 2 lymph nodes at the right cardiophrenic angle.  The larger lymph node measures 2.2 cm.  These are new from the prior CT.  -CT chest :There are 2 right-sided pericardial lymph nodes, neither of which was present on 09/11/2019. The can be seen in retrospect on 11/04/2023.   The larger lymph node is mildly larger today. The smaller lymph node   is stable since 11/04/2023. Exact etiology and clinical significance   are uncertain. However, recommend PET-CT scan to evaluate for   possible lymphoma.   -Outpt referral placed to follow up on dc with  for above findings.  -Acute Care surgery consulted; patient added to OR with Dr. Blackwell yesterday (07/25/24) for lap cholecystectomy converted to open  .  -IVF  -IV abts  -PRN pain control  -PRN antiemetics  -Elevated AST/ALT this am ; GI consulted; appreciate recs; Surgical team made aware  -Will monitor     Secondary Problems:     2.Chemotherapy-induced peripheral neuropathy current with Pain management  -Continue home medications  -hx of CNS lymphoma     3. DVT prophylaxis  -scds     DC plan:  DC home when medically stable . Anticipate upgrade to inpatient status ; will reassess throughout the day given the recent elevation of liver function.        Interdisciplinary rounding completed with Attending Provider, Bedside RN and TCC.  Labs, results and plan of care discussed and reviewed with Dr. Biggs.     Addendum @ 154 (07/26/24): Patient upgraded to inpatient status. Report given and patient accepted to .       I spent 20 minutes in the professional and overall care of this patient.      Anabella Minor, APRN-CNP

## 2024-07-26 NOTE — CARE PLAN
The patient's goals for the shift include  ambulation, tolerate advanced diet    The clinical goals for the shift include safety, pain controlled, ambulation, void    Over the shift, the patient did make progress toward the following goals.

## 2024-07-26 NOTE — PROGRESS NOTES
Saba Park is a 69 y.o. female on day 0 of admission presenting with Cholecystitis.      Subjective   TESSIE. Pain controlled, tolerating liquids, +void     Objective     PE:  Constitutional: A&Ox3, calm and cooperative, NAD  Eyes: EOMI, clear sclera   Cardiovascular: Normal rate and regular rhythm. No murmurs  Respiratory/Thorax: CTAB, on RA  Gastrointestinal: abd soft, mildly distended, RUQ horizontal incision well approximated with staples, no surrounding erythema or drainage. Fresh dressing applied. Lap sites covered with dermabond. Hypoactive bowel sounds this morning, improved by this afternoon  Genitourinary: Voiding independently   Musculoskeletal: ROM intact  Extremities: No peripheral edema  Neurological: A&Ox3, No focal deficits   Psychological: Appropriate mood and behavior      Last Recorded Vitals  Vitals:    07/26/24 0010 07/26/24 0414 07/26/24 0700 07/26/24 1212   BP: 170/78 116/67 124/66 102/60   BP Location: Left arm Left arm     Patient Position: Lying Lying     Pulse: 72 63 63 65   Resp: 16 16 18 21   Temp: 36.9 °C (98.5 °F) 36.7 °C (98 °F) 36.3 °C (97.3 °F) 36.1 °C (97 °F)   TempSrc: Temporal Temporal Temporal Temporal   SpO2: 98% 96% 92% 94%   Weight:       Height:             Relevant Results    Imaging:     .=== 11/15/23 ===    XR HIP RIGHT WITH PELVIS WHEN PERFORMED 2 OR 3 VIEWS    - Impression -  1.  Right total hip arthroplasty without hardware complication.  2. Additional findings as above    MACRO:  None.    Signed by: Juliana Damon 11/16/2023 6:02 PM  Dictation workstation:   [id number]   .=== 07/25/24 ===    CT CHEST WO IV CONTRAST    - Impression -  There are 2 right-sided pericardial lymph nodes, neither of which was  present on 09/11/2019. The can be seen in retrospect on 11/04/2023.  The larger lymph node is mildly larger today. The smaller lymph node  is stable since 11/04/2023. Exact etiology and clinical significance  are uncertain. However, recommend PET-CT scan to  evaluate for  possible lymphoma.    No other thoracic adenopathy in this exam.    Small fat containing posteromedial right-sided diaphragmatic hernia.  No mass or pneumonia in either lung.    Multilevel vertebral body hemangiomas of bone. See above for details.    Stone and sludge filled gallbladder.    Nonobstructing right renal stones.    MACRO:  None    Signed by: Dwight Elkins 7/25/2024 8:51 AM  Dictation workstation:   [id number]         Lab Results:   Lab Results   Component Value Date    WBC 6.3 07/26/2024    HGB 10.9 (L) 07/26/2024    HCT 32.3 (L) 07/26/2024    MCV 96 07/26/2024     07/26/2024     Lab Results   Component Value Date    GLUCOSE 110 (H) 07/26/2024    CALCIUM 8.1 (L) 07/26/2024     07/26/2024    K 3.8 07/26/2024    CO2 28 07/26/2024     07/26/2024    BUN 15 07/26/2024    CREATININE 0.80 07/26/2024     Results from last 72 hours   Lab Units 07/26/24  0657   ALK PHOS U/L 213*   BILIRUBIN TOTAL mg/dL 1.0   PROTEIN TOTAL g/dL 4.8*   ALT U/L 985*   AST U/L 614*   ALBUMIN g/dL 3.3*     Estimated Creatinine Clearance: 59.4 mL/min (by C-G formula based on SCr of 0.8 mg/dL).  C-Reactive Protein   Date/Time Value Ref Range Status   12/01/2023 03:06 PM 0.82 <1.00 mg/dL Final         Assessment/Plan   Saba Park is a 69 y.o. female on day 0 of admission presenting with Cholecystitis.  Principal Problem:    Cholecystitis    POD1 lap converted to open cholecystectomy, Doing well  Intra-op findings: dense intra-abdominal adhesions, inflamed gallbladder  Expect ileus    Plan:  - VSS, afebrile  - labs: wbc 6.3, T bili 1.0. Significant jump in ALP (213), ALT (985), and AST (614)  - GI on board, appreciate recs- repeat labs tomorrow AM   - encourage IS, encourage ambulation  - diet: tolerated fulls for lunch, will advance to low fat diet for dinner  - abx: zosyn  - adjust pain regimen from IV morphine to PO oxy 5/10 with breakthrough dilaudid   - DVT prophylaxis: SCD/lovenox      Dispo:   Discussed plan with Dr. Blackwell. Cautious advancement of diet    I spent 35 minutes in the professional and overall care of this patient.           ZENIA DunnC

## 2024-07-26 NOTE — CONSULTS
Reason For Consult  Abnormal LFT    History Of Present Illness  Saba Park is a 69 y.o. 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  The pain started on Wednesday around 10:00 in the evening, she is not aware of any precipitating factors.  Reports associated nausea, vomiting.  On admission CT showing cholelithiasis and sludge small amount of pericholecystic fluid.  Right upper quadrant ultrasound consistent with acute cholecystitis and cholelithiasis.  She had normal liver enzymes on admission.  She underwent lap cholecystectomy yesterday.  She stated she is feeling much better today, still taking morphine for pain.  Labs this morning showed alk phos 213, , , total bilirubin 1.0.  GI consulted for abnormal liver enzymes.  Patient denies prior history of liver problems, IV drug use, tattoos, blood transfusion.  She stated she had hepatitis as a child but have no further information about it.  She stated her blood pressure was a little bit lower this morning.    Colonoscopy 5/13/19    - Perianal skin tags found on perianal exam.  - Melanotic mucosa in the ascending colon.  - One 4 mm polyp at the hepatic flexure (TVA), removed with  a cold snare. Resected and retrieved.  - The examination was otherwise normal on direct and                          retroflexion views.    Past Medical History  She has a past medical history of Anxiety disorder, unspecified, Family history of malignant neoplasm of breast (02/28/2019), Gynecologic cancer (Multi) (09/18/2023), Other conditions influencing health status, Ovarian cancer (Multi), Personal history of malignant neoplasm of other parts of uterus (08/14/2020), Personal  history of non-Hodgkin lymphomas (08/14/2020), Personal history of other diseases of the circulatory system, Personal history of other diseases of the digestive system, Personal history of other diseases of the respiratory system (01/10/2014), Personal history of other endocrine, nutritional and metabolic disease, Personal history of other malignant neoplasm of skin, Personal history of other specified conditions (10/28/2014), Portal vein thrombosis (09/18/2023), and Presence of spectacles and contact lenses (12/09/2014).    Surgical History  She has a past surgical history that includes Lithotripsy (08/19/2013); Other surgical history (08/19/2013); and Other surgical history (11/25/2019).     Social History  She reports that she has never smoked. She has never used smokeless tobacco. She reports current alcohol use. She reports current drug use. Drug: Marijuana.    Family History  Family History   Problem Relation Name Age of Onset    Breast cancer Mother  60    Stroke Mother      Colon cancer Father      Breast cancer Mother's Sister  40    Breast cancer Cousin  50        Allergies  Erythromycin    Review of Systems  10 systems reviewed and negative other than HPI     Physical Exam  Physical Exam  Vitals reviewed.   Constitutional:       Appearance: Normal appearance. She is obese.   HENT:      Head: Normocephalic and atraumatic.      Nose: Nose normal.      Mouth/Throat:      Mouth: Mucous membranes are moist.      Pharynx: Oropharynx is clear.   Eyes:      Conjunctiva/sclera: Conjunctivae normal.   Cardiovascular:      Rate and Rhythm: Normal rate and regular rhythm.      Heart sounds: Normal heart sounds.   Pulmonary:      Effort: Pulmonary effort is normal.      Breath sounds: Normal breath sounds.   Abdominal:      General: Bowel sounds are normal. There is distension.      Palpations: Abdomen is soft. There is no mass.      Tenderness: There is abdominal tenderness. There is no guarding or rebound.       "Hernia: No hernia is present.   Musculoskeletal:      Cervical back: Neck supple.   Skin:     General: Skin is warm and dry.   Neurological:      General: No focal deficit present.      Mental Status: She is alert and oriented to person, place, and time.   Psychiatric:         Mood and Affect: Mood normal.         Behavior: Behavior normal.            Last Recorded Vitals  Blood pressure 102/60, pulse 65, temperature 36.1 °C (97 °F), temperature source Temporal, resp. rate 21, height 1.6 m (5' 3\"), weight 63 kg (139 lb), SpO2 94%.    Relevant Results      Scheduled medications  acetaminophen, 650 mg, oral, 4x daily  buPROPion XL, 300 mg, oral, Daily  busPIRone, 5 mg, oral, BID  DULoxetine, 60 mg, oral, q PM  enoxaparin, 40 mg, subcutaneous, q24h  ketorolac, 15 mg, intravenous, q6h  methadone, 10 mg, oral, BID  piperacillin-tazobactam, 3.375 g, intravenous, q6h  polyethylene glycol, 17 g, oral, Daily  pregabalin, 50 mg, oral, TID  primidone, 175 mg, oral, Nightly  propranolol, 60 mg, oral, BID      Continuous medications  lactated Ringer's, 100 mL/hr, Last Rate: 100 mL/hr (07/26/24 0606)      PRN medications  PRN medications: HYDROmorphone, ondansetron **OR** ondansetron, oxyCODONE, oxyCODONE, prochlorperazine **OR** prochlorperazine **OR** prochlorperazine  ECG 12 lead    Result Date: 7/25/2024  Normal sinus rhythm Normal ECG When compared with ECG of 05-APR-2023 12:43, ST elevation now present in Inferior leads Inverted T waves have replaced nonspecific T wave abnormality in Anterior leads See ED provider note for full interpretation and clinical correlation Confirmed by Valerie Choi ([id number]) on 7/25/2024 10:14:09 AM    CT chest wo IV contrast    Result Date: 7/25/2024  Interpreted By:  Dwight Elkins, STUDY: CT CHEST WO IV CONTRAST;  7/25/2024 8:15 am   INDICATION: 70 y/o   F with  Signs/Symptoms:lymphnodes 2cm.   LIMITATIONS: Imaging of the mediastinum and olga is limited without the use of IV contrast..   ACCESSION " NUMBER(S): [id number]   ORDERING CLINICIAN: WES MCLEAN   TECHNIQUE: Noncontrast Thin-section images were obtained  from the thoracic inlet down through the diaphragm. Sagittal and coronal reconstruction images were generated. Mediastinal, lung, bone, and liver windows were reviewed.   COMPARISON: Most recent prior is included a coronary artery calcium score exam from 11/04/2023 and CT scan abdomen and pelvis and chest dated 09/11/2019.   FINDINGS: CHEST WALL/BASE OF THE NECK: The chest wall was grossly intact. No thyromegaly or thyroid mass.   LUNGS/ PLEURA/ AND TRACHEA: No mass or pneumonia in either lung. No pleural effusion. No pneumothorax. The trachea was grossly intact. Incidental note of a small fat containing posteromedial right-sided diaphragmatic hernia.   MEDIASTINUM/NGA: There is no suspicious hilar or axillary adenopathy in this exam. There is no suspicious central mediastinal adenopathy. However, there are 2 adjacent right-sided pericardial lymph nodes. The larger measures 25 x 18 mm, not present on 09/11/2019, and increased from 20 x 16 mm on the cardiac score exam from 11/04/2023. The adjacent 9 mm lymph node is stable since 11/04/2023 and was not present on 09/11/2019. No cardiomegaly. No pericardial effusion. No thoracic aortic aneurysm.   BONES: No destructive lytic or blastic bone lesion. There are however vertebral body hemangiomas of bone involving C7, T6, T11, and to a lesser extent T12 vertebral bodies.   UPPER ABDOMEN: For information regarding the upper abdomen, please see the report for CT scan abdomen with contrast done at 5:50 a.m. earlier today. Of note was sludge and stones filling the gallbladder and the presence of nonobstructing stones in the right kidney.       There are 2 right-sided pericardial lymph nodes, neither of which was present on 09/11/2019. The can be seen in retrospect on 11/04/2023. The larger lymph node is mildly larger today. The smaller lymph node is  stable since 11/04/2023. Exact etiology and clinical significance are uncertain. However, recommend PET-CT scan to evaluate for possible lymphoma.   No other thoracic adenopathy in this exam.   Small fat containing posteromedial right-sided diaphragmatic hernia. No mass or pneumonia in either lung.   Multilevel vertebral body hemangiomas of bone. See above for details.   Stone and sludge filled gallbladder.   Nonobstructing right renal stones.   MACRO: None   Signed by: Dwight Elkins 7/25/2024 8:51 AM Dictation workstation:   [id number]    US right upper quadrant    Result Date: 7/25/2024  Interpreted By:  Dwight Elkins, STUDY: US RIGHT UPPER QUADRANT  7/25/2024 8:17 am   INDICATION: 70 y/o   F with  Signs/Symptoms:pain.   COMPARISON: Correlation with CT scan from 07/25/2024. In   ACCESSION NUMBER(S): [id number]   ORDERING CLINICIAN: RICK ALVES   TECHNIQUE: Routine ultrasound of the right upper quadrant was performed using grayscale imaging, color Doppler, and spectral Doppler.   FINDINGS: LIVER: Craniocaudal length: 15.2 CM.  This is  within normal limits. Echogenicity:  Normal. Mass:  None   GALLBLADDER: Sludge and stones fill the gallbladder. Borderline gallbladder wall thickening at 3 mm thickness. There was a positive sonographic Al sign. Trace pericholecystic fluid.   BILE DUCTS: No intrahepatic biliary ductal dilatation. Common bile duct measured 0.7 CM   in diameter. This is mildly dilated.   PANCREAS: The pancreas was obscured by bowel gas..   RIGHT KIDNEY: Craniocaudal length:  10.0 CM , Within normal limits of size for age. Echogenicity was normal. There was a nonobstructing stone in the right renal pelvis and or multiple stones in the right kidney on CT scan. On ultrasound, only a thin 8 mm upper pole stone could be identified. No hydronephrosis,   or perinephric edema. No gross right renal mass.   PERITONEAL FLUID: None seen in the right upper quadrant.         Sonographic findings consistent  with cholelithiasis and acute cholecystitis.   Of the several nonobstructing right renal stones evident on earlier CT scan, only the 8 mm upper pole stone could be identified. No hydronephrosis.   Obscuration of the pancreas by bowel gas and body habitus.   MACRO: None   Signed by: Dwight Elkins 7/25/2024 8:36 AM Dictation workstation:   [id number]    CT abdomen pelvis w IV contrast    Result Date: 7/25/2024  STUDY: CT Abdomen and Pelvis with IV Contrast; 07/25/2024 5:50 AM INDICATION: Abdominal pain. COMPARISON: CT abdomen 06/17/2020, 06/16/2020. ACCESSION NUMBER(S): [id number] ORDERING CLINICIAN: DELILAH DOUGLAS TECHNIQUE: CT of the abdomen and pelvis was performed.  Contiguous axial images were obtained at 3 mm slice thickness through the abdomen and pelvis. Coronal and sagittal reconstructions at 3 mm slice thickness were performed.  Omnipaque 350 75 mL was administered intravenously.  FINDINGS: LOWER CHEST: No cardiomegaly.  No pericardial effusion.  Lung bases are clear. There are 2 lymph nodes at the right cardiophrenic angle.  The larger lymph node measures 2.2 cm; series 604, image 21.  These are new from the prior CT.  There is a fat-containing Bochdalek hernia on the right.  LIVER: No hepatomegaly.  Smooth surface contour.  Normal attenuation.  BILE DUCTS: No intrahepatic or extrahepatic biliary ductal dilatation.  GALLBLADDER: The gallbladder contains several small stones and sludge.  There is a small quantity of pericholecystic fluid.  The appearance is suggestive of cholecystitis.  The findings are new from the prior CT. STOMACH: No abnormalities identified.  PANCREAS: No masses or ductal dilatation.  SPLEEN: No splenomegaly or focal splenic lesion.  ADRENAL GLANDS: No thickening or nodules.  KIDNEYS AND URETERS: There is a 3.3 cm lower pole cyst of the left kidney.  Other tiny cortical lesions of the right kidney are statistically cysts but too small to characterize.  There are several calyceal  stones of the right kidney.  The largest measures approximately 4 mm.  There is a 3 to 4 mm stone in the right renal pelvis.  The measurement is obtained on the coronal reconstructed images.  No hydronephrosis.  Previously noted left UPJ stone has resolved.  PELVIS:  There is streak artifact from a right hip replacement.  BLADDER: No abnormalities identified.  REPRODUCTIVE ORGANS: The uterus is not identified and is presumed surgically absent.  No abnormalities identified.  BOWEL: There is a surgical staple line of a small bowel loop, unchanged.  The technologist provided no surgical detail.  There is no information about what was performed, when it was performed or why it was performed.  VESSELS: Abdominal aorta is normal in caliber.  PERITONEUM/RETROPERITONEUM/LYMPH NODES: No free fluid.  No pneumoperitoneum. No lymphadenopathy.  ABDOMINAL WALL: No abnormalities identified. SOFT TISSUES: No abnormalities identified.  BONES: There are moderate degenerative changes of the spine.  The appearance of T11, T12 and L1 suggests hemangiomas.  No acute fracture or aggressive osseous lesion.    1.  The gallbladder contains several small stones and sludge.  There is a small quantity of pericholecystic fluid.  The appearance is suggestive of cholecystitis.  The findings are new from the prior CT. No dilated bile ducts. 2.  There are 2 lymph nodes at the right cardiophrenic angle.  The larger lymph node measures 2.2 cm.  These are new from the prior CT. Consideration could be given to follow-up with chest CT. 3.  There is a 3 to 4 mm stone of the right renal pelvis and several small right-sided calyceal stones.  No hydronephrosis. 4.  No ascites, free air or bowel obstruction. Signed by Morteza Santiago MD   Results for orders placed or performed during the hospital encounter of 07/25/24 (from the past 24 hour(s))   CBC   Result Value Ref Range    WBC 6.3 4.4 - 11.3 x10*3/uL    nRBC 0.0 0.0 - 0.0 /100 WBCs    RBC 3.36 (L) 4.00 -  5.20 x10*6/uL    Hemoglobin 10.9 (L) 12.0 - 16.0 g/dL    Hematocrit 32.3 (L) 36.0 - 46.0 %    MCV 96 80 - 100 fL    MCH 32.4 26.0 - 34.0 pg    MCHC 33.7 32.0 - 36.0 g/dL    RDW 12.9 11.5 - 14.5 %    Platelets 175 150 - 450 x10*3/uL   Comprehensive metabolic panel   Result Value Ref Range    Glucose 110 (H) 74 - 99 mg/dL    Sodium 137 136 - 145 mmol/L    Potassium 3.8 3.5 - 5.3 mmol/L    Chloride 101 98 - 107 mmol/L    Bicarbonate 28 21 - 32 mmol/L    Anion Gap 12 10 - 20 mmol/L    Urea Nitrogen 15 6 - 23 mg/dL    Creatinine 0.80 0.50 - 1.05 mg/dL    eGFR 80 >60 mL/min/1.73m*2    Calcium 8.1 (L) 8.6 - 10.3 mg/dL    Albumin 3.3 (L) 3.4 - 5.0 g/dL    Alkaline Phosphatase 213 (H) 33 - 136 U/L    Total Protein 4.8 (L) 6.4 - 8.2 g/dL     (H) 9 - 39 U/L    Bilirubin, Total 1.0 0.0 - 1.2 mg/dL     (H) 7 - 45 U/L   Urinalysis with Reflex Culture and Microscopic   Result Value Ref Range    Color, Urine Yellow Light-Yellow, Yellow, Dark-Yellow    Appearance, Urine Clear Clear    Specific Gravity, Urine 1.014 1.005 - 1.035    pH, Urine 6.0 5.0, 5.5, 6.0, 6.5, 7.0, 7.5, 8.0    Protein, Urine 10 (TRACE) NEGATIVE, 10 (TRACE), 20 (TRACE) mg/dL    Glucose, Urine Normal Normal mg/dL    Blood, Urine OVER (3+) (A) NEGATIVE    Ketones, Urine NEGATIVE NEGATIVE mg/dL    Bilirubin, Urine NEGATIVE NEGATIVE    Urobilinogen, Urine Normal Normal mg/dL    Nitrite, Urine NEGATIVE NEGATIVE    Leukocyte Esterase, Urine 75 Barby/µL (A) NEGATIVE   Microscopic Only, Urine   Result Value Ref Range    WBC, Urine 11-20 (A) 1-5, NONE /HPF    RBC, Urine >20 (A) NONE, 1-2, 3-5 /HPF    Bacteria, Urine 1+ (A) NONE SEEN /HPF   CBC   Result Value Ref Range    WBC 6.9 4.4 - 11.3 x10*3/uL    nRBC 0.0 0.0 - 0.0 /100 WBCs    RBC 3.42 (L) 4.00 - 5.20 x10*6/uL    Hemoglobin 10.9 (L) 12.0 - 16.0 g/dL    Hematocrit 32.6 (L) 36.0 - 46.0 %    MCV 95 80 - 100 fL    MCH 31.9 26.0 - 34.0 pg    MCHC 33.4 32.0 - 36.0 g/dL    RDW 12.8 11.5 - 14.5 %    Platelets  172 150 - 450 x10*3/uL   Comprehensive metabolic panel   Result Value Ref Range    Glucose 111 (H) 74 - 99 mg/dL    Sodium 139 136 - 145 mmol/L    Potassium 3.5 3.5 - 5.3 mmol/L    Chloride 101 98 - 107 mmol/L    Bicarbonate 31 21 - 32 mmol/L    Anion Gap 11 10 - 20 mmol/L    Urea Nitrogen 14 6 - 23 mg/dL    Creatinine 0.82 0.50 - 1.05 mg/dL    eGFR 78 >60 mL/min/1.73m*2    Calcium 8.4 (L) 8.6 - 10.3 mg/dL    Albumin 3.4 3.4 - 5.0 g/dL    Alkaline Phosphatase 199 (H) 33 - 136 U/L    Total Protein 5.3 (L) 6.4 - 8.2 g/dL     (H) 9 - 39 U/L    Bilirubin, Total 1.1 0.0 - 1.2 mg/dL     (H) 7 - 45 U/L   Protime-INR   Result Value Ref Range    Protime 13.2 (H) 9.8 - 12.8 seconds    INR 1.2 (H) 0.9 - 1.1     *Note: Due to a large number of results and/or encounters for the requested time period, some results have not been displayed. A complete set of results can be found in Results Review.          Assessment/Plan     69 y.o. 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  GI consulted with acute elevation of liver enzymes.  Possible etiologies include shock liver or ischemic hepatopathy in the setting of decreased blood flow, hypertension, other etiologies include acute viral hepatitis,?  Bile leak.    -Recommend to monitor LFTs and coags daily  -Will check labs for viral hepatitis and other etiologies  -Diet as tolerated  -Surgery follows.    I spent 45 minutes in the professional and overall care of this patient.      Opal Henderson, APRN-CNP

## 2024-07-26 NOTE — CARE PLAN
The patient's goals for the shift include  pain control    The clinical goals for the shift include safety, pain controlled, ambulation, void

## 2024-07-26 NOTE — POST-PROCEDURE NOTE
POD 0 laparoscopic cholecystectomy converted to open. Intra-op findings: dense intra-abdominal adhesions, inflamed gallbladder.     Patient is resting in bed, pain is 7/10. Pain is better than it was. She has ice pack to her abdomen. Denies any SOB, CP, N/V. Has ashley able to void post-op. Has not had anything to eat, but is not really hungry right now.       PE:  Constitutional: A&Ox3, calm and cooperative, NAD.  Eyes: PERRL, clear sclera.  ENMT: Moist mucous membranes.  Head/Neck: Neck supple.  Cardiovascular: Normal rate and regular rhythm.   Respiratory/Thorax: CTAB, No rales, rhonchi, or wheezes. Good symmetric chest expansion.   Gastrointestinal: Abdomen slightly distended, soft, appropriately tender, no peritoneal signs, laparotomy sites c/d/i, well approximated with Dermabond, no erythema or drainage.  Genitourinary: Voiding independently without difficulty.  Musculoskeletal: ROM intact.  Neurological: A&Ox3, No focal deficits.  Psychological: Appropriate mood and behavior.  Skin: Warm and dry.    Radiology and labs reviewed. VSS, afebrile.    Plan:   - Diet: CLD  - Continue current pain regimen   - PRN antiemetic   - DVT Proph: SCDs/ ambulate  - Bowel regimen: Miralax  - Monitor VS every 4 hours   - Labs ordered for AM   - IS every hour while awake   - Encourage ambulation / OOB as tolerated   - Instructed on post operative care, s/s of infection  - Monitor lap sites for drainage, erythema, excessive bruising   - Continue supportive care  - F/u with Dr. Blackwell outpatient in 10-14 days once d/c from hospital     Dispo: Pain control. Will be seen in AM by surgery team.     Total time spent 25 minutes, and greater than 50% of time was spent in counseling/coordination of care

## 2024-07-27 LAB
ALBUMIN SERPL BCP-MCNC: 3.1 G/DL (ref 3.4–5)
ALP SERPL-CCNC: 199 U/L (ref 33–136)
ALT SERPL W P-5'-P-CCNC: 791 U/L (ref 7–45)
ANION GAP SERPL CALC-SCNC: 13 MMOL/L (ref 10–20)
AST SERPL W P-5'-P-CCNC: 363 U/L (ref 9–39)
BILIRUB SERPL-MCNC: 1.6 MG/DL (ref 0–1.2)
BUN SERPL-MCNC: 10 MG/DL (ref 6–23)
CALCIUM SERPL-MCNC: 8.1 MG/DL (ref 8.6–10.3)
CHLORIDE SERPL-SCNC: 106 MMOL/L (ref 98–107)
CO2 SERPL-SCNC: 27 MMOL/L (ref 21–32)
CREAT SERPL-MCNC: 0.72 MG/DL (ref 0.5–1.05)
EGFRCR SERPLBLD CKD-EPI 2021: >90 ML/MIN/1.73M*2
ERYTHROCYTE [DISTWIDTH] IN BLOOD BY AUTOMATED COUNT: 13 % (ref 11.5–14.5)
GLUCOSE SERPL-MCNC: 91 MG/DL (ref 74–99)
HCT VFR BLD AUTO: 30.2 % (ref 36–46)
HGB BLD-MCNC: 9.8 G/DL (ref 12–16)
MCH RBC QN AUTO: 32 PG (ref 26–34)
MCHC RBC AUTO-ENTMCNC: 32.5 G/DL (ref 32–36)
MCV RBC AUTO: 99 FL (ref 80–100)
NRBC BLD-RTO: 0 /100 WBCS (ref 0–0)
PLATELET # BLD AUTO: 144 X10*3/UL (ref 150–450)
POTASSIUM SERPL-SCNC: 3.6 MMOL/L (ref 3.5–5.3)
PROT SERPL-MCNC: 4.9 G/DL (ref 6.4–8.2)
RBC # BLD AUTO: 3.06 X10*6/UL (ref 4–5.2)
SODIUM SERPL-SCNC: 142 MMOL/L (ref 136–145)
WBC # BLD AUTO: 4.9 X10*3/UL (ref 4.4–11.3)

## 2024-07-27 PROCEDURE — 99232 SBSQ HOSP IP/OBS MODERATE 35: CPT | Performed by: INTERNAL MEDICINE

## 2024-07-27 PROCEDURE — 2500000001 HC RX 250 WO HCPCS SELF ADMINISTERED DRUGS (ALT 637 FOR MEDICARE OP)

## 2024-07-27 PROCEDURE — 2500000004 HC RX 250 GENERAL PHARMACY W/ HCPCS (ALT 636 FOR OP/ED): Performed by: STUDENT IN AN ORGANIZED HEALTH CARE EDUCATION/TRAINING PROGRAM

## 2024-07-27 PROCEDURE — 1200000002 HC GENERAL ROOM WITH TELEMETRY DAILY

## 2024-07-27 PROCEDURE — 2500000002 HC RX 250 W HCPCS SELF ADMINISTERED DRUGS (ALT 637 FOR MEDICARE OP, ALT 636 FOR OP/ED): Performed by: STUDENT IN AN ORGANIZED HEALTH CARE EDUCATION/TRAINING PROGRAM

## 2024-07-27 PROCEDURE — 36415 COLL VENOUS BLD VENIPUNCTURE: CPT | Performed by: NURSE PRACTITIONER

## 2024-07-27 PROCEDURE — S0109 METHADONE ORAL 5MG: HCPCS | Performed by: STUDENT IN AN ORGANIZED HEALTH CARE EDUCATION/TRAINING PROGRAM

## 2024-07-27 PROCEDURE — 84075 ASSAY ALKALINE PHOSPHATASE: CPT | Performed by: NURSE PRACTITIONER

## 2024-07-27 PROCEDURE — 2500000001 HC RX 250 WO HCPCS SELF ADMINISTERED DRUGS (ALT 637 FOR MEDICARE OP): Performed by: STUDENT IN AN ORGANIZED HEALTH CARE EDUCATION/TRAINING PROGRAM

## 2024-07-27 PROCEDURE — 85027 COMPLETE CBC AUTOMATED: CPT | Performed by: NURSE PRACTITIONER

## 2024-07-27 RX ORDER — OXYCODONE HYDROCHLORIDE 5 MG/1
5 TABLET ORAL EVERY 4 HOURS PRN
Status: DISCONTINUED | OUTPATIENT
Start: 2024-07-27 | End: 2024-07-29 | Stop reason: HOSPADM

## 2024-07-27 RX ORDER — SIMETHICONE 80 MG
80 TABLET,CHEWABLE ORAL 4 TIMES DAILY PRN
Status: DISCONTINUED | OUTPATIENT
Start: 2024-07-27 | End: 2024-07-29 | Stop reason: HOSPADM

## 2024-07-27 RX ORDER — OXYCODONE HYDROCHLORIDE 5 MG/1
10 TABLET ORAL EVERY 4 HOURS PRN
Status: DISCONTINUED | OUTPATIENT
Start: 2024-07-27 | End: 2024-07-29 | Stop reason: HOSPADM

## 2024-07-27 ASSESSMENT — PAIN SCALES - GENERAL
PAINLEVEL_OUTOF10: 6
PAINLEVEL_OUTOF10: 7
PAINLEVEL_OUTOF10: 6
PAINLEVEL_OUTOF10: 5 - MODERATE PAIN
PAINLEVEL_OUTOF10: 2
PAINLEVEL_OUTOF10: 7
PAINLEVEL_OUTOF10: 3
PAINLEVEL_OUTOF10: 6
PAINLEVEL_OUTOF10: 7

## 2024-07-27 ASSESSMENT — COGNITIVE AND FUNCTIONAL STATUS - GENERAL
MOBILITY SCORE: 19
DAILY ACTIVITIY SCORE: 19
TOILETING: A LITTLE
HELP NEEDED FOR BATHING: A LITTLE
DRESSING REGULAR UPPER BODY CLOTHING: A LITTLE
STANDING UP FROM CHAIR USING ARMS: A LITTLE
DAILY ACTIVITIY SCORE: 24
WALKING IN HOSPITAL ROOM: A LITTLE
MOBILITY SCORE: 20
DRESSING REGULAR UPPER BODY CLOTHING: A LITTLE
DRESSING REGULAR LOWER BODY CLOTHING: A LITTLE
STANDING UP FROM CHAIR USING ARMS: A LITTLE
TOILETING: A LITTLE
MOBILITY SCORE: 23
CLIMB 3 TO 5 STEPS WITH RAILING: A LOT
DAILY ACTIVITIY SCORE: 19
PERSONAL GROOMING: A LITTLE
DRESSING REGULAR LOWER BODY CLOTHING: A LITTLE
CLIMB 3 TO 5 STEPS WITH RAILING: A LOT
WALKING IN HOSPITAL ROOM: A LITTLE
PERSONAL GROOMING: A LITTLE
MOVING TO AND FROM BED TO CHAIR: A LITTLE
CLIMB 3 TO 5 STEPS WITH RAILING: A LITTLE
HELP NEEDED FOR BATHING: A LITTLE

## 2024-07-27 ASSESSMENT — PAIN SCALES - PAIN ASSESSMENT IN ADVANCED DEMENTIA (PAINAD): TOTALSCORE: MEDICATION (SEE MAR)

## 2024-07-27 ASSESSMENT — PAIN DESCRIPTION - ORIENTATION: ORIENTATION: RIGHT

## 2024-07-27 ASSESSMENT — PAIN SCALES - WONG BAKER: WONGBAKER_NUMERICALRESPONSE: HURTS LITTLE BIT

## 2024-07-27 ASSESSMENT — PAIN DESCRIPTION - LOCATION
LOCATION: ABDOMEN

## 2024-07-27 ASSESSMENT — PAIN - FUNCTIONAL ASSESSMENT
PAIN_FUNCTIONAL_ASSESSMENT: 0-10
PAIN_FUNCTIONAL_ASSESSMENT: 0-10

## 2024-07-27 NOTE — CARE PLAN
The patient's goals for the shift include   tolerating diet    The clinical goals for the shift include pain controlled, HDS    Over the shift, the patient did  make progress toward the following goals.

## 2024-07-27 NOTE — CARE PLAN
The patient's goals for the shift include  pain control    The clinical goals for the shift include pain controlled, HDS

## 2024-07-27 NOTE — PROGRESS NOTES
"Saba Park is a 69 y.o. female on day 1 of admission presenting with Cholecystitis.    Subjective   Having some upset stomach today and overnight, thinks she restarted her diet too quickly.       Objective     Physical Exam  Constitutional:       General: She is not in acute distress.  Eyes:      Extraocular Movements: Extraocular movements intact.   Abdominal:      General: Bowel sounds are normal. There is no distension.      Palpations: Abdomen is soft.      Tenderness: There is abdominal tenderness. There is no guarding or rebound.   Skin:     General: Skin is warm and dry.   Neurological:      General: No focal deficit present.      Mental Status: She is alert.   Psychiatric:         Mood and Affect: Mood normal.         Behavior: Behavior normal.         Last Recorded Vitals  Blood pressure 110/69, pulse 67, temperature 36.9 °C (98.4 °F), temperature source Oral, resp. rate 18, height 1.6 m (5' 3\"), weight 63 kg (139 lb), SpO2 94%.  Intake/Output last 3 Shifts:  I/O last 3 completed shifts:  In: 1803.3 (28.6 mL/kg) [P.O.:360; I.V.:1293.3 (20.5 mL/kg); IV Piggyback:150]  Out: 1200 (19 mL/kg) [Urine:1200 (0.5 mL/kg/hr)]  Weight: 63 kg     Relevant Results    Scheduled medications  acetaminophen, 650 mg, oral, 4x daily  buPROPion XL, 300 mg, oral, Daily  busPIRone, 5 mg, oral, BID  DULoxetine, 60 mg, oral, q PM  enoxaparin, 40 mg, subcutaneous, q24h  ketorolac, 15 mg, intravenous, q6h  methadone, 10 mg, oral, BID  piperacillin-tazobactam, 3.375 g, intravenous, q6h  polyethylene glycol, 17 g, oral, Daily  pregabalin, 50 mg, oral, TID  primidone, 175 mg, oral, Nightly  propranolol, 60 mg, oral, BID      Continuous medications  lactated Ringer's, 100 mL/hr, Last Rate: 100 mL/hr (07/26/24 0161)      PRN medications  PRN medications: HYDROmorphone, ondansetron **OR** ondansetron, oxyCODONE, oxyCODONE, prochlorperazine **OR** prochlorperazine **OR** prochlorperazine    Results for orders placed or performed during " the hospital encounter of 07/25/24 (from the past 24 hour(s))   CBC   Result Value Ref Range    WBC 4.9 4.4 - 11.3 x10*3/uL    nRBC 0.0 0.0 - 0.0 /100 WBCs    RBC 3.06 (L) 4.00 - 5.20 x10*6/uL    Hemoglobin 9.8 (L) 12.0 - 16.0 g/dL    Hematocrit 30.2 (L) 36.0 - 46.0 %    MCV 99 80 - 100 fL    MCH 32.0 26.0 - 34.0 pg    MCHC 32.5 32.0 - 36.0 g/dL    RDW 13.0 11.5 - 14.5 %    Platelets 144 (L) 150 - 450 x10*3/uL   Comprehensive Metabolic Panel   Result Value Ref Range    Glucose 91 74 - 99 mg/dL    Sodium 142 136 - 145 mmol/L    Potassium 3.6 3.5 - 5.3 mmol/L    Chloride 106 98 - 107 mmol/L    Bicarbonate 27 21 - 32 mmol/L    Anion Gap 13 10 - 20 mmol/L    Urea Nitrogen 10 6 - 23 mg/dL    Creatinine 0.72 0.50 - 1.05 mg/dL    eGFR >90 >60 mL/min/1.73m*2    Calcium 8.1 (L) 8.6 - 10.3 mg/dL    Albumin 3.1 (L) 3.4 - 5.0 g/dL    Alkaline Phosphatase 199 (H) 33 - 136 U/L    Total Protein 4.9 (L) 6.4 - 8.2 g/dL     (H) 9 - 39 U/L    Bilirubin, Total 1.6 (H) 0.0 - 1.2 mg/dL     (H) 7 - 45 U/L     *Note: Due to a large number of results and/or encounters for the requested time period, some results have not been displayed. A complete set of results can be found in Results Review.       US right upper quadrant    Result Date: 7/25/2024  Interpreted By:  Dwight Elkins, STUDY: US RIGHT UPPER QUADRANT  7/25/2024 8:17 am   INDICATION: 68 y/o   F with  Signs/Symptoms:pain.   COMPARISON: Correlation with CT scan from 07/25/2024. In   ACCESSION NUMBER(S): [id number]   ORDERING CLINICIAN: RICK ALVES   TECHNIQUE: Routine ultrasound of the right upper quadrant was performed using grayscale imaging, color Doppler, and spectral Doppler.   FINDINGS: LIVER: Craniocaudal length: 15.2 CM.  This is  within normal limits. Echogenicity:  Normal. Mass:  None   GALLBLADDER: Sludge and stones fill the gallbladder. Borderline gallbladder wall thickening at 3 mm thickness. There was a positive sonographic Al sign. Trace  pericholecystic fluid.   BILE DUCTS: No intrahepatic biliary ductal dilatation. Common bile duct measured 0.7 CM   in diameter. This is mildly dilated.   PANCREAS: The pancreas was obscured by bowel gas..   RIGHT KIDNEY: Craniocaudal length:  10.0 CM , Within normal limits of size for age. Echogenicity was normal. There was a nonobstructing stone in the right renal pelvis and or multiple stones in the right kidney on CT scan. On ultrasound, only a thin 8 mm upper pole stone could be identified. No hydronephrosis,   or perinephric edema. No gross right renal mass.   PERITONEAL FLUID: None seen in the right upper quadrant.         Sonographic findings consistent with cholelithiasis and acute cholecystitis.   Of the several nonobstructing right renal stones evident on earlier CT scan, only the 8 mm upper pole stone could be identified. No hydronephrosis.   Obscuration of the pancreas by bowel gas and body habitus.   MACRO: None   Signed by: Dwight Elkins 7/25/2024 8:36 AM Dictation workstation:   [id number]    CT abdomen pelvis w IV contrast    Result Date: 7/25/2024  STUDY: CT Abdomen and Pelvis with IV Contrast; 07/25/2024 5:50 AM INDICATION: Abdominal pain. COMPARISON: CT abdomen 06/17/2020, 06/16/2020. ACCESSION NUMBER(S): [id number] ORDERING CLINICIAN: DELILAH DOUGLAS TECHNIQUE: CT of the abdomen and pelvis was performed.  Contiguous axial images were obtained at 3 mm slice thickness through the abdomen and pelvis. Coronal and sagittal reconstructions at 3 mm slice thickness were performed.  Omnipaque 350 75 mL was administered intravenously.  FINDINGS: LOWER CHEST: No cardiomegaly.  No pericardial effusion.  Lung bases are clear. There are 2 lymph nodes at the right cardiophrenic angle.  The larger lymph node measures 2.2 cm; series 604, image 21.  These are new from the prior CT.  There is a fat-containing Bochdalek hernia on the right.  LIVER: No hepatomegaly.  Smooth surface contour.  Normal attenuation.   BILE DUCTS: No intrahepatic or extrahepatic biliary ductal dilatation.  GALLBLADDER: The gallbladder contains several small stones and sludge.  There is a small quantity of pericholecystic fluid.  The appearance is suggestive of cholecystitis.  The findings are new from the prior CT. STOMACH: No abnormalities identified.  PANCREAS: No masses or ductal dilatation.  SPLEEN: No splenomegaly or focal splenic lesion.  ADRENAL GLANDS: No thickening or nodules.  KIDNEYS AND URETERS: There is a 3.3 cm lower pole cyst of the left kidney.  Other tiny cortical lesions of the right kidney are statistically cysts but too small to characterize.  There are several calyceal stones of the right kidney.  The largest measures approximately 4 mm.  There is a 3 to 4 mm stone in the right renal pelvis.  The measurement is obtained on the coronal reconstructed images.  No hydronephrosis.  Previously noted left UPJ stone has resolved.  PELVIS:  There is streak artifact from a right hip replacement.  BLADDER: No abnormalities identified.  REPRODUCTIVE ORGANS: The uterus is not identified and is presumed surgically absent.  No abnormalities identified.  BOWEL: There is a surgical staple line of a small bowel loop, unchanged.  The technologist provided no surgical detail.  There is no information about what was performed, when it was performed or why it was performed.  VESSELS: Abdominal aorta is normal in caliber.  PERITONEUM/RETROPERITONEUM/LYMPH NODES: No free fluid.  No pneumoperitoneum. No lymphadenopathy.  ABDOMINAL WALL: No abnormalities identified. SOFT TISSUES: No abnormalities identified.  BONES: There are moderate degenerative changes of the spine.  The appearance of T11, T12 and L1 suggests hemangiomas.  No acute fracture or aggressive osseous lesion.    1.  The gallbladder contains several small stones and sludge.  There is a small quantity of pericholecystic fluid.  The appearance is suggestive of cholecystitis.  The findings  are new from the prior CT. No dilated bile ducts. 2.  There are 2 lymph nodes at the right cardiophrenic angle.  The larger lymph node measures 2.2 cm.  These are new from the prior CT. Consideration could be given to follow-up with chest CT. 3.  There is a 3 to 4 mm stone of the right renal pelvis and several small right-sided calyceal stones.  No hydronephrosis. 4.  No ascites, free air or bowel obstruction. Signed by Morteza Santiago MD        Assessment/Plan   Principal Problem:    Cholecystitis    70 y/o F with PMHx of ovarian Ca, CNS lymphoma presenting due to acute-onset RUQ pain, with imaging demonstrating cholecystitis. She was taken to the OR 7/25. GI consulted for markedly increased transaminases after surgery, however today the AST/ALT are improving (bilirubin is slightly higher, but this will lag behind the transaminases).  Given the dramatic acute rise in transaminases after surgery, the suspicion would be for some form of ischemic hepatitis. Other labs for causes of acute hepatitis have been negative (await smooth muscle Ab).  Suspect labs will continue to improve.    - check LFTs tomorrow, if still declining she could be discharged and could follow up LFTs with her PCP  - do not suspect underlying chronic liver disease that would require long-term follow up  - we also discussed a colonoscopy, and I gave her my information to set up a routine outpatient colonoscopy at some point  - will sign off      Randall Teran MD

## 2024-07-27 NOTE — PROGRESS NOTES
"Doing well    Kevan diet    No bm     Pain under good control    /79 (BP Location: Left arm, Patient Position: Sitting)   Pulse 67   Temp 37.1 °C (98.7 °F) (Oral)   Resp 18   Ht 1.6 m (5' 3\")   Wt 63 kg (139 lb)   SpO2 95%   BMI 24.62 kg/m²     She is comfortable,   Incisions c/d/i    Lab Results   Component Value Date    WBC 4.9 07/27/2024    HGB 9.8 (L) 07/27/2024    HCT 30.2 (L) 07/27/2024    MCV 99 07/27/2024     (L) 07/27/2024     Lab Results   Component Value Date    GLUCOSE 91 07/27/2024     07/27/2024    K 3.6 07/27/2024     07/27/2024    CO2 27 07/27/2024    ANIONGAP 13 07/27/2024    BUN 10 07/27/2024    CREATININE 0.72 07/27/2024    EGFR >90 07/27/2024    CALCIUM 8.1 (L) 07/27/2024    ALBUMIN 3.1 (L) 07/27/2024    ALKPHOS 199 (H) 07/27/2024    PROT 4.9 (L) 07/27/2024     (H) 07/27/2024    BILITOT 1.6 (H) 07/27/2024     (H) 07/27/2024                   POD #2 open cholecystectomy.  Doing well    LFT's slightly up.  Pattern suggestive of hepatocellular injury, less likely obstructiive.  Will follow    Encourage up, ambulate.     "

## 2024-07-28 LAB
ALBUMIN SERPL BCP-MCNC: 2.9 G/DL (ref 3.4–5)
ALP SERPL-CCNC: 226 U/L (ref 33–136)
ALT SERPL W P-5'-P-CCNC: 625 U/L (ref 7–45)
ANION GAP SERPL CALC-SCNC: 11 MMOL/L (ref 10–20)
AST SERPL W P-5'-P-CCNC: 194 U/L (ref 9–39)
BACTERIA UR CULT: NO GROWTH
BILIRUB SERPL-MCNC: 1 MG/DL (ref 0–1.2)
BUN SERPL-MCNC: 13 MG/DL (ref 6–23)
CALCIUM SERPL-MCNC: 7.8 MG/DL (ref 8.6–10.3)
CHLORIDE SERPL-SCNC: 106 MMOL/L (ref 98–107)
CO2 SERPL-SCNC: 25 MMOL/L (ref 21–32)
CREAT SERPL-MCNC: 0.85 MG/DL (ref 0.5–1.05)
EGFRCR SERPLBLD CKD-EPI 2021: 74 ML/MIN/1.73M*2
ERYTHROCYTE [DISTWIDTH] IN BLOOD BY AUTOMATED COUNT: 13.2 % (ref 11.5–14.5)
GLUCOSE SERPL-MCNC: 125 MG/DL (ref 74–99)
HCT VFR BLD AUTO: 29.3 % (ref 36–46)
HGB BLD-MCNC: 9.6 G/DL (ref 12–16)
MCH RBC QN AUTO: 32.2 PG (ref 26–34)
MCHC RBC AUTO-ENTMCNC: 32.8 G/DL (ref 32–36)
MCV RBC AUTO: 98 FL (ref 80–100)
NRBC BLD-RTO: 0 /100 WBCS (ref 0–0)
PLATELET # BLD AUTO: 138 X10*3/UL (ref 150–450)
POTASSIUM SERPL-SCNC: 3.4 MMOL/L (ref 3.5–5.3)
PROT SERPL-MCNC: 4.7 G/DL (ref 6.4–8.2)
RBC # BLD AUTO: 2.98 X10*6/UL (ref 4–5.2)
SODIUM SERPL-SCNC: 139 MMOL/L (ref 136–145)
WBC # BLD AUTO: 6.4 X10*3/UL (ref 4.4–11.3)

## 2024-07-28 PROCEDURE — 80053 COMPREHEN METABOLIC PANEL: CPT

## 2024-07-28 PROCEDURE — 85027 COMPLETE CBC AUTOMATED: CPT

## 2024-07-28 PROCEDURE — S0109 METHADONE ORAL 5MG: HCPCS | Performed by: STUDENT IN AN ORGANIZED HEALTH CARE EDUCATION/TRAINING PROGRAM

## 2024-07-28 PROCEDURE — 2500000004 HC RX 250 GENERAL PHARMACY W/ HCPCS (ALT 636 FOR OP/ED): Performed by: STUDENT IN AN ORGANIZED HEALTH CARE EDUCATION/TRAINING PROGRAM

## 2024-07-28 PROCEDURE — 2500000002 HC RX 250 W HCPCS SELF ADMINISTERED DRUGS (ALT 637 FOR MEDICARE OP, ALT 636 FOR OP/ED): Performed by: STUDENT IN AN ORGANIZED HEALTH CARE EDUCATION/TRAINING PROGRAM

## 2024-07-28 PROCEDURE — 1100000001 HC PRIVATE ROOM DAILY

## 2024-07-28 PROCEDURE — 2500000002 HC RX 250 W HCPCS SELF ADMINISTERED DRUGS (ALT 637 FOR MEDICARE OP, ALT 636 FOR OP/ED): Performed by: FAMILY MEDICINE

## 2024-07-28 PROCEDURE — 87493 C DIFF AMPLIFIED PROBE: CPT | Mod: AHULAB | Performed by: FAMILY MEDICINE

## 2024-07-28 PROCEDURE — 2500000001 HC RX 250 WO HCPCS SELF ADMINISTERED DRUGS (ALT 637 FOR MEDICARE OP): Performed by: STUDENT IN AN ORGANIZED HEALTH CARE EDUCATION/TRAINING PROGRAM

## 2024-07-28 PROCEDURE — 36415 COLL VENOUS BLD VENIPUNCTURE: CPT

## 2024-07-28 PROCEDURE — 2500000001 HC RX 250 WO HCPCS SELF ADMINISTERED DRUGS (ALT 637 FOR MEDICARE OP)

## 2024-07-28 RX ORDER — POTASSIUM CHLORIDE 20 MEQ/1
40 TABLET, EXTENDED RELEASE ORAL ONCE
Status: COMPLETED | OUTPATIENT
Start: 2024-07-28 | End: 2024-07-28

## 2024-07-28 ASSESSMENT — PAIN SCALES - GENERAL
PAINLEVEL_OUTOF10: 6
PAINLEVEL_OUTOF10: 6
PAINLEVEL_OUTOF10: 5 - MODERATE PAIN
PAINLEVEL_OUTOF10: 5 - MODERATE PAIN
PAINLEVEL_OUTOF10: 7

## 2024-07-28 ASSESSMENT — PAIN DESCRIPTION - ORIENTATION: ORIENTATION: RIGHT;LEFT;MID

## 2024-07-28 ASSESSMENT — PAIN DESCRIPTION - LOCATION
LOCATION: ABDOMEN

## 2024-07-28 ASSESSMENT — COGNITIVE AND FUNCTIONAL STATUS - GENERAL
HELP NEEDED FOR BATHING: A LITTLE
DAILY ACTIVITIY SCORE: 21
DRESSING REGULAR UPPER BODY CLOTHING: A LITTLE
CLIMB 3 TO 5 STEPS WITH RAILING: A LITTLE
DRESSING REGULAR LOWER BODY CLOTHING: A LITTLE
MOBILITY SCORE: 23

## 2024-07-28 NOTE — CARE PLAN
Problem: Fall/Injury  Goal: Not fall by end of shift  Outcome: Progressing  Goal: Be free from injury by end of the shift  Outcome: Progressing  Goal: Verbalize understanding of personal risk factors for fall in the hospital  Outcome: Progressing  Goal: Verbalize understanding of risk factor reduction measures to prevent injury from fall in the home  Outcome: Progressing  Goal: Use assistive devices by end of the shift  Outcome: Progressing  Goal: Pace activities to prevent fatigue by end of the shift  Outcome: Progressing   The patient's goals for the shift include      The clinical goals for the shift include safety and pain controlled

## 2024-07-28 NOTE — PROGRESS NOTES
"Had a difficult night but feeling better this am    Kevan regular diet    Had loose bm      /80 (BP Location: Left arm, Patient Position: Sitting)   Pulse 68   Temp 36.9 °C (98.5 °F) (Oral)   Resp 16   Ht 1.6 m (5' 3\")   Wt 63 kg (139 lb)   SpO2 96%   BMI 24.62 kg/m²   Abdomen soft, incisions clean    Lab Results   Component Value Date    WBC 6.4 07/28/2024    HGB 9.6 (L) 07/28/2024    HCT 29.3 (L) 07/28/2024    MCV 98 07/28/2024     (L) 07/28/2024     Lab Results   Component Value Date    GLUCOSE 125 (H) 07/28/2024     07/28/2024    K 3.4 (L) 07/28/2024     07/28/2024    CO2 25 07/28/2024    ANIONGAP 11 07/28/2024    BUN 13 07/28/2024    CREATININE 0.85 07/28/2024    EGFR 74 07/28/2024    CALCIUM 7.8 (L) 07/28/2024    ALBUMIN 2.9 (L) 07/28/2024    ALKPHOS 226 (H) 07/28/2024    PROT 4.7 (L) 07/28/2024     (H) 07/28/2024    BILITOT 1.0 07/28/2024     (H) 07/28/2024       POD #2 open Cholecystectomy for acute cholecysitis     LFT's starting to trend down    Encourage ambulation, Incentive spirometer      "

## 2024-07-28 NOTE — PROGRESS NOTES
Saba Park is a 69 y.o. female on day 1 of admission presenting with Cholecystitis.      Subjective   Feeling better  However still with pain in abdo   Tolerating diet   No fever  Nochills       Objective     Last Recorded Vitals  /74 (BP Location: Left arm, Patient Position: Lying)   Pulse 68   Temp 36.9 °C (98.5 °F) (Temporal)   Resp 16   Wt 63 kg (139 lb)   SpO2 96%   Intake/Output last 3 Shifts:        Admission Weight  Weight: 63 kg (139 lb) (07/25/24 0239)    Daily Weight  07/25/24 : 63 kg (139 lb)    Image Results  ECG 12 lead  Normal sinus rhythm  Normal ECG  When compared with ECG of 05-APR-2023 12:43,  ST elevation now present in Inferior leads  Inverted T waves have replaced nonspecific T wave abnormality in Anterior leads  See ED provider note for full interpretation and clinical correlation  Confirmed by Valerie Choi ([id number]) on 7/25/2024 10:14:09 AM  CT chest wo IV contrast  Narrative: Interpreted By:  Dwight Elkins,   STUDY:  CT CHEST WO IV CONTRAST;  7/25/2024 8:15 am      INDICATION:  68 y/o   F with  Signs/Symptoms:lymphnodes 2cm.      LIMITATIONS:  Imaging of the mediastinum and olga is limited without the use of IV  contrast..      ACCESSION NUMBER(S):  [id number]      ORDERING CLINICIAN:  WES MCLEAN      TECHNIQUE:  Noncontrast Thin-section images were obtained  from the thoracic  inlet down through the diaphragm. Sagittal and coronal reconstruction  images were generated. Mediastinal, lung, bone, and liver windows  were reviewed.      COMPARISON:  Most recent prior is included a coronary artery calcium score exam  from 11/04/2023 and CT scan abdomen and pelvis and chest dated  09/11/2019.      FINDINGS:  CHEST WALL/BASE OF THE NECK:  The chest wall was grossly intact.  No thyromegaly or thyroid mass.      LUNGS/ PLEURA/ AND TRACHEA:  No mass or pneumonia in either lung.  No pleural effusion.  No pneumothorax.  The trachea was grossly intact.  Incidental note of a small  fat containing posteromedial right-sided  diaphragmatic hernia.      MEDIASTINUM/NGA:  There is no suspicious hilar or axillary adenopathy in this exam.  There is no suspicious central mediastinal adenopathy. However, there  are 2 adjacent right-sided pericardial lymph nodes. The larger  measures 25 x 18 mm, not present on 09/11/2019, and increased from 20  x 16 mm on the cardiac score exam from 11/04/2023. The adjacent 9 mm  lymph node is stable since 11/04/2023 and was not present on  09/11/2019. No cardiomegaly.  No pericardial effusion.  No thoracic aortic aneurysm.      BONES:  No destructive lytic or blastic bone lesion. There are however  vertebral body hemangiomas of bone involving C7, T6, T11, and to a  lesser extent T12 vertebral bodies.      UPPER ABDOMEN:  For information regarding the upper abdomen, please see the report  for CT scan abdomen with contrast done at 5:50 a.m. earlier today. Of  note was sludge and stones filling the gallbladder and the presence  of nonobstructing stones in the right kidney.      Impression: There are 2 right-sided pericardial lymph nodes, neither of which was  present on 09/11/2019. The can be seen in retrospect on 11/04/2023.  The larger lymph node is mildly larger today. The smaller lymph node  is stable since 11/04/2023. Exact etiology and clinical significance  are uncertain. However, recommend PET-CT scan to evaluate for  possible lymphoma.      No other thoracic adenopathy in this exam.      Small fat containing posteromedial right-sided diaphragmatic hernia.  No mass or pneumonia in either lung.      Multilevel vertebral body hemangiomas of bone. See above for details.      Stone and sludge filled gallbladder.      Nonobstructing right renal stones.      MACRO:  None      Signed by: Dwight Elkins 7/25/2024 8:51 AM  Dictation workstation:   [id number]  US right upper quadrant  Narrative: Interpreted By:  Dwight Elkins,   STUDY:  US RIGHT UPPER QUADRANT  7/25/2024  8:17 am      INDICATION:  68 y/o   F with  Signs/Symptoms:pain.      COMPARISON:  Correlation with CT scan from 07/25/2024. In      ACCESSION NUMBER(S):  [id number]      ORDERING CLINICIAN:  RICK ALVES      TECHNIQUE:  Routine ultrasound of the right upper quadrant was performed using  grayscale imaging, color Doppler, and spectral Doppler.      FINDINGS:  LIVER:  Craniocaudal length: 15.2 CM.  This is  within normal limits.  Echogenicity:  Normal.  Mass:  None      GALLBLADDER:  Sludge and stones fill the gallbladder. Borderline gallbladder wall  thickening at 3 mm thickness. There was a positive sonographic Al  sign. Trace pericholecystic fluid.      BILE DUCTS:  No intrahepatic biliary ductal dilatation.  Common bile duct measured 0.7 CM   in diameter. This is mildly  dilated.      PANCREAS:  The pancreas was obscured by bowel gas..      RIGHT KIDNEY:  Craniocaudal length:  10.0 CM , Within normal limits of size for age.  Echogenicity was normal. There was a nonobstructing stone in the  right renal pelvis and or multiple stones in the right kidney on CT  scan. On ultrasound, only a thin 8 mm upper pole stone could be  identified. No hydronephrosis,   or perinephric edema.  No gross right renal mass.      PERITONEAL FLUID:  None seen in the right upper quadrant.          Impression: Sonographic findings consistent with cholelithiasis and acute  cholecystitis.      Of the several nonobstructing right renal stones evident on earlier  CT scan, only the 8 mm upper pole stone could be identified. No  hydronephrosis.      Obscuration of the pancreas by bowel gas and body habitus.      MACRO:  None      Signed by: Dwight Elkins 7/25/2024 8:36 AM  Dictation workstation:   [id number]  CT abdomen pelvis w IV contrast  Narrative: STUDY:  CT Abdomen and Pelvis with IV Contrast; 07/25/2024 5:50 AM  INDICATION:  Abdominal pain.  COMPARISON:  CT abdomen 06/17/2020, 06/16/2020.  ACCESSION  NUMBER(S):  [id number]  ORDERING CLINICIAN:  DELILAH DOUGLAS  TECHNIQUE:  CT of the abdomen and pelvis was performed.  Contiguous axial images  were obtained at 3 mm slice thickness through the abdomen and pelvis.   Coronal and sagittal reconstructions at 3 mm slice thickness were  performed.  Omnipaque 350 75 mL was administered intravenously.    FINDINGS:  LOWER CHEST:  No cardiomegaly.  No pericardial effusion.  Lung bases are clear.  There are 2 lymph nodes at the right cardiophrenic angle.  The larger  lymph node measures 2.2 cm; series 604, image 21.  These are new from  the prior CT.  There is a fat-containing Bochdalek hernia on the  right.     LIVER:  No hepatomegaly.  Smooth surface contour.  Normal attenuation.     BILE DUCTS:  No intrahepatic or extrahepatic biliary ductal dilatation.     GALLBLADDER:  The gallbladder contains several small stones and sludge.  There is a  small quantity of pericholecystic fluid.  The appearance is suggestive  of cholecystitis.  The findings are new from the prior CT.  STOMACH:  No abnormalities identified.     PANCREAS:  No masses or ductal dilatation.     SPLEEN:  No splenomegaly or focal splenic lesion.     ADRENAL GLANDS:  No thickening or nodules.     KIDNEYS AND URETERS:  There is a 3.3 cm lower pole cyst of the left kidney.  Other tiny  cortical lesions of the right kidney are statistically cysts but too  small to characterize.  There are several calyceal stones of the right  kidney.  The largest measures approximately 4 mm.  There is a 3 to 4  mm stone in the right renal pelvis.  The measurement is obtained on  the coronal reconstructed images.  No hydronephrosis.  Previously  noted left UPJ stone has resolved.     PELVIS:    There is streak artifact from a right hip replacement.     BLADDER:  No abnormalities identified.     REPRODUCTIVE ORGANS:  The uterus is not identified and is presumed surgically absent.  No  abnormalities identified.     BOWEL:  There  is a surgical staple line of a small bowel loop, unchanged.  The  technologist provided no surgical detail.  There is no information  about what was performed, when it was performed or why it was  performed.      VESSELS:  Abdominal aorta is normal in caliber.      PERITONEUM/RETROPERITONEUM/LYMPH NODES:  No free fluid.  No pneumoperitoneum. No lymphadenopathy.     ABDOMINAL WALL:  No abnormalities identified.  SOFT TISSUES:   No abnormalities identified.     BONES:  There are moderate degenerative changes of the spine.  The appearance  of T11, T12 and L1 suggests hemangiomas.  No acute fracture or  aggressive osseous lesion.  Impression: 1.  The gallbladder contains several small stones and sludge.  There  is a small quantity of pericholecystic fluid.  The appearance is  suggestive of cholecystitis.  The findings are new from the prior CT.   No dilated bile ducts.  2.  There are 2 lymph nodes at the right cardiophrenic angle.  The  larger lymph node measures 2.2 cm.  These are new from the prior CT.   Consideration could be given to follow-up with chest CT.  3.  There is a 3 to 4 mm stone of the right renal pelvis and several  small right-sided calyceal stones.  No hydronephrosis.  4.  No ascites, free air or bowel obstruction.  Signed by Morteza Santiago MD      Physical Exam  Well developed well nurished   No distress  Chest clear  CVS regular  Ext no edema  Abdo soft nontender, does have surg incision dry  Bs active    Relevant Results    Scheduled medications  acetaminophen, 650 mg, oral, 4x daily  buPROPion XL, 300 mg, oral, Daily  busPIRone, 5 mg, oral, BID  DULoxetine, 60 mg, oral, q PM  enoxaparin, 40 mg, subcutaneous, q24h  ketorolac, 15 mg, intravenous, q6h  methadone, 10 mg, oral, BID  piperacillin-tazobactam, 3.375 g, intravenous, q6h  polyethylene glycol, 17 g, oral, Daily  pregabalin, 50 mg, oral, TID  primidone, 175 mg, oral, Nightly  propranolol, 60 mg, oral, BID      Continuous medications  lactated  Ringer's, 100 mL/hr, Last Rate: 100 mL/hr (07/27/24 1956)      PRN medications  PRN medications: HYDROmorphone, ondansetron **OR** ondansetron, oxyCODONE, oxyCODONE, prochlorperazine **OR** prochlorperazine **OR** prochlorperazine, simethicone  Results for orders placed or performed during the hospital encounter of 07/25/24 (from the past 96 hour(s))   CBC and Auto Differential   Result Value Ref Range    WBC 11.0 4.4 - 11.3 x10*3/uL    nRBC 0.0 0.0 - 0.0 /100 WBCs    RBC 4.14 4.00 - 5.20 x10*6/uL    Hemoglobin 13.3 12.0 - 16.0 g/dL    Hematocrit 38.8 36.0 - 46.0 %    MCV 94 80 - 100 fL    MCH 32.1 26.0 - 34.0 pg    MCHC 34.3 32.0 - 36.0 g/dL    RDW 12.4 11.5 - 14.5 %    Platelets 230 150 - 450 x10*3/uL    Neutrophils % 78.0 40.0 - 80.0 %    Immature Granulocytes %, Automated 0.5 0.0 - 0.9 %    Lymphocytes % 15.8 13.0 - 44.0 %    Monocytes % 5.0 2.0 - 10.0 %    Eosinophils % 0.3 0.0 - 6.0 %    Basophils % 0.4 0.0 - 2.0 %    Neutrophils Absolute 8.56 (H) 1.20 - 7.70 x10*3/uL    Immature Granulocytes Absolute, Automated 0.05 0.00 - 0.70 x10*3/uL    Lymphocytes Absolute 1.73 1.20 - 4.80 x10*3/uL    Monocytes Absolute 0.55 0.10 - 1.00 x10*3/uL    Eosinophils Absolute 0.03 0.00 - 0.70 x10*3/uL    Basophils Absolute 0.04 0.00 - 0.10 x10*3/uL   Comprehensive metabolic panel   Result Value Ref Range    Glucose 143 (H) 74 - 99 mg/dL    Sodium 141 136 - 145 mmol/L    Potassium 5.4 (H) 3.5 - 5.3 mmol/L    Chloride 104 98 - 107 mmol/L    Bicarbonate 27 21 - 32 mmol/L    Anion Gap 15 10 - 20 mmol/L    Urea Nitrogen 25 (H) 6 - 23 mg/dL    Creatinine 0.67 0.50 - 1.05 mg/dL    eGFR >90 >60 mL/min/1.73m*2    Calcium 9.3 8.6 - 10.3 mg/dL    Albumin 4.1 3.4 - 5.0 g/dL    Alkaline Phosphatase 90 33 - 136 U/L    Total Protein 6.3 (L) 6.4 - 8.2 g/dL    AST 12 9 - 39 U/L    Bilirubin, Total 0.3 0.0 - 1.2 mg/dL    ALT 28 7 - 45 U/L   Lipase   Result Value Ref Range    Lipase 10 9 - 82 U/L   Troponin I, High Sensitivity   Result Value Ref  Range    Troponin I, High Sensitivity 4 0 - 13 ng/L   Urinalysis with Reflex Culture and Microscopic   Result Value Ref Range    Color, Urine Light-Yellow Light-Yellow, Yellow, Dark-Yellow    Appearance, Urine Clear Clear    Specific Gravity, Urine 1.020 1.005 - 1.035    pH, Urine 6.5 5.0, 5.5, 6.0, 6.5, 7.0, 7.5, 8.0    Protein, Urine 10 (TRACE) NEGATIVE, 10 (TRACE), 20 (TRACE) mg/dL    Glucose, Urine Normal Normal mg/dL    Blood, Urine NEGATIVE NEGATIVE    Ketones, Urine 40 (2+) (A) NEGATIVE mg/dL    Bilirubin, Urine NEGATIVE NEGATIVE    Urobilinogen, Urine Normal Normal mg/dL    Nitrite, Urine NEGATIVE NEGATIVE    Leukocyte Esterase, Urine NEGATIVE NEGATIVE   Extra Urine Gray Tube   Result Value Ref Range    Extra Tube Hold for add-ons.    Urinalysis Microscopic   Result Value Ref Range    WBC, Urine 1-5 1-5, NONE /HPF    RBC, Urine 3-5 NONE, 1-2, 3-5 /HPF    Mucus, Urine FEW Reference range not established. /LPF   ECG 12 lead   Result Value Ref Range    Ventricular Rate 61 BPM    Atrial Rate 61 BPM    VA Interval 126 ms    QRS Duration 84 ms    QT Interval 458 ms    QTC Calculation(Bazett) 461 ms    P Axis 49 degrees    R Axis 45 degrees    T Axis 36 degrees    QRS Count 10 beats    Q Onset 215 ms    P Onset 152 ms    P Offset 201 ms    T Offset 444 ms    QTC Fredericia 460 ms   CBC   Result Value Ref Range    WBC 6.3 4.4 - 11.3 x10*3/uL    nRBC 0.0 0.0 - 0.0 /100 WBCs    RBC 3.36 (L) 4.00 - 5.20 x10*6/uL    Hemoglobin 10.9 (L) 12.0 - 16.0 g/dL    Hematocrit 32.3 (L) 36.0 - 46.0 %    MCV 96 80 - 100 fL    MCH 32.4 26.0 - 34.0 pg    MCHC 33.7 32.0 - 36.0 g/dL    RDW 12.9 11.5 - 14.5 %    Platelets 175 150 - 450 x10*3/uL   Comprehensive metabolic panel   Result Value Ref Range    Glucose 110 (H) 74 - 99 mg/dL    Sodium 137 136 - 145 mmol/L    Potassium 3.8 3.5 - 5.3 mmol/L    Chloride 101 98 - 107 mmol/L    Bicarbonate 28 21 - 32 mmol/L    Anion Gap 12 10 - 20 mmol/L    Urea Nitrogen 15 6 - 23 mg/dL    Creatinine  0.80 0.50 - 1.05 mg/dL    eGFR 80 >60 mL/min/1.73m*2    Calcium 8.1 (L) 8.6 - 10.3 mg/dL    Albumin 3.3 (L) 3.4 - 5.0 g/dL    Alkaline Phosphatase 213 (H) 33 - 136 U/L    Total Protein 4.8 (L) 6.4 - 8.2 g/dL     (H) 9 - 39 U/L    Bilirubin, Total 1.0 0.0 - 1.2 mg/dL     (H) 7 - 45 U/L   Urinalysis with Reflex Culture and Microscopic   Result Value Ref Range    Color, Urine Yellow Light-Yellow, Yellow, Dark-Yellow    Appearance, Urine Clear Clear    Specific Gravity, Urine 1.014 1.005 - 1.035    pH, Urine 6.0 5.0, 5.5, 6.0, 6.5, 7.0, 7.5, 8.0    Protein, Urine 10 (TRACE) NEGATIVE, 10 (TRACE), 20 (TRACE) mg/dL    Glucose, Urine Normal Normal mg/dL    Blood, Urine OVER (3+) (A) NEGATIVE    Ketones, Urine NEGATIVE NEGATIVE mg/dL    Bilirubin, Urine NEGATIVE NEGATIVE    Urobilinogen, Urine Normal Normal mg/dL    Nitrite, Urine NEGATIVE NEGATIVE    Leukocyte Esterase, Urine 75 Barby/µL (A) NEGATIVE   Extra Urine Gray Tube   Result Value Ref Range    Extra Tube Hold for add-ons.    Microscopic Only, Urine   Result Value Ref Range    WBC, Urine 11-20 (A) 1-5, NONE /HPF    RBC, Urine >20 (A) NONE, 1-2, 3-5 /HPF    Bacteria, Urine 1+ (A) NONE SEEN /HPF   CBC   Result Value Ref Range    WBC 6.9 4.4 - 11.3 x10*3/uL    nRBC 0.0 0.0 - 0.0 /100 WBCs    RBC 3.42 (L) 4.00 - 5.20 x10*6/uL    Hemoglobin 10.9 (L) 12.0 - 16.0 g/dL    Hematocrit 32.6 (L) 36.0 - 46.0 %    MCV 95 80 - 100 fL    MCH 31.9 26.0 - 34.0 pg    MCHC 33.4 32.0 - 36.0 g/dL    RDW 12.8 11.5 - 14.5 %    Platelets 172 150 - 450 x10*3/uL   Comprehensive metabolic panel   Result Value Ref Range    Glucose 111 (H) 74 - 99 mg/dL    Sodium 139 136 - 145 mmol/L    Potassium 3.5 3.5 - 5.3 mmol/L    Chloride 101 98 - 107 mmol/L    Bicarbonate 31 21 - 32 mmol/L    Anion Gap 11 10 - 20 mmol/L    Urea Nitrogen 14 6 - 23 mg/dL    Creatinine 0.82 0.50 - 1.05 mg/dL    eGFR 78 >60 mL/min/1.73m*2    Calcium 8.4 (L) 8.6 - 10.3 mg/dL    Albumin 3.4 3.4 - 5.0 g/dL     Alkaline Phosphatase 199 (H) 33 - 136 U/L    Total Protein 5.3 (L) 6.4 - 8.2 g/dL     (H) 9 - 39 U/L    Bilirubin, Total 1.1 0.0 - 1.2 mg/dL     (H) 7 - 45 U/L   Hepatitis B Surface Antibody   Result Value Ref Range    Hepatitis B Surface AB 25.4 (H) <10.0 mIU/mL   Hepatitis B Surface Antigen   Result Value Ref Range    Hepatitis B Surface AG Nonreactive Nonreactive   Hepatitis C Antibody   Result Value Ref Range    Hepatitis C AB Nonreactive Nonreactive   Ceruloplasmin   Result Value Ref Range    Ceruloplasmin 28.7 20.0 - 60.0 mg/dL   Hepatitis A Antibody, Total   Result Value Ref Range    Hepatitis A  AB-Total Reactive (A) Nonreactive   Protime-INR   Result Value Ref Range    Protime 13.2 (H) 9.8 - 12.8 seconds    INR 1.2 (H) 0.9 - 1.1   Hepatitis A Antibody, IgM   Result Value Ref Range    Hepatitis A  AB- IgM Nonreactive Nonreactive   CBC   Result Value Ref Range    WBC 4.9 4.4 - 11.3 x10*3/uL    nRBC 0.0 0.0 - 0.0 /100 WBCs    RBC 3.06 (L) 4.00 - 5.20 x10*6/uL    Hemoglobin 9.8 (L) 12.0 - 16.0 g/dL    Hematocrit 30.2 (L) 36.0 - 46.0 %    MCV 99 80 - 100 fL    MCH 32.0 26.0 - 34.0 pg    MCHC 32.5 32.0 - 36.0 g/dL    RDW 13.0 11.5 - 14.5 %    Platelets 144 (L) 150 - 450 x10*3/uL   Comprehensive Metabolic Panel   Result Value Ref Range    Glucose 91 74 - 99 mg/dL    Sodium 142 136 - 145 mmol/L    Potassium 3.6 3.5 - 5.3 mmol/L    Chloride 106 98 - 107 mmol/L    Bicarbonate 27 21 - 32 mmol/L    Anion Gap 13 10 - 20 mmol/L    Urea Nitrogen 10 6 - 23 mg/dL    Creatinine 0.72 0.50 - 1.05 mg/dL    eGFR >90 >60 mL/min/1.73m*2    Calcium 8.1 (L) 8.6 - 10.3 mg/dL    Albumin 3.1 (L) 3.4 - 5.0 g/dL    Alkaline Phosphatase 199 (H) 33 - 136 U/L    Total Protein 4.9 (L) 6.4 - 8.2 g/dL     (H) 9 - 39 U/L    Bilirubin, Total 1.6 (H) 0.0 - 1.2 mg/dL     (H) 7 - 45 U/L     *Note: Due to a large number of results and/or encounters for the requested time period, some results have not been displayed. A  complete set of results can be found in Results Review.                Assessment/Plan                  Principal Problem:    Cholecystitis  Elevated liver enzymes  S/p lap choli    Liver enzymes improving   Cont to monitro   Noted surg input   Dw GI will cont to monitor  Reassess tomorrow                Nestor Christiansen MD

## 2024-07-29 ENCOUNTER — TELEPHONE (OUTPATIENT)
Dept: PALLIATIVE MEDICINE | Facility: CLINIC | Age: 69
End: 2024-07-29
Payer: MEDICARE

## 2024-07-29 VITALS
TEMPERATURE: 98.3 F | HEIGHT: 63 IN | RESPIRATION RATE: 16 BRPM | OXYGEN SATURATION: 97 % | SYSTOLIC BLOOD PRESSURE: 128 MMHG | HEART RATE: 64 BPM | DIASTOLIC BLOOD PRESSURE: 72 MMHG | BODY MASS INDEX: 24.63 KG/M2 | WEIGHT: 139 LBS

## 2024-07-29 VITALS
HEART RATE: 67 BPM | WEIGHT: 139 LBS | OXYGEN SATURATION: 93 % | SYSTOLIC BLOOD PRESSURE: 121 MMHG | RESPIRATION RATE: 15 BRPM | BODY MASS INDEX: 24.63 KG/M2 | HEIGHT: 63 IN | TEMPERATURE: 98.4 F | DIASTOLIC BLOOD PRESSURE: 62 MMHG

## 2024-07-29 LAB
ALBUMIN SERPL BCP-MCNC: 3 G/DL (ref 3.4–5)
ALP SERPL-CCNC: 205 U/L (ref 33–136)
ALT SERPL W P-5'-P-CCNC: 453 U/L (ref 7–45)
ANION GAP SERPL CALC-SCNC: 15 MMOL/L (ref 10–20)
AST SERPL W P-5'-P-CCNC: 68 U/L (ref 9–39)
BILIRUB SERPL-MCNC: 0.6 MG/DL (ref 0–1.2)
BUN SERPL-MCNC: 12 MG/DL (ref 6–23)
C DIF TOX TCDA+TCDB STL QL NAA+PROBE: NOT DETECTED
CALCIUM SERPL-MCNC: 8.3 MG/DL (ref 8.6–10.3)
CHLORIDE SERPL-SCNC: 107 MMOL/L (ref 98–107)
CO2 SERPL-SCNC: 23 MMOL/L (ref 21–32)
CREAT SERPL-MCNC: 0.71 MG/DL (ref 0.5–1.05)
EGFRCR SERPLBLD CKD-EPI 2021: >90 ML/MIN/1.73M*2
ERYTHROCYTE [DISTWIDTH] IN BLOOD BY AUTOMATED COUNT: 13.3 % (ref 11.5–14.5)
GLUCOSE SERPL-MCNC: 84 MG/DL (ref 74–99)
HCT VFR BLD AUTO: 31.5 % (ref 36–46)
HGB BLD-MCNC: 10 G/DL (ref 12–16)
MCH RBC QN AUTO: 32.2 PG (ref 26–34)
MCHC RBC AUTO-ENTMCNC: 31.7 G/DL (ref 32–36)
MCV RBC AUTO: 101 FL (ref 80–100)
NRBC BLD-RTO: 0 /100 WBCS (ref 0–0)
PLATELET # BLD AUTO: 150 X10*3/UL (ref 150–450)
POTASSIUM SERPL-SCNC: 4 MMOL/L (ref 3.5–5.3)
PROT SERPL-MCNC: 4.6 G/DL (ref 6.4–8.2)
RBC # BLD AUTO: 3.11 X10*6/UL (ref 4–5.2)
SODIUM SERPL-SCNC: 141 MMOL/L (ref 136–145)
WBC # BLD AUTO: 8.6 X10*3/UL (ref 4.4–11.3)

## 2024-07-29 PROCEDURE — 2500000001 HC RX 250 WO HCPCS SELF ADMINISTERED DRUGS (ALT 637 FOR MEDICARE OP)

## 2024-07-29 PROCEDURE — 80053 COMPREHEN METABOLIC PANEL: CPT

## 2024-07-29 PROCEDURE — 99232 SBSQ HOSP IP/OBS MODERATE 35: CPT

## 2024-07-29 PROCEDURE — 2500000002 HC RX 250 W HCPCS SELF ADMINISTERED DRUGS (ALT 637 FOR MEDICARE OP, ALT 636 FOR OP/ED): Performed by: STUDENT IN AN ORGANIZED HEALTH CARE EDUCATION/TRAINING PROGRAM

## 2024-07-29 PROCEDURE — 36415 COLL VENOUS BLD VENIPUNCTURE: CPT

## 2024-07-29 PROCEDURE — 2500000001 HC RX 250 WO HCPCS SELF ADMINISTERED DRUGS (ALT 637 FOR MEDICARE OP): Performed by: STUDENT IN AN ORGANIZED HEALTH CARE EDUCATION/TRAINING PROGRAM

## 2024-07-29 PROCEDURE — 2500000004 HC RX 250 GENERAL PHARMACY W/ HCPCS (ALT 636 FOR OP/ED): Performed by: STUDENT IN AN ORGANIZED HEALTH CARE EDUCATION/TRAINING PROGRAM

## 2024-07-29 PROCEDURE — 85027 COMPLETE CBC AUTOMATED: CPT

## 2024-07-29 PROCEDURE — S0109 METHADONE ORAL 5MG: HCPCS | Performed by: STUDENT IN AN ORGANIZED HEALTH CARE EDUCATION/TRAINING PROGRAM

## 2024-07-29 ASSESSMENT — COGNITIVE AND FUNCTIONAL STATUS - GENERAL
CLIMB 3 TO 5 STEPS WITH RAILING: A LITTLE
DRESSING REGULAR LOWER BODY CLOTHING: A LITTLE
DAILY ACTIVITIY SCORE: 21
HELP NEEDED FOR BATHING: A LITTLE
DRESSING REGULAR UPPER BODY CLOTHING: A LITTLE
MOBILITY SCORE: 23

## 2024-07-29 ASSESSMENT — PAIN SCALES - GENERAL
PAINLEVEL_OUTOF10: 6
PAINLEVEL_OUTOF10: 6
PAINLEVEL_OUTOF10: 5 - MODERATE PAIN

## 2024-07-29 ASSESSMENT — PAIN DESCRIPTION - LOCATION
LOCATION: ABDOMEN
LOCATION: ABDOMEN

## 2024-07-29 NOTE — CARE PLAN
Problem: Fall/Injury  Goal: Not fall by end of shift  Outcome: Progressing  Goal: Be free from injury by end of the shift  Outcome: Progressing  Goal: Verbalize understanding of personal risk factors for fall in the hospital  Outcome: Progressing  Goal: Verbalize understanding of risk factor reduction measures to prevent injury from fall in the home  Outcome: Progressing  Goal: Use assistive devices by end of the shift  Outcome: Progressing  Goal: Pace activities to prevent fatigue by end of the shift  Outcome: Progressing   The patient's goals for the shift include      The clinical goals for the shift include control pain

## 2024-07-29 NOTE — PROGRESS NOTES
07/29/24 1136   Discharge Planning   Home or Post Acute Services In home services   Expected Discharge Disposition Home     PLAN/BARRIER: waiting on c-diff test results  DISP: home with no homecare needs  ADOD: today or tomorrow  Daphne Medeiros RN

## 2024-07-29 NOTE — PROGRESS NOTES
Saba Park is a 69 y.o. female on day 1 of admission presenting with Cholecystitis.      Subjective   No diarrhea  No nausea vomiting  Does get abod pain       Objective     Last Recorded Vitals  /62 (BP Location: Left arm, Patient Position: Lying)   Pulse 67   Temp 36.9 °C (98.4 °F) (Oral)   Resp 15   Wt 63 kg (139 lb)   SpO2 93%   Intake/Output last 3 Shifts:        Admission Weight  Weight: 63 kg (139 lb) (07/25/24 0239)    Daily Weight  07/25/24 : 63 kg (139 lb)    Image Results  ECG 12 lead  Normal sinus rhythm  Normal ECG  When compared with ECG of 05-APR-2023 12:43,  ST elevation now present in Inferior leads  Inverted T waves have replaced nonspecific T wave abnormality in Anterior leads  See ED provider note for full interpretation and clinical correlation  Confirmed by Valerie Choi ([id number]) on 7/25/2024 10:14:09 AM  CT chest wo IV contrast  Narrative: Interpreted By:  Dwight Elkins,   STUDY:  CT CHEST WO IV CONTRAST;  7/25/2024 8:15 am      INDICATION:  70 y/o   F with  Signs/Symptoms:lymphnodes 2cm.      LIMITATIONS:  Imaging of the mediastinum and olga is limited without the use of IV  contrast..      ACCESSION NUMBER(S):  [id number]      ORDERING CLINICIAN:  WES MCLEAN      TECHNIQUE:  Noncontrast Thin-section images were obtained  from the thoracic  inlet down through the diaphragm. Sagittal and coronal reconstruction  images were generated. Mediastinal, lung, bone, and liver windows  were reviewed.      COMPARISON:  Most recent prior is included a coronary artery calcium score exam  from 11/04/2023 and CT scan abdomen and pelvis and chest dated  09/11/2019.      FINDINGS:  CHEST WALL/BASE OF THE NECK:  The chest wall was grossly intact.  No thyromegaly or thyroid mass.      LUNGS/ PLEURA/ AND TRACHEA:  No mass or pneumonia in either lung.  No pleural effusion.  No pneumothorax.  The trachea was grossly intact.  Incidental note of a small fat containing posteromedial  right-sided  diaphragmatic hernia.      MEDIASTINUM/NGA:  There is no suspicious hilar or axillary adenopathy in this exam.  There is no suspicious central mediastinal adenopathy. However, there  are 2 adjacent right-sided pericardial lymph nodes. The larger  measures 25 x 18 mm, not present on 09/11/2019, and increased from 20  x 16 mm on the cardiac score exam from 11/04/2023. The adjacent 9 mm  lymph node is stable since 11/04/2023 and was not present on  09/11/2019. No cardiomegaly.  No pericardial effusion.  No thoracic aortic aneurysm.      BONES:  No destructive lytic or blastic bone lesion. There are however  vertebral body hemangiomas of bone involving C7, T6, T11, and to a  lesser extent T12 vertebral bodies.      UPPER ABDOMEN:  For information regarding the upper abdomen, please see the report  for CT scan abdomen with contrast done at 5:50 a.m. earlier today. Of  note was sludge and stones filling the gallbladder and the presence  of nonobstructing stones in the right kidney.      Impression: There are 2 right-sided pericardial lymph nodes, neither of which was  present on 09/11/2019. The can be seen in retrospect on 11/04/2023.  The larger lymph node is mildly larger today. The smaller lymph node  is stable since 11/04/2023. Exact etiology and clinical significance  are uncertain. However, recommend PET-CT scan to evaluate for  possible lymphoma.      No other thoracic adenopathy in this exam.      Small fat containing posteromedial right-sided diaphragmatic hernia.  No mass or pneumonia in either lung.      Multilevel vertebral body hemangiomas of bone. See above for details.      Stone and sludge filled gallbladder.      Nonobstructing right renal stones.      MACRO:  None      Signed by: Dwight Elkins 7/25/2024 8:51 AM  Dictation workstation:   [id number]  US right upper quadrant  Narrative: Interpreted By:  Dwight Elkins,   STUDY:  US RIGHT UPPER QUADRANT  7/25/2024 8:17 am      INDICATION:  69  y/o   F with  Signs/Symptoms:pain.      COMPARISON:  Correlation with CT scan from 07/25/2024. In      ACCESSION NUMBER(S):  [id number]      ORDERING CLINICIAN:  RICK ALVES      TECHNIQUE:  Routine ultrasound of the right upper quadrant was performed using  grayscale imaging, color Doppler, and spectral Doppler.      FINDINGS:  LIVER:  Craniocaudal length: 15.2 CM.  This is  within normal limits.  Echogenicity:  Normal.  Mass:  None      GALLBLADDER:  Sludge and stones fill the gallbladder. Borderline gallbladder wall  thickening at 3 mm thickness. There was a positive sonographic Al  sign. Trace pericholecystic fluid.      BILE DUCTS:  No intrahepatic biliary ductal dilatation.  Common bile duct measured 0.7 CM   in diameter. This is mildly  dilated.      PANCREAS:  The pancreas was obscured by bowel gas..      RIGHT KIDNEY:  Craniocaudal length:  10.0 CM , Within normal limits of size for age.  Echogenicity was normal. There was a nonobstructing stone in the  right renal pelvis and or multiple stones in the right kidney on CT  scan. On ultrasound, only a thin 8 mm upper pole stone could be  identified. No hydronephrosis,   or perinephric edema.  No gross right renal mass.      PERITONEAL FLUID:  None seen in the right upper quadrant.          Impression: Sonographic findings consistent with cholelithiasis and acute  cholecystitis.      Of the several nonobstructing right renal stones evident on earlier  CT scan, only the 8 mm upper pole stone could be identified. No  hydronephrosis.      Obscuration of the pancreas by bowel gas and body habitus.      MACRO:  None      Signed by: Dwight Elkins 7/25/2024 8:36 AM  Dictation workstation:   [id number]  CT abdomen pelvis w IV contrast  Narrative: STUDY:  CT Abdomen and Pelvis with IV Contrast; 07/25/2024 5:50 AM  INDICATION:  Abdominal pain.  COMPARISON:  CT abdomen 06/17/2020, 06/16/2020.  ACCESSION NUMBER(S):  [id number]  ORDERING CLINICIAN:  DELILAH BOSS  MISAEL  TECHNIQUE:  CT of the abdomen and pelvis was performed.  Contiguous axial images  were obtained at 3 mm slice thickness through the abdomen and pelvis.   Coronal and sagittal reconstructions at 3 mm slice thickness were  performed.  Omnipaque 350 75 mL was administered intravenously.    FINDINGS:  LOWER CHEST:  No cardiomegaly.  No pericardial effusion.  Lung bases are clear.  There are 2 lymph nodes at the right cardiophrenic angle.  The larger  lymph node measures 2.2 cm; series 604, image 21.  These are new from  the prior CT.  There is a fat-containing Bochdalek hernia on the  right.     LIVER:  No hepatomegaly.  Smooth surface contour.  Normal attenuation.     BILE DUCTS:  No intrahepatic or extrahepatic biliary ductal dilatation.     GALLBLADDER:  The gallbladder contains several small stones and sludge.  There is a  small quantity of pericholecystic fluid.  The appearance is suggestive  of cholecystitis.  The findings are new from the prior CT.  STOMACH:  No abnormalities identified.     PANCREAS:  No masses or ductal dilatation.     SPLEEN:  No splenomegaly or focal splenic lesion.     ADRENAL GLANDS:  No thickening or nodules.     KIDNEYS AND URETERS:  There is a 3.3 cm lower pole cyst of the left kidney.  Other tiny  cortical lesions of the right kidney are statistically cysts but too  small to characterize.  There are several calyceal stones of the right  kidney.  The largest measures approximately 4 mm.  There is a 3 to 4  mm stone in the right renal pelvis.  The measurement is obtained on  the coronal reconstructed images.  No hydronephrosis.  Previously  noted left UPJ stone has resolved.     PELVIS:    There is streak artifact from a right hip replacement.     BLADDER:  No abnormalities identified.     REPRODUCTIVE ORGANS:  The uterus is not identified and is presumed surgically absent.  No  abnormalities identified.     BOWEL:  There is a surgical staple line of a small bowel loop,  unchanged.  The  technologist provided no surgical detail.  There is no information  about what was performed, when it was performed or why it was  performed.      VESSELS:  Abdominal aorta is normal in caliber.      PERITONEUM/RETROPERITONEUM/LYMPH NODES:  No free fluid.  No pneumoperitoneum. No lymphadenopathy.     ABDOMINAL WALL:  No abnormalities identified.  SOFT TISSUES:   No abnormalities identified.     BONES:  There are moderate degenerative changes of the spine.  The appearance  of T11, T12 and L1 suggests hemangiomas.  No acute fracture or  aggressive osseous lesion.  Impression: 1.  The gallbladder contains several small stones and sludge.  There  is a small quantity of pericholecystic fluid.  The appearance is  suggestive of cholecystitis.  The findings are new from the prior CT.   No dilated bile ducts.  2.  There are 2 lymph nodes at the right cardiophrenic angle.  The  larger lymph node measures 2.2 cm.  These are new from the prior CT.   Consideration could be given to follow-up with chest CT.  3.  There is a 3 to 4 mm stone of the right renal pelvis and several  small right-sided calyceal stones.  No hydronephrosis.  4.  No ascites, free air or bowel obstruction.  Signed by Morteza Santiago MD      Physical Exam  Well developed well nurished   No distress  Chest clear  CVS regular  Ext no edema  Abdo soft nontender, does have surg incision dry  Bs active    Relevant Results    Scheduled medications  acetaminophen, 650 mg, oral, 4x daily  buPROPion XL, 300 mg, oral, Daily  busPIRone, 5 mg, oral, BID  DULoxetine, 60 mg, oral, q PM  enoxaparin, 40 mg, subcutaneous, q24h  methadone, 10 mg, oral, BID  polyethylene glycol, 17 g, oral, Daily  pregabalin, 50 mg, oral, TID  primidone, 175 mg, oral, Nightly  propranolol, 60 mg, oral, BID      Continuous medications       PRN medications  PRN medications: HYDROmorphone, ondansetron **OR** ondansetron, oxyCODONE, oxyCODONE, prochlorperazine **OR**  prochlorperazine **OR** prochlorperazine, simethicone  Results for orders placed or performed during the hospital encounter of 07/25/24 (from the past 96 hour(s))   CBC   Result Value Ref Range    WBC 6.3 4.4 - 11.3 x10*3/uL    nRBC 0.0 0.0 - 0.0 /100 WBCs    RBC 3.36 (L) 4.00 - 5.20 x10*6/uL    Hemoglobin 10.9 (L) 12.0 - 16.0 g/dL    Hematocrit 32.3 (L) 36.0 - 46.0 %    MCV 96 80 - 100 fL    MCH 32.4 26.0 - 34.0 pg    MCHC 33.7 32.0 - 36.0 g/dL    RDW 12.9 11.5 - 14.5 %    Platelets 175 150 - 450 x10*3/uL   Comprehensive metabolic panel   Result Value Ref Range    Glucose 110 (H) 74 - 99 mg/dL    Sodium 137 136 - 145 mmol/L    Potassium 3.8 3.5 - 5.3 mmol/L    Chloride 101 98 - 107 mmol/L    Bicarbonate 28 21 - 32 mmol/L    Anion Gap 12 10 - 20 mmol/L    Urea Nitrogen 15 6 - 23 mg/dL    Creatinine 0.80 0.50 - 1.05 mg/dL    eGFR 80 >60 mL/min/1.73m*2    Calcium 8.1 (L) 8.6 - 10.3 mg/dL    Albumin 3.3 (L) 3.4 - 5.0 g/dL    Alkaline Phosphatase 213 (H) 33 - 136 U/L    Total Protein 4.8 (L) 6.4 - 8.2 g/dL     (H) 9 - 39 U/L    Bilirubin, Total 1.0 0.0 - 1.2 mg/dL     (H) 7 - 45 U/L   Urinalysis with Reflex Culture and Microscopic   Result Value Ref Range    Color, Urine Yellow Light-Yellow, Yellow, Dark-Yellow    Appearance, Urine Clear Clear    Specific Gravity, Urine 1.014 1.005 - 1.035    pH, Urine 6.0 5.0, 5.5, 6.0, 6.5, 7.0, 7.5, 8.0    Protein, Urine 10 (TRACE) NEGATIVE, 10 (TRACE), 20 (TRACE) mg/dL    Glucose, Urine Normal Normal mg/dL    Blood, Urine OVER (3+) (A) NEGATIVE    Ketones, Urine NEGATIVE NEGATIVE mg/dL    Bilirubin, Urine NEGATIVE NEGATIVE    Urobilinogen, Urine Normal Normal mg/dL    Nitrite, Urine NEGATIVE NEGATIVE    Leukocyte Esterase, Urine 75 Barby/µL (A) NEGATIVE   Extra Urine Gray Tube   Result Value Ref Range    Extra Tube Hold for add-ons.    Microscopic Only, Urine   Result Value Ref Range    WBC, Urine 11-20 (A) 1-5, NONE /HPF    RBC, Urine >20 (A) NONE, 1-2, 3-5 /HPF     Bacteria, Urine 1+ (A) NONE SEEN /HPF   Urine Culture    Specimen: Clean Catch/Voided; Urine   Result Value Ref Range    Urine Culture No growth    CBC   Result Value Ref Range    WBC 6.9 4.4 - 11.3 x10*3/uL    nRBC 0.0 0.0 - 0.0 /100 WBCs    RBC 3.42 (L) 4.00 - 5.20 x10*6/uL    Hemoglobin 10.9 (L) 12.0 - 16.0 g/dL    Hematocrit 32.6 (L) 36.0 - 46.0 %    MCV 95 80 - 100 fL    MCH 31.9 26.0 - 34.0 pg    MCHC 33.4 32.0 - 36.0 g/dL    RDW 12.8 11.5 - 14.5 %    Platelets 172 150 - 450 x10*3/uL   Comprehensive metabolic panel   Result Value Ref Range    Glucose 111 (H) 74 - 99 mg/dL    Sodium 139 136 - 145 mmol/L    Potassium 3.5 3.5 - 5.3 mmol/L    Chloride 101 98 - 107 mmol/L    Bicarbonate 31 21 - 32 mmol/L    Anion Gap 11 10 - 20 mmol/L    Urea Nitrogen 14 6 - 23 mg/dL    Creatinine 0.82 0.50 - 1.05 mg/dL    eGFR 78 >60 mL/min/1.73m*2    Calcium 8.4 (L) 8.6 - 10.3 mg/dL    Albumin 3.4 3.4 - 5.0 g/dL    Alkaline Phosphatase 199 (H) 33 - 136 U/L    Total Protein 5.3 (L) 6.4 - 8.2 g/dL     (H) 9 - 39 U/L    Bilirubin, Total 1.1 0.0 - 1.2 mg/dL     (H) 7 - 45 U/L   Hepatitis B Surface Antibody   Result Value Ref Range    Hepatitis B Surface AB 25.4 (H) <10.0 mIU/mL   Hepatitis B Surface Antigen   Result Value Ref Range    Hepatitis B Surface AG Nonreactive Nonreactive   Hepatitis C Antibody   Result Value Ref Range    Hepatitis C AB Nonreactive Nonreactive   Ceruloplasmin   Result Value Ref Range    Ceruloplasmin 28.7 20.0 - 60.0 mg/dL   Hepatitis A Antibody, Total   Result Value Ref Range    Hepatitis A  AB-Total Reactive (A) Nonreactive   Protime-INR   Result Value Ref Range    Protime 13.2 (H) 9.8 - 12.8 seconds    INR 1.2 (H) 0.9 - 1.1   Hepatitis A Antibody, IgM   Result Value Ref Range    Hepatitis A  AB- IgM Nonreactive Nonreactive   CBC   Result Value Ref Range    WBC 4.9 4.4 - 11.3 x10*3/uL    nRBC 0.0 0.0 - 0.0 /100 WBCs    RBC 3.06 (L) 4.00 - 5.20 x10*6/uL    Hemoglobin 9.8 (L) 12.0 - 16.0 g/dL     Hematocrit 30.2 (L) 36.0 - 46.0 %    MCV 99 80 - 100 fL    MCH 32.0 26.0 - 34.0 pg    MCHC 32.5 32.0 - 36.0 g/dL    RDW 13.0 11.5 - 14.5 %    Platelets 144 (L) 150 - 450 x10*3/uL   Comprehensive Metabolic Panel   Result Value Ref Range    Glucose 91 74 - 99 mg/dL    Sodium 142 136 - 145 mmol/L    Potassium 3.6 3.5 - 5.3 mmol/L    Chloride 106 98 - 107 mmol/L    Bicarbonate 27 21 - 32 mmol/L    Anion Gap 13 10 - 20 mmol/L    Urea Nitrogen 10 6 - 23 mg/dL    Creatinine 0.72 0.50 - 1.05 mg/dL    eGFR >90 >60 mL/min/1.73m*2    Calcium 8.1 (L) 8.6 - 10.3 mg/dL    Albumin 3.1 (L) 3.4 - 5.0 g/dL    Alkaline Phosphatase 199 (H) 33 - 136 U/L    Total Protein 4.9 (L) 6.4 - 8.2 g/dL     (H) 9 - 39 U/L    Bilirubin, Total 1.6 (H) 0.0 - 1.2 mg/dL     (H) 7 - 45 U/L   CBC   Result Value Ref Range    WBC 6.4 4.4 - 11.3 x10*3/uL    nRBC 0.0 0.0 - 0.0 /100 WBCs    RBC 2.98 (L) 4.00 - 5.20 x10*6/uL    Hemoglobin 9.6 (L) 12.0 - 16.0 g/dL    Hematocrit 29.3 (L) 36.0 - 46.0 %    MCV 98 80 - 100 fL    MCH 32.2 26.0 - 34.0 pg    MCHC 32.8 32.0 - 36.0 g/dL    RDW 13.2 11.5 - 14.5 %    Platelets 138 (L) 150 - 450 x10*3/uL   Comprehensive Metabolic Panel   Result Value Ref Range    Glucose 125 (H) 74 - 99 mg/dL    Sodium 139 136 - 145 mmol/L    Potassium 3.4 (L) 3.5 - 5.3 mmol/L    Chloride 106 98 - 107 mmol/L    Bicarbonate 25 21 - 32 mmol/L    Anion Gap 11 10 - 20 mmol/L    Urea Nitrogen 13 6 - 23 mg/dL    Creatinine 0.85 0.50 - 1.05 mg/dL    eGFR 74 >60 mL/min/1.73m*2    Calcium 7.8 (L) 8.6 - 10.3 mg/dL    Albumin 2.9 (L) 3.4 - 5.0 g/dL    Alkaline Phosphatase 226 (H) 33 - 136 U/L    Total Protein 4.7 (L) 6.4 - 8.2 g/dL     (H) 9 - 39 U/L    Bilirubin, Total 1.0 0.0 - 1.2 mg/dL     (H) 7 - 45 U/L   C. difficile, PCR    Specimen: Stool   Result Value Ref Range    C. difficile, PCR Not Detected Not Detected   CBC   Result Value Ref Range    WBC 8.6 4.4 - 11.3 x10*3/uL    nRBC 0.0 0.0 - 0.0 /100 WBCs    RBC  3.11 (L) 4.00 - 5.20 x10*6/uL    Hemoglobin 10.0 (L) 12.0 - 16.0 g/dL    Hematocrit 31.5 (L) 36.0 - 46.0 %     (H) 80 - 100 fL    MCH 32.2 26.0 - 34.0 pg    MCHC 31.7 (L) 32.0 - 36.0 g/dL    RDW 13.3 11.5 - 14.5 %    Platelets 150 150 - 450 x10*3/uL   Comprehensive Metabolic Panel   Result Value Ref Range    Glucose 84 74 - 99 mg/dL    Sodium 141 136 - 145 mmol/L    Potassium 4.0 3.5 - 5.3 mmol/L    Chloride 107 98 - 107 mmol/L    Bicarbonate 23 21 - 32 mmol/L    Anion Gap 15 10 - 20 mmol/L    Urea Nitrogen 12 6 - 23 mg/dL    Creatinine 0.71 0.50 - 1.05 mg/dL    eGFR >90 >60 mL/min/1.73m*2    Calcium 8.3 (L) 8.6 - 10.3 mg/dL    Albumin 3.0 (L) 3.4 - 5.0 g/dL    Alkaline Phosphatase 205 (H) 33 - 136 U/L    Total Protein 4.6 (L) 6.4 - 8.2 g/dL    AST 68 (H) 9 - 39 U/L    Bilirubin, Total 0.6 0.0 - 1.2 mg/dL     (H) 7 - 45 U/L     *Note: Due to a large number of results and/or encounters for the requested time period, some results have not been displayed. A complete set of results can be found in Results Review.                Assessment/Plan         Principal Problem:    Cholecystitis  Elevated liver enzymes  S/p lap choli  diarrhea    Liver enzymes improving   Noted surg input   Dw GI signed off  Dc to home  Followup dw pt  Time > 30 min in discharge planning              Nestor Christiansen MD

## 2024-07-29 NOTE — PROGRESS NOTES
Saba Park is a 69 y.o. female on day 1 of admission presenting with Cholecystitis.      Subjective   Not feeling good  Does have diarrhea  No nasuea vomiting but decreased apatite  Tolerating diet   No fever  Nochills       Objective     Last Recorded Vitals  /72   Pulse 64   Temp 36.8 °C (98.3 °F)   Resp 16   Wt 63 kg (139 lb)   SpO2 97%   Intake/Output last 3 Shifts:        Admission Weight  Weight: 63 kg (139 lb) (07/25/24 0239)    Daily Weight  07/25/24 : 63 kg (139 lb)    Image Results  ECG 12 lead  Normal sinus rhythm  Normal ECG  When compared with ECG of 05-APR-2023 12:43,  ST elevation now present in Inferior leads  Inverted T waves have replaced nonspecific T wave abnormality in Anterior leads  See ED provider note for full interpretation and clinical correlation  Confirmed by Valerie Choi ([id number]) on 7/25/2024 10:14:09 AM  CT chest wo IV contrast  Narrative: Interpreted By:  Dwight Elkins,   STUDY:  CT CHEST WO IV CONTRAST;  7/25/2024 8:15 am      INDICATION:  70 y/o   F with  Signs/Symptoms:lymphnodes 2cm.      LIMITATIONS:  Imaging of the mediastinum and olga is limited without the use of IV  contrast..      ACCESSION NUMBER(S):  [id number]      ORDERING CLINICIAN:  WES MCLEAN      TECHNIQUE:  Noncontrast Thin-section images were obtained  from the thoracic  inlet down through the diaphragm. Sagittal and coronal reconstruction  images were generated. Mediastinal, lung, bone, and liver windows  were reviewed.      COMPARISON:  Most recent prior is included a coronary artery calcium score exam  from 11/04/2023 and CT scan abdomen and pelvis and chest dated  09/11/2019.      FINDINGS:  CHEST WALL/BASE OF THE NECK:  The chest wall was grossly intact.  No thyromegaly or thyroid mass.      LUNGS/ PLEURA/ AND TRACHEA:  No mass or pneumonia in either lung.  No pleural effusion.  No pneumothorax.  The trachea was grossly intact.  Incidental note of a small fat containing posteromedial  right-sided  diaphragmatic hernia.      MEDIASTINUM/NGA:  There is no suspicious hilar or axillary adenopathy in this exam.  There is no suspicious central mediastinal adenopathy. However, there  are 2 adjacent right-sided pericardial lymph nodes. The larger  measures 25 x 18 mm, not present on 09/11/2019, and increased from 20  x 16 mm on the cardiac score exam from 11/04/2023. The adjacent 9 mm  lymph node is stable since 11/04/2023 and was not present on  09/11/2019. No cardiomegaly.  No pericardial effusion.  No thoracic aortic aneurysm.      BONES:  No destructive lytic or blastic bone lesion. There are however  vertebral body hemangiomas of bone involving C7, T6, T11, and to a  lesser extent T12 vertebral bodies.      UPPER ABDOMEN:  For information regarding the upper abdomen, please see the report  for CT scan abdomen with contrast done at 5:50 a.m. earlier today. Of  note was sludge and stones filling the gallbladder and the presence  of nonobstructing stones in the right kidney.      Impression: There are 2 right-sided pericardial lymph nodes, neither of which was  present on 09/11/2019. The can be seen in retrospect on 11/04/2023.  The larger lymph node is mildly larger today. The smaller lymph node  is stable since 11/04/2023. Exact etiology and clinical significance  are uncertain. However, recommend PET-CT scan to evaluate for  possible lymphoma.      No other thoracic adenopathy in this exam.      Small fat containing posteromedial right-sided diaphragmatic hernia.  No mass or pneumonia in either lung.      Multilevel vertebral body hemangiomas of bone. See above for details.      Stone and sludge filled gallbladder.      Nonobstructing right renal stones.      MACRO:  None      Signed by: Dwight Elkins 7/25/2024 8:51 AM  Dictation workstation:   [id number]  US right upper quadrant  Narrative: Interpreted By:  Dwight Elkins,   STUDY:  US RIGHT UPPER QUADRANT  7/25/2024 8:17 am      INDICATION:  69  y/o   F with  Signs/Symptoms:pain.      COMPARISON:  Correlation with CT scan from 07/25/2024. In      ACCESSION NUMBER(S):  [id number]      ORDERING CLINICIAN:  RICK ALVES      TECHNIQUE:  Routine ultrasound of the right upper quadrant was performed using  grayscale imaging, color Doppler, and spectral Doppler.      FINDINGS:  LIVER:  Craniocaudal length: 15.2 CM.  This is  within normal limits.  Echogenicity:  Normal.  Mass:  None      GALLBLADDER:  Sludge and stones fill the gallbladder. Borderline gallbladder wall  thickening at 3 mm thickness. There was a positive sonographic Al  sign. Trace pericholecystic fluid.      BILE DUCTS:  No intrahepatic biliary ductal dilatation.  Common bile duct measured 0.7 CM   in diameter. This is mildly  dilated.      PANCREAS:  The pancreas was obscured by bowel gas..      RIGHT KIDNEY:  Craniocaudal length:  10.0 CM , Within normal limits of size for age.  Echogenicity was normal. There was a nonobstructing stone in the  right renal pelvis and or multiple stones in the right kidney on CT  scan. On ultrasound, only a thin 8 mm upper pole stone could be  identified. No hydronephrosis,   or perinephric edema.  No gross right renal mass.      PERITONEAL FLUID:  None seen in the right upper quadrant.          Impression: Sonographic findings consistent with cholelithiasis and acute  cholecystitis.      Of the several nonobstructing right renal stones evident on earlier  CT scan, only the 8 mm upper pole stone could be identified. No  hydronephrosis.      Obscuration of the pancreas by bowel gas and body habitus.      MACRO:  None      Signed by: Dwight Elkins 7/25/2024 8:36 AM  Dictation workstation:   [id number]  CT abdomen pelvis w IV contrast  Narrative: STUDY:  CT Abdomen and Pelvis with IV Contrast; 07/25/2024 5:50 AM  INDICATION:  Abdominal pain.  COMPARISON:  CT abdomen 06/17/2020, 06/16/2020.  ACCESSION NUMBER(S):  [id number]  ORDERING CLINICIAN:  DELILAH BOSS  MISAEL  TECHNIQUE:  CT of the abdomen and pelvis was performed.  Contiguous axial images  were obtained at 3 mm slice thickness through the abdomen and pelvis.   Coronal and sagittal reconstructions at 3 mm slice thickness were  performed.  Omnipaque 350 75 mL was administered intravenously.    FINDINGS:  LOWER CHEST:  No cardiomegaly.  No pericardial effusion.  Lung bases are clear.  There are 2 lymph nodes at the right cardiophrenic angle.  The larger  lymph node measures 2.2 cm; series 604, image 21.  These are new from  the prior CT.  There is a fat-containing Bochdalek hernia on the  right.     LIVER:  No hepatomegaly.  Smooth surface contour.  Normal attenuation.     BILE DUCTS:  No intrahepatic or extrahepatic biliary ductal dilatation.     GALLBLADDER:  The gallbladder contains several small stones and sludge.  There is a  small quantity of pericholecystic fluid.  The appearance is suggestive  of cholecystitis.  The findings are new from the prior CT.  STOMACH:  No abnormalities identified.     PANCREAS:  No masses or ductal dilatation.     SPLEEN:  No splenomegaly or focal splenic lesion.     ADRENAL GLANDS:  No thickening or nodules.     KIDNEYS AND URETERS:  There is a 3.3 cm lower pole cyst of the left kidney.  Other tiny  cortical lesions of the right kidney are statistically cysts but too  small to characterize.  There are several calyceal stones of the right  kidney.  The largest measures approximately 4 mm.  There is a 3 to 4  mm stone in the right renal pelvis.  The measurement is obtained on  the coronal reconstructed images.  No hydronephrosis.  Previously  noted left UPJ stone has resolved.     PELVIS:    There is streak artifact from a right hip replacement.     BLADDER:  No abnormalities identified.     REPRODUCTIVE ORGANS:  The uterus is not identified and is presumed surgically absent.  No  abnormalities identified.     BOWEL:  There is a surgical staple line of a small bowel loop,  unchanged.  The  technologist provided no surgical detail.  There is no information  about what was performed, when it was performed or why it was  performed.      VESSELS:  Abdominal aorta is normal in caliber.      PERITONEUM/RETROPERITONEUM/LYMPH NODES:  No free fluid.  No pneumoperitoneum. No lymphadenopathy.     ABDOMINAL WALL:  No abnormalities identified.  SOFT TISSUES:   No abnormalities identified.     BONES:  There are moderate degenerative changes of the spine.  The appearance  of T11, T12 and L1 suggests hemangiomas.  No acute fracture or  aggressive osseous lesion.  Impression: 1.  The gallbladder contains several small stones and sludge.  There  is a small quantity of pericholecystic fluid.  The appearance is  suggestive of cholecystitis.  The findings are new from the prior CT.   No dilated bile ducts.  2.  There are 2 lymph nodes at the right cardiophrenic angle.  The  larger lymph node measures 2.2 cm.  These are new from the prior CT.   Consideration could be given to follow-up with chest CT.  3.  There is a 3 to 4 mm stone of the right renal pelvis and several  small right-sided calyceal stones.  No hydronephrosis.  4.  No ascites, free air or bowel obstruction.  Signed by Morteza Santiago MD      Physical Exam  Well developed well nurished   No distress  Chest clear  CVS regular  Ext no edema  Abdo soft nontender, does have surg incision dry  Bs active    Relevant Results    Scheduled medications  acetaminophen, 650 mg, oral, 4x daily  buPROPion XL, 300 mg, oral, Daily  busPIRone, 5 mg, oral, BID  DULoxetine, 60 mg, oral, q PM  enoxaparin, 40 mg, subcutaneous, q24h  methadone, 10 mg, oral, BID  polyethylene glycol, 17 g, oral, Daily  pregabalin, 50 mg, oral, TID  primidone, 175 mg, oral, Nightly  propranolol, 60 mg, oral, BID      Continuous medications  lactated Ringer's, 100 mL/hr, Last Rate: 100 mL/hr (07/28/24 9699)      PRN medications  PRN medications: HYDROmorphone, ondansetron **OR**  ondansetron, oxyCODONE, oxyCODONE, prochlorperazine **OR** prochlorperazine **OR** prochlorperazine, simethicone  Results for orders placed or performed during the hospital encounter of 07/25/24 (from the past 96 hour(s))   CBC   Result Value Ref Range    WBC 6.3 4.4 - 11.3 x10*3/uL    nRBC 0.0 0.0 - 0.0 /100 WBCs    RBC 3.36 (L) 4.00 - 5.20 x10*6/uL    Hemoglobin 10.9 (L) 12.0 - 16.0 g/dL    Hematocrit 32.3 (L) 36.0 - 46.0 %    MCV 96 80 - 100 fL    MCH 32.4 26.0 - 34.0 pg    MCHC 33.7 32.0 - 36.0 g/dL    RDW 12.9 11.5 - 14.5 %    Platelets 175 150 - 450 x10*3/uL   Comprehensive metabolic panel   Result Value Ref Range    Glucose 110 (H) 74 - 99 mg/dL    Sodium 137 136 - 145 mmol/L    Potassium 3.8 3.5 - 5.3 mmol/L    Chloride 101 98 - 107 mmol/L    Bicarbonate 28 21 - 32 mmol/L    Anion Gap 12 10 - 20 mmol/L    Urea Nitrogen 15 6 - 23 mg/dL    Creatinine 0.80 0.50 - 1.05 mg/dL    eGFR 80 >60 mL/min/1.73m*2    Calcium 8.1 (L) 8.6 - 10.3 mg/dL    Albumin 3.3 (L) 3.4 - 5.0 g/dL    Alkaline Phosphatase 213 (H) 33 - 136 U/L    Total Protein 4.8 (L) 6.4 - 8.2 g/dL     (H) 9 - 39 U/L    Bilirubin, Total 1.0 0.0 - 1.2 mg/dL     (H) 7 - 45 U/L   Urinalysis with Reflex Culture and Microscopic   Result Value Ref Range    Color, Urine Yellow Light-Yellow, Yellow, Dark-Yellow    Appearance, Urine Clear Clear    Specific Gravity, Urine 1.014 1.005 - 1.035    pH, Urine 6.0 5.0, 5.5, 6.0, 6.5, 7.0, 7.5, 8.0    Protein, Urine 10 (TRACE) NEGATIVE, 10 (TRACE), 20 (TRACE) mg/dL    Glucose, Urine Normal Normal mg/dL    Blood, Urine OVER (3+) (A) NEGATIVE    Ketones, Urine NEGATIVE NEGATIVE mg/dL    Bilirubin, Urine NEGATIVE NEGATIVE    Urobilinogen, Urine Normal Normal mg/dL    Nitrite, Urine NEGATIVE NEGATIVE    Leukocyte Esterase, Urine 75 Barby/µL (A) NEGATIVE   Extra Urine Gray Tube   Result Value Ref Range    Extra Tube Hold for add-ons.    Microscopic Only, Urine   Result Value Ref Range    WBC, Urine 11-20 (A) 1-5,  NONE /HPF    RBC, Urine >20 (A) NONE, 1-2, 3-5 /HPF    Bacteria, Urine 1+ (A) NONE SEEN /HPF   Urine Culture    Specimen: Clean Catch/Voided; Urine   Result Value Ref Range    Urine Culture No growth    CBC   Result Value Ref Range    WBC 6.9 4.4 - 11.3 x10*3/uL    nRBC 0.0 0.0 - 0.0 /100 WBCs    RBC 3.42 (L) 4.00 - 5.20 x10*6/uL    Hemoglobin 10.9 (L) 12.0 - 16.0 g/dL    Hematocrit 32.6 (L) 36.0 - 46.0 %    MCV 95 80 - 100 fL    MCH 31.9 26.0 - 34.0 pg    MCHC 33.4 32.0 - 36.0 g/dL    RDW 12.8 11.5 - 14.5 %    Platelets 172 150 - 450 x10*3/uL   Comprehensive metabolic panel   Result Value Ref Range    Glucose 111 (H) 74 - 99 mg/dL    Sodium 139 136 - 145 mmol/L    Potassium 3.5 3.5 - 5.3 mmol/L    Chloride 101 98 - 107 mmol/L    Bicarbonate 31 21 - 32 mmol/L    Anion Gap 11 10 - 20 mmol/L    Urea Nitrogen 14 6 - 23 mg/dL    Creatinine 0.82 0.50 - 1.05 mg/dL    eGFR 78 >60 mL/min/1.73m*2    Calcium 8.4 (L) 8.6 - 10.3 mg/dL    Albumin 3.4 3.4 - 5.0 g/dL    Alkaline Phosphatase 199 (H) 33 - 136 U/L    Total Protein 5.3 (L) 6.4 - 8.2 g/dL     (H) 9 - 39 U/L    Bilirubin, Total 1.1 0.0 - 1.2 mg/dL     (H) 7 - 45 U/L   Hepatitis B Surface Antibody   Result Value Ref Range    Hepatitis B Surface AB 25.4 (H) <10.0 mIU/mL   Hepatitis B Surface Antigen   Result Value Ref Range    Hepatitis B Surface AG Nonreactive Nonreactive   Hepatitis C Antibody   Result Value Ref Range    Hepatitis C AB Nonreactive Nonreactive   Ceruloplasmin   Result Value Ref Range    Ceruloplasmin 28.7 20.0 - 60.0 mg/dL   Hepatitis A Antibody, Total   Result Value Ref Range    Hepatitis A  AB-Total Reactive (A) Nonreactive   Protime-INR   Result Value Ref Range    Protime 13.2 (H) 9.8 - 12.8 seconds    INR 1.2 (H) 0.9 - 1.1   Hepatitis A Antibody, IgM   Result Value Ref Range    Hepatitis A  AB- IgM Nonreactive Nonreactive   CBC   Result Value Ref Range    WBC 4.9 4.4 - 11.3 x10*3/uL    nRBC 0.0 0.0 - 0.0 /100 WBCs    RBC 3.06 (L) 4.00 -  5.20 x10*6/uL    Hemoglobin 9.8 (L) 12.0 - 16.0 g/dL    Hematocrit 30.2 (L) 36.0 - 46.0 %    MCV 99 80 - 100 fL    MCH 32.0 26.0 - 34.0 pg    MCHC 32.5 32.0 - 36.0 g/dL    RDW 13.0 11.5 - 14.5 %    Platelets 144 (L) 150 - 450 x10*3/uL   Comprehensive Metabolic Panel   Result Value Ref Range    Glucose 91 74 - 99 mg/dL    Sodium 142 136 - 145 mmol/L    Potassium 3.6 3.5 - 5.3 mmol/L    Chloride 106 98 - 107 mmol/L    Bicarbonate 27 21 - 32 mmol/L    Anion Gap 13 10 - 20 mmol/L    Urea Nitrogen 10 6 - 23 mg/dL    Creatinine 0.72 0.50 - 1.05 mg/dL    eGFR >90 >60 mL/min/1.73m*2    Calcium 8.1 (L) 8.6 - 10.3 mg/dL    Albumin 3.1 (L) 3.4 - 5.0 g/dL    Alkaline Phosphatase 199 (H) 33 - 136 U/L    Total Protein 4.9 (L) 6.4 - 8.2 g/dL     (H) 9 - 39 U/L    Bilirubin, Total 1.6 (H) 0.0 - 1.2 mg/dL     (H) 7 - 45 U/L   CBC   Result Value Ref Range    WBC 6.4 4.4 - 11.3 x10*3/uL    nRBC 0.0 0.0 - 0.0 /100 WBCs    RBC 2.98 (L) 4.00 - 5.20 x10*6/uL    Hemoglobin 9.6 (L) 12.0 - 16.0 g/dL    Hematocrit 29.3 (L) 36.0 - 46.0 %    MCV 98 80 - 100 fL    MCH 32.2 26.0 - 34.0 pg    MCHC 32.8 32.0 - 36.0 g/dL    RDW 13.2 11.5 - 14.5 %    Platelets 138 (L) 150 - 450 x10*3/uL   Comprehensive Metabolic Panel   Result Value Ref Range    Glucose 125 (H) 74 - 99 mg/dL    Sodium 139 136 - 145 mmol/L    Potassium 3.4 (L) 3.5 - 5.3 mmol/L    Chloride 106 98 - 107 mmol/L    Bicarbonate 25 21 - 32 mmol/L    Anion Gap 11 10 - 20 mmol/L    Urea Nitrogen 13 6 - 23 mg/dL    Creatinine 0.85 0.50 - 1.05 mg/dL    eGFR 74 >60 mL/min/1.73m*2    Calcium 7.8 (L) 8.6 - 10.3 mg/dL    Albumin 2.9 (L) 3.4 - 5.0 g/dL    Alkaline Phosphatase 226 (H) 33 - 136 U/L    Total Protein 4.7 (L) 6.4 - 8.2 g/dL     (H) 9 - 39 U/L    Bilirubin, Total 1.0 0.0 - 1.2 mg/dL     (H) 7 - 45 U/L   CBC   Result Value Ref Range    WBC 8.6 4.4 - 11.3 x10*3/uL    nRBC 0.0 0.0 - 0.0 /100 WBCs    RBC 3.11 (L) 4.00 - 5.20 x10*6/uL    Hemoglobin 10.0 (L) 12.0 -  16.0 g/dL    Hematocrit 31.5 (L) 36.0 - 46.0 %     (H) 80 - 100 fL    MCH 32.2 26.0 - 34.0 pg    MCHC 31.7 (L) 32.0 - 36.0 g/dL    RDW 13.3 11.5 - 14.5 %    Platelets 150 150 - 450 x10*3/uL   Comprehensive Metabolic Panel   Result Value Ref Range    Glucose 84 74 - 99 mg/dL    Sodium 141 136 - 145 mmol/L    Potassium 4.0 3.5 - 5.3 mmol/L    Chloride 107 98 - 107 mmol/L    Bicarbonate 23 21 - 32 mmol/L    Anion Gap 15 10 - 20 mmol/L    Urea Nitrogen 12 6 - 23 mg/dL    Creatinine 0.71 0.50 - 1.05 mg/dL    eGFR >90 >60 mL/min/1.73m*2    Calcium 8.3 (L) 8.6 - 10.3 mg/dL    Albumin 3.0 (L) 3.4 - 5.0 g/dL    Alkaline Phosphatase 205 (H) 33 - 136 U/L    Total Protein 4.6 (L) 6.4 - 8.2 g/dL    AST 68 (H) 9 - 39 U/L    Bilirubin, Total 0.6 0.0 - 1.2 mg/dL     (H) 7 - 45 U/L     *Note: Due to a large number of results and/or encounters for the requested time period, some results have not been displayed. A complete set of results can be found in Results Review.                Assessment/Plan         Principal Problem:    Cholecystitis  Elevated liver enzymes  S/p lap choli  diarrhea    Liver enzymes improving   Cont to monitro   Noted surg input   Dw GI signed off  Check for c diff  Hold fluids and dc zosyn  Reassess tomorrow  Dw surgery no indication for antibiotics from surgery stand point                Nestor Christiansen MD

## 2024-07-29 NOTE — DISCHARGE SUMMARY
Discharge Diagnosis  Cholecystitis    Issues Requiring Follow-Up  Recheck renal function    Discharge Meds     Your medication list        CONTINUE taking these medications        Instructions Last Dose Given Next Dose Due   buPROPion  mg 24 hr tablet  Commonly known as: Wellbutrin XL      Take 1 tablet (300 mg) by mouth once daily. Do not crush, chew, or split.       busPIRone 5 mg tablet  Commonly known as: Buspar      TAKE 1 TABLET BY MOUTH TWICE A DAY       calcium carbonate 500 mg calcium (1,250 mg) tablet  Commonly known as: Oscal           cholecalciferol 50 mcg (2,000 unit) capsule  Commonly known as: Vitamin D-3           clobetasol 0.05 % cream  Commonly known as: Temovate           DULoxetine 60 mg DR capsule  Commonly known as: Cymbalta      TAKE 1 CAPSULE DAILY IN THE EVENING       imiquimod 5 % cream  Commonly known as: Aldara           methadone 10 mg tablet  Commonly known as: Dolophine      Take 1 tablet (10 mg) by mouth every 12 hours.       ondansetron 8 mg tablet  Commonly known as: Zofran      Take 1 tablet (8 mg) by mouth every 8 hours if needed for nausea or vomiting.       oxyCODONE 5 mg immediate release tablet  Commonly known as: Roxicodone      Take 1-2 tablets (5-10 mg) by mouth every 4 hours if needed for severe pain (7 - 10) for up to 15 days.       pregabalin 50 mg capsule  Commonly known as: Lyrica      Take 1 capsule (50 mg) by mouth 3 times a day for 15 days.       primidone 50 mg tablet  Commonly known as: Mysoline      Take 3.5 tablets (175 mg) by mouth once daily at bedtime. 3.5 tabs at bedtime.       propranolol 60 mg tablet  Commonly known as: Inderal      Take one tablet in the morning and half tablet in the afternoon after a week if heart rate is above 60 after that increase to one tablet twice a day after one week              STOP taking these medications      methylPREDNISolone 4 mg tablets  Commonly known as: Medrol Dospak        metoclopramide 10 mg  tablet  Commonly known as: Reglan                 Test Results Pending At Discharge  Pending Labs       Order Current Status    NICK with Reflex to JEAN In process    Anti-Mitochondrial Antibody In process    Anti-Smooth Muscle Antibody In process    Surgical Pathology Exam In process            Hospital Course   Pt did have open choli  Postop did have elevated liver enzymes and seen GI , LFTs are trending down, no specific etiology noted,   She is improving and will be discharged to home  Followup with PCP and surgery     Pertinent Physical Exam At Time of Discharge  Physical Exam    Outpatient Follow-Up  Future Appointments   Date Time Provider Department [place]   8/5/2024  9:30 AM Abner Alarcon MD [id number] [facility]   8/6/2024  2:30 PM Arin Hirsch APRN-[facility] [id number] [place]   8/23/2024 11:30 AM DEMETRIUS Orourke-QUYNH [id number] [place]   8/28/2024  4:30 PM Anabella Ugalde MD [id number] [facility]   9/5/2024  1:40 PM DEMETRIUS Jorgensen-[facility] [id number] [facility]   9/18/2024  1:00 PM Aasef G Shaikh, MD PhD [id number] [facility]   9/19/2024  1:30 PM DEMETRIUS Castro-CNP [id number] [facility]         Nestor Christiansen MD

## 2024-07-29 NOTE — PROGRESS NOTES
"Saba Park is a 69 y.o. female on day 3 of admission presenting with Cholecystitis.    Subjective   Patient is awake in bed this morning. She reports she is feeling better overall. Tolerating diet. Having BM and passing gas. Denies nausea/vomiting.        Objective     Physical Exam  Constitutional:       Appearance: Normal appearance.   Cardiovascular:      Rate and Rhythm: Normal rate and regular rhythm.   Pulmonary:      Effort: Pulmonary effort is normal.      Comments: Non labored breathing on RA  Abdominal:      General: There is distension.      Palpations: Abdomen is soft.      Tenderness: There is no abdominal tenderness.      Comments: Lap site C/D/I, edges well approximated, covered in Dermabond. Insidon under the ribs c/d/I closed with staples.    Skin:     General: Skin is warm and dry.   Neurological:      General: No focal deficit present.      Mental Status: She is alert and oriented to person, place, and time. Mental status is at baseline.   Psychiatric:         Attention and Perception: Attention normal.         Mood and Affect: Mood normal.         Speech: Speech normal.         Behavior: Behavior normal.         Cognition and Memory: Cognition normal.         Last Recorded Vitals  Blood pressure 147/72, pulse 67, temperature 37 °C (98.6 °F), temperature source Oral, resp. rate 16, height 1.6 m (5' 3\"), weight 63 kg (139 lb), SpO2 93%.  Intake/Output last 3 Shifts:  I/O last 3 completed shifts:  In: 4750 (75.3 mL/kg) [P.O.:240; I.V.:4410 (69.9 mL/kg); IV Piggyback:100]  Out: 300 (4.8 mL/kg) [Urine:300 (0.1 mL/kg/hr)]  Weight: 63 kg     Relevant Results  Scheduled medications  acetaminophen, 650 mg, oral, 4x daily  buPROPion XL, 300 mg, oral, Daily  busPIRone, 5 mg, oral, BID  DULoxetine, 60 mg, oral, q PM  enoxaparin, 40 mg, subcutaneous, q24h  methadone, 10 mg, oral, BID  polyethylene glycol, 17 g, oral, Daily  pregabalin, 50 mg, oral, TID  primidone, 175 mg, oral, Nightly  propranolol, 60 " mg, oral, BID      Continuous medications     PRN medications  PRN medications: HYDROmorphone, ondansetron **OR** ondansetron, oxyCODONE, oxyCODONE, prochlorperazine **OR** prochlorperazine **OR** prochlorperazine, simethicone  Results for orders placed or performed during the hospital encounter of 07/25/24 (from the past 24 hour(s))   CBC   Result Value Ref Range    WBC 8.6 4.4 - 11.3 x10*3/uL    nRBC 0.0 0.0 - 0.0 /100 WBCs    RBC 3.11 (L) 4.00 - 5.20 x10*6/uL    Hemoglobin 10.0 (L) 12.0 - 16.0 g/dL    Hematocrit 31.5 (L) 36.0 - 46.0 %     (H) 80 - 100 fL    MCH 32.2 26.0 - 34.0 pg    MCHC 31.7 (L) 32.0 - 36.0 g/dL    RDW 13.3 11.5 - 14.5 %    Platelets 150 150 - 450 x10*3/uL   Comprehensive Metabolic Panel   Result Value Ref Range    Glucose 84 74 - 99 mg/dL    Sodium 141 136 - 145 mmol/L    Potassium 4.0 3.5 - 5.3 mmol/L    Chloride 107 98 - 107 mmol/L    Bicarbonate 23 21 - 32 mmol/L    Anion Gap 15 10 - 20 mmol/L    Urea Nitrogen 12 6 - 23 mg/dL    Creatinine 0.71 0.50 - 1.05 mg/dL    eGFR >90 >60 mL/min/1.73m*2    Calcium 8.3 (L) 8.6 - 10.3 mg/dL    Albumin 3.0 (L) 3.4 - 5.0 g/dL    Alkaline Phosphatase 205 (H) 33 - 136 U/L    Total Protein 4.6 (L) 6.4 - 8.2 g/dL    AST 68 (H) 9 - 39 U/L    Bilirubin, Total 0.6 0.0 - 1.2 mg/dL     (H) 7 - 45 U/L     *Note: Due to a large number of results and/or encounters for the requested time period, some results have not been displayed. A complete set of results can be found in Results Review.     US right upper quadrant   Final Result   Sonographic findings consistent with cholelithiasis and acute   cholecystitis.        Of the several nonobstructing right renal stones evident on earlier   CT scan, only the 8 mm upper pole stone could be identified. No   hydronephrosis.        Obscuration of the pancreas by bowel gas and body habitus.        MACRO:   None        Signed by: Dwight Elkins 7/25/2024 8:36 AM   Dictation workstation:   [id number]      CT chest  wo IV contrast   Final Result   There are 2 right-sided pericardial lymph nodes, neither of which was   present on 09/11/2019. The can be seen in retrospect on 11/04/2023.   The larger lymph node is mildly larger today. The smaller lymph node   is stable since 11/04/2023. Exact etiology and clinical significance   are uncertain. However, recommend PET-CT scan to evaluate for   possible lymphoma.        No other thoracic adenopathy in this exam.        Small fat containing posteromedial right-sided diaphragmatic hernia.   No mass or pneumonia in either lung.        Multilevel vertebral body hemangiomas of bone. See above for details.        Stone and sludge filled gallbladder.        Nonobstructing right renal stones.        MACRO:   None        Signed by: Dwight Elkins 7/25/2024 8:51 AM   Dictation workstation:   [id number]      CT abdomen pelvis w IV contrast   Final Result   1.  The gallbladder contains several small stones and sludge.  There   is a small quantity of pericholecystic fluid.  The appearance is   suggestive of cholecystitis.  The findings are new from the prior CT.    No dilated bile ducts.   2.  There are 2 lymph nodes at the right cardiophrenic angle.  The   larger lymph node measures 2.2 cm.  These are new from the prior CT.    Consideration could be given to follow-up with chest CT.   3.  There is a 3 to 4 mm stone of the right renal pelvis and several   small right-sided calyceal stones.  No hydronephrosis.   4.  No ascites, free air or bowel obstruction.   Signed by Morteza Santiago MD                      Assessment/Plan   Principal Problem:    Cholecystitis    Plan: POD 4 Laparoscopic cholecystectomy converted to open Findings: Dense intra-abdominal adhesions, inflamed gallbladder   - Low fat diet  - PRN pain control   - PRN antiemetic  - Encourage OOB  - Encourage IS  - Out patient labs ordered     Dispo: Patient is doing well postoperatively. Would be ready for discharge from a surgical  perspective pending c-diff test. Discussed with Dr. Blackwell.        I spent 35 minutes in the professional and overall care of this patient.      Tiago Orellana PA-C

## 2024-07-29 NOTE — DISCHARGE INSTRUCTIONS
Instructions After Gallbladder Surgery    Wound Care:   -Ice packs to wounds every hour the first day  -Ok to shower in 24 hours  -no baths or swimming for 2 weeks  -keep wound clean and dry  -do not apply topical creams or ointments  -call if you notice redness around wound, foul-smelling drainage, or increasing pain     Diet:   -Avoid greasy, fatty foods first few weeks   -[place], soft meals first day or two after surgery    Activity   -Take it easy for the first 48 hours   -stairs and walks are fine   -resume activities gradually over the first week   -ask Dr Blackwell before resuming strenuous physical exertions   -No driving while on pain medication    Medications   -Pain medicine prescription attached for severe pain   -You can also take Motrin/Ibuprofen 400mg every 6 hours for mild pain   -Resume your home medications unless otherwise directed   -To avoid constipation please take Colace 100mg twice a day (over the counter)    Other Instructions   -Call to make appointment within 1-2 days:  [phone]   -Call the doctor for the following:  severe unrelieved pain  fevers > 101F  Nausea/vomiting  wound issues  insurance/return to work forms  shortness of breath  chest pains   -Feedback on your surgical experience is appreciated!

## 2024-07-29 NOTE — TELEPHONE ENCOUNTER
Patient reports she was admitted for cholecystitis. She had her gallbaldder removed. She wants DEMETRIUS Hirsch to know that during the CT scan they found some nodes in her chest. She also missed her brain MRI and will follow up with her Oncology team about this. She wanted to pass this along to DEMETRIUS Hirsch. I left her know DEMETRIUS Hirsch is out of the office, but I will send her an email. Patient has a fuv with her 8/6.

## 2024-07-29 NOTE — PROGRESS NOTES
Continues to do well. [place] diet    Loose bm's.  C. Diff neg    Incisions clean    LFT's down trending    Will sign off.  Re consult if needed    She can follow up with me next week to remove staples

## 2024-07-30 LAB
ANA PATTERN: ABNORMAL
ANA SER QL HEP2 SUBST: POSITIVE
ANA TITR SER IF: ABNORMAL {TITER}
CENTROMERE B AB SER-ACNC: <0.2 AI
CHROMATIN AB SERPL-ACNC: <0.2 AI
DSDNA AB SER-ACNC: <1 IU/ML
ENA JO1 AB SER QL IA: <0.2 AI
ENA RNP AB SER IA-ACNC: 0.3 AI
ENA SCL70 AB SER QL IA: <0.2 AI
ENA SM AB SER IA-ACNC: <0.2 AI
ENA SM+RNP AB SER QL IA: <0.2 AI
ENA SS-A AB SER IA-ACNC: <0.2 AI
ENA SS-B AB SER IA-ACNC: <0.2 AI
LABORATORY COMMENT REPORT: NORMAL
MITOCHONDRIA AB SER QL IF: NEGATIVE
PATH REPORT.FINAL DX SPEC: NORMAL
PATH REPORT.GROSS SPEC: NORMAL
PATH REPORT.RELEVANT HX SPEC: NORMAL
PATH REPORT.TOTAL CANCER: NORMAL
RIBOSOMAL P AB SER-ACNC: <0.2 AI
SMOOTH MUSCLE AB SER QL IF: NEGATIVE

## 2024-08-05 ENCOUNTER — APPOINTMENT (OUTPATIENT)
Dept: PAIN MEDICINE | Facility: CLINIC | Age: 69
End: 2024-08-05
Payer: MEDICARE

## 2024-08-05 NOTE — PROGRESS NOTES
SUPPORTIVE AND PALLIATIVE ONCOLOGY OUTPATIENT FOLLOW-UP    Virtual or Telephone Consent    A telephone visit (audio only) between the patient (at the originating site) and the provider (at the distant site) was utilized to provide this telehealth service.   Verbal consent was requested and obtained from Saba Park on this date, 8/6/2024 for a telehealth visit.     SERVICE DATE: 8/6/2024    Subjective   HISTORY OF PRESENT ILLNESS: Saba Park is a 69 y.o. female who presents with history of CNS lymphoma and ovarian cancer. Currently in active surveillance and without evidence of either of her cancers. Patient follows with Supportive Oncology/Palliative Care for chronic pain r/t disease history/treatment and further symptom management.      Since last visit patient went to ED 7/25/24 with new abd pain, found to have cholecystitis, S/P laparoscopic cholecystectomy converted to open 7/25/24. Discharged 7/29/24.  CT on admission showed concern of 2 right sided pericardial lymph nodes inflamed. Getting work-up with Hematology Oncologist at [facility], Dr. Harris.    Pain Assessment:  Pain Score:  5  Location:  right hip and right upper abd  Description:  sharp/shooting    Symptom Assessment:  Pain:somewhat - patient is reporting two different sources of pain. She is having some continued tenderness in her abd from her recent surgery but in time this is improving. Her hip is also improving some, no longer wincing in pain as previous, but does have some continued shooting pain down her right buttock and leg. She is continuing her Methadone 10mg BID and is taking Pregabalin 50mg BID. Has tried over the past few days to not take Oxycodone. Last night she did wake up in throbbing pain to the abd and took motrin with good relief  Numbness or Tingling in hands/feet/other: somewhat - shooting pain down leg; she does note generally her numbness and tingling in the right foot has improved some since Scrambler, but also feels she  has not been as active and that is usual when it is bothersome to her  Sore Muscles/Spasms: none  Headache: none  Dizziness:none  Constipation: none  Diarrhea: none  Nausea: none - improved since surgery  Vomiting: none  Lack of Appetite: none - improved since surgery   Weight Loss: none  Taste changes: none  Dry Mouth: none  Pain in Mouth/Swallowing: none  Lack of Energy: a little  Difficulty Sleeping: none  Worrying: none  Anxiety: a little - concerns of two inflamed LN. Is awaiting PET this Friday. She received a 1x dose of Lorazepam for her brain MRI which was today, but is asking for another dose for her PET scan on Friday  Depression: a little  Shortness of breath: none  Other: none      Information obtained from: chart review and interview of patient  ______________________________________________________________________        Objective      PHYSICAL EXAMINATION   Vital Signs:   Vital signs reviewed      7/28/2024    12:30 AM 7/28/2024     4:56 AM 7/28/2024     8:23 AM 7/28/2024     2:37 PM 7/28/2024    11:54 PM 7/29/2024     8:47 AM 7/29/2024     3:15 PM   Vitals   Systolic 165 105 147 139 128 147 121   Diastolic 76 74 80 52 72 72 62   Heart Rate 71 68   64 67 67   Temp 37.2 °C (99 °F) 36.9 °C (98.5 °F) 36.9 °C (98.5 °F) 37.1 °C (98.8 °F) 36.8 °C (98.3 °F) 37 °C (98.6 °F) 36.9 °C (98.4 °F)   Resp 16 16   16 16 15     Interpreted By:  Dwight Elkins,   STUDY:  CT CHEST WO IV CONTRAST;  7/25/2024 8:15 am      INDICATION:  70 y/o   F with  Signs/Symptoms:lymphnodes 2cm.      LIMITATIONS:  Imaging of the mediastinum and olga is limited without the use of IV  contrast..      ACCESSION NUMBER(S):  [id number]      ORDERING CLINICIAN:  WES MCLEAN      TECHNIQUE:  Noncontrast Thin-section images were obtained  from the thoracic  inlet down through the diaphragm. Sagittal and coronal reconstruction  images were generated. Mediastinal, lung, bone, and liver windows  were reviewed.      COMPARISON:  Most  recent prior is included a coronary artery calcium score exam  from 11/04/2023 and CT scan abdomen and pelvis and chest dated  09/11/2019.      FINDINGS:  CHEST WALL/BASE OF THE NECK:  The chest wall was grossly intact.  No thyromegaly or thyroid mass.      LUNGS/ PLEURA/ AND TRACHEA:  No mass or pneumonia in either lung.  No pleural effusion.  No pneumothorax.  The trachea was grossly intact.  Incidental note of a small fat containing posteromedial right-sided  diaphragmatic hernia.      MEDIASTINUM/NGA:  There is no suspicious hilar or axillary adenopathy in this exam.  There is no suspicious central mediastinal adenopathy. However, there  are 2 adjacent right-sided pericardial lymph nodes. The larger  measures 25 x 18 mm, not present on 09/11/2019, and increased from 20  x 16 mm on the cardiac score exam from 11/04/2023. The adjacent 9 mm  lymph node is stable since 11/04/2023 and was not present on  09/11/2019. No cardiomegaly.  No pericardial effusion.  No thoracic aortic aneurysm.      BONES:  No destructive lytic or blastic bone lesion. There are however  vertebral body hemangiomas of bone involving C7, T6, T11, and to a  lesser extent T12 vertebral bodies.      UPPER ABDOMEN:  For information regarding the upper abdomen, please see the report  for CT scan abdomen with contrast done at 5:50 a.m. earlier today. Of  note was sludge and stones filling the gallbladder and the presence  of nonobstructing stones in the right kidney.      IMPRESSION:  There are 2 right-sided pericardial lymph nodes, neither of which was  present on 09/11/2019. The can be seen in retrospect on 11/04/2023.  The larger lymph node is mildly larger today. The smaller lymph node  is stable since 11/04/2023. Exact etiology and clinical significance  are uncertain. However, recommend PET-CT scan to evaluate for  possible lymphoma.      No other thoracic adenopathy in this exam.      Small fat containing posteromedial right-sided  diaphragmatic hernia.  No mass or pneumonia in either lung.      Multilevel vertebral body hemangiomas of bone. See above for details.      Stone and sludge filled gallbladder.      Nonobstructing right renal stones.      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 Completely anbx therapy  Type: neuropathic  Pain control: sub-optimally controlled  Home regimen:   - S/P [facility] 5/19/22  - Continue Ibuprofen 600mg q6 hours PRN  - Completed PT/[facility] therapy  - Supportive care with rest, ice/heat as needed  - Continue Methadone 10mg BID  - EKG (4/5/23) QTc 431. (7/25/2024) . Repeat with increase in Methadone dose  - Pursed Scrambler Therapy 7/8/24 to 7/18/24 (total of 8 sessions) - noted worsening radiating pain from hip, but slight improvement in numbness/tingling of her foot. Did not complete further sessions due to extreme hip/leg pain  - Increase to Pregabalin 75mg BID  - Medrol Dospack (7/23/2024) - complete  - Continue Acetaminophen 500mg [id number] PRN  - Continue Oxycodone 5mg [id number] PRN for breakthrough pain  - Continue Duloxetine 60mg/daily  - Completed program through chiropractor w/o change in pain  - Referral to Symptom Clinic - Dr Silverman - pursuing acupuncture with notable change in neuropathy/pain  - Continue following with Chronic Pain (Dr. Alarcon) - next follow-up 9/19/24   - Continue following with podiatry   Intolerances/previously tried:   - Alpha Liproic Acid - not effective  - Gabapentin - caused excessive lethargy     Opioid Use  Medication  Management:   - OARRS report reviewed with no aberrant behavior; consistent with  prescriptions/records and patient history  - MED 90.  Overdose Risk Score 100.   This has been discussed with patient.   - We will continue to closely monitor the patient for signs of prescription misuse including UDS, OARRS review and subjective reports at each visit.  - No concurrent benzodiazepine use   - I am a provider who either is or has consulted and collaborated with a provider certified in Hospice and Palliative Medicine and have conducted a face-face visit and examination for this patient.  - Routine Urine Drug Screen: 7/12/23 appropriately positive for opioids and negative for illicit substances  - Controlled Substance Agreement: 7/12/23  - Specifically discussed that controlled substance prescriptions will only be provided by our group as outlined in the completed agreement  - Prescribed naloxone: patient declined  - Red Flags: NA     Bowel  - Continue Docusate 100mg 1-2x daily     Nausea/Loss of Appetite - improved since surgery  - Continue Omeprazole 20mg daily  - Continue Ondansetron 8mg [id number] PRN  - Continue Metoclopramide 10mg [id number] PRN     Altered Mood  Chronic anxiety and depression related to health concerns   Home regimen:   - Continue Duloxetine 60mg once daily at bed  - Continue Buspirone 5mg TID  - Continue Buproprion XL 150mg daily  - Following with counselor weekly to every 2 weeks  - Established with Dr. Ugalde - Onco Psychiatry    Next Follow-Up Visit:  Return to clinic in 2-3 weeks in person    Signature and billing  Medical complexity was moderate level due to due to complexity of problems, extensive data review, and high risk of management/treatment.  Time was spent on the following: Prep Time, Time Directly with Patient/Family/Caregiver, Documentation Time. Total time spent: 45      Data  Diagnostic tests and information reviewed for today's visit:  Most recent labs and imaging results,  Medications     Some elements copied from Palliative Care note on 7/23/2024, the elements have been updated and all reflect current decision making from today, 8/6/2024.      Plan of Care discussed with: Patient    SIGNATURE: DANILO Orourke    Contact information:  Supportive and Palliative Oncology  Monday-Friday 8 AM-5 PM  Phone:  [phone], press option #5, then option #1.   Or Epic Secure Chat

## 2024-08-06 ENCOUNTER — LAB (OUTPATIENT)
Dept: LAB | Facility: CLINIC | Age: 69
End: 2024-08-06
Payer: MEDICARE

## 2024-08-06 ENCOUNTER — TELEMEDICINE (OUTPATIENT)
Dept: PALLIATIVE MEDICINE | Facility: CLINIC | Age: 69
End: 2024-08-06
Payer: MEDICARE

## 2024-08-06 ENCOUNTER — TELEPHONE (OUTPATIENT)
Dept: SURGERY | Facility: CLINIC | Age: 69
End: 2024-08-06
Payer: MEDICARE

## 2024-08-06 DIAGNOSIS — R11.0 NAUSEA: ICD-10-CM

## 2024-08-06 DIAGNOSIS — F33.1 MODERATE EPISODE OF RECURRENT MAJOR DEPRESSIVE DISORDER (MULTI): ICD-10-CM

## 2024-08-06 DIAGNOSIS — G89.3 CANCER RELATED PAIN: ICD-10-CM

## 2024-08-06 DIAGNOSIS — T45.1X5A CHEMOTHERAPY-INDUCED PERIPHERAL NEUROPATHY (MULTI): ICD-10-CM

## 2024-08-06 DIAGNOSIS — F41.9 ANXIETY: ICD-10-CM

## 2024-08-06 DIAGNOSIS — G62.9 NEUROPATHY: ICD-10-CM

## 2024-08-06 DIAGNOSIS — G89.3 CHRONIC PAIN AFTER CANCER TREATMENT: ICD-10-CM

## 2024-08-06 DIAGNOSIS — G89.29 OTHER CHRONIC PAIN: ICD-10-CM

## 2024-08-06 DIAGNOSIS — G62.0 CHEMOTHERAPY-INDUCED PERIPHERAL NEUROPATHY (MULTI): ICD-10-CM

## 2024-08-06 DIAGNOSIS — C85.89: ICD-10-CM

## 2024-08-06 DIAGNOSIS — R53.83 OTHER FATIGUE: ICD-10-CM

## 2024-08-06 DIAGNOSIS — M79.2 NEUROPATHIC PAIN: ICD-10-CM

## 2024-08-06 DIAGNOSIS — Z51.5 PALLIATIVE CARE ENCOUNTER: Primary | ICD-10-CM

## 2024-08-06 DIAGNOSIS — K81.9 CHOLECYSTITIS: ICD-10-CM

## 2024-08-06 DIAGNOSIS — C56.9 MALIGNANT NEOPLASM OF OVARY, UNSPECIFIED LATERALITY (MULTI): ICD-10-CM

## 2024-08-06 LAB
ALBUMIN SERPL BCP-MCNC: 4.2 G/DL (ref 3.4–5)
ALP SERPL-CCNC: 153 U/L (ref 33–136)
ALT SERPL W P-5'-P-CCNC: 64 U/L (ref 7–45)
ANION GAP SERPL CALC-SCNC: 12 MMOL/L (ref 10–20)
AST SERPL W P-5'-P-CCNC: 13 U/L (ref 9–39)
BILIRUB SERPL-MCNC: 0.4 MG/DL (ref 0–1.2)
BUN SERPL-MCNC: 24 MG/DL (ref 6–23)
CALCIUM SERPL-MCNC: 9.8 MG/DL (ref 8.6–10.6)
CHLORIDE SERPL-SCNC: 108 MMOL/L (ref 98–107)
CO2 SERPL-SCNC: 27 MMOL/L (ref 21–32)
CREAT SERPL-MCNC: 0.95 MG/DL (ref 0.5–1.05)
EGFRCR SERPLBLD CKD-EPI 2021: 65 ML/MIN/1.73M*2
GLUCOSE SERPL-MCNC: 90 MG/DL (ref 74–99)
POTASSIUM SERPL-SCNC: 5.3 MMOL/L (ref 3.5–5.3)
PROT SERPL-MCNC: 6.4 G/DL (ref 6.4–8.2)
SODIUM SERPL-SCNC: 142 MMOL/L (ref 136–145)

## 2024-08-06 PROCEDURE — 80053 COMPREHEN METABOLIC PANEL: CPT

## 2024-08-06 PROCEDURE — 36415 COLL VENOUS BLD VENIPUNCTURE: CPT

## 2024-08-06 PROCEDURE — 1111F DSCHRG MED/CURRENT MED MERGE: CPT | Performed by: NURSE PRACTITIONER

## 2024-08-06 PROCEDURE — 1157F ADVNC CARE PLAN IN RCRD: CPT | Performed by: NURSE PRACTITIONER

## 2024-08-06 PROCEDURE — 99215 OFFICE O/P EST HI 40 MIN: CPT | Performed by: NURSE PRACTITIONER

## 2024-08-06 RX ORDER — PREGABALIN 75 MG/1
75 CAPSULE ORAL 2 TIMES DAILY
Qty: 60 CAPSULE | Refills: 0 | Status: SHIPPED | OUTPATIENT
Start: 2024-08-06 | End: 2024-09-05

## 2024-08-06 RX ORDER — LORAZEPAM 1 MG/1
1 TABLET ORAL ONCE
Qty: 1 TABLET | Refills: 0 | Status: SHIPPED | OUTPATIENT
Start: 2024-08-06 | End: 2024-08-06

## 2024-08-06 NOTE — TELEPHONE ENCOUNTER
Spoke with the patient last evening , she has a order for BW so she will get today so  can see it at visit on Wednesday.  Mirtha

## 2024-08-07 ENCOUNTER — OFFICE VISIT (OUTPATIENT)
Dept: SURGERY | Facility: CLINIC | Age: 69
End: 2024-08-07
Payer: MEDICARE

## 2024-08-07 VITALS
HEART RATE: 74 BPM | TEMPERATURE: 97.7 F | WEIGHT: 141.8 LBS | BODY MASS INDEX: 25.12 KG/M2 | DIASTOLIC BLOOD PRESSURE: 64 MMHG | HEIGHT: 63 IN | SYSTOLIC BLOOD PRESSURE: 101 MMHG

## 2024-08-07 DIAGNOSIS — Z09 POSTOPERATIVE EXAMINATION: Primary | ICD-10-CM

## 2024-08-07 PROCEDURE — 1036F TOBACCO NON-USER: CPT | Performed by: SURGERY

## 2024-08-07 PROCEDURE — 1157F ADVNC CARE PLAN IN RCRD: CPT | Performed by: SURGERY

## 2024-08-07 PROCEDURE — 3008F BODY MASS INDEX DOCD: CPT | Performed by: SURGERY

## 2024-08-07 PROCEDURE — 3078F DIAST BP <80 MM HG: CPT | Performed by: SURGERY

## 2024-08-07 PROCEDURE — 3074F SYST BP LT 130 MM HG: CPT | Performed by: SURGERY

## 2024-08-07 PROCEDURE — 1159F MED LIST DOCD IN RCRD: CPT | Performed by: SURGERY

## 2024-08-07 PROCEDURE — 99211 OFF/OP EST MAY X REQ PHY/QHP: CPT | Performed by: SURGERY

## 2024-08-07 PROCEDURE — 1111F DSCHRG MED/CURRENT MED MERGE: CPT | Performed by: SURGERY

## 2024-08-07 PROCEDURE — 1125F AMNT PAIN NOTED PAIN PRSNT: CPT | Performed by: SURGERY

## 2024-08-07 ASSESSMENT — ENCOUNTER SYMPTOMS: DEPRESSION: 1

## 2024-08-07 ASSESSMENT — PAIN SCALES - GENERAL: PAINLEVEL: 2

## 2024-08-07 NOTE — LETTER
August 7, 2024     DANILO Castro   [address]  [address]  [facility] [address]    Patient: Saba Park   YOB: 1955   Date of Visit: 8/7/2024       Dear DEMETRIUS Menezes-CNP:    Thank you for referring Saba Park to me for evaluation. Below are my notes for this consultation.  If you have questions, please do not hesitate to call me. I look forward to following your patient along with you.       Sincerely,     Db Blackwell MD      CC: No Recipients  ______________________________________________________________________________________    Subjective  Patient ID: Saba Park is a 69 y.o. female who presents for Post-op (Postoperative visit.).       Mrs. Saba Park is a 69 y.o. year old female   Patient is a 69-year-old female with multiple medical conditions to include ovarian cancer status post laparotomy, TSH/BSO, rectal resection, ileostomy, reversal of ileostomy, CNS lymphoma.  She was recently in the hospital with worsening right upper quadrant abdominal pain.  Workup is included a right upper quadrant ultrasound that shows gallstones with evidence of acute cholecystitis.  She was taken to surgery on 7/25/2024 for cholecystectomy.  We are unable to proceed laparoscopically due to dense intra-abdominal adhesions and an open procedure was performed.  She did fairly well following the operation.  She was noted to have elevated liver function test that were improving upon discharge.    She comes in today for follow-up.  She is doing fairly well.  She is tolerating a regular diet, bowel movements are normal    On exam patient looks well    Incisions are healing very nicely.  We removed her staples today      Collected 7/25/2024 15:40       Status: Final result       Visible to patient: Yes (not seen)       Dx: Cholecystitis    0 Result Notes       Component  Resulting Agency   FINAL DIAGNOSIS   A.  Gallbladder, cholecystectomy:  -Acute cholecystitis with adenomyoma.     -Cholelithiasis.              Impression: Doing well following open laparoscopic cholecystectomy performed for acute cholecystitis, symptomatic cholelithiasis    Will repeat her LFTs next week.  I did ask her to give me a call so that we can discuss the results      Discussed increasing activity level    Follow-up with me as needed    Db Blackwell MD 08/07/24 3:09 PM

## 2024-08-07 NOTE — PROGRESS NOTES
Subjective   Patient ID: Saba Park is a 69 y.o. female who presents for Post-op (Postoperative visit.).       Mrs. Saba Park is a 69 y.o. year old female   Patient is a 69-year-old female with multiple medical conditions to include ovarian cancer status post laparotomy, TSH/BSO, rectal resection, ileostomy, reversal of ileostomy, CNS lymphoma.  She was recently in the hospital with worsening right upper quadrant abdominal pain.  Workup is included a right upper quadrant ultrasound that shows gallstones with evidence of acute cholecystitis.  She was taken to surgery on 7/25/2024 for cholecystectomy.  We are unable to proceed laparoscopically due to dense intra-abdominal adhesions and an open procedure was performed.  She did fairly well following the operation.  She was noted to have elevated liver function test that were improving upon discharge.    She comes in today for follow-up.  She is doing fairly well.  She is tolerating a regular diet, bowel movements are normal    On exam patient looks well    Incisions are healing very nicely.  We removed her staples today      Collected 7/25/2024 15:40       Status: Final result       Visible to patient: Yes (not seen)       Dx: Cholecystitis    0 Result Notes       Component  Resulting Agency   FINAL DIAGNOSIS   A.  Gallbladder, cholecystectomy:  -Acute cholecystitis with adenomyoma.    -Cholelithiasis.              Impression: Doing well following open laparoscopic cholecystectomy performed for acute cholecystitis, symptomatic cholelithiasis    Will repeat her LFTs next week.  I did ask her to give me a call so that we can discuss the results      Discussed increasing activity level    Follow-up with me as needed    Db Blackwell MD 08/07/24 3:09 PM

## 2024-08-08 DIAGNOSIS — F33.0 MILD EPISODE OF RECURRENT MAJOR DEPRESSIVE DISORDER (CMS-HCC): ICD-10-CM

## 2024-08-08 DIAGNOSIS — F41.9 ANXIETY: ICD-10-CM

## 2024-08-08 RX ORDER — BUSPIRONE HYDROCHLORIDE 5 MG/1
5 TABLET ORAL 2 TIMES DAILY
Qty: 180 TABLET | Refills: 1 | Status: SHIPPED | OUTPATIENT
Start: 2024-08-08

## 2024-08-13 ENCOUNTER — LAB (OUTPATIENT)
Dept: LAB | Facility: CLINIC | Age: 69
End: 2024-08-13
Payer: MEDICARE

## 2024-08-13 DIAGNOSIS — Z09 POSTOPERATIVE EXAMINATION: ICD-10-CM

## 2024-08-13 LAB
ALBUMIN SERPL BCP-MCNC: 4.5 G/DL (ref 3.4–5)
ALP SERPL-CCNC: 135 U/L (ref 33–136)
ALT SERPL W P-5'-P-CCNC: 47 U/L (ref 7–45)
AST SERPL W P-5'-P-CCNC: 19 U/L (ref 9–39)
BILIRUB DIRECT SERPL-MCNC: 0.1 MG/DL (ref 0–0.3)
BILIRUB SERPL-MCNC: 0.5 MG/DL (ref 0–1.2)
PROT SERPL-MCNC: 6.9 G/DL (ref 6.4–8.2)

## 2024-08-13 PROCEDURE — 80076 HEPATIC FUNCTION PANEL: CPT

## 2024-08-13 PROCEDURE — 36415 COLL VENOUS BLD VENIPUNCTURE: CPT

## 2024-08-16 ENCOUNTER — TELEPHONE (OUTPATIENT)
Dept: GYNECOLOGIC ONCOLOGY | Facility: HOSPITAL | Age: 69
End: 2024-08-16
Payer: MEDICARE

## 2024-08-16 DIAGNOSIS — Z85.43 HISTORY OF OVARIAN CANCER: ICD-10-CM

## 2024-08-16 NOTE — TELEPHONE ENCOUNTER
Patient called to update that she underwent open cholecystectomy on 7/25/24.  CT scan of c/a/p done prior to surgery showed 2 pericardial lymph nodes and patients hematology/oncology doctor recommended a PET scan.    PET done on 8/9 and showed PET avid mesentery nodule and patients heme/onc recommended follow up with gyn/oncology due to this finding.    PET also showed enlarged mediastinal lymph node and heme/onc is scheduling IR biopsy.   Messaged Dr. Kenny and Avis Crook CNP with patient update and PET results.    Dr. Kenny recommends  and possible biopsy of mesentery nodule if  elevated.    Phoned patient to update regarding Dr. Johnson recommendation.    Order entered for  and patient states she will have drawn on Monday.   Office will follow up on  result.

## 2024-08-19 ENCOUNTER — APPOINTMENT (OUTPATIENT)
Dept: PAIN MEDICINE | Facility: CLINIC | Age: 69
End: 2024-08-19
Payer: MEDICARE

## 2024-08-19 ENCOUNTER — LAB (OUTPATIENT)
Dept: LAB | Facility: CLINIC | Age: 69
End: 2024-08-19
Payer: MEDICARE

## 2024-08-19 DIAGNOSIS — Z85.43 HISTORY OF OVARIAN CANCER: ICD-10-CM

## 2024-08-19 LAB — CANCER AG125 SERPL-ACNC: 30.6 U/ML (ref 0–30.2)

## 2024-08-19 PROCEDURE — 86304 IMMUNOASSAY TUMOR CA 125: CPT

## 2024-08-19 PROCEDURE — 36415 COLL VENOUS BLD VENIPUNCTURE: CPT

## 2024-08-19 NOTE — PROGRESS NOTES
SUPPORTIVE AND PALLIATIVE ONCOLOGY OUTPATIENT FOLLOW-UP    SERVICE DATE: 8/23/2024    Subjective   HISTORY OF PRESENT ILLNESS: Saba Park is a 69 y.o. female who presents with history of CNS lymphoma and ovarian cancer. Currently in active surveillance and without evidence of either of her cancers. Patient follows with Supportive Oncology/Palliative Care for chronic pain r/t disease history/treatment and further symptom management.      Pain Assessment:  Pain Score:  0  Location:    Description:      Symptom Assessment:  Pain:a little  Numbness or Tingling in hands/feet/other: a little - patient endorses pain has been very well controlled recently, and has very rarely if ever now in need of taking Oxycodone. Continues with taking Methadone 10mg BID and Pregabalin 75mg. Discussed ability to further increase Pregabalin if in need due to residual breakthrough pain, she would like to hold off on that today  Sore Muscles/Spasms: none  Headache: none  Dizziness:none  Constipation: none  Diarrhea: none  Nausea: none  Vomiting: none  Lack of Appetite: none   Weight Loss: none  Taste changes: none  Dry Mouth: none  Pain in Mouth/Swallowing: none  Lack of Energy: a little  Difficulty Sleeping: none  Worrying: a little  Anxiety: somewhat  Depression: somewhat - she has been very down and anxious recently with recent PET scan results. Notes this whole summer has been horrible for her, not able to catch a break in life. She is meeting with Dr. Aftab lara and wants to further discuss this with her  Shortness of breath: none  Other: none      Information obtained from: chart review and interview of patient  ______________________________________________________________________        Objective     PHYSICAL EXAMINATION   Vital Signs:   Vital signs reviewed      7/28/2024     8:23 AM 7/28/2024     2:37 PM 7/28/2024    11:54 PM 7/29/2024     8:47 AM 7/29/2024     3:15 PM 8/7/2024     3:00 PM 8/23/2024    11:40 AM  "  Vitals   Systolic 147 139 128 147 121 101 114   Diastolic 80 52 72 72 62 64 66   Heart Rate   64 67 67 74 61   Temp 36.9 °C (98.5 °F) 37.1 °C (98.8 °F) 36.8 °C (98.3 °F) 37 °C (98.6 °F) 36.9 °C (98.4 °F) 36.5 °C (97.7 °F) 36 °C (96.8 °F)   Resp   16 16 15  16   Height (in)      1.6 m (5' 3\")    Weight (lb)      141.8    BMI      25.12 kg/m2    BSA (m2)      1.69 m2    Visit Report      Report Report     Physical Exam  Vitals reviewed.   Constitutional:       Appearance: Normal appearance.   HENT:      Head: Normocephalic.   Cardiovascular:      Rate and Rhythm: Normal rate and regular rhythm.   Pulmonary:      Effort: Pulmonary effort is normal.   Abdominal:      General: Abdomen is flat. Bowel sounds are normal.      Palpations: Abdomen is soft.   Musculoskeletal:         General: Normal range of motion.   Skin:     General: Skin is warm and dry.   Neurological:      General: No focal deficit present.      Mental Status: She is alert and oriented to person, place, and time. Mental status is at baseline.   Psychiatric:         Mood and Affect: Mood normal.         Behavior: Behavior normal.         Thought Content: Thought content normal.         Judgment: Judgment normal.     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 Completely anbx therapy  Type: neuropathic  Pain control: sub-optimally controlled  Home regimen:   - S/P [facility] 5/19/22  - Continue Ibuprofen 600mg q6 hours PRN  - Completed PT/[facility] therapy  - Supportive care with rest, ice/heat as needed  - Continue Methadone 10mg BID  - EKG (4/5/23) QTc 431. (7/25/2024) . Repeat with increase in Methadone dose  - Pursed " Scrambler Therapy 7/8/24 to 7/18/24 (total of 8 sessions) - noted worsening radiating pain from hip, but slight improvement in numbness/tingling of her foot. Did not complete further sessions due to extreme hip/leg pain  - Continue Pregabalin 75mg BID  - Medrol Dospack (7/23/2024) - complete  - Continue Acetaminophen 500mg [id number] PRN  - Continue Oxycodone 5mg [id number] PRN for breakthrough pain  - Continue Duloxetine 60mg/daily  - Completed program through chiropractor w/o change in pain  - Referral to Symptom Clinic - Dr Silverman - pursuing acupuncture with notable change in neuropathy/pain  - Continue following with Chronic Pain (Dr. Alarcon) - next follow-up 9/19/24   - Continue following with podiatry   Intolerances/previously tried:   - Alpha Liproic Acid - not effective  - Gabapentin - caused excessive lethargy     Opioid Use  Medication Management:   - OARRS report reviewed with no aberrant behavior; consistent with [facility] prescriptions/records and patient history  - MED 90.  Overdose Risk Score 100.   This has been discussed with patient.   - We will continue to closely monitor the patient for signs of prescription misuse including UDS, OARRS review and subjective reports at each visit.  - No concurrent benzodiazepine use   - I am a provider who either is or has consulted and collaborated with a provider certified in Hospice and Palliative Medicine and have conducted a face-face visit and examination for this patient.  - Routine Urine Drug Screen: 7/12/23 appropriately positive for opioids and negative for illicit substances. Pending results 8/23/2024  - Controlled Substance Agreement: 8/23/2024  - Specifically discussed that controlled substance prescriptions will only be provided by our group as outlined in the completed agreement  - Prescribed naloxone: patient declined  - Red Flags: NA     Bowel  - Continue Docusate 100mg 1-2x daily     Nausea/Loss of Appetite - improved since surgery  - Continue Omeprazole 20mg  daily  - Continue Ondansetron 8mg [id number] PRN  - Continue Metoclopramide 10mg [id number] PRN     Altered Mood  Chronic anxiety and depression related to health concerns   Home regimen:   - Continue Duloxetine 60mg once daily at bed  - Continue Buspirone 5mg TID  - Continue Buproprion XL 150mg daily  - Following with counselor weekly to every 2 weeks  - Established with Dr. Ugalde - Onco Psychiatry    Next Follow-Up Visit:  Return to clinic in 6 weeks by telephone    Signature and billing  Medical complexity was high level due to due to complexity of problems, extensive data review, and high risk of management/treatment.  Time was spent on the following: Prep Time, Time Directly with Patient/Family/Caregiver, Documentation Time. Total time spent: 50      Data  Diagnostic tests and information reviewed for today's visit:  Most recent labs and imaging results, Medications     Some elements copied from Palliative Care note on 8/6/2024, the elements have been updated and all reflect current decision making from today, 8/23/2024.      Plan of Care discussed with: Patient    SIGNATURE: DEMETRIUS Orourke-CNP    Contact information:  [facility]ive [facility] Palliative Oncology  Monday-Friday 8 AM-5 PM  Phone:  [phone], press option #5, then option #1.   Or Epic Secure Chat

## 2024-08-20 ENCOUNTER — TELEPHONE (OUTPATIENT)
Dept: GYNECOLOGIC ONCOLOGY | Facility: HOSPITAL | Age: 69
End: 2024-08-20
Payer: MEDICARE

## 2024-08-20 DIAGNOSIS — C56.9 MALIGNANT NEOPLASM OF OVARY, UNSPECIFIED LATERALITY (MULTI): Primary | ICD-10-CM

## 2024-08-20 DIAGNOSIS — D07.1 VIN III (VULVAR INTRAEPITHELIAL NEOPLASIA III): ICD-10-CM

## 2024-08-20 NOTE — TELEPHONE ENCOUNTER
Phoned patient to notify that  = 30.6 and that Dr. Kenny and Avis Crook CNP recommend biopsy of FDG avid nodule within the mesentery.    Patient verbalized her understanding of information given and in agreement with recommendation.    Staff message sent to Kait Stearns and Patience Goodwin requesting scheduling of biopsy.

## 2024-08-23 ENCOUNTER — LAB (OUTPATIENT)
Dept: LAB | Facility: CLINIC | Age: 69
End: 2024-08-23
Payer: MEDICARE

## 2024-08-23 ENCOUNTER — OFFICE VISIT (OUTPATIENT)
Dept: PALLIATIVE MEDICINE | Facility: CLINIC | Age: 69
End: 2024-08-23
Payer: MEDICARE

## 2024-08-23 VITALS
HEART RATE: 61 BPM | TEMPERATURE: 96.8 F | DIASTOLIC BLOOD PRESSURE: 66 MMHG | SYSTOLIC BLOOD PRESSURE: 114 MMHG | RESPIRATION RATE: 16 BRPM | OXYGEN SATURATION: 98 %

## 2024-08-23 DIAGNOSIS — G62.0 CHEMOTHERAPY-INDUCED PERIPHERAL NEUROPATHY (MULTI): ICD-10-CM

## 2024-08-23 DIAGNOSIS — F33.1 MODERATE EPISODE OF RECURRENT MAJOR DEPRESSIVE DISORDER (MULTI): ICD-10-CM

## 2024-08-23 DIAGNOSIS — Z51.81 ENCOUNTER FOR MONITORING OPIOID MAINTENANCE THERAPY: ICD-10-CM

## 2024-08-23 DIAGNOSIS — R11.0 NAUSEA: ICD-10-CM

## 2024-08-23 DIAGNOSIS — Z79.891 ENCOUNTER FOR MONITORING OPIOID MAINTENANCE THERAPY: ICD-10-CM

## 2024-08-23 DIAGNOSIS — G89.3 CHRONIC PAIN AFTER CANCER TREATMENT: ICD-10-CM

## 2024-08-23 DIAGNOSIS — Z51.5 PALLIATIVE CARE ENCOUNTER: Primary | ICD-10-CM

## 2024-08-23 DIAGNOSIS — C85.89: ICD-10-CM

## 2024-08-23 DIAGNOSIS — T45.1X5A CHEMOTHERAPY-INDUCED PERIPHERAL NEUROPATHY (MULTI): ICD-10-CM

## 2024-08-23 DIAGNOSIS — R53.83 OTHER FATIGUE: ICD-10-CM

## 2024-08-23 DIAGNOSIS — M79.2 NEUROPATHIC PAIN: ICD-10-CM

## 2024-08-23 DIAGNOSIS — C56.9 MALIGNANT NEOPLASM OF OVARY, UNSPECIFIED LATERALITY (MULTI): ICD-10-CM

## 2024-08-23 DIAGNOSIS — G62.9 NEUROPATHY: ICD-10-CM

## 2024-08-23 DIAGNOSIS — G89.29 OTHER CHRONIC PAIN: ICD-10-CM

## 2024-08-23 DIAGNOSIS — F41.9 ANXIETY: ICD-10-CM

## 2024-08-23 LAB
AMPHETAMINES UR QL SCN: ABNORMAL
BARBITURATES UR QL SCN: ABNORMAL
BENZODIAZ UR QL SCN: ABNORMAL
BZE UR QL SCN: ABNORMAL
CANNABINOIDS UR QL SCN: ABNORMAL
FENTANYL+NORFENTANYL UR QL SCN: ABNORMAL
METHADONE UR QL SCN: ABNORMAL
OPIATES UR QL SCN: ABNORMAL
OXYCODONE+OXYMORPHONE UR QL SCN: ABNORMAL
PCP UR QL SCN: ABNORMAL

## 2024-08-23 PROCEDURE — 80307 DRUG TEST PRSMV CHEM ANLYZR: CPT

## 2024-08-23 PROCEDURE — 1157F ADVNC CARE PLAN IN RCRD: CPT | Performed by: NURSE PRACTITIONER

## 2024-08-23 PROCEDURE — 3078F DIAST BP <80 MM HG: CPT | Performed by: NURSE PRACTITIONER

## 2024-08-23 PROCEDURE — 3074F SYST BP LT 130 MM HG: CPT | Performed by: NURSE PRACTITIONER

## 2024-08-23 PROCEDURE — 80358 DRUG SCREENING METHADONE: CPT

## 2024-08-23 PROCEDURE — 99215 OFFICE O/P EST HI 40 MIN: CPT | Performed by: NURSE PRACTITIONER

## 2024-08-23 PROCEDURE — 1036F TOBACCO NON-USER: CPT | Performed by: NURSE PRACTITIONER

## 2024-08-23 PROCEDURE — 1126F AMNT PAIN NOTED NONE PRSNT: CPT | Performed by: NURSE PRACTITIONER

## 2024-08-23 PROCEDURE — 80345 DRUG SCREENING BARBITURATES: CPT

## 2024-08-23 PROCEDURE — 1111F DSCHRG MED/CURRENT MED MERGE: CPT | Performed by: NURSE PRACTITIONER

## 2024-08-23 PROCEDURE — 1159F MED LIST DOCD IN RCRD: CPT | Performed by: NURSE PRACTITIONER

## 2024-08-23 RX ORDER — METHADONE HYDROCHLORIDE 10 MG/1
10 TABLET ORAL EVERY 12 HOURS
Qty: 60 TABLET | Refills: 0 | Status: SHIPPED | OUTPATIENT
Start: 2024-08-23 | End: 2024-09-22

## 2024-08-23 ASSESSMENT — PAIN SCALES - GENERAL: PAINLEVEL: 0-NO PAIN

## 2024-08-26 ENCOUNTER — TELEPHONE (OUTPATIENT)
Dept: GYNECOLOGIC ONCOLOGY | Facility: HOSPITAL | Age: 69
End: 2024-08-26
Payer: MEDICARE

## 2024-08-26 NOTE — TELEPHONE ENCOUNTER
Patient called said that she is having difficulty scheduling an IR biopsy due to imaging not being available. I told her we can request imaging from [facility]. I asked the  to assist with pushing the images through to our EMR.

## 2024-08-27 ENCOUNTER — APPOINTMENT (OUTPATIENT)
Dept: PAIN MEDICINE | Facility: CLINIC | Age: 69
End: 2024-08-27
Payer: MEDICARE

## 2024-08-28 ENCOUNTER — APPOINTMENT (OUTPATIENT)
Dept: BEHAVIORAL HEALTH | Facility: CLINIC | Age: 69
End: 2024-08-28
Payer: MEDICARE

## 2024-08-28 DIAGNOSIS — F41.9 ANXIETY: ICD-10-CM

## 2024-08-28 DIAGNOSIS — F33.41 RECURRENT MAJOR DEPRESSIVE DISORDER, IN PARTIAL REMISSION (CMS-HCC): ICD-10-CM

## 2024-08-28 DIAGNOSIS — F33.0 MILD EPISODE OF RECURRENT MAJOR DEPRESSIVE DISORDER (CMS-HCC): ICD-10-CM

## 2024-08-28 PROCEDURE — 99214 OFFICE O/P EST MOD 30 MIN: CPT | Performed by: PSYCHIATRY & NEUROLOGY

## 2024-08-28 PROCEDURE — 1111F DSCHRG MED/CURRENT MED MERGE: CPT | Performed by: PSYCHIATRY & NEUROLOGY

## 2024-08-28 PROCEDURE — 1157F ADVNC CARE PLAN IN RCRD: CPT | Performed by: PSYCHIATRY & NEUROLOGY

## 2024-08-28 PROCEDURE — 90833 PSYTX W PT W E/M 30 MIN: CPT | Performed by: PSYCHIATRY & NEUROLOGY

## 2024-08-28 RX ORDER — BUPROPION HYDROCHLORIDE 150 MG/1
150 TABLET, EXTENDED RELEASE ORAL 2 TIMES DAILY
Qty: 60 TABLET | Refills: 2 | Status: SHIPPED | OUTPATIENT
Start: 2024-08-28 | End: 2024-11-26

## 2024-08-28 NOTE — PROGRESS NOTES
"Outpatient Psychiatry FUV      Subjective   Saba Park, a 69 y.o. female, for virtual FUV  Confirmed to be home today      Assessment/Plan   Patient Discussion:  CONTINUE duloxetine 60mg daily at bedtime   CONTINUE  buspirone 5mg 2x/day  CHANGE to bupropion SR 150mg 2x/day      RETURN to clinic 10/9 at 4:30PM for virtual FUV     Call with questions/concerns [phone]    Assessment:   69 y.o.  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 AVN R hip dx 2016. 2019 dx with ovarian CA in surveillance s/p resection and chemo. Pt had been see several time in 2015 ahead of MD maternity leave and transferred care to Dr. Nilay Farr, who has since left . 2021 pt re-established care and here for follow up     FUV 8/28/2024 . Since last appt, has had complicated course and now with concern of cancer recurrence. Notes in AM takes 3 hours for wellbutrin to work, will trial SR. Follow up 1-2 months, sooner if needed.     Diagnosis:   MDD, recurrent, p remission  Other spec anxiety r/o adjustment vs part of depression     Treatment Plan/Recommendations:   1. Safety Assessment: denies active thoughts of death/self harm though has had \"dark thoughts\". no h/o SI/SA. passive death wish in the past but not recently . RF include age, W, pain, cancer. PF include , engaged in care, no hx, no substance abuse, no guns. Currently low imminent risk     2. MDD, other spec anxiety r/o element of MDD vs ISIS  CONTINUE duloxetine 60mg PO QHS   CONTINUE buspar 5mg PO BID for now--consider tapering  CHANGE to bupropion SR 150mg PO BID, monitor anxiety.      Continue with therapy, supportive therapy during appt--pt has also   stopped seeing therapist related to worse health and not able to keep up, defer for now     3. Medical: notes and labs reviewed, noted to have stable volume loss on MRI brain with encephalomalacia of fronto-parietal " lxn from CNS lymphoma  currently working with Arin Castrejon for pain and seeing NM specialist, PMR for neuropathy  limits to function affect coping for patient  S/p hip replacement, notes continued pain and c/b infection, now in PT, reports improvement  Neuro adjusted med for tremor, fatigue better considering DBS  Scrambler therapy in July--some improvement but then had CCY  Found LN and pending biopsy for possible cancer recurrence.  elevated     4. Social: lives with , continue to encourage behavioral activation. Does better when out and about but frustrated by physical limitations, has been limited this summer due to health    Reason for Visit:     FUV for depression, anxiety    Subjective:  Last appt 5/2024  Since then increased stress with health    Scrambler therapy- had to go off lyrica  Inc pain, took oxy then started to improve  Then CCY c/b transaminitis (resolving)  Imaging incidentally found 4LN  Now waiting for bx results    Mood has been nore down with health stressors  Unable to get out with friends  Notes more down in the AM, takes wellbutrin and back to bed until 12pm, wonders about splitting dose, agrees to change to SR  Some anxiety with pending bx but otherwise ok  No time for therapy  Unsure if wants to go through treatment again but will wait until results/treatment plan    no SI or thoughts of not wanting to go on  No a/e to meds    Current Medications:    Current Outpatient Medications:     clobetasol (Temovate) 0.05 % cream, APPLY AS DIRECTED EVERY DAY, Disp: , Rfl:     imiquimod (Aldara) 5 % cream, APPLY AT BEDTIME MONDAY, WED, FRIDAY FOR 4 WEEKS TO UPPER FOREHEAD AND BRIDGE OF NOSE, Disp: , Rfl:     buPROPion XL (Wellbutrin XL) 300 mg 24 hr tablet, Take 1 tablet (300 mg) by mouth once daily. Do not crush, chew, or split., Disp: 90 tablet, Rfl: 1    busPIRone (Buspar) 5 mg tablet, Take 1 tablet (5 mg) by mouth 2 times a day., Disp: 180 tablet, Rfl: 1    calcium carbonate  (Oscal) 500 mg calcium (1,250 mg) tablet, Take 1 tablet (1,250 mg) by mouth 2 times daily (morning and late afternoon)., Disp: , Rfl:     cholecalciferol (Vitamin D-3) 50 mcg (2,000 unit) capsule, Take 1 capsule (50 mcg) by mouth once daily., Disp: , Rfl:     DULoxetine (Cymbalta) 60 mg DR capsule, TAKE 1 CAPSULE DAILY IN THE EVENING, Disp: 90 capsule, Rfl: 2    LORazepam (Ativan) 1 mg tablet, Take 1 tablet (1 mg) by mouth 1 time for 1 dose. Take prior to PET scan., Disp: 1 tablet, Rfl: 0    methadone (Dolophine) 10 mg tablet, Take 1 tablet (10 mg) by mouth every 12 hours., Disp: 60 tablet, Rfl: 0    ondansetron (Zofran) 8 mg tablet, Take 1 tablet (8 mg) by mouth every 8 hours if needed for nausea or vomiting., Disp: 60 tablet, Rfl: 0    pregabalin (Lyrica) 75 mg capsule, Take 1 capsule (75 mg) by mouth 2 times a day., Disp: 60 capsule, Rfl: 0    primidone (Mysoline) 50 mg tablet, Take 3.5 tablets (175 mg) by mouth once daily at bedtime. 3.5 tabs at bedtime., Disp: 315 tablet, Rfl: 3    propranolol (Inderal) 60 mg tablet, Take one tablet in the morning and half tablet in the afternoon after a week if heart rate is above 60 after that increase to one tablet twice a day after one week, Disp: 60 tablet, Rfl: 11  Medical History:  Past Medical History:   Diagnosis Date    Anxiety disorder, unspecified     Anxiety    Family history of malignant neoplasm of breast 02/28/2019    Family history of breast cancer    Gynecologic cancer (Multi) 09/18/2023    Other conditions influencing health status     Nephrolithiasis    Ovarian cancer (Multi)     Personal history of malignant neoplasm of other parts of uterus 08/14/2020    History of malignant neoplasm of other parts of uterus    Personal history of non-Hodgkin lymphomas 08/14/2020    History of non-Hodgkin's lymphoma    Personal history of other diseases of the circulatory system     History of hypertension    Personal history of other diseases of the digestive system      History of irritable bowel syndrome    Personal history of other diseases of the respiratory system 01/10/2014    History of acute bronchitis    Personal history of other endocrine, nutritional and metabolic disease     History of hypercholesterolemia    Personal history of other malignant neoplasm of skin     History of basal cell carcinoma    Personal history of other specified conditions 10/28/2014    History of fibrocystic disease of breast    Portal vein thrombosis 09/18/2023    PORTAL VEIN THROMBOSIS I81    Presence of spectacles and contact lenses 12/09/2014    Wears contact lenses       Surgical History:  Past Surgical History:   Procedure Laterality Date    LITHOTRIPSY  08/19/2013    Renal Lithotripsy    OTHER SURGICAL HISTORY  08/19/2013    Lithotomy    OTHER SURGICAL HISTORY  11/25/2019    Ileostomy closure       Family History:  Family History   Problem Relation Name Age of Onset    Breast cancer Mother  60    Stroke Mother      Colon cancer Father      Breast cancer Mother's Sister  40    Breast cancer Cousin  50       Social History:  Social History     Socioeconomic History    Marital status:      Spouse name: Not on file    Number of children: Not on file    Years of education: Not on file    Highest education level: Not on file   Occupational History    Not on file   Tobacco Use    Smoking status: Never    Smokeless tobacco: Never   Substance and Sexual Activity    Alcohol use: Yes     Comment: 1 glass of wine daily    Drug use: Yes     Types: Marijuana     Comment: medical card / gummies    Sexual activity: Not on file   Other Topics Concern    Not on file   Social History Narrative    Not on file     Social Determinants of Health     Financial Resource Strain: Low Risk  (7/25/2024)    Overall Financial Resource Strain (CARDIA)     Difficulty of Paying Living Expenses: Not hard at all   Food Insecurity: No Food Insecurity (7/26/2024)    Hunger Vital Sign     Worried About Running Out of Food  "in the Last Year: Never true     Ran Out of Food in the Last Year: Never true   Transportation Needs: No Transportation Needs (7/25/2024)    PRAPARE - Transportation     Lack of Transportation (Medical): No     Lack of Transportation (Non-Medical): No   Physical Activity: Inactive (7/26/2024)    Exercise Vital Sign     Days of Exercise per Week: 0 days     Minutes of Exercise per Session: 0 min   Stress: Stress Concern Present (7/26/2024)    [place] [employer] - Occupational Stress Questionnaire     Feeling of Stress : To some extent   Social Connections: Moderately Isolated (7/26/2024)    Social Connection and Isolation Panel [NHANES]     Frequency of Communication with Friends and Family: More than three times a week     Frequency of Social Gatherings with Friends and Family: Once a week     Attends [employer] Services: Never     Active Member of Clubs or Organizations: No     Attends Club or Organization Meetings: Never     Marital Status:    Intimate Partner Violence: Not At Risk (7/26/2024)    Humiliation, Afraid, Rape, and Kick questionnaire     Fear of Current or Ex-Partner: No     Emotionally Abused: No     Physically Abused: No     Sexually Abused: No   Housing Stability: Low Risk  (7/25/2024)    Housing Stability Vital Sign     Unable to Pay for Housing in the Last Year: No     Number of Times Moved in the Last Year: 1     Homeless in the Last Year: No              Medical Review Of Systems:  +neuropathy  +pain but better    Psychiatric Review Of Systems:  +more down       Objective   Mental Status Exam:     Appearance: dressed neat and clean.   Attitude: cooperative and engaged.   Behavior: appropriate eye contact via video.   Motor Activity: mild truncal tremor noted.   Speech: regular volume, prosody, no aphasia.   Mood: \"not good\".   Affect: congruent, appropriate to circumstance  Thought Process: on topic.   Thought Content: no delusions, no SI/HI, less hopeless.   Thought " Perception: no AVH.   Cognition: alert, oriented to person, place, time. attn intact.   Insight: fair.   Judgment: fair.       Vitals:  There were no vitals filed for this visit.  Encounter Date: 07/25/24   ECG 12 lead   Result Value    Ventricular Rate 61    Atrial Rate 61    [place] Interval 126    QRS Duration 84    QT Interval 458    QTC Calculation(Bazett) 461    P Axis 49    R Axis 45    T Axis 36    QRS Count 10    Q Onset 215    P Onset 152    P Offset 201    T Offset 444    QTC Fredericia 460    Narrative    Normal sinus rhythm  Normal ECG  When compared with ECG of 05-APR-2023 12:43,  ST elevation now present in Inferior leads  Inverted T waves have replaced nonspecific T wave abnormality in Anterior leads  See ED provider note for full interpretation and clinical correlation  Confirmed by Valerie Choi ([id number]) on 7/25/2024 10:14:09 AM     Lab Results   Component Value Date    WBC 8.6 07/29/2024    HGB 10.0 (L) 07/29/2024    HCT 31.5 (L) 07/29/2024     (H) 07/29/2024     07/29/2024     Lab Results   Component Value Date    GLUCOSE 90 08/06/2024    CALCIUM 9.8 08/06/2024     08/06/2024    K 5.3 08/06/2024    CO2 27 08/06/2024     (H) 08/06/2024    BUN 24 (H) 08/06/2024    CREATININE 0.95 08/06/2024       Psychotherapy   Time: 20 minutes  Type: supportive  Target: mood, anxiety  Techniques: problem solving, validation  Goal: improved sx management  Follow up: next appt  Response: fair        Anabella Ugalde MD

## 2024-08-29 ENCOUNTER — TELEPHONE (OUTPATIENT)
Dept: GYNECOLOGIC ONCOLOGY | Facility: HOSPITAL | Age: 69
End: 2024-08-29
Payer: MEDICARE

## 2024-08-29 NOTE — TELEPHONE ENCOUNTER
Phoned patient to notify that PET reviewed by  interventional radiologist and he states that mesenteric nodule is medially located and is not amenable for CT/percutaneous biopsy.   Radiologist states that if biopsy is required, would recommend surgical consult for possible laparoscopic approach.    Dr. Kenny is aware and recommends waiting for CT chest biopsy results done at [facility] on 8/28 to determine further plan.    Patient verbalized her understanding of information given.

## 2024-08-31 LAB
EDDP UR CFM-MCNC: >1000 NG/ML
METHADONE UR CFM-MCNC: >1000 NG/ML

## 2024-09-03 ENCOUNTER — TELEPHONE (OUTPATIENT)
Dept: GYNECOLOGIC ONCOLOGY | Facility: HOSPITAL | Age: 69
End: 2024-09-03
Payer: MEDICARE

## 2024-09-03 LAB
BUTALBITAL, URN, QUANT: <50 NG/ML
PENTOBARBITAL, URN, QUANT: <50 NG/ML
PHENOBARBITAL, URN, QUANT: 4428 NG/ML

## 2024-09-03 NOTE — TELEPHONE ENCOUNTER
Patient called to update that she received results from CT chest biopsy and results representing gyn malignancy vs. Lymphoma recurrence per patients heme/onc doctor.     Message sent to Dr. Kenny and Avis Crook CNP to update.     Dr. Kenny recommends appointment with her to discuss results and plan.   Message Charmaine Alicia requesting she contact patient to schedule appointment with Dr. Kenny.

## 2024-09-05 ENCOUNTER — APPOINTMENT (OUTPATIENT)
Dept: GYNECOLOGIC ONCOLOGY | Facility: CLINIC | Age: 69
End: 2024-09-05
Payer: MEDICARE

## 2024-09-06 ENCOUNTER — TELEMEDICINE (OUTPATIENT)
Dept: GYNECOLOGIC ONCOLOGY | Facility: HOSPITAL | Age: 69
End: 2024-09-06
Payer: MEDICARE

## 2024-09-06 DIAGNOSIS — C56.9 MALIGNANT NEOPLASM OF OVARY, UNSPECIFIED LATERALITY (MULTI): Primary | ICD-10-CM

## 2024-09-06 PROCEDURE — 99212 OFFICE O/P EST SF 10 MIN: CPT | Performed by: OBSTETRICS & GYNECOLOGY

## 2024-09-06 PROCEDURE — 1157F ADVNC CARE PLAN IN RCRD: CPT | Performed by: OBSTETRICS & GYNECOLOGY

## 2024-09-06 NOTE — PROGRESS NOTES
Consent:  Verbal consent was requested and obtained from patient on this date for a telehealth visit.    Patient ID: Saba Park is a 69 y.o. female.  Referring Physician: No referring provider defined for this encounter.  Primary Care Provider: DEMETRIUS Castro-CNP    Subjective    Interval history    Feeling well    Recurrence seen on CT scan after gall bladder removal - open procedure     is 30    Still has pain in R leg from prior lymphoma treatments.        Objective    BSA: There is no height or weight on file to calculate BSA.  There were no vitals taken for this visit.     Physical Exam    Performance Status:  Symptomatic; fully ambulatory    Assessment/Plan    Oncology History Overview Note   Cancer History:     -Dx 2019 -high grade serous ovarian cancerCT (1/24/19): mixed solid cystic mass in her right ovary for malignancy. Questionable invasion into the uterus.  Questionable mesenteric implants.  It looks like one of them is small bowel mesentery, maybe omentum.   The rest look more omental to me. kidney stone.  Complex lesion in her left kidney presumably cystic but can't rule out solid area.   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  Felt to be undebulkable on laparoscopy, plan for NACT  - 3 cycles NACT  -Genetics: SLX-4 gene of unk significance.  - May 2019 debulking, included ileostomy.  - 3 additional cycles chemo - carbo/taxol  - 8/26/19 last chemotherapy  8/2024 recurrence - mediastinal and internal mammary Lns  - bx of LN c/w recurrent high grade serous carcinoma       Malignant neoplasm of unspecified ovary (Multi)   12/1/2022 Initial Diagnosis    Malignant neoplasm of unspecified ovary (Multi)       Cancer Staging   No matching staging information was found for the patient.       Problem List Items Addressed This Visit             ICD-10-CM    Malignant neoplasm of unspecified ovary (Multi) -  Primary C56.9        Treatment Plans       No treatment plans exist           reviewed natural history and prognosis of recurrent platinum sensitive ovarian cancer.    Recommend re-treatment with carboplatin/taxol.  Side effects and toxicities were reviewed.  Patient previously tolerated well    Will send tumor testing including NGS HER2 and FOLR1 testing -will need to order from [facility] specimen    Follow-up for chemotherapy consent cycle 1    I spent 15 minutes in telephone discussion with the patient

## 2024-09-09 DIAGNOSIS — C56.9 MALIGNANT NEOPLASM OF OVARY, UNSPECIFIED LATERALITY (MULTI): ICD-10-CM

## 2024-09-10 ENCOUNTER — ONCOLOGY MEDICATION OUTREACH (OUTPATIENT)
Dept: GYNECOLOGIC ONCOLOGY | Facility: HOSPITAL | Age: 69
End: 2024-09-10
Payer: MEDICARE

## 2024-09-10 ENCOUNTER — TELEPHONE (OUTPATIENT)
Dept: ADMISSION | Facility: HOSPITAL | Age: 69
End: 2024-09-10
Payer: MEDICARE

## 2024-09-10 ENCOUNTER — TELEPHONE (OUTPATIENT)
Dept: GYNECOLOGIC ONCOLOGY | Facility: HOSPITAL | Age: 69
End: 2024-09-10
Payer: MEDICARE

## 2024-09-10 DIAGNOSIS — C56.9 MALIGNANT NEOPLASM OF OVARY, UNSPECIFIED LATERALITY (MULTI): Primary | ICD-10-CM

## 2024-09-10 DIAGNOSIS — C56.9 MALIGNANT NEOPLASM OF OVARY, UNSPECIFIED LATERALITY (MULTI): ICD-10-CM

## 2024-09-10 DIAGNOSIS — G89.3 CANCER RELATED PAIN: ICD-10-CM

## 2024-09-10 RX ORDER — DEXAMETHASONE 4 MG/1
TABLET ORAL
Qty: 66 TABLET | Refills: 0 | Status: SHIPPED | OUTPATIENT
Start: 2024-09-10

## 2024-09-10 RX ORDER — PREGABALIN 75 MG/1
75 CAPSULE ORAL 2 TIMES DAILY
Qty: 60 CAPSULE | Refills: 0 | Status: SHIPPED | OUTPATIENT
Start: 2024-09-10 | End: 2024-10-10

## 2024-09-10 RX ORDER — PROCHLORPERAZINE MALEATE 10 MG
10 TABLET ORAL EVERY 6 HOURS PRN
Qty: 30 TABLET | Refills: 5 | Status: SHIPPED | OUTPATIENT
Start: 2024-09-10

## 2024-09-10 RX ORDER — MAGNESIUM GLUCONATE 27 MG(500)
54 TABLET ORAL 2 TIMES DAILY
Qty: 120 TABLET | Refills: 5 | Status: SHIPPED | OUTPATIENT
Start: 2024-09-10

## 2024-09-10 RX ORDER — ONDANSETRON HYDROCHLORIDE 8 MG/1
8 TABLET, FILM COATED ORAL EVERY 8 HOURS PRN
Qty: 30 TABLET | Refills: 5 | Status: SHIPPED | OUTPATIENT
Start: 2024-09-10

## 2024-09-10 NOTE — TELEPHONE ENCOUNTER
OARRS report reviewed and reflects  prescription history, no aberrancy noted. Per OARRS, patient last filled 30 day supply lyrica 75 mg on 8/6. Per last visit with Arin Hirsch on 8/6 patient to increase to 75 mg lyrica BID. Patient with follow up visit scheduled on 10/3. Patient updated that medication will be sent to [facility]. Refill request routed to provider.

## 2024-09-10 NOTE — PROGRESS NOTES
ONCOLOGY CLINICAL PHARMACY NOTE     Subjective  Saba Park is a 69 y.o. female with ovarian cancer, here for education.        Treatment history  Treatment Details   Treatment goal Control   Plan Name PACLitaxel / CARBOplatin, 21 Day Cycles - Gyn   Status Active   Start Date 9/17/2024 (Planned)   End Date 1/2/2025 (Planned)   Provider Krista Kenny MD   Chemotherapy CARBOplatin (Paraplatin) in sodium chloride 0.9% 100 mL IV, , intravenous, Once, 0 of 6 cycles    PACLitaxeL (Taxol) 294 mg in dextrose 5% 299 mL IV, 175 mg/m2 = 294 mg, intravenous, Once, 0 of 6 cycles    methylPREDNISolone sod succinate (SOLU-Medrol) 40 mg/mL injection 40 mg, 40 mg, intravenous, As needed, 0 of 6 cycles    palonosetron (Aloxi) injection 250 mcg, 250 mcg, intravenous, Once, 0 of 6 cycles          Objective  There were no vitals taken for this visit.  Lab Results   Component Value Date    WBC 8.6 07/29/2024    HGB 10.0 (L) 07/29/2024    HCT 31.5 (L) 07/29/2024     (H) 07/29/2024     07/29/2024      Lab Results   Component Value Date    GLUCOSE 90 08/06/2024    CALCIUM 9.8 08/06/2024     08/06/2024    K 5.3 08/06/2024    CO2 27 08/06/2024     (H) 08/06/2024    BUN 24 (H) 08/06/2024    CREATININE 0.95 08/06/2024     Lab Results   Component Value Date    ALT 47 (H) 08/13/2024    AST 19 08/13/2024    ALKPHOS 135 08/13/2024    BILITOT 0.5 08/13/2024       Allergies and Medications   Allergies   Allergen Reactions    Erythromycin Other       Current Outpatient Medications:     buPROPion SR (Wellbutrin SR) 150 mg 12 hr tablet, Take 1 tablet (150 mg) by mouth 2 times a day. Do not crush, chew, or split., Disp: 60 tablet, Rfl: 2    busPIRone (Buspar) 5 mg tablet, Take 1 tablet (5 mg) by mouth 2 times a day., Disp: 180 tablet, Rfl: 1    calcium carbonate (Oscal) 500 mg calcium (1,250 mg) tablet, Take 1 tablet (1,250 mg) by mouth 2 times daily (morning and late afternoon)., Disp: , Rfl:     cholecalciferol (Vitamin  D-3) 50 mcg (2,000 unit) capsule, Take 1 capsule (50 mcg) by mouth once daily., Disp: , Rfl:     clobetasol (Temovate) 0.05 % cream, APPLY AS DIRECTED EVERY DAY, Disp: , Rfl:     dexAMETHasone (Decadron) 4 mg tablet, Take 5 tablets (20 mg) by mouth once 12 hours prior to PACLitaxel treatment. Then take 2 tablets (8 mg) by mouth once daily for 3 days starting the day after treatment., Disp: 66 tablet, Rfl: 0    DULoxetine (Cymbalta) 60 mg DR capsule, TAKE 1 CAPSULE DAILY IN THE EVENING, Disp: 90 capsule, Rfl: 2    imiquimod (Aldara) 5 % cream, APPLY AT BEDTIME MONDAY, WED, FRIDAY FOR 4 WEEKS TO UPPER FOREHEAD AND BRIDGE OF NOSE, Disp: , Rfl:     LORazepam (Ativan) 1 mg tablet, Take 1 tablet (1 mg) by mouth 1 time for 1 dose. Take prior to PET scan., Disp: 1 tablet, Rfl: 0    magnesium, as gluconate, (Magonate) 27 mg magnesium (500 mg) tablet, Take 2 tablets (54 mg) by mouth 2 times a day., Disp: 120 tablet, Rfl: 5    methadone (Dolophine) 10 mg tablet, Take 1 tablet (10 mg) by mouth every 12 hours., Disp: 60 tablet, Rfl: 0    ondansetron (Zofran) 8 mg tablet, Take 1 tablet (8 mg) by mouth every 8 hours if needed for nausea or vomiting., Disp: 60 tablet, Rfl: 0    ondansetron (Zofran) 8 mg tablet, Take 1 tablet (8 mg) by mouth every 8 hours if needed for nausea or vomiting., Disp: 30 tablet, Rfl: 5    pregabalin (Lyrica) 75 mg capsule, Take 1 capsule (75 mg) by mouth 2 times a day., Disp: 60 capsule, Rfl: 0    primidone (Mysoline) 50 mg tablet, Take 3.5 tablets (175 mg) by mouth once daily at bedtime. 3.5 tabs at bedtime., Disp: 315 tablet, Rfl: 3    prochlorperazine (Compazine) 10 mg tablet, Take 1 tablet (10 mg) by mouth every 6 hours if needed for nausea or vomiting., Disp: 30 tablet, Rfl: 5    propranolol (Inderal) 60 mg tablet, Take one tablet in the morning and half tablet in the afternoon after a week if heart rate is above 60 after that increase to one tablet twice a day after one week, Disp: 60 tablet, Rfl:  11    Assessment and Plan  Saba Park is a 69 y.o. female with ovarian cancer, to be treated with carboplatin/paclitaxel. Patient previously treated in 2019, noted dose reduction of taxol to 135 mg/m2.     Chemotherapy  Education: Reviewed drug, dose, frequency, administration, treatment cycle, duration of therapy, and missed doses. Counseled on potential side effects including but not limited to chemotherapy side effects: neutropenia, infection risk, anemia, fatigue, weakness, low energy, thrombocytopenia, bleeding/bruising, n/v, diarrhea, electrolyte changes, hair loss, muscle or joint pain, mucositis, skin rash, infusion reactions, and neuropathy. Other toxicities reviewed include: taste changes and nail discoloration. Discussed techniques to mitigate severity of side effects such as blood count checks, temperature checks, electrolyte monitoring, antiemetic use, loperamide use with max dose of 8 tabs per 24 hours, and staying hydrated if having diarrhea.  Unable to provide handouts due to virtual nature of encounter. Patient had questions about side-effects. All questions answered and contact information was given to patient.   Drug-drug interactions: as needed nausea meds (zofran/compazine) interact with homes (increased risk of QT prolongation/sedation), will monitor closed and adjust based on usage. Patient on primidone for tremors. Noted to be strong inducer of 3A4 (decreased dex, emend, taxol efficacy). Adjusted to nausea regimen have been made (will monitor closely).  Time spent on patient care: 45 minutes.             Maximo Spence, GailD

## 2024-09-10 NOTE — TELEPHONE ENCOUNTER
Call to patient to coordinate chemotherapy treatments.  Per Dr. Kenny, patient needs to get C1 at  main [place] so she can sign consent.  Patient is requesting all  subsequent treatments to be scheduled at [facility] because she has a family friend who works there.  Scheduling orders entered for [facility] infusion appointments starting with C2.  Patient is aware that she needs to stop at [facility] on Friday of this week to have blood work done prior to her first treatment on Tuesday.  She was provided with my contact information for future nursing needs as well as the phone number for the after hours answering service.  Patient is waiting for her prescriptions to be ready for  at [facility].  I will call her the day prior to first treatment to review how to take pre/post chemotherapy medications.  Chemotherapy education bag mailed to patient for a refresher.

## 2024-09-11 ENCOUNTER — DOCUMENTATION (OUTPATIENT)
Dept: GYNECOLOGIC ONCOLOGY | Facility: HOSPITAL | Age: 69
End: 2024-09-11
Payer: MEDICARE

## 2024-09-11 DIAGNOSIS — E78.5 HYPERLIPIDEMIA, UNSPECIFIED HYPERLIPIDEMIA TYPE: Primary | ICD-10-CM

## 2024-09-11 NOTE — PROGRESS NOTES
Per Dr. Kenny, [facility] testing (HER2 and FOLR1 included) was requested via [facility] [employer] portal.  Recent Pathology is from [facility] on 8/27/24.  Await results.

## 2024-09-12 ENCOUNTER — DOCUMENTATION (OUTPATIENT)
Dept: GYNECOLOGIC ONCOLOGY | Facility: HOSPITAL | Age: 69
End: 2024-09-12
Payer: MEDICARE

## 2024-09-12 NOTE — PROGRESS NOTES
[facility] testing requested via [employer] [facility] portal.  Statement of Medical Necessity faxed to [email or web address] at [phone].

## 2024-09-13 ENCOUNTER — DOCUMENTATION (OUTPATIENT)
Dept: GYNECOLOGIC ONCOLOGY | Facility: HOSPITAL | Age: 69
End: 2024-09-13
Payer: MEDICARE

## 2024-09-13 ENCOUNTER — TELEPHONE (OUTPATIENT)
Dept: GYNECOLOGIC ONCOLOGY | Facility: HOSPITAL | Age: 69
End: 2024-09-13
Payer: MEDICARE

## 2024-09-13 ENCOUNTER — LAB (OUTPATIENT)
Dept: LAB | Facility: CLINIC | Age: 69
End: 2024-09-13
Payer: MEDICARE

## 2024-09-13 DIAGNOSIS — C56.9 MALIGNANT NEOPLASM OF OVARY, UNSPECIFIED LATERALITY (MULTI): ICD-10-CM

## 2024-09-13 LAB
ALBUMIN SERPL BCP-MCNC: 3.9 G/DL (ref 3.4–5)
ALP SERPL-CCNC: 110 U/L (ref 33–136)
ALT SERPL W P-5'-P-CCNC: 18 U/L (ref 7–45)
ANION GAP SERPL CALC-SCNC: 13 MMOL/L (ref 10–20)
AST SERPL W P-5'-P-CCNC: 12 U/L (ref 9–39)
BASOPHILS # BLD AUTO: 0.05 X10*3/UL (ref 0–0.1)
BASOPHILS NFR BLD AUTO: 0.7 %
BILIRUB SERPL-MCNC: 0.5 MG/DL (ref 0–1.2)
BUN SERPL-MCNC: 22 MG/DL (ref 6–23)
CALCIUM SERPL-MCNC: 9.2 MG/DL (ref 8.6–10.6)
CANCER AG125 SERPL-ACNC: 115.2 U/ML (ref 0–30.2)
CHLORIDE SERPL-SCNC: 104 MMOL/L (ref 98–107)
CO2 SERPL-SCNC: 30 MMOL/L (ref 21–32)
CREAT SERPL-MCNC: 0.9 MG/DL (ref 0.5–1.05)
EGFRCR SERPLBLD CKD-EPI 2021: 69 ML/MIN/1.73M*2
EOSINOPHIL # BLD AUTO: 0.07 X10*3/UL (ref 0–0.7)
EOSINOPHIL NFR BLD AUTO: 0.9 %
ERYTHROCYTE [DISTWIDTH] IN BLOOD BY AUTOMATED COUNT: 12.7 % (ref 11.5–14.5)
GLUCOSE SERPL-MCNC: 139 MG/DL (ref 74–99)
HBV CORE AB SER QL: NONREACTIVE
HBV SURFACE AB SER-ACNC: 31.9 MIU/ML
HBV SURFACE AG SERPL QL IA: NONREACTIVE
HCT VFR BLD AUTO: 39.5 % (ref 36–46)
HGB BLD-MCNC: 12.6 G/DL (ref 12–16)
IMM GRANULOCYTES # BLD AUTO: 0.01 X10*3/UL (ref 0–0.7)
IMM GRANULOCYTES NFR BLD AUTO: 0.1 % (ref 0–0.9)
LYMPHOCYTES # BLD AUTO: 1.57 X10*3/UL (ref 1.2–4.8)
LYMPHOCYTES NFR BLD AUTO: 21.2 %
MAGNESIUM SERPL-MCNC: 1.87 MG/DL (ref 1.6–2.4)
MCH RBC QN AUTO: 32 PG (ref 26–34)
MCHC RBC AUTO-ENTMCNC: 31.9 G/DL (ref 32–36)
MCV RBC AUTO: 100 FL (ref 80–100)
MONOCYTES # BLD AUTO: 0.4 X10*3/UL (ref 0.1–1)
MONOCYTES NFR BLD AUTO: 5.4 %
NEUTROPHILS # BLD AUTO: 5.29 X10*3/UL (ref 1.2–7.7)
NEUTROPHILS NFR BLD AUTO: 71.7 %
NRBC BLD-RTO: ABNORMAL /100{WBCS}
PLATELET # BLD AUTO: 209 X10*3/UL (ref 150–450)
POTASSIUM SERPL-SCNC: 4.8 MMOL/L (ref 3.5–5.3)
PROT SERPL-MCNC: 6.1 G/DL (ref 6.4–8.2)
RBC # BLD AUTO: 3.94 X10*6/UL (ref 4–5.2)
SODIUM SERPL-SCNC: 142 MMOL/L (ref 136–145)
WBC # BLD AUTO: 7.4 X10*3/UL (ref 4.4–11.3)

## 2024-09-13 PROCEDURE — 83735 ASSAY OF MAGNESIUM: CPT

## 2024-09-13 PROCEDURE — 86706 HEP B SURFACE ANTIBODY: CPT

## 2024-09-13 PROCEDURE — 85025 COMPLETE CBC W/AUTO DIFF WBC: CPT

## 2024-09-13 PROCEDURE — 36415 COLL VENOUS BLD VENIPUNCTURE: CPT

## 2024-09-13 PROCEDURE — 86304 IMMUNOASSAY TUMOR CA 125: CPT

## 2024-09-13 PROCEDURE — 86704 HEP B CORE ANTIBODY TOTAL: CPT

## 2024-09-13 PROCEDURE — 87340 HEPATITIS B SURFACE AG IA: CPT

## 2024-09-13 PROCEDURE — 84075 ASSAY ALKALINE PHOSPHATASE: CPT

## 2024-09-13 NOTE — PROGRESS NOTES
Call from pharmacist Laverne at [facility] to clarify prescription for Magnesium Gluconate.  He wanted to know if Mag G (Brand name) can be used instead of generic.  Advised that is OK per Dr. Kenny.

## 2024-09-13 NOTE — TELEPHONE ENCOUNTER
Call to patient and reviewed her pre and post medications she will need to take for her first treatment next week.  She is aware to take 20mg of Dex at bedtime the night prior and 8mg daily for 3 days after treatment.  Her pharmacy had to order her oral mag supplement so she will start that as soon as her pharmacy gets it in.  All her other questions were addressed and answered.  She is aware to get her blood work done today and she will see Dr. Kenny in the clinic on Tuesday for chemo consent + C1 treatment.

## 2024-09-16 PROBLEM — Z51.11 CHEMOTHERAPY MANAGEMENT, ENCOUNTER FOR: Status: ACTIVE | Noted: 2024-09-16

## 2024-09-16 NOTE — PROGRESS NOTES
Patient ID: Saba Park is a 69 y.o. female.  Referring Physician: No referring provider defined for this encounter.  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 Questionable invasion into the uterus.  Questionable mesenteric implants.  It looks like one of them is small bowel mesentery, maybe omentum.   The rest look more omental to me. kidney stone.  Complex lesion in her left kidney presumably cystic but can't rule out solid area.   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  Felt to be undebulkable on laparoscopy, plan for NACT  - 3 cycles NACT  -Genetics: SLX-4 gene of unk significance.  - May 2019 debulking, included ileostomy.  - 3 additional cycles chemo - carbo/taxol  - 8/26/19 last chemotherapy  8/2024 recurrence - mediastinal and internal mammary Lns  - bx of LN c/w recurrent high grade serous carcinoma       Malignant neoplasm of unspecified ovary (Multi)   12/1/2022 Initial Diagnosis    Malignant neoplasm of unspecified ovary (Multi)     9/17/2024 -  Chemotherapy    PACLitaxel / CARBOplatin, 21 Day Cycles - Gyn          Problem List Items Addressed This Visit             ICD-10-CM    Malignant neoplasm of unspecified ovary (Multi) - Primary C56.9    Relevant Orders    CBC and Auto Differential    Comprehensive metabolic panel    Ca 125    Magnesium    Clinic Appointment Request     Chemotherapy management, encounter for Z51.11       Treatment Plans       Name Type Plan Dates Plan Provider         Active    PACLitaxel / CARBOplatin, 21 Day Cycles - Gyn Oncology Treatment  9/16/2024 - Present Krista Kenny MD                    Doing well.  Chemo consent signed, expected side effects and toxicities reviewed.  Carbo/taxol cycle #1 today  Follow up per chemo calendar

## 2024-09-17 ENCOUNTER — INFUSION (OUTPATIENT)
Dept: HEMATOLOGY/ONCOLOGY | Facility: HOSPITAL | Age: 69
End: 2024-09-17
Payer: MEDICARE

## 2024-09-17 ENCOUNTER — OFFICE VISIT (OUTPATIENT)
Dept: GYNECOLOGIC ONCOLOGY | Facility: HOSPITAL | Age: 69
End: 2024-09-17
Payer: MEDICARE

## 2024-09-17 VITALS
SYSTOLIC BLOOD PRESSURE: 118 MMHG | TEMPERATURE: 97.5 F | WEIGHT: 140 LBS | OXYGEN SATURATION: 95 % | BODY MASS INDEX: 24.8 KG/M2 | HEART RATE: 70 BPM | RESPIRATION RATE: 18 BRPM | DIASTOLIC BLOOD PRESSURE: 70 MMHG

## 2024-09-17 VITALS — HEIGHT: 63 IN | BODY MASS INDEX: 24.81 KG/M2

## 2024-09-17 DIAGNOSIS — C56.9 MALIGNANT NEOPLASM OF OVARY, UNSPECIFIED LATERALITY (MULTI): ICD-10-CM

## 2024-09-17 DIAGNOSIS — C56.9 MALIGNANT NEOPLASM OF OVARY, UNSPECIFIED LATERALITY (MULTI): Primary | ICD-10-CM

## 2024-09-17 DIAGNOSIS — Z51.11 CHEMOTHERAPY MANAGEMENT, ENCOUNTER FOR: ICD-10-CM

## 2024-09-17 PROCEDURE — 1125F AMNT PAIN NOTED PAIN PRSNT: CPT | Performed by: OBSTETRICS & GYNECOLOGY

## 2024-09-17 PROCEDURE — 3078F DIAST BP <80 MM HG: CPT | Performed by: OBSTETRICS & GYNECOLOGY

## 2024-09-17 PROCEDURE — 1160F RVW MEDS BY RX/DR IN RCRD: CPT | Performed by: OBSTETRICS & GYNECOLOGY

## 2024-09-17 PROCEDURE — 99215 OFFICE O/P EST HI 40 MIN: CPT | Mod: 25 | Performed by: OBSTETRICS & GYNECOLOGY

## 2024-09-17 PROCEDURE — 2500000004 HC RX 250 GENERAL PHARMACY W/ HCPCS (ALT 636 FOR OP/ED): Performed by: OBSTETRICS & GYNECOLOGY

## 2024-09-17 PROCEDURE — 96417 CHEMO IV INFUS EACH ADDL SEQ: CPT

## 2024-09-17 PROCEDURE — 96415 CHEMO IV INFUSION ADDL HR: CPT

## 2024-09-17 PROCEDURE — 99215 OFFICE O/P EST HI 40 MIN: CPT | Performed by: OBSTETRICS & GYNECOLOGY

## 2024-09-17 PROCEDURE — 96413 CHEMO IV INFUSION 1 HR: CPT

## 2024-09-17 PROCEDURE — 3074F SYST BP LT 130 MM HG: CPT | Performed by: OBSTETRICS & GYNECOLOGY

## 2024-09-17 PROCEDURE — 1159F MED LIST DOCD IN RCRD: CPT | Performed by: OBSTETRICS & GYNECOLOGY

## 2024-09-17 PROCEDURE — 1157F ADVNC CARE PLAN IN RCRD: CPT | Performed by: OBSTETRICS & GYNECOLOGY

## 2024-09-17 PROCEDURE — 96375 TX/PRO/DX INJ NEW DRUG ADDON: CPT | Mod: INF

## 2024-09-17 PROCEDURE — 2500000001 HC RX 250 WO HCPCS SELF ADMINISTERED DRUGS (ALT 637 FOR MEDICARE OP): Performed by: OBSTETRICS & GYNECOLOGY

## 2024-09-17 RX ORDER — HEPARIN SODIUM,PORCINE/PF 10 UNIT/ML
50 SYRINGE (ML) INTRAVENOUS AS NEEDED
OUTPATIENT
Start: 2024-09-17

## 2024-09-17 RX ORDER — DIPHENHYDRAMINE HCL 50 MG
50 CAPSULE ORAL ONCE
Status: CANCELLED | OUTPATIENT
Start: 2024-09-17

## 2024-09-17 RX ORDER — DIPHENHYDRAMINE HCL 50 MG
50 CAPSULE ORAL ONCE
Status: COMPLETED | OUTPATIENT
Start: 2024-09-17 | End: 2024-09-17

## 2024-09-17 RX ORDER — EPINEPHRINE 0.3 MG/.3ML
0.3 INJECTION SUBCUTANEOUS EVERY 5 MIN PRN
Status: CANCELLED | OUTPATIENT
Start: 2024-09-17

## 2024-09-17 RX ORDER — DIPHENHYDRAMINE HYDROCHLORIDE 50 MG/ML
50 INJECTION INTRAMUSCULAR; INTRAVENOUS AS NEEDED
Status: CANCELLED | OUTPATIENT
Start: 2024-09-17

## 2024-09-17 RX ORDER — ALBUTEROL SULFATE 0.83 MG/ML
3 SOLUTION RESPIRATORY (INHALATION) AS NEEDED
Status: DISCONTINUED | OUTPATIENT
Start: 2024-09-17 | End: 2024-09-17 | Stop reason: HOSPADM

## 2024-09-17 RX ORDER — PROCHLORPERAZINE MALEATE 10 MG
10 TABLET ORAL EVERY 6 HOURS PRN
Status: DISCONTINUED | OUTPATIENT
Start: 2024-09-17 | End: 2024-09-17 | Stop reason: HOSPADM

## 2024-09-17 RX ORDER — FAMOTIDINE 10 MG/ML
20 INJECTION INTRAVENOUS ONCE AS NEEDED
Status: DISCONTINUED | OUTPATIENT
Start: 2024-09-17 | End: 2024-09-17 | Stop reason: HOSPADM

## 2024-09-17 RX ORDER — ALBUTEROL SULFATE 0.83 MG/ML
3 SOLUTION RESPIRATORY (INHALATION) AS NEEDED
Status: CANCELLED | OUTPATIENT
Start: 2024-09-17

## 2024-09-17 RX ORDER — PROCHLORPERAZINE EDISYLATE 5 MG/ML
10 INJECTION INTRAMUSCULAR; INTRAVENOUS EVERY 6 HOURS PRN
Status: CANCELLED | OUTPATIENT
Start: 2024-09-17

## 2024-09-17 RX ORDER — HEPARIN 100 UNIT/ML
500 SYRINGE INTRAVENOUS AS NEEDED
OUTPATIENT
Start: 2024-09-17

## 2024-09-17 RX ORDER — DEXAMETHASONE IN 0.9 % SOD CHL 20 MG/50ML
20 INTRAVENOUS SOLUTION, PIGGYBACK (ML) INTRAVENOUS ONCE
Status: COMPLETED | OUTPATIENT
Start: 2024-09-17 | End: 2024-09-17

## 2024-09-17 RX ORDER — HEPARIN SODIUM,PORCINE/PF 10 UNIT/ML
50 SYRINGE (ML) INTRAVENOUS AS NEEDED
Status: CANCELLED | OUTPATIENT
Start: 2024-09-17

## 2024-09-17 RX ORDER — PALONOSETRON 0.05 MG/ML
0.25 INJECTION, SOLUTION INTRAVENOUS ONCE
Status: COMPLETED | OUTPATIENT
Start: 2024-09-17 | End: 2024-09-17

## 2024-09-17 RX ORDER — FAMOTIDINE 10 MG/ML
20 INJECTION INTRAVENOUS ONCE
Status: COMPLETED | OUTPATIENT
Start: 2024-09-17 | End: 2024-09-17

## 2024-09-17 RX ORDER — FAMOTIDINE 10 MG/ML
20 INJECTION INTRAVENOUS ONCE AS NEEDED
Status: CANCELLED | OUTPATIENT
Start: 2024-09-17

## 2024-09-17 RX ORDER — PROCHLORPERAZINE MALEATE 10 MG
10 TABLET ORAL EVERY 6 HOURS PRN
Status: CANCELLED | OUTPATIENT
Start: 2024-09-17

## 2024-09-17 RX ORDER — DIPHENHYDRAMINE HYDROCHLORIDE 50 MG/ML
50 INJECTION INTRAMUSCULAR; INTRAVENOUS AS NEEDED
Status: DISCONTINUED | OUTPATIENT
Start: 2024-09-17 | End: 2024-09-17 | Stop reason: HOSPADM

## 2024-09-17 RX ORDER — HEPARIN 100 UNIT/ML
500 SYRINGE INTRAVENOUS AS NEEDED
Status: CANCELLED | OUTPATIENT
Start: 2024-09-17

## 2024-09-17 RX ORDER — FAMOTIDINE 10 MG/ML
20 INJECTION INTRAVENOUS ONCE
Status: CANCELLED | OUTPATIENT
Start: 2024-09-17

## 2024-09-17 RX ORDER — PALONOSETRON 0.05 MG/ML
0.25 INJECTION, SOLUTION INTRAVENOUS ONCE
Status: CANCELLED | OUTPATIENT
Start: 2024-09-17

## 2024-09-17 RX ORDER — EPINEPHRINE 0.3 MG/.3ML
0.3 INJECTION SUBCUTANEOUS EVERY 5 MIN PRN
Status: DISCONTINUED | OUTPATIENT
Start: 2024-09-17 | End: 2024-09-17 | Stop reason: HOSPADM

## 2024-09-17 RX ORDER — PROCHLORPERAZINE EDISYLATE 5 MG/ML
10 INJECTION INTRAMUSCULAR; INTRAVENOUS EVERY 6 HOURS PRN
Status: DISCONTINUED | OUTPATIENT
Start: 2024-09-17 | End: 2024-09-17 | Stop reason: HOSPADM

## 2024-09-17 ASSESSMENT — PAIN SCALES - GENERAL: PAINLEVEL: 3

## 2024-09-17 NOTE — PROGRESS NOTES
Saba Park is a 69 y.o. female who presents for Chemotherapy.    She is on the following chemotherapy regimen:     Treatment Plans       Name Type Plan Dates Plan Provider         Active    PACLitaxel / CARBOplatin, 21 Day Cycles - Gyn Oncology Treatment  9/16/2024 - Present Krista Kenny MD                  .    She has been doing well.  Overall, she states her energy level is  low .  Her appetite has been good.  This is her first dose of chemo in 5 years.  Her peripheral iv was placed without difficulty.  She tolerated all infusions without incident.  She felt a little loopy from the benadryl and left Infusion in stable condition via wheelchair accompanied by family.    Patient has been educated with the medication plan, she was given a first dose handout and educated on chemo safety at home.

## 2024-09-18 ENCOUNTER — APPOINTMENT (OUTPATIENT)
Dept: NEUROLOGY | Facility: CLINIC | Age: 69
End: 2024-09-18
Payer: MEDICARE

## 2024-09-18 VITALS — HEART RATE: 60 BPM | RESPIRATION RATE: 16 BRPM | DIASTOLIC BLOOD PRESSURE: 69 MMHG | SYSTOLIC BLOOD PRESSURE: 104 MMHG

## 2024-09-18 DIAGNOSIS — G25.0 ESSENTIAL TREMOR: Primary | ICD-10-CM

## 2024-09-18 PROCEDURE — 3074F SYST BP LT 130 MM HG: CPT | Performed by: STUDENT IN AN ORGANIZED HEALTH CARE EDUCATION/TRAINING PROGRAM

## 2024-09-18 PROCEDURE — 1157F ADVNC CARE PLAN IN RCRD: CPT | Performed by: STUDENT IN AN ORGANIZED HEALTH CARE EDUCATION/TRAINING PROGRAM

## 2024-09-18 PROCEDURE — 1036F TOBACCO NON-USER: CPT | Performed by: STUDENT IN AN ORGANIZED HEALTH CARE EDUCATION/TRAINING PROGRAM

## 2024-09-18 PROCEDURE — 1159F MED LIST DOCD IN RCRD: CPT | Performed by: STUDENT IN AN ORGANIZED HEALTH CARE EDUCATION/TRAINING PROGRAM

## 2024-09-18 PROCEDURE — 99214 OFFICE O/P EST MOD 30 MIN: CPT | Performed by: STUDENT IN AN ORGANIZED HEALTH CARE EDUCATION/TRAINING PROGRAM

## 2024-09-18 PROCEDURE — 3078F DIAST BP <80 MM HG: CPT | Performed by: STUDENT IN AN ORGANIZED HEALTH CARE EDUCATION/TRAINING PROGRAM

## 2024-09-18 ASSESSMENT — ENCOUNTER SYMPTOMS
OCCASIONAL FEELINGS OF UNSTEADINESS: 1
LOSS OF SENSATION IN FEET: 0
DEPRESSION: 0

## 2024-09-18 NOTE — PROGRESS NOTES
Subjective     Saba Park is a 69 y.o. year old female with history of tremor, neuropathy, CNS lymphoma, now recurrent recurrence of her ovarian cancer presenting for follow-up of tremor.  Prior to all of that tremor was well-controlled on primidone 175 mg at bedtime and propranolol regular 60 mg twice a day.  As her cancer was coming back she has had some worsening of tremor as well and now she is being in the planning phase for the treatment of her ovarian cancer which involves chemotherapy.  She is here for follow-up of tremor.  Other than worse tremor compared to her baseline no new neurological issues.      Review of Systems  As noted in HPI    Patient Active Problem List   Diagnosis    Anxiety    Avascular necrosis of bone of hip (Multi)    Binge-eating disorder, severe    Cardiac conduction disorder    Chemotherapy-induced peripheral neuropathy (Multi)    Chronic low back pain with sciatica    Chronic pain    Neoplasm related pain    Claustrophobia    Cognitive impairment    Colitis    Deafferentation pain    Dense breasts    Depressed mood    Dry eye syndrome    Essential tremor    Neuropathy    Fatigue    History of laser refractive surgery    History of lymphoma    History of ovarian cancer    Hydronephrosis with renal and ureteral calculus obstruction    Hydronephrosis, left    Hypertension    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)    Osteoarthritis of right hip    Low platelet count (CMS-HCC)    MDD (major depressive disorder)    Depression    Mild episode of recurrent major depressive disorder (CMS-HCC)    Nephrolithiasis    Calculus of kidney    Weakness of extremity    Muscle weakness    Neutropenic fever (CMS-HCC)    Foot pain    Greater trochanteric bursitis of right hip    Leg pain    Myofascial pain    Numbness of lower extremity    Pain of thigh    Pelvic pain    Sciatica of right side    Trochanteric bursitis    Osteopenia    Parkinson's disease (Multi)    Pelvic mass     Peripheral retinal hole of left eye    Presbyopia    Iliotibial band syndrome of right side    Right hip pain    Sacroiliac joint pain    Sacroiliac joint dysfunction of right side    S/P exploratory laparotomy    Right shoulder pain    Shuffling gait    Status post right hip replacement    Stress fracture of right foot    Swelling of right lower extremity    Tendinitis of right rotator cuff    Tendinitis of upper biceps tendon of right shoulder    Thyroid nodule    Thyroid incidentaloma    Unsteadiness on feet    RADHA III (vulvar intraepithelial neoplasia III)    Vulvar lesion    Tremor    Depression, unspecified    Hyperlipidemia, unspecified    Malignant neoplasm of unspecified ovary (Multi)    Other chronic pain    Pyogenic arthritis, unspecified (Multi)    Essential (primary) hypertension    Long term (current) use of opiate analgesic    Oth types of non-hodg lymph, extrnod and solid organ sites (Multi)    Cellulitis of right lower limb    Acute bronchitis with bronchospasm    Chronic pain after treatment for malignant neoplasm    Clostridioides difficile diarrhea    Contact with and (suspected) exposure to covid-19    Diarrhea    Fever    Headache    History of basal cell carcinoma (BCC)    History of hypercholesterolemia    History of hypertension    Ileostomy present (Multi)    Major depressive disorder, single episode, severe (Multi)    Neuropathic pain    Obesity with body mass index 30 or greater    Primary malignant neoplasm (Multi)    Pyogenic arthritis of hip (Multi)    Symptoms involving urinary system    Encounter for palliative care    Pain in right hip    Right foot pain    Cellulitis of right lower extremity    Paresthesia of lower extremity    Sciatica    Hyperlipidemia    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)    Mass of thyroid gland    Cholecystitis    Chemotherapy management, encounter for     Past Medical History:   Diagnosis Date    Anxiety disorder, unspecified     Anxiety     Family history of malignant neoplasm of breast 02/28/2019    Family history of breast cancer    Gynecologic cancer (Multi) 09/18/2023    Other conditions influencing health status     Nephrolithiasis    Ovarian cancer (Multi)     Personal history of malignant neoplasm of other parts of uterus 08/14/2020    History of malignant neoplasm of other parts of uterus    Personal history of non-Hodgkin lymphomas 08/14/2020    History of non-Hodgkin's lymphoma    Personal history of other diseases of the circulatory system     History of hypertension    Personal history of other diseases of the digestive system     History of irritable bowel syndrome    Personal history of other diseases of the respiratory system 01/10/2014    History of acute bronchitis    Personal history of other endocrine, nutritional and metabolic disease     History of hypercholesterolemia    Personal history of other malignant neoplasm of skin     History of basal cell carcinoma    Personal history of other specified conditions 10/28/2014    History of fibrocystic disease of breast    Portal vein thrombosis 09/18/2023    PORTAL VEIN THROMBOSIS I81    Presence of spectacles and contact lenses 12/09/2014    Wears contact lenses     Past Surgical History:   Procedure Laterality Date    LITHOTRIPSY  08/19/2013    Renal Lithotripsy    OTHER SURGICAL HISTORY  08/19/2013    Lithotomy    OTHER SURGICAL HISTORY  11/25/2019    Ileostomy closure     Social History     Tobacco Use    Smoking status: Never    Smokeless tobacco: Never   Substance Use Topics    Alcohol use: Yes     Comment: 1 glass of wine daily     family history includes Breast cancer (age of onset: 40) in her mother's sister; Breast cancer (age of onset: 50) in her cousin; Breast cancer (age of onset: 60) in her mother; Colon cancer in her father; Stroke in her mother.    Current Outpatient Medications:     buPROPion SR (Wellbutrin SR) 150 mg 12 hr tablet, Take 1 tablet (150 mg) by mouth 2 times  a day. Do not crush, chew, or split., Disp: 60 tablet, Rfl: 2    busPIRone (Buspar) 5 mg tablet, Take 1 tablet (5 mg) by mouth 2 times a day., Disp: 180 tablet, Rfl: 1    calcium carbonate (Oscal) 500 mg calcium (1,250 mg) tablet, Take 1 tablet (1,250 mg) by mouth 2 times daily (morning and late afternoon)., Disp: , Rfl:     cholecalciferol (Vitamin D-3) 50 mcg (2,000 unit) capsule, Take 1 capsule (50 mcg) by mouth once daily., Disp: , Rfl:     clobetasol (Temovate) 0.05 % cream, APPLY AS DIRECTED EVERY DAY, Disp: , Rfl:     dexAMETHasone (Decadron) 4 mg tablet, Take 5 tablets (20 mg) by mouth once 12 hours prior to PACLitaxel treatment. Then take 2 tablets (8 mg) by mouth once daily for 3 days starting the day after treatment., Disp: 66 tablet, Rfl: 0    DULoxetine (Cymbalta) 60 mg DR capsule, TAKE 1 CAPSULE DAILY IN THE EVENING, Disp: 90 capsule, Rfl: 2    imiquimod (Aldara) 5 % cream, APPLY AT BEDTIME MONDAY, WED, FRIDAY FOR 4 WEEKS TO UPPER FOREHEAD AND BRIDGE OF NOSE, Disp: , Rfl:     LORazepam (Ativan) 1 mg tablet, Take 1 tablet (1 mg) by mouth 1 time for 1 dose. Take prior to PET scan., Disp: 1 tablet, Rfl: 0    magnesium, as gluconate, (Magonate) 27 mg magnesium (500 mg) tablet, Take 2 tablets (54 mg) by mouth 2 times a day., Disp: 120 tablet, Rfl: 5    methadone (Dolophine) 10 mg tablet, Take 1 tablet (10 mg) by mouth every 12 hours., Disp: 60 tablet, Rfl: 0    ondansetron (Zofran) 8 mg tablet, Take 1 tablet (8 mg) by mouth every 8 hours if needed for nausea or vomiting., Disp: 30 tablet, Rfl: 5    pregabalin (Lyrica) 75 mg capsule, Take 1 capsule (75 mg) by mouth 2 times a day., Disp: 60 capsule, Rfl: 0    primidone (Mysoline) 50 mg tablet, Take 3.5 tablets (175 mg) by mouth once daily at bedtime. 3.5 tabs at bedtime., Disp: 315 tablet, Rfl: 3    propranolol (Inderal) 60 mg tablet, Take one tablet in the morning and half tablet in the afternoon after a week if heart rate is above 60 after that increase to  one tablet twice a day after one week, Disp: 60 tablet, Rfl: 11  No current facility-administered medications for this visit.  Allergies   Allergen Reactions    Erythromycin Other       Objective   Alert oriented x 4, extraocular full, saccades normal, regular normal, pursuit normal face symmetric facial sensations intact.  No abnormal facial movements.  There is no neck tremor.  Power intact in all 4 extremities proximally distally sensation intact all 4 extremities proximally distally there is mild postural tremor of the left side which is also present during action.  I did not see any tremor on the right side no resting tremor bilaterally no tremor of the leg.  Walking is cautious and unsteady. Symmetric but reduced reflexes.    Assessment/Plan   The patient is a 69-year-old woman with history of tremor in addition to that she also has history of CNS lymphoma neuropathy and now has remission of ovarian cancer for which she is going to start chemotherapy.  The question that oncologist has is if it is okay to reduce primidone while she is on chemotherapy.  If it had to be I am okay for her to reduce primidone to 100 mg nightly she is currently taking primidone 175 mg nightly.  While she is on chemo she will reduce it to 100 mg nightly.  After chemo is over she can go back to 175 mg nightly dose.  After she is done with her chemotherapy and cancer and once it is back in remission we will repeat revisit the idea of deep brain stimulation because she has not been tolerant to primidone or propranolol to the best extent.  I did  her that her tremor may get worse as she is getting chemo and in top of that on reduced dose of primidone.    30 mins spent.

## 2024-09-19 ENCOUNTER — APPOINTMENT (OUTPATIENT)
Dept: PRIMARY CARE | Facility: CLINIC | Age: 69
End: 2024-09-19
Payer: MEDICARE

## 2024-09-19 ENCOUNTER — APPOINTMENT (OUTPATIENT)
Dept: PAIN MEDICINE | Facility: CLINIC | Age: 69
End: 2024-09-19
Payer: MEDICARE

## 2024-09-19 PROBLEM — R59.0 SUBMANDIBULAR LYMPHADENOPATHY: Status: ACTIVE | Noted: 2024-09-19

## 2024-09-19 PROBLEM — R10.13 EPIGASTRIC PAIN: Status: ACTIVE | Noted: 2024-09-19

## 2024-09-19 PROBLEM — H00.019 HORDEOLUM EXTERNUM: Status: ACTIVE | Noted: 2024-09-19

## 2024-09-19 PROBLEM — E74.9 DISORDER OF CARBOHYDRATE METABOLISM (MULTI): Status: ACTIVE | Noted: 2024-09-19

## 2024-09-19 PROBLEM — E86.0 DEHYDRATION: Status: ACTIVE | Noted: 2024-09-19

## 2024-09-19 PROBLEM — R42 DIZZINESS: Status: ACTIVE | Noted: 2024-09-19

## 2024-09-19 PROBLEM — R61 EXCESSIVE SWEATING: Status: ACTIVE | Noted: 2024-09-19

## 2024-09-19 RX ORDER — PREDNISONE 10 MG/1
TABLET ORAL
COMMUNITY
Start: 2024-01-18

## 2024-09-19 RX ORDER — AMOXICILLIN 500 MG/1
2000 TABLET, FILM COATED ORAL ONCE AS NEEDED
COMMUNITY
Start: 2024-09-12

## 2024-09-21 DIAGNOSIS — F33.0 MILD EPISODE OF RECURRENT MAJOR DEPRESSIVE DISORDER (CMS-HCC): ICD-10-CM

## 2024-09-23 ENCOUNTER — TELEPHONE (OUTPATIENT)
Dept: GYNECOLOGIC ONCOLOGY | Facility: HOSPITAL | Age: 69
End: 2024-09-23
Payer: MEDICARE

## 2024-09-23 RX ORDER — BUPROPION HYDROCHLORIDE 150 MG/1
150 TABLET, EXTENDED RELEASE ORAL 2 TIMES DAILY
Qty: 180 TABLET | Refills: 1 | Status: SHIPPED | OUTPATIENT
Start: 2024-09-23 | End: 2024-12-22

## 2024-09-23 NOTE — TELEPHONE ENCOUNTER
Call from patient c/o side effects from treatment.  She had some diarrhea yesterday and is having metallic taste changes and a poor appetite.  Suggested that she try adding lemon to her water, suck on lemon candies or try lemonade to counteract the metallic taste in her mouth.  She was also advised to continue to push PO fluids since she says she is having a hard time drinking fluids with taste changes.  She is having myalgia/arthralgia pain but is not taking OTC medications.  She will start taking Motrin 600mg Q 6 hours.

## 2024-09-25 ENCOUNTER — TELEPHONE (OUTPATIENT)
Dept: ADMISSION | Facility: HOSPITAL | Age: 69
End: 2024-09-25
Payer: MEDICARE

## 2024-09-25 DIAGNOSIS — G89.29 OTHER CHRONIC PAIN: ICD-10-CM

## 2024-09-25 DIAGNOSIS — T45.1X5A CHEMOTHERAPY-INDUCED PERIPHERAL NEUROPATHY (MULTI): ICD-10-CM

## 2024-09-25 DIAGNOSIS — G62.0 CHEMOTHERAPY-INDUCED PERIPHERAL NEUROPATHY (MULTI): ICD-10-CM

## 2024-09-25 RX ORDER — METHADONE HYDROCHLORIDE 10 MG/1
10 TABLET ORAL EVERY 12 HOURS
Qty: 60 TABLET | Refills: 0 | Status: SHIPPED | OUTPATIENT
Start: 2024-09-25 | End: 2024-10-25

## 2024-09-25 NOTE — TELEPHONE ENCOUNTER
Refill pended to provider for methadone 10 mg BID 60/30.  OARRS reviewed.  Due for refill.  Patient to follow up with Arin Hirsch on 10/3.

## 2024-09-25 NOTE — TELEPHONE ENCOUNTER
Pt is requesting a refill of methadone 10mg q12, #60.  Preferred pharmacy is [facility] in [facility] in [place].

## 2024-09-30 NOTE — PROGRESS NOTES
SUPPORTIVE AND PALLIATIVE ONCOLOGY OUTPATIENT FOLLOW-UP    Virtual or Telephone Consent    A telephone visit (audio only) between the patient (at the originating site) and the provider (at the distant site) was utilized to provide this telehealth service.   Verbal consent was requested and obtained from Saba Park on this date, 10/3/2024 for a telehealth visit.     SERVICE DATE: 10/3/2024    Subjective   HISTORY OF PRESENT ILLNESS: Saba Park is a 69 y.o. female who presents with history of CNS lymphoma and ovarian cancer. Currently in active surveillance CNS lymphoma through Dr. Harris at [facility]. Patient with recurrence of Ovarian Ca noted in 2 retroperitoneal LN on CT 7/2024. Patient follows with Supportive Oncology/Palliative Care for chronic pain r/t disease history/treatment and further symptom management.       Pain Assessment:  Pain Score:  0  Location:    Description:      Symptom Assessment:  Pain:a little - patient reports body aches/bone aches following her treatment for a few days. Was told by oncology to take Claritin. Planning to try this with next cycle. Otherwise her hip pain is much improved and as a whole her neuropathy is well controlled with Methadone and Pregabalin. Thus far she does not notice any change in neuropathy with starting chemotherapy  Numbness or Tingling in hands/feet/other: a little - well managed as above  Sore Muscles/Spasms: none  Headache: none  Dizziness:none  Constipation: none  Diarrhea: somewhat - notes with chemotherapy she has developed quick a bit of diarrhea. It does subside after about 5-6 days. On those days is eating more of a BRAT diet. Not taking any further medications to help this. Declines medication assistance   Nausea: a little  Vomiting: none  Lack of Appetite: a little - due to drastic change in taste, everything tasting metallic. Is trying to get in little amount of food throughout the day. Did get a booklet from the hospital to help assist with the  right foods to eat. Discussed trialing a medication to help improve appetite and she declined today  Weight Loss: a little  Taste changes: a little  Dry Mouth: none  Pain in Mouth/Swallowing: none  Lack of Energy: a little - general fatigue/weakness especially following chemotherapy for about 10 days, but then it improves  Difficulty Sleeping: none  Worrying: a little  Anxiety: none  Depression: a little - continues to follow with Onco Psych  Shortness of breath: none  Other: none      Information obtained from: chart review and interview of patient  ______________________________________________________________________        Objective     Lab on 09/13/2024   Component Date Value Ref Range Status    WBC 09/13/2024 7.4  4.4 - 11.3 x10*3/uL Final    nRBC 09/13/2024    Final    RBC 09/13/2024 3.94 (L)  4.00 - 5.20 x10*6/uL Final    Hemoglobin 09/13/2024 12.6  12.0 - 16.0 g/dL Final    Hematocrit 09/13/2024 39.5  36.0 - 46.0 % Final    MCV 09/13/2024 100  80 - 100 fL Final    MCH 09/13/2024 32.0  26.0 - 34.0 pg Final    MCHC 09/13/2024 31.9 (L)  32.0 - 36.0 g/dL Final    RDW 09/13/2024 12.7  11.5 - 14.5 % Final    Platelets 09/13/2024 209  150 - 450 x10*3/uL Final    Neutrophils % 09/13/2024 71.7  40.0 - 80.0 % Final    Immature Granulocytes %, Automated 09/13/2024 0.1  0.0 - 0.9 % Final    Lymphocytes % 09/13/2024 21.2  13.0 - 44.0 % Final    Monocytes % 09/13/2024 5.4  2.0 - 10.0 % Final    Eosinophils % 09/13/2024 0.9  0.0 - 6.0 % Final    Basophils % 09/13/2024 0.7  0.0 - 2.0 % Final    Neutrophils Absolute 09/13/2024 5.29  1.20 - 7.70 x10*3/uL Final    Immature Granulocytes Absolute, Au* 09/13/2024 0.01  0.00 - 0.70 x10*3/uL Final    Lymphocytes Absolute 09/13/2024 1.57  1.20 - 4.80 x10*3/uL Final    Monocytes Absolute 09/13/2024 0.40  0.10 - 1.00 x10*3/uL Final    Eosinophils Absolute 09/13/2024 0.07  0.00 - 0.70 x10*3/uL Final    Basophils Absolute 09/13/2024 0.05  0.00 - 0.10 x10*3/uL Final    Glucose  "09/13/2024 139 (H)  74 - 99 mg/dL Final    Sodium 09/13/2024 142  136 - 145 mmol/L Final    Potassium 09/13/2024 4.8  3.5 - 5.3 mmol/L Final    Chloride 09/13/2024 104  98 - 107 mmol/L Final    Bicarbonate 09/13/2024 30  21 - 32 mmol/L Final    Anion Gap 09/13/2024 13  10 - 20 mmol/L Final    Urea Nitrogen 09/13/2024 22  6 - 23 mg/dL Final    Creatinine 09/13/2024 0.90  0.50 - 1.05 mg/dL Final    eGFR 09/13/2024 69  >60 mL/min/1.73m*2 Final    Calcium 09/13/2024 9.2  8.6 - 10.6 mg/dL Final    Albumin 09/13/2024 3.9  3.4 - 5.0 g/dL Final    Alkaline Phosphatase 09/13/2024 110  33 - 136 U/L Final    Total Protein 09/13/2024 6.1 (L)  6.4 - 8.2 g/dL Final    AST 09/13/2024 12  9 - 39 U/L Final    Bilirubin, Total 09/13/2024 0.5  0.0 - 1.2 mg/dL Final    ALT 09/13/2024 18  7 - 45 U/L Final    Hepatitis B Surface AG 09/13/2024 Nonreactive  Nonreactive Final    Hepatitis B Core AB- Total 09/13/2024 Nonreactive  Nonreactive Final    Hepatitis B Surface AB 09/13/2024 31.9 (H)  <10.0 mIU/mL Final    Cancer  09/13/2024 115.2 (H)  0.0 - 30.2 U/mL Final    Magnesium 09/13/2024 1.87  1.60 - 2.40 mg/dL Final      PHYSICAL EXAMINATION   Vital Signs:   Vital signs reviewed      7/29/2024     8:47 AM 7/29/2024     3:15 PM 8/7/2024     3:00 PM 8/23/2024    11:40 AM 9/17/2024     8:23 AM 9/17/2024     9:11 AM 9/18/2024     1:02 PM   Vitals   Systolic 147 121 101 114 118  104   Diastolic 72 62 64 66 70  69   Heart Rate 67 67 74 61 70  60   Temp 37 °C (98.6 °F) 36.9 °C (98.4 °F) 36.5 °C (97.7 °F) 36 °C (96.8 °F) 36.4 °C (97.5 °F)     Resp 16 15  16 18  16   Height (in)   1.6 m (5' 3\")   1.6 m (5' 2.99\")     Weight (lb)   141.8  140     BMI   25.12 kg/m2  24.8 kg/m2 24.81 kg/m2    BSA (m2)   1.69 m2  1.68 m2 1.68 m2    Visit Report   Report Report Report Report Report       Significant value     Physical Exam  Not complete dt telephone visit     ASSESSMENT/PLAN    Chronic Right Hip/Leg Pain and Neuropathic Pain  Pain is: chronic - " 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 Completely anbx therapy  Type: neuropathic  Pain control: sub-optimally controlled  Home regimen:   - S/P [facility] 5/19/22  - Continue Ibuprofen 600mg q6 hours PRN  - Continue Methadone 10mg BID  - EKG (4/5/23) QTc 431. (7/25/2024) . Repeat with increase in Methadone dose  - Pursed [facility] 7/8/24 to 7/18/24 (total of 8 sessions) - noted worsening radiating pain from hip, but slight improvement in numbness/tingling of her foot. Did not complete further sessions due to extreme hip/leg pain  - Continue Pregabalin 75mg BID  - Continue Acetaminophen 500mg [id number] PRN  - Continue Oxycodone 5mg [id number] PRN for breakthrough pain - rarely uses  - Continue Duloxetine 60mg/daily  - Completed program through chiropractor w/o change in pain  - Referral to Symptom [facility] - Dr Silverman - pursued acupuncture with notable change in neuropathy/pain, has not continued  - Continue following with Chronic Pain (Dr. Alarcon) - no follow-up scheduled at this time   - Continue following with podiatry   Intolerances/previously tried:   - Alpha Liproic Acid - not effective  - Gabapentin - caused excessive lethargy     Opioid Use  Medication Management:   - OARRS report reviewed with no aberrant behavior; consistent with  prescriptions/records and patient history  - MED 90.  Overdose Risk Score 100.   This has been discussed with patient.   - We will continue to closely monitor the patient for signs of prescription misuse including UDS, OARRS review and subjective reports at each visit.  - No concurrent benzodiazepine use   - I am a provider who either is or has consulted and collaborated with a provider certified in Hospice and  Palliative Medicine and have conducted a face-face visit and examination for this patient.  - Routine Urine Drug Screen: 8/23/2024 appropriately positive for opioids and negative for illicit substances.  - Controlled Substance Agreement: 8/23/2024  - Specifically discussed that controlled substance prescriptions will only be provided by our group as outlined in the completed agreement  - Prescribed naloxone: patient declined  - Red Flags: NA     Diarrhea  - BRAT diet  - Discussed starting Loperamide 2mg PRN - MAX 8caps/24 hours     Nausea/Loss of Appetite  - Continue Omeprazole 20mg daily  - Continue Ondansetron 8mg [id number] PRN  - Continue Metoclopramide 10mg [id number] PRN  - Encouraged good hydration   - Encouraged small more frequent meals  - Encouraged increase in caloric intake and protein supplements  - Referral to dietician (10/3/2024)  - Discussed use of lemon heads to assist with taste  - Discussed medications to help stimulate appetite - patient declined today     Altered Mood  Chronic anxiety and depression related to health concerns   Home regimen:   - Continue Duloxetine 60mg once daily at bed  - Continue Buspirone 5mg TID  - Continue Buproprion SR 150mg BID  - Following with counselor weekly to every 2 weeks  - Established with Dr. Ugalde - Onco Psychiatry    Next Follow-Up Visit:  Return to clinic in 6 weeks in person    Signature and billing  Medical complexity was moderate level due to due to complexity of problems, extensive data review, and high risk of management/treatment.  Time was spent on the following: Prep Time, Time Directly with Patient/Family/Caregiver, Documentation Time. Total time spent: 35      Data  Diagnostic tests and information reviewed for today's visit:  Most recent labs and imaging results, Medications     Some elements copied from Palliative Care note on 8/23/2024, the elements have been updated and all reflect current decision making from today, 10/3/2024.    Plan of  Care discussed with: Patient    SIGNATURE: DEMETRIUS Orourke-CNP    Contact information:  [facility]ive [facility] Palliative Oncology  Monday-Friday 8 AM-5 PM  Phone:  [phone], press option #5, then option #1.   Or Epic Secure Chat

## 2024-10-02 ENCOUNTER — TELEPHONE (OUTPATIENT)
Dept: GYNECOLOGIC ONCOLOGY | Facility: HOSPITAL | Age: 69
End: 2024-10-02
Payer: MEDICARE

## 2024-10-02 NOTE — TELEPHONE ENCOUNTER
Call back to patient to review treatment schedule.  She is aware that she should get labs done on Friday of this week prior to treatment on Monday.  Reviewed pre and post treatment medications she needs to take.

## 2024-10-03 ENCOUNTER — TELEMEDICINE (OUTPATIENT)
Dept: PALLIATIVE MEDICINE | Facility: HOSPITAL | Age: 69
End: 2024-10-03
Payer: MEDICARE

## 2024-10-03 DIAGNOSIS — F33.1 MODERATE EPISODE OF RECURRENT MAJOR DEPRESSIVE DISORDER: ICD-10-CM

## 2024-10-03 DIAGNOSIS — C85.89: ICD-10-CM

## 2024-10-03 DIAGNOSIS — G62.9 NEUROPATHY: ICD-10-CM

## 2024-10-03 DIAGNOSIS — G89.3 CHRONIC PAIN AFTER CANCER TREATMENT: ICD-10-CM

## 2024-10-03 DIAGNOSIS — G62.0 CHEMOTHERAPY-INDUCED PERIPHERAL NEUROPATHY (MULTI): ICD-10-CM

## 2024-10-03 DIAGNOSIS — R53.0 NEOPLASTIC MALIGNANT RELATED FATIGUE: ICD-10-CM

## 2024-10-03 DIAGNOSIS — F41.9 ANXIETY: ICD-10-CM

## 2024-10-03 DIAGNOSIS — C56.9 MALIGNANT NEOPLASM OF OVARY, UNSPECIFIED LATERALITY (MULTI): ICD-10-CM

## 2024-10-03 DIAGNOSIS — T45.1X5A CHEMOTHERAPY-INDUCED PERIPHERAL NEUROPATHY (MULTI): ICD-10-CM

## 2024-10-03 DIAGNOSIS — G89.29 OTHER CHRONIC PAIN: ICD-10-CM

## 2024-10-03 DIAGNOSIS — R43.2 DYSGEUSIA: ICD-10-CM

## 2024-10-03 DIAGNOSIS — Z51.81 ENCOUNTER FOR MONITORING OPIOID MAINTENANCE THERAPY: ICD-10-CM

## 2024-10-03 DIAGNOSIS — Z51.5 PALLIATIVE CARE ENCOUNTER: Primary | ICD-10-CM

## 2024-10-03 DIAGNOSIS — Z79.891 ENCOUNTER FOR MONITORING OPIOID MAINTENANCE THERAPY: ICD-10-CM

## 2024-10-03 DIAGNOSIS — R53.1 WEAKNESS: ICD-10-CM

## 2024-10-03 DIAGNOSIS — R63.0 LOSS OF APPETITE: ICD-10-CM

## 2024-10-03 DIAGNOSIS — M79.2 NEUROPATHIC PAIN: ICD-10-CM

## 2024-10-03 DIAGNOSIS — R11.0 NAUSEA: ICD-10-CM

## 2024-10-03 DIAGNOSIS — G89.3 CANCER RELATED PAIN: ICD-10-CM

## 2024-10-03 PROCEDURE — 1157F ADVNC CARE PLAN IN RCRD: CPT | Performed by: NURSE PRACTITIONER

## 2024-10-03 PROCEDURE — 99214 OFFICE O/P EST MOD 30 MIN: CPT | Performed by: NURSE PRACTITIONER

## 2024-10-03 RX ORDER — PREGABALIN 75 MG/1
75 CAPSULE ORAL 2 TIMES DAILY
Qty: 60 CAPSULE | Refills: 0 | Status: SHIPPED | OUTPATIENT
Start: 2024-10-09 | End: 2024-11-08

## 2024-10-03 RX ORDER — METHADONE HYDROCHLORIDE 10 MG/1
10 TABLET ORAL EVERY 12 HOURS
Qty: 60 TABLET | Refills: 0 | Status: SHIPPED | OUTPATIENT
Start: 2024-10-24 | End: 2024-11-23

## 2024-10-04 ENCOUNTER — LAB (OUTPATIENT)
Dept: LAB | Facility: CLINIC | Age: 69
End: 2024-10-04
Payer: MEDICARE

## 2024-10-04 DIAGNOSIS — C56.9 MALIGNANT NEOPLASM OF OVARY, UNSPECIFIED LATERALITY (MULTI): ICD-10-CM

## 2024-10-04 LAB
ALBUMIN SERPL BCP-MCNC: 3.8 G/DL (ref 3.4–5)
ALP SERPL-CCNC: 107 U/L (ref 33–136)
ALT SERPL W P-5'-P-CCNC: 26 U/L (ref 7–45)
ANION GAP SERPL CALC-SCNC: 12 MMOL/L (ref 10–20)
AST SERPL W P-5'-P-CCNC: 18 U/L (ref 9–39)
BASOPHILS # BLD AUTO: 0.03 X10*3/UL (ref 0–0.1)
BASOPHILS NFR BLD AUTO: 0.9 %
BILIRUB SERPL-MCNC: 0.3 MG/DL (ref 0–1.2)
BUN SERPL-MCNC: 21 MG/DL (ref 6–23)
CALCIUM SERPL-MCNC: 9.1 MG/DL (ref 8.6–10.6)
CANCER AG125 SERPL-ACNC: 109.1 U/ML (ref 0–30.2)
CHLORIDE SERPL-SCNC: 106 MMOL/L (ref 98–107)
CO2 SERPL-SCNC: 30 MMOL/L (ref 21–32)
CREAT SERPL-MCNC: 0.78 MG/DL (ref 0.5–1.05)
EGFRCR SERPLBLD CKD-EPI 2021: 82 ML/MIN/1.73M*2
EOSINOPHIL # BLD AUTO: 0.01 X10*3/UL (ref 0–0.7)
EOSINOPHIL NFR BLD AUTO: 0.3 %
ERYTHROCYTE [DISTWIDTH] IN BLOOD BY AUTOMATED COUNT: 12.6 % (ref 11.5–14.5)
GLUCOSE SERPL-MCNC: 125 MG/DL (ref 74–99)
HCT VFR BLD AUTO: 37 % (ref 36–46)
HGB BLD-MCNC: 12.1 G/DL (ref 12–16)
IMM GRANULOCYTES # BLD AUTO: 0.01 X10*3/UL (ref 0–0.7)
IMM GRANULOCYTES NFR BLD AUTO: 0.3 % (ref 0–0.9)
LYMPHOCYTES # BLD AUTO: 1.81 X10*3/UL (ref 1.2–4.8)
LYMPHOCYTES NFR BLD AUTO: 53.7 %
MAGNESIUM SERPL-MCNC: 1.88 MG/DL (ref 1.6–2.4)
MCH RBC QN AUTO: 32.3 PG (ref 26–34)
MCHC RBC AUTO-ENTMCNC: 32.7 G/DL (ref 32–36)
MCV RBC AUTO: 99 FL (ref 80–100)
MONOCYTES # BLD AUTO: 0.3 X10*3/UL (ref 0.1–1)
MONOCYTES NFR BLD AUTO: 8.9 %
NEUTROPHILS # BLD AUTO: 1.21 X10*3/UL (ref 1.2–7.7)
NEUTROPHILS NFR BLD AUTO: 35.9 %
NRBC BLD-RTO: ABNORMAL /100{WBCS}
PLATELET # BLD AUTO: 108 X10*3/UL (ref 150–450)
POTASSIUM SERPL-SCNC: 4.4 MMOL/L (ref 3.5–5.3)
PROT SERPL-MCNC: 6.2 G/DL (ref 6.4–8.2)
RBC # BLD AUTO: 3.75 X10*6/UL (ref 4–5.2)
SODIUM SERPL-SCNC: 144 MMOL/L (ref 136–145)
WBC # BLD AUTO: 3.4 X10*3/UL (ref 4.4–11.3)

## 2024-10-04 PROCEDURE — 36415 COLL VENOUS BLD VENIPUNCTURE: CPT

## 2024-10-04 PROCEDURE — 85025 COMPLETE CBC W/AUTO DIFF WBC: CPT

## 2024-10-04 PROCEDURE — 84075 ASSAY ALKALINE PHOSPHATASE: CPT

## 2024-10-04 PROCEDURE — 86304 IMMUNOASSAY TUMOR CA 125: CPT

## 2024-10-04 PROCEDURE — 83735 ASSAY OF MAGNESIUM: CPT

## 2024-10-07 ENCOUNTER — OFFICE VISIT (OUTPATIENT)
Dept: GYNECOLOGIC ONCOLOGY | Facility: CLINIC | Age: 69
End: 2024-10-07
Payer: MEDICARE

## 2024-10-07 ENCOUNTER — INFUSION (OUTPATIENT)
Dept: HEMATOLOGY/ONCOLOGY | Facility: CLINIC | Age: 69
End: 2024-10-07
Payer: MEDICARE

## 2024-10-07 VITALS
RESPIRATION RATE: 15 BRPM | OXYGEN SATURATION: 99 % | SYSTOLIC BLOOD PRESSURE: 151 MMHG | DIASTOLIC BLOOD PRESSURE: 82 MMHG | WEIGHT: 139.55 LBS | BODY MASS INDEX: 24.73 KG/M2 | TEMPERATURE: 97.2 F | HEART RATE: 80 BPM

## 2024-10-07 DIAGNOSIS — C56.9 MALIGNANT NEOPLASM OF OVARY, UNSPECIFIED LATERALITY (MULTI): Primary | ICD-10-CM

## 2024-10-07 DIAGNOSIS — Z51.11 CHEMOTHERAPY MANAGEMENT, ENCOUNTER FOR: ICD-10-CM

## 2024-10-07 DIAGNOSIS — C56.9 MALIGNANT NEOPLASM OF OVARY, UNSPECIFIED LATERALITY (MULTI): ICD-10-CM

## 2024-10-07 LAB
BASOPHILS # BLD AUTO: 0.01 X10*3/UL (ref 0–0.1)
BASOPHILS NFR BLD AUTO: 0.2 %
EOSINOPHIL # BLD AUTO: 0 X10*3/UL (ref 0–0.7)
EOSINOPHIL NFR BLD AUTO: 0 %
ERYTHROCYTE [DISTWIDTH] IN BLOOD BY AUTOMATED COUNT: 13 % (ref 11.5–14.5)
HCT VFR BLD AUTO: 40.3 % (ref 36–46)
HGB BLD-MCNC: 13.3 G/DL (ref 12–16)
IMM GRANULOCYTES # BLD AUTO: 0.01 X10*3/UL (ref 0–0.7)
IMM GRANULOCYTES NFR BLD AUTO: 0.2 % (ref 0–0.9)
LYMPHOCYTES # BLD AUTO: 1.02 X10*3/UL (ref 1.2–4.8)
LYMPHOCYTES NFR BLD AUTO: 16.9 %
MCH RBC QN AUTO: 32.8 PG (ref 26–34)
MCHC RBC AUTO-ENTMCNC: 33 G/DL (ref 32–36)
MCV RBC AUTO: 100 FL (ref 80–100)
MONOCYTES # BLD AUTO: 0.06 X10*3/UL (ref 0.1–1)
MONOCYTES NFR BLD AUTO: 1 %
NEUTROPHILS # BLD AUTO: 4.95 X10*3/UL (ref 1.2–7.7)
NEUTROPHILS NFR BLD AUTO: 81.7 %
PLATELET # BLD AUTO: 117 X10*3/UL (ref 150–450)
RBC # BLD AUTO: 4.05 X10*6/UL (ref 4–5.2)
WBC # BLD AUTO: 6.1 X10*3/UL (ref 4.4–11.3)

## 2024-10-07 PROCEDURE — 96413 CHEMO IV INFUSION 1 HR: CPT

## 2024-10-07 PROCEDURE — 2500000001 HC RX 250 WO HCPCS SELF ADMINISTERED DRUGS (ALT 637 FOR MEDICARE OP): Performed by: OBSTETRICS & GYNECOLOGY

## 2024-10-07 PROCEDURE — 96415 CHEMO IV INFUSION ADDL HR: CPT

## 2024-10-07 PROCEDURE — 1160F RVW MEDS BY RX/DR IN RCRD: CPT | Performed by: OBSTETRICS & GYNECOLOGY

## 2024-10-07 PROCEDURE — 3079F DIAST BP 80-89 MM HG: CPT | Performed by: OBSTETRICS & GYNECOLOGY

## 2024-10-07 PROCEDURE — 1157F ADVNC CARE PLAN IN RCRD: CPT | Performed by: OBSTETRICS & GYNECOLOGY

## 2024-10-07 PROCEDURE — 1159F MED LIST DOCD IN RCRD: CPT | Performed by: OBSTETRICS & GYNECOLOGY

## 2024-10-07 PROCEDURE — 2500000004 HC RX 250 GENERAL PHARMACY W/ HCPCS (ALT 636 FOR OP/ED): Performed by: OBSTETRICS & GYNECOLOGY

## 2024-10-07 PROCEDURE — 99215 OFFICE O/P EST HI 40 MIN: CPT | Performed by: OBSTETRICS & GYNECOLOGY

## 2024-10-07 PROCEDURE — 3077F SYST BP >= 140 MM HG: CPT | Performed by: OBSTETRICS & GYNECOLOGY

## 2024-10-07 PROCEDURE — 85025 COMPLETE CBC W/AUTO DIFF WBC: CPT

## 2024-10-07 PROCEDURE — 96417 CHEMO IV INFUS EACH ADDL SEQ: CPT

## 2024-10-07 PROCEDURE — 96375 TX/PRO/DX INJ NEW DRUG ADDON: CPT | Mod: INF

## 2024-10-07 PROCEDURE — 1125F AMNT PAIN NOTED PAIN PRSNT: CPT | Performed by: OBSTETRICS & GYNECOLOGY

## 2024-10-07 RX ORDER — PROCHLORPERAZINE EDISYLATE 5 MG/ML
10 INJECTION INTRAMUSCULAR; INTRAVENOUS EVERY 6 HOURS PRN
Status: CANCELLED | OUTPATIENT
Start: 2024-10-07

## 2024-10-07 RX ORDER — DIPHENHYDRAMINE HYDROCHLORIDE 50 MG/ML
50 INJECTION INTRAMUSCULAR; INTRAVENOUS AS NEEDED
Status: CANCELLED | OUTPATIENT
Start: 2024-10-07

## 2024-10-07 RX ORDER — FAMOTIDINE 10 MG/ML
20 INJECTION INTRAVENOUS ONCE AS NEEDED
Status: CANCELLED | OUTPATIENT
Start: 2024-10-07

## 2024-10-07 RX ORDER — FAMOTIDINE 10 MG/ML
20 INJECTION INTRAVENOUS ONCE AS NEEDED
Status: DISCONTINUED | OUTPATIENT
Start: 2024-10-07 | End: 2024-10-07 | Stop reason: HOSPADM

## 2024-10-07 RX ORDER — DEXAMETHASONE IN 0.9 % SOD CHL 20 MG/50ML
20 INTRAVENOUS SOLUTION, PIGGYBACK (ML) INTRAVENOUS ONCE
Status: CANCELLED | OUTPATIENT
Start: 2024-10-07

## 2024-10-07 RX ORDER — PROCHLORPERAZINE EDISYLATE 5 MG/ML
10 INJECTION INTRAMUSCULAR; INTRAVENOUS EVERY 6 HOURS PRN
Status: DISCONTINUED | OUTPATIENT
Start: 2024-10-07 | End: 2024-10-07 | Stop reason: HOSPADM

## 2024-10-07 RX ORDER — ALBUTEROL SULFATE 0.83 MG/ML
3 SOLUTION RESPIRATORY (INHALATION) AS NEEDED
Status: CANCELLED | OUTPATIENT
Start: 2024-10-07

## 2024-10-07 RX ORDER — FAMOTIDINE 10 MG/ML
20 INJECTION INTRAVENOUS ONCE
Status: COMPLETED | OUTPATIENT
Start: 2024-10-07 | End: 2024-10-07

## 2024-10-07 RX ORDER — PALONOSETRON 0.05 MG/ML
0.25 INJECTION, SOLUTION INTRAVENOUS ONCE
Status: CANCELLED | OUTPATIENT
Start: 2024-10-07

## 2024-10-07 RX ORDER — PALONOSETRON 0.05 MG/ML
0.25 INJECTION, SOLUTION INTRAVENOUS ONCE
Status: COMPLETED | OUTPATIENT
Start: 2024-10-07 | End: 2024-10-07

## 2024-10-07 RX ORDER — EPINEPHRINE 0.3 MG/.3ML
0.3 INJECTION SUBCUTANEOUS EVERY 5 MIN PRN
Status: DISCONTINUED | OUTPATIENT
Start: 2024-10-07 | End: 2024-10-07 | Stop reason: HOSPADM

## 2024-10-07 RX ORDER — PROCHLORPERAZINE MALEATE 10 MG
10 TABLET ORAL EVERY 6 HOURS PRN
Status: CANCELLED | OUTPATIENT
Start: 2024-10-07

## 2024-10-07 RX ORDER — EPINEPHRINE 0.3 MG/.3ML
0.3 INJECTION SUBCUTANEOUS EVERY 5 MIN PRN
Status: CANCELLED | OUTPATIENT
Start: 2024-10-07

## 2024-10-07 RX ORDER — ALBUTEROL SULFATE 0.83 MG/ML
3 SOLUTION RESPIRATORY (INHALATION) AS NEEDED
Status: DISCONTINUED | OUTPATIENT
Start: 2024-10-07 | End: 2024-10-07 | Stop reason: HOSPADM

## 2024-10-07 RX ORDER — PROCHLORPERAZINE MALEATE 10 MG
10 TABLET ORAL EVERY 6 HOURS PRN
Status: DISCONTINUED | OUTPATIENT
Start: 2024-10-07 | End: 2024-10-07 | Stop reason: HOSPADM

## 2024-10-07 RX ORDER — DIPHENHYDRAMINE HCL 25 MG
50 CAPSULE ORAL ONCE
Status: CANCELLED | OUTPATIENT
Start: 2024-10-07

## 2024-10-07 RX ORDER — DIPHENHYDRAMINE HYDROCHLORIDE 50 MG/ML
50 INJECTION INTRAMUSCULAR; INTRAVENOUS AS NEEDED
Status: DISCONTINUED | OUTPATIENT
Start: 2024-10-07 | End: 2024-10-07 | Stop reason: HOSPADM

## 2024-10-07 RX ORDER — DIPHENHYDRAMINE HCL 25 MG
50 CAPSULE ORAL ONCE
Status: COMPLETED | OUTPATIENT
Start: 2024-10-07 | End: 2024-10-07

## 2024-10-07 RX ORDER — FAMOTIDINE 10 MG/ML
20 INJECTION INTRAVENOUS ONCE
Status: CANCELLED | OUTPATIENT
Start: 2024-10-07

## 2024-10-07 ASSESSMENT — PAIN SCALES - GENERAL: PAINLEVEL: 3

## 2024-10-07 NOTE — PROGRESS NOTES
Patient ID: Saba Park is a 69 y.o. female.  Referring Physician: No referring provider defined for this encounter.  Primary Care Provider: DEMETRIUS Castro-CNP    Subjective    Interval History:  First 2 weeks post chemotherapy reporting severe body aches, metallic taste, poor appetite, stable neuropathy, unsteady gait, she took Motrin and Primidone for tremors.      Objective    BSA: 1.68 meters squared  /82   Pulse 80   Temp 36.2 °C (97.2 °F)   Resp 15   Wt 63.3 kg (139 lb 8.8 oz)   SpO2 99%   BMI 24.73 kg/m²      Physical Exam  Constitutional:       General: She is not in acute distress.     Appearance: Normal appearance.   Cardiovascular:      Rate and Rhythm: Normal rate and regular rhythm.   Pulmonary:      Effort: Pulmonary effort is normal.      Breath sounds: Normal breath sounds.   Abdominal:      General: Bowel sounds are normal. There is no distension.   Musculoskeletal:      Right forearm: Normal.      Left forearm: Normal.      Right hand: Normal.      Left hand: Normal.      Right lower leg: Normal.      Left lower leg: Normal.      Right foot: Normal.      Left foot: Normal.         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 Questionable invasion into the uterus.  Questionable mesenteric implants.  It looks like one of them is small bowel mesentery, maybe omentum.   The rest look more omental to me. kidney stone.  Complex lesion in her left kidney presumably cystic but can't rule out solid area.   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  Felt to be undebulkable on laparoscopy, plan for NACT  - 3 cycles NACT  -Genetics: SLX-4 gene of unk significance.  - May 2019 debulking, included ileostomy.  - 3  additional cycles chemo - carbo/taxol  - 8/26/19 last chemotherapy  8/2024 recurrence - mediastinal and internal mammary Lns  - bx of LN c/w recurrent high grade serous carcinoma  9/2024 started carbo/taxol       Malignant neoplasm of unspecified ovary (Multi)   12/1/2022 Initial Diagnosis    Malignant neoplasm of unspecified ovary (Multi)     9/17/2024 -  Chemotherapy    PACLitaxel / CARBOplatin, 21 Day Cycles - Gyn          Problem List Items Addressed This Visit             ICD-10-CM    Malignant neoplasm of unspecified ovary (Multi) - Primary C56.9    Relevant Orders    Clinic Appointment Request    CBC and Auto Differential (Completed)    Chemotherapy management, encounter for Z51.11       Treatment Plans       Name Type Plan Dates Plan Provider         Active    PACLitaxel / CARBOplatin, 21 Day Cycles - Gyn Oncology Treatment  9/16/2024 - Present Krista Kenny MD                  - Slightly decreased , low ANC on pretreatment labs.  - Patient here today for assessment and chemotherapy treatment.  Treatment today pending repeat CBC with diff.  Will reduce taxol to 135mg/m2 since she has reduced primidone dose.  Start claritin for myalgias.  - She will follow chemotherapy calendar.  - CT after 3rd cycle      Scribe Attestation  By signing my name below, INoy, Bianca   attest that this documentation has been prepared under the direction and in the presence of Krista Kenny MD.

## 2024-10-08 DIAGNOSIS — C56.9 MALIGNANT NEOPLASM OF OVARY, UNSPECIFIED LATERALITY (MULTI): ICD-10-CM

## 2024-10-09 ENCOUNTER — APPOINTMENT (OUTPATIENT)
Dept: BEHAVIORAL HEALTH | Facility: CLINIC | Age: 69
End: 2024-10-09
Payer: MEDICARE

## 2024-10-09 DIAGNOSIS — F41.9 ANXIETY: ICD-10-CM

## 2024-10-09 DIAGNOSIS — F33.0 MILD EPISODE OF RECURRENT MAJOR DEPRESSIVE DISORDER (CMS-HCC): ICD-10-CM

## 2024-10-09 PROCEDURE — 1036F TOBACCO NON-USER: CPT | Performed by: PSYCHIATRY & NEUROLOGY

## 2024-10-09 PROCEDURE — 1157F ADVNC CARE PLAN IN RCRD: CPT | Performed by: PSYCHIATRY & NEUROLOGY

## 2024-10-09 PROCEDURE — 1159F MED LIST DOCD IN RCRD: CPT | Performed by: PSYCHIATRY & NEUROLOGY

## 2024-10-09 PROCEDURE — 1160F RVW MEDS BY RX/DR IN RCRD: CPT | Performed by: PSYCHIATRY & NEUROLOGY

## 2024-10-09 PROCEDURE — 99214 OFFICE O/P EST MOD 30 MIN: CPT | Performed by: PSYCHIATRY & NEUROLOGY

## 2024-10-09 NOTE — PROGRESS NOTES
"Outpatient Psychiatry FUV      Subjective   Saba Park, a 69 y.o. female, for virtual FUV  Virtual or Telephone Consent    An interactive audio and video telecommunication system which permits real time communications between the patient (at the originating site) and provider (at the distant site) was utilized to provide this telehealth service.   Verbal consent was requested and obtained from Saba Park on this date, 10/09/24 for a telehealth visit.   Confirmed to be home for appt today        Assessment/Plan   Patient Discussion:  CONTINUE duloxetine 60mg daily at bedtime   CONTINUE  buspirone 5mg 2x/day  CONTINUE bupropion SR 150mg 2x/day      RETURN to clinic 1/8 at 4:00PM for virtual FUV     Call with questions/concerns [phone]    Assessment:   69 y.o.  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 AVN R hip dx 2016. 2019 dx with ovarian CA in surveillance s/p resection and chemo. Pt had been see several time in 2015 ahead of MD maternity leave and transferred care to Dr. Nilay Farr, who has since left . 2021 pt re-established care and here for follow up     FUV 10/9/2024 . Since last appt, back on chemo for cancer recurrence. Mood better with change to bupropion SR.  Follow up 3 months, sooner if needed.     Diagnosis:   MDD, recurrent, p remission  Other spec anxiety r/o adjustment vs part of depression     Treatment Plan/Recommendations:   1. Safety Assessment: denies active thoughts of death/self harm though has had \"dark thoughts\". no h/o SI/SA. passive death wish in the past but not recently . RF include age, W, pain, cancer. PF include , engaged in care, no hx, no substance abuse, no guns. Currently low imminent risk     2. MDD, other spec anxiety r/o element of MDD vs ISIS  CONTINUE duloxetine 60mg PO QHS   CONTINUE buspar 5mg PO BID for now--consider tapering  CONTINUE bupropion SR " 150mg PO BID, monitor anxiety.      Continue with therapy, supportive therapy during appt--pt has also   stopped seeing therapist related to worse health and not able to keep up, defer for now     3. 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  elevated  Now back on chemo carboplatin/taxol      4. Social: lives with , continue to encourage behavioral activation. Does better when out and about but frustrated by physical limitations, has been limited this summer due to health    Reason for Visit:     FUV for depression, anxiety    Subjective:  Last appt 8/2024  Late to appt, was sleeping 2/2 a/e from chemo    Does find the SR dose split more helpful  Since then back on chemo, lots of side effects, same chemo as done before, alopecia, metallic taste    Has had chemo 2 of 3 and then rescan    Agreed to follow up in January, sooner if needed    no SI or thoughts of not wanting to go on  No a/e to meds    Current Medications:    Current Outpatient Medications:     amoxicillin (Amoxil) 500 mg tablet, Take 4 tablets (2,000 mg) by mouth 1 time if needed. Take 1 hour before dental appointment, Disp: , Rfl:     predniSONE (Deltasone) 10 mg tablet, PLEASE SEE ATTACHED FOR DETAILED DIRECTIONS, Disp: , Rfl:     buPROPion SR (Wellbutrin SR) 150 mg 12 hr tablet, TAKE 1 TABLET (150 MG) BY MOUTH 2 TIMES A DAY. DO NOT CRUSH, CHEW, OR SPLIT., Disp: 180 tablet, Rfl: 1    busPIRone (Buspar) 5 mg tablet, Take 1 tablet (5  mg) by mouth 2 times a day., Disp: 180 tablet, Rfl: 1    calcium carbonate (Oscal) 500 mg calcium (1,250 mg) tablet, Take 1 tablet (1,250 mg) by mouth 2 times daily (morning and late afternoon)., Disp: , Rfl:     cholecalciferol (Vitamin D-3) 50 mcg (2,000 unit) capsule, Take 1 capsule (50 mcg) by mouth once daily., Disp: , Rfl:     clobetasol (Temovate) 0.05 % cream, APPLY AS DIRECTED EVERY DAY, Disp: , Rfl:     dexAMETHasone (Decadron) 4 mg tablet, Take 5 tablets (20 mg) by mouth once 12 hours prior to PACLitaxel treatment. Then take 2 tablets (8 mg) by mouth once daily for 3 days starting the day after treatment., Disp: 66 tablet, Rfl: 0    DULoxetine (Cymbalta) 60 mg DR capsule, TAKE 1 CAPSULE DAILY IN THE EVENING, Disp: 90 capsule, Rfl: 2    imiquimod (Aldara) 5 % cream, APPLY AT BEDTIME MONDAY, WED, FRIDAY FOR 4 WEEKS TO UPPER FOREHEAD AND BRIDGE OF NOSE, Disp: , Rfl:     magnesium, amino acid chelate, 133 mg tablet, Take 1 tablet (133 mg) by mouth 2 times a day., Disp: , Rfl:     magnesium, as gluconate, (Magonate) 27 mg magnesium (500 mg) tablet, Take 2 tablets (54 mg) by mouth 2 times a day., Disp: 120 tablet, Rfl: 5    [START ON 10/24/2024] methadone (Dolophine) 10 mg tablet, Take 1 tablet (10 mg) by mouth every 12 hours. Do not fill before October 24, 2024., Disp: 60 tablet, Rfl: 0    ondansetron (Zofran) 8 mg tablet, Take 1 tablet (8 mg) by mouth every 8 hours if needed for nausea or vomiting., Disp: 30 tablet, Rfl: 5    pregabalin (Lyrica) 75 mg capsule, Take 1 capsule (75 mg) by mouth 2 times a day. Do not fill before October 9, 2024., Disp: 60 capsule, Rfl: 0    primidone (Mysoline) 50 mg tablet, Take 3.5 tablets (175 mg) by mouth once daily at bedtime. 3.5 tabs at bedtime. (Patient taking differently: Take 1 tablet (50 mg) by mouth once daily at bedtime. 2 tabs at bedtime.), Disp: 315 tablet, Rfl: 3    propranolol (Inderal) 60 mg tablet, Take one tablet in the morning and half tablet in the afternoon  after a week if heart rate is above 60 after that increase to one tablet twice a day after one week, Disp: 60 tablet, Rfl: 11  Medical History:  Past Medical History:   Diagnosis Date    Anxiety disorder, unspecified     Anxiety    Family history of malignant neoplasm of breast 02/28/2019    Family history of breast cancer    Gynecologic cancer (Multi) 09/18/2023    Other conditions influencing health status     Nephrolithiasis    Ovarian cancer (Multi)     Personal history of malignant neoplasm of other parts of uterus 08/14/2020    History of malignant neoplasm of other parts of uterus    Personal history of non-Hodgkin lymphomas 08/14/2020    History of non-Hodgkin's lymphoma    Personal history of other diseases of the circulatory system     History of hypertension    Personal history of other diseases of the digestive system     History of irritable bowel syndrome    Personal history of other diseases of the respiratory system 01/10/2014    History of acute bronchitis    Personal history of other endocrine, nutritional and metabolic disease     History of hypercholesterolemia    Personal history of other malignant neoplasm of skin     History of basal cell carcinoma    Personal history of other specified conditions 10/28/2014    History of fibrocystic disease of breast    Portal vein thrombosis 09/18/2023    PORTAL VEIN THROMBOSIS I81    Presence of spectacles and contact lenses 12/09/2014    Wears contact lenses       Surgical History:  Past Surgical History:   Procedure Laterality Date    LITHOTRIPSY  08/19/2013    Renal Lithotripsy    OTHER SURGICAL HISTORY  08/19/2013    Lithotomy    OTHER SURGICAL HISTORY  11/25/2019    Ileostomy closure       Family History:  Family History   Problem Relation Name Age of Onset    Breast cancer Mother  60    Stroke Mother      Colon cancer Father      Breast cancer Mother's Sister  40    Breast cancer Cousin  50       Social History:  Social History     Socioeconomic  History    Marital status:      Spouse name: Not on file    Number of children: Not on file    Years of education: Not on file    Highest education level: Not on file   Occupational History    Not on file   Tobacco Use    Smoking status: Never    Smokeless tobacco: Never   Substance and Sexual Activity    Alcohol use: Yes     Comment: 1 glass of wine daily    Drug use: Yes     Types: Marijuana     Comment: medical card / gummies    Sexual activity: Not on file   Other Topics Concern    Not on file   Social History Narrative    Not on file     Social Determinants of Health     Financial Resource Strain: Low Risk  (7/25/2024)    Overall Financial Resource Strain (CARDIA)     Difficulty of Paying Living Expenses: Not hard at all   Food Insecurity: No Food Insecurity (7/26/2024)    Hunger Vital Sign     Worried About Running Out of Food in the Last Year: Never true     Ran Out of Food in the Last Year: Never true   Transportation Needs: No Transportation Needs (7/25/2024)    PRAPARE - Transportation     Lack of Transportation (Medical): No     Lack of Transportation (Non-Medical): No   Physical Activity: Inactive (7/26/2024)    Exercise Vital Sign     Days of Exercise per Week: 0 days     Minutes of Exercise per Session: 0 min   Stress: Stress Concern Present (7/26/2024)    [employer] - Occupational Stress Questionnaire     Feeling of Stress : To some extent   Social Connections: Moderately Isolated (7/26/2024)    Social Connection and Isolation Panel [NHANES]     Frequency of Communication with Friends and Family: More than three times a week     Frequency of Social Gatherings with Friends and Family: Once a week     Attends Sikh Services: Never     Active Member of Clubs or Organizations: No     Attends Club or Organization Meetings: Never     Marital Status:    Intimate Partner Violence: Not At Risk (7/26/2024)    Humiliation, Afraid, Rape, and Kick questionnaire      "Fear of Current or Ex-Partner: No     Emotionally Abused: No     Physically Abused: No     Sexually Abused: No   Housing Stability: Low Risk  (7/25/2024)    Housing Stability Vital Sign     Unable to Pay for Housing in the Last Year: No     Number of Times Moved in the Last Year: 1     Homeless in the Last Year: No              Medical Review Of Systems:  +neuropathy  +pain but better    Psychiatric Review Of Systems:  +more down       Objective   Mental Status Exam:     Appearance: dressed neat and clean.   Attitude: cooperative and engaged.   Behavior: appropriate eye contact via video.   Motor Activity: mild truncal tremor noted.   Speech: regular volume, prosody, no aphasia.   Mood: \"not good\".   Affect: congruent, appropriate to circumstance  Thought Process: on topic.   Thought Content: no delusions, no SI/HI, less hopeless.   Thought Perception: no AVH.   Cognition: alert, oriented to person, place, time. attn intact.   Insight: fair.   Judgment: fair.       Vitals:  There were no vitals filed for this visit.  Encounter Date: 07/25/24   ECG 12 lead   Result Value    Ventricular Rate 61    Atrial Rate 61    MT Interval 126    QRS Duration 84    QT Interval 458    QTC Calculation(Bazett) 461    P Axis 49    R Axis 45    T Axis 36    QRS Count 10    Q Onset 215    P Onset 152    P Offset 201    T Offset 444    QTC Fredericia 460    Narrative    Normal sinus rhythm  Normal ECG  When compared with ECG of 05-APR-2023 12:43,  ST elevation now present in Inferior leads  Inverted T waves have replaced nonspecific T wave abnormality in Anterior leads  See ED provider note for full interpretation and clinical correlation  Confirmed by Valerie Choi ([id number]) on 7/25/2024 10:14:09 AM     Lab Results   Component Value Date    WBC 6.1 10/07/2024    HGB 13.3 10/07/2024    HCT 40.3 10/07/2024     10/07/2024     (L) 10/07/2024     Lab Results   Component Value Date    GLUCOSE 125 (H) 10/04/2024    CALCIUM 9.1 " 10/04/2024     10/04/2024    K 4.4 10/04/2024    CO2 30 10/04/2024     10/04/2024    BUN 21 10/04/2024    CREATININE 0.78 10/04/2024       Psychotherapy   Time: 5 minutes  Type: supportive  Target: mood, anxiety  Techniques: problem solving, validation  Goal: improved sx management  Follow up: next appt  Response: therese Ugalde MD

## 2024-10-10 ENCOUNTER — APPOINTMENT (OUTPATIENT)
Dept: HEMATOLOGY/ONCOLOGY | Facility: CLINIC | Age: 69
End: 2024-10-10
Payer: MEDICARE

## 2024-10-10 ENCOUNTER — APPOINTMENT (OUTPATIENT)
Dept: GYNECOLOGIC ONCOLOGY | Facility: CLINIC | Age: 69
End: 2024-10-10
Payer: MEDICARE

## 2024-10-25 ENCOUNTER — TELEPHONE (OUTPATIENT)
Dept: GYNECOLOGIC ONCOLOGY | Facility: HOSPITAL | Age: 69
End: 2024-10-25
Payer: MEDICARE

## 2024-10-25 ENCOUNTER — LAB (OUTPATIENT)
Dept: LAB | Facility: CLINIC | Age: 69
End: 2024-10-25
Payer: MEDICARE

## 2024-10-25 DIAGNOSIS — C56.9 MALIGNANT NEOPLASM OF OVARY, UNSPECIFIED LATERALITY (MULTI): ICD-10-CM

## 2024-10-25 LAB
ALBUMIN SERPL BCP-MCNC: 4 G/DL (ref 3.4–5)
ALP SERPL-CCNC: 106 U/L (ref 33–136)
ALT SERPL W P-5'-P-CCNC: 26 U/L (ref 7–45)
ANION GAP SERPL CALC-SCNC: 13 MMOL/L (ref 10–20)
AST SERPL W P-5'-P-CCNC: 16 U/L (ref 9–39)
BASOPHILS # BLD AUTO: 0.01 X10*3/UL (ref 0–0.1)
BASOPHILS NFR BLD AUTO: 0.3 %
BILIRUB SERPL-MCNC: 0.3 MG/DL (ref 0–1.2)
BUN SERPL-MCNC: 17 MG/DL (ref 6–23)
CALCIUM SERPL-MCNC: 9 MG/DL (ref 8.6–10.6)
CANCER AG125 SERPL-ACNC: 39 U/ML (ref 0–30.2)
CHLORIDE SERPL-SCNC: 106 MMOL/L (ref 98–107)
CO2 SERPL-SCNC: 30 MMOL/L (ref 21–32)
CREAT SERPL-MCNC: 0.8 MG/DL (ref 0.5–1.05)
DACRYOCYTES BLD QL SMEAR: NORMAL
EGFRCR SERPLBLD CKD-EPI 2021: 80 ML/MIN/1.73M*2
EOSINOPHIL # BLD AUTO: 0.01 X10*3/UL (ref 0–0.7)
EOSINOPHIL NFR BLD AUTO: 0.3 %
ERYTHROCYTE [DISTWIDTH] IN BLOOD BY AUTOMATED COUNT: 13.6 % (ref 11.5–14.5)
GLUCOSE SERPL-MCNC: 116 MG/DL (ref 74–99)
HCT VFR BLD AUTO: 34.9 % (ref 36–46)
HGB BLD-MCNC: 11.4 G/DL (ref 12–16)
IMM GRANULOCYTES # BLD AUTO: 0 X10*3/UL (ref 0–0.7)
IMM GRANULOCYTES NFR BLD AUTO: 0 % (ref 0–0.9)
LYMPHOCYTES # BLD AUTO: 1.65 X10*3/UL (ref 1.2–4.8)
LYMPHOCYTES NFR BLD AUTO: 55.6 %
MAGNESIUM SERPL-MCNC: 1.82 MG/DL (ref 1.6–2.4)
MCH RBC QN AUTO: 32.5 PG (ref 26–34)
MCHC RBC AUTO-ENTMCNC: 32.7 G/DL (ref 32–36)
MCV RBC AUTO: 99 FL (ref 80–100)
MONOCYTES # BLD AUTO: 0.34 X10*3/UL (ref 0.1–1)
MONOCYTES NFR BLD AUTO: 11.4 %
NEUTROPHILS # BLD AUTO: 0.96 X10*3/UL (ref 1.2–7.7)
NEUTROPHILS NFR BLD AUTO: 32.4 %
NRBC BLD-RTO: ABNORMAL /100{WBCS}
PLATELET # BLD AUTO: 93 X10*3/UL (ref 150–450)
POTASSIUM SERPL-SCNC: 4.7 MMOL/L (ref 3.5–5.3)
PROT SERPL-MCNC: 6 G/DL (ref 6.4–8.2)
RBC # BLD AUTO: 3.51 X10*6/UL (ref 4–5.2)
RBC MORPH BLD: NORMAL
SODIUM SERPL-SCNC: 144 MMOL/L (ref 136–145)
WBC # BLD AUTO: 3 X10*3/UL (ref 4.4–11.3)

## 2024-10-25 PROCEDURE — 83735 ASSAY OF MAGNESIUM: CPT

## 2024-10-25 PROCEDURE — 80053 COMPREHEN METABOLIC PANEL: CPT

## 2024-10-25 PROCEDURE — 36415 COLL VENOUS BLD VENIPUNCTURE: CPT

## 2024-10-25 PROCEDURE — 86304 IMMUNOASSAY TUMOR CA 125: CPT

## 2024-10-25 PROCEDURE — 85025 COMPLETE CBC W/AUTO DIFF WBC: CPT

## 2024-10-25 NOTE — TELEPHONE ENCOUNTER
Call to patient to make sure she is getting blood work done today prior to cycle 3 chemotherapy on Monday.  She is on her way.  She is aware that Dr. Kenny will be ordering a CT scan for after this cycle.

## 2024-10-28 ENCOUNTER — OFFICE VISIT (OUTPATIENT)
Dept: GYNECOLOGIC ONCOLOGY | Facility: CLINIC | Age: 69
End: 2024-10-28
Payer: MEDICARE

## 2024-10-28 ENCOUNTER — LAB (OUTPATIENT)
Dept: LAB | Facility: CLINIC | Age: 69
End: 2024-10-28
Payer: MEDICARE

## 2024-10-28 ENCOUNTER — INFUSION (OUTPATIENT)
Dept: HEMATOLOGY/ONCOLOGY | Facility: CLINIC | Age: 69
End: 2024-10-28
Payer: MEDICARE

## 2024-10-28 VITALS
SYSTOLIC BLOOD PRESSURE: 140 MMHG | BODY MASS INDEX: 24.8 KG/M2 | HEART RATE: 132 BPM | OXYGEN SATURATION: 96 % | RESPIRATION RATE: 16 BRPM | DIASTOLIC BLOOD PRESSURE: 68 MMHG | WEIGHT: 139.99 LBS | TEMPERATURE: 97 F

## 2024-10-28 DIAGNOSIS — Z51.11 CHEMOTHERAPY MANAGEMENT, ENCOUNTER FOR: ICD-10-CM

## 2024-10-28 DIAGNOSIS — C56.9 MALIGNANT NEOPLASM OF OVARY, UNSPECIFIED LATERALITY (MULTI): Primary | ICD-10-CM

## 2024-10-28 DIAGNOSIS — C56.9 MALIGNANT NEOPLASM OF OVARY, UNSPECIFIED LATERALITY (MULTI): ICD-10-CM

## 2024-10-28 DIAGNOSIS — E78.5 HYPERLIPIDEMIA: Primary | ICD-10-CM

## 2024-10-28 LAB
BASOPHILS # BLD AUTO: 0.01 X10*3/UL (ref 0–0.1)
BASOPHILS NFR BLD AUTO: 0.2 %
EOSINOPHIL # BLD AUTO: 0 X10*3/UL (ref 0–0.7)
EOSINOPHIL NFR BLD AUTO: 0 %
ERYTHROCYTE [DISTWIDTH] IN BLOOD BY AUTOMATED COUNT: 13.7 % (ref 11.5–14.5)
HCT VFR BLD AUTO: 38.4 % (ref 36–46)
HGB BLD-MCNC: 12.8 G/DL (ref 12–16)
HOLD SPECIMEN: NORMAL
IMM GRANULOCYTES # BLD AUTO: 0.01 X10*3/UL (ref 0–0.7)
IMM GRANULOCYTES NFR BLD AUTO: 0.2 % (ref 0–0.9)
LYMPHOCYTES # BLD AUTO: 0.83 X10*3/UL (ref 1.2–4.8)
LYMPHOCYTES NFR BLD AUTO: 16.6 %
MCH RBC QN AUTO: 32.8 PG (ref 26–34)
MCHC RBC AUTO-ENTMCNC: 33.3 G/DL (ref 32–36)
MCV RBC AUTO: 99 FL (ref 80–100)
MONOCYTES # BLD AUTO: 0.12 X10*3/UL (ref 0.1–1)
MONOCYTES NFR BLD AUTO: 2.4 %
NEUTROPHILS # BLD AUTO: 4.03 X10*3/UL (ref 1.2–7.7)
NEUTROPHILS NFR BLD AUTO: 80.6 %
PLATELET # BLD AUTO: 117 X10*3/UL (ref 150–450)
RBC # BLD AUTO: 3.9 X10*6/UL (ref 4–5.2)
WBC # BLD AUTO: 5 X10*3/UL (ref 4.4–11.3)

## 2024-10-28 PROCEDURE — 1036F TOBACCO NON-USER: CPT | Performed by: OBSTETRICS & GYNECOLOGY

## 2024-10-28 PROCEDURE — 96413 CHEMO IV INFUSION 1 HR: CPT

## 2024-10-28 PROCEDURE — 85025 COMPLETE CBC W/AUTO DIFF WBC: CPT

## 2024-10-28 PROCEDURE — 1126F AMNT PAIN NOTED NONE PRSNT: CPT | Performed by: OBSTETRICS & GYNECOLOGY

## 2024-10-28 PROCEDURE — 96417 CHEMO IV INFUS EACH ADDL SEQ: CPT

## 2024-10-28 PROCEDURE — 99215 OFFICE O/P EST HI 40 MIN: CPT | Mod: 25 | Performed by: OBSTETRICS & GYNECOLOGY

## 2024-10-28 PROCEDURE — 3078F DIAST BP <80 MM HG: CPT | Performed by: OBSTETRICS & GYNECOLOGY

## 2024-10-28 PROCEDURE — 36415 COLL VENOUS BLD VENIPUNCTURE: CPT

## 2024-10-28 PROCEDURE — 99215 OFFICE O/P EST HI 40 MIN: CPT | Performed by: OBSTETRICS & GYNECOLOGY

## 2024-10-28 PROCEDURE — 96415 CHEMO IV INFUSION ADDL HR: CPT

## 2024-10-28 PROCEDURE — 3077F SYST BP >= 140 MM HG: CPT | Performed by: OBSTETRICS & GYNECOLOGY

## 2024-10-28 PROCEDURE — 2500000004 HC RX 250 GENERAL PHARMACY W/ HCPCS (ALT 636 FOR OP/ED): Performed by: OBSTETRICS & GYNECOLOGY

## 2024-10-28 PROCEDURE — 1157F ADVNC CARE PLAN IN RCRD: CPT | Performed by: OBSTETRICS & GYNECOLOGY

## 2024-10-28 PROCEDURE — 96375 TX/PRO/DX INJ NEW DRUG ADDON: CPT | Mod: INF

## 2024-10-28 PROCEDURE — 2500000001 HC RX 250 WO HCPCS SELF ADMINISTERED DRUGS (ALT 637 FOR MEDICARE OP): Performed by: OBSTETRICS & GYNECOLOGY

## 2024-10-28 PROCEDURE — 1159F MED LIST DOCD IN RCRD: CPT | Performed by: OBSTETRICS & GYNECOLOGY

## 2024-10-28 RX ORDER — ALBUTEROL SULFATE 0.83 MG/ML
3 SOLUTION RESPIRATORY (INHALATION) AS NEEDED
Status: CANCELLED | OUTPATIENT
Start: 2024-10-28

## 2024-10-28 RX ORDER — PALONOSETRON 0.05 MG/ML
0.25 INJECTION, SOLUTION INTRAVENOUS ONCE
Status: COMPLETED | OUTPATIENT
Start: 2024-10-28 | End: 2024-10-28

## 2024-10-28 RX ORDER — ALBUTEROL SULFATE 0.83 MG/ML
3 SOLUTION RESPIRATORY (INHALATION) AS NEEDED
Status: DISCONTINUED | OUTPATIENT
Start: 2024-10-28 | End: 2024-10-28 | Stop reason: HOSPADM

## 2024-10-28 RX ORDER — DIPHENHYDRAMINE HYDROCHLORIDE 50 MG/ML
50 INJECTION INTRAMUSCULAR; INTRAVENOUS AS NEEDED
Status: CANCELLED | OUTPATIENT
Start: 2024-10-28

## 2024-10-28 RX ORDER — PROCHLORPERAZINE EDISYLATE 5 MG/ML
10 INJECTION INTRAMUSCULAR; INTRAVENOUS EVERY 6 HOURS PRN
Status: CANCELLED | OUTPATIENT
Start: 2024-10-28

## 2024-10-28 RX ORDER — DIPHENHYDRAMINE HYDROCHLORIDE 50 MG/ML
50 INJECTION INTRAMUSCULAR; INTRAVENOUS AS NEEDED
Status: DISCONTINUED | OUTPATIENT
Start: 2024-10-28 | End: 2024-10-28 | Stop reason: HOSPADM

## 2024-10-28 RX ORDER — EPINEPHRINE 0.3 MG/.3ML
0.3 INJECTION SUBCUTANEOUS EVERY 5 MIN PRN
Status: CANCELLED | OUTPATIENT
Start: 2024-10-28

## 2024-10-28 RX ORDER — EPINEPHRINE 0.3 MG/.3ML
0.3 INJECTION SUBCUTANEOUS EVERY 5 MIN PRN
Status: DISCONTINUED | OUTPATIENT
Start: 2024-10-28 | End: 2024-10-28 | Stop reason: HOSPADM

## 2024-10-28 RX ORDER — PROCHLORPERAZINE MALEATE 10 MG
10 TABLET ORAL EVERY 6 HOURS PRN
Status: CANCELLED | OUTPATIENT
Start: 2024-10-28

## 2024-10-28 RX ORDER — PROCHLORPERAZINE EDISYLATE 5 MG/ML
10 INJECTION INTRAMUSCULAR; INTRAVENOUS EVERY 6 HOURS PRN
Status: DISCONTINUED | OUTPATIENT
Start: 2024-10-28 | End: 2024-10-28 | Stop reason: HOSPADM

## 2024-10-28 RX ORDER — FAMOTIDINE 10 MG/ML
20 INJECTION INTRAVENOUS ONCE
Status: COMPLETED | OUTPATIENT
Start: 2024-10-28 | End: 2024-10-28

## 2024-10-28 RX ORDER — DIPHENHYDRAMINE HCL 25 MG
50 CAPSULE ORAL ONCE
Status: CANCELLED | OUTPATIENT
Start: 2024-10-28

## 2024-10-28 RX ORDER — FAMOTIDINE 10 MG/ML
20 INJECTION INTRAVENOUS ONCE
Status: CANCELLED | OUTPATIENT
Start: 2024-10-28

## 2024-10-28 RX ORDER — PROCHLORPERAZINE MALEATE 10 MG
10 TABLET ORAL EVERY 6 HOURS PRN
Status: DISCONTINUED | OUTPATIENT
Start: 2024-10-28 | End: 2024-10-28 | Stop reason: HOSPADM

## 2024-10-28 RX ORDER — FAMOTIDINE 10 MG/ML
20 INJECTION INTRAVENOUS ONCE AS NEEDED
Status: DISCONTINUED | OUTPATIENT
Start: 2024-10-28 | End: 2024-10-28 | Stop reason: HOSPADM

## 2024-10-28 RX ORDER — FAMOTIDINE 10 MG/ML
20 INJECTION INTRAVENOUS ONCE AS NEEDED
Status: CANCELLED | OUTPATIENT
Start: 2024-10-28

## 2024-10-28 RX ORDER — DIPHENHYDRAMINE HCL 25 MG
50 CAPSULE ORAL ONCE
Status: COMPLETED | OUTPATIENT
Start: 2024-10-28 | End: 2024-10-28

## 2024-10-28 RX ORDER — PALONOSETRON 0.05 MG/ML
0.25 INJECTION, SOLUTION INTRAVENOUS ONCE
Status: CANCELLED | OUTPATIENT
Start: 2024-10-28

## 2024-10-28 ASSESSMENT — PAIN SCALES - GENERAL: PAINLEVEL_OUTOF10: 0-NO PAIN

## 2024-10-29 ENCOUNTER — TELEPHONE (OUTPATIENT)
Dept: ADMISSION | Facility: HOSPITAL | Age: 69
End: 2024-10-29
Payer: MEDICARE

## 2024-10-30 ENCOUNTER — TELEPHONE (OUTPATIENT)
Dept: GYNECOLOGIC ONCOLOGY | Facility: HOSPITAL | Age: 69
End: 2024-10-30
Payer: MEDICARE

## 2024-10-31 ENCOUNTER — APPOINTMENT (OUTPATIENT)
Dept: GYNECOLOGIC ONCOLOGY | Facility: CLINIC | Age: 69
End: 2024-10-31
Payer: MEDICARE

## 2024-10-31 ENCOUNTER — APPOINTMENT (OUTPATIENT)
Dept: HEMATOLOGY/ONCOLOGY | Facility: CLINIC | Age: 69
End: 2024-10-31
Payer: MEDICARE

## 2024-11-06 ENCOUNTER — TELEPHONE (OUTPATIENT)
Dept: GYNECOLOGIC ONCOLOGY | Facility: HOSPITAL | Age: 69
End: 2024-11-06
Payer: MEDICARE

## 2024-11-06 DIAGNOSIS — C56.9 MALIGNANT NEOPLASM OF OVARY, UNSPECIFIED LATERALITY (MULTI): ICD-10-CM

## 2024-11-06 RX ORDER — LIDOCAINE AND PRILOCAINE 25; 25 MG/G; MG/G
CREAM TOPICAL ONCE
Qty: 30 G | Refills: 6 | Status: SHIPPED | OUTPATIENT
Start: 2024-11-06 | End: 2024-11-06

## 2024-11-06 NOTE — TELEPHONE ENCOUNTER
Call from patient to discuss mediport placement details.  She is getting port placed on 11-12-24 at [facility]  She is due for a CT and blood work on 11-14-24.  Scheduling appointment will be entered for an RNEX appointment for infusion nurse to access port and draw labs prior to CT scan.  She would also like mediport appointments to be scheduled prior to C5 and C6 treatments in December.  Discussed Emla cream to be used prior to port access to numb the skin.  Emla cream Rx request sent to Dr. Kenny to send into patient's [facility] pharmacy.  Patient verbalized understanding and agreement regarding discussed information via verbal feedback.

## 2024-11-11 ENCOUNTER — TELEPHONE (OUTPATIENT)
Dept: ADMISSION | Facility: HOSPITAL | Age: 69
End: 2024-11-11
Payer: MEDICARE

## 2024-11-11 ENCOUNTER — TELEPHONE (OUTPATIENT)
Dept: CARDIOLOGY | Facility: HOSPITAL | Age: 69
End: 2024-11-11

## 2024-11-11 DIAGNOSIS — G89.3 CANCER RELATED PAIN: ICD-10-CM

## 2024-11-11 RX ORDER — PREGABALIN 75 MG/1
75 CAPSULE ORAL 2 TIMES DAILY
Qty: 60 CAPSULE | Refills: 0 | Status: SHIPPED | OUTPATIENT
Start: 2024-11-11 | End: 2024-12-11

## 2024-11-11 NOTE — TELEPHONE ENCOUNTER
[facility] called requesting a refill of pregabalin 75mg BID, #60.  Last prescribed 10/9/24.

## 2024-11-11 NOTE — PROGRESS NOTES
SUPPORTIVE AND PALLIATIVE ONCOLOGY OUTPATIENT FOLLOW-UP      SERVICE DATE: 11/15/2024    Subjective   HISTORY OF PRESENT ILLNESS: Saba Park is a 69 y.o. female who presents with history of CNS lymphoma and ovarian cancer. Currently in active surveillance CNS lymphoma through Dr. Harris at [facility]. Patient with recurrence of Ovarian Ca noted in 2 retroperitoneal LN on CT 7/2024. Patient follows with Supportive Oncology/Palliative Care for chronic pain r/t disease history/treatment and further symptom management.        Pain Assessment:  Pain Score:  0  Location:    Description:      Symptom Assessment:  Pain:a little - very little pain. Has occasional right hip aching but overall manageable. Neuropathy continues to be greatly managed by Methadone and Pregabalin  Numbness or Tingling in hands/feet/other: a little  Sore Muscles/Spasms: none  Headache: none  Dizziness:somewhat - patient has been experiencing dizziness regularly, has been unsteady on her feet and had a few episodes close to falling. BP is low today but she feels she is staying hydrated. Open to staying for IV fluids today in infusion to see if this would help. Discusses that her Primidone dose was decreased for chemotherapy, and discussed reaching out to neurology to discuss if this could be contributing or to further work-up dizzy episodes  Constipation: none  Diarrhea: none  Nausea: a little - notes a pit in stomach after treatment that last for 5-8 days. Denies nausea/queezy feeling, but will occasionally take Ondansetron which helps some  Vomiting: none  Lack of Appetite: a little - appetite decreases for a few days after chemo but has been better these past few rounds. Weight has been more stable lately  Weight Loss: none  Taste changes: none  Dry Mouth: none  Pain in Mouth/Swallowing: none  Lack of Energy: a little  Difficulty Sleeping: none  Worrying: none  Anxiety: none  Depression: none  Shortness of breath: none  Other: none      Information  obtained from: chart review and interview of patient  ______________________________________________________________________        Objective     Infusion on 11/14/2024   Component Date Value Ref Range Status    Magnesium 11/14/2024 1.40 (L)  1.60 - 2.40 mg/dL Final    Cancer  11/14/2024 18.6  0.0 - 30.2 U/mL Final    Glucose 11/14/2024 94  74 - 99 mg/dL Final    Sodium 11/14/2024 138  136 - 145 mmol/L Final    Potassium 11/14/2024 4.4  3.5 - 5.3 mmol/L Final    Chloride 11/14/2024 102  98 - 107 mmol/L Final    Bicarbonate 11/14/2024 27  21 - 32 mmol/L Final    Anion Gap 11/14/2024 13  10 - 20 mmol/L Final    Urea Nitrogen 11/14/2024 21  6 - 23 mg/dL Final    Creatinine 11/14/2024 0.83  0.50 - 1.05 mg/dL Final    eGFR 11/14/2024 76  >60 mL/min/1.73m*2 Final    Calcium 11/14/2024 8.4 (L)  8.6 - 10.6 mg/dL Final    Albumin 11/14/2024 3.7  3.4 - 5.0 g/dL Final    Alkaline Phosphatase 11/14/2024 91  33 - 136 U/L Final    Total Protein 11/14/2024 5.7 (L)  6.4 - 8.2 g/dL Final    AST 11/14/2024 11  9 - 39 U/L Final    Bilirubin, Total 11/14/2024 0.3  0.0 - 1.2 mg/dL Final    ALT 11/14/2024 18  7 - 45 U/L Final    WBC 11/14/2024 3.4 (L)  4.4 - 11.3 x10*3/uL Final    nRBC 11/14/2024    Final    RBC 11/14/2024 2.93 (L)  4.00 - 5.20 x10*6/uL Final    Hemoglobin 11/14/2024 9.7 (L)  12.0 - 16.0 g/dL Final    Hematocrit 11/14/2024 29.3 (L)  36.0 - 46.0 % Final    MCV 11/14/2024 100  80 - 100 fL Final    MCH 11/14/2024 33.1  26.0 - 34.0 pg Final    MCHC 11/14/2024 33.1  32.0 - 36.0 g/dL Final    RDW 11/14/2024 14.7 (H)  11.5 - 14.5 % Final    Platelets 11/14/2024 60 (L)  150 - 450 x10*3/uL Final    Neutrophils % 11/14/2024 35.0  40.0 - 80.0 % Final    Immature Granulocytes %, Automated 11/14/2024 0.3  0.0 - 0.9 % Final    Lymphocytes % 11/14/2024 50.1  13.0 - 44.0 % Final    Monocytes % 11/14/2024 14.3  2.0 - 10.0 % Final    Eosinophils % 11/14/2024 0.0  0.0 - 6.0 % Final    Basophils % 11/14/2024 0.3  0.0 - 2.0 % Final     "Neutrophils Absolute 11/14/2024 1.20  1.20 - 7.70 x10*3/uL Final    Immature Granulocytes Absolute, Au* 11/14/2024 0.01  0.00 - 0.70 x10*3/uL Final    Lymphocytes Absolute 11/14/2024 1.72  1.20 - 4.80 x10*3/uL Final    Monocytes Absolute 11/14/2024 0.49  0.10 - 1.00 x10*3/uL Final    Eosinophils Absolute 11/14/2024 0.00  0.00 - 0.70 x10*3/uL Final    Basophils Absolute 11/14/2024 0.01  0.00 - 0.10 x10*3/uL Final       PHYSICAL EXAMINATION   Vital Signs:   Vital signs reviewed      11/12/2024     1:32 PM 11/12/2024     3:02 PM 11/12/2024     3:17 PM 11/12/2024     3:32 PM 11/12/2024     3:47 PM 11/14/2024    12:41 PM 11/15/2024    11:51 AM   Vitals   Systolic 125 136 134 132 130 131 97   Diastolic 69 67 68 65 66 63 67   Heart Rate 60 57 59 58 57 73 67   Temp 36 °C (96.8 °F) 36.4 °C (97.5 °F)    35.4 °C (95.7 °F) 36 °C (96.8 °F)   Resp 18 14 13 21 14 18 16   Height (in) 1.575 m (5' 2\")         Weight (lb) 141.09      139.33   BMI 25.81 kg/m2      25.48 kg/m2   BSA (m2) 1.67 m2      1.66 m2   Visit Report       Report     Physical Exam  Vitals reviewed.   Constitutional:       Appearance: Normal appearance.   HENT:      Head: Normocephalic.   Cardiovascular:      Rate and Rhythm: Normal rate and regular rhythm.   Pulmonary:      Effort: Pulmonary effort is normal.   Abdominal:      General: Abdomen is flat.      Palpations: Abdomen is soft.   Musculoskeletal:         General: Normal range of motion.   Skin:     General: Skin is warm and dry.   Neurological:      General: No focal deficit present.      Mental Status: She is alert and oriented to person, place, and time. Mental status is at baseline.   Psychiatric:         Mood and Affect: Mood normal.         Behavior: Behavior normal.         Thought Content: Thought content normal.         Judgment: Judgment normal.       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 Completely anbx therapy  Type: neuropathic  Pain control: sub-optimally controlled  Home regimen:   - S/P [facility] 5/19/22  - Continue Ibuprofen 600mg q6 hours PRN  - Continue Methadone 10mg BID  - EKG (4/5/23) QTc 431. (7/25/2024) . Repeat with increase in Methadone dose  - Pursed Scrambler Therapy 7/8/24 to 7/18/24 (total of 8 sessions) - noted worsening radiating pain from hip, but slight improvement in numbness/tingling of her foot. Did not complete further sessions due to extreme hip/leg pain  - Continue Pregabalin 75mg BID  - Continue Acetaminophen 500mg [id number] PRN  - Continue Oxycodone 5mg [id number] PRN for breakthrough pain - rarely uses  - Continue Duloxetine 60mg/daily  - Completed program through chiropractor w/o change in pain  - Referral to Symptom Clinic - Dr Silverman - pursued acupuncture with notable change in neuropathy/pain, has not continued  - Continue following with Chronic Pain (Dr. Alarcon) - no follow-up scheduled at this time   - Continue following with podiatry   Intolerances/previously tried:   - Alpha Liproic Acid - not effective  - Gabapentin - caused excessive lethargy     Opioid Use  Medication Management:   - OARRS report reviewed with no aberrant behavior; consistent with  prescriptions/records and patient history  - MED 90.  Overdose Risk Score 100.   This has been discussed with patient.   - We will continue to closely monitor the patient for signs of prescription misuse including UDS, OARRS review and subjective reports at each visit.  - No concurrent benzodiazepine use   - I am a provider who either is or has consulted and collaborated with a provider certified in Hospice and Palliative Medicine and have conducted a face-face visit and  examination for this patient.  - Routine Urine Drug Screen: 8/23/2024 appropriately positive for opioids and negative for illicit substances.  - Controlled Substance Agreement: 8/23/2024  - Specifically discussed that controlled substance prescriptions will only be provided by our group as outlined in the completed agreement  - Prescribed naloxone: patient declined  - Red Flags: NA     Diarrhea - improved  - BRAT diet  - Discussed starting Loperamide 2mg PRN - MAX 8caps/24 hours     Nausea/Loss of Appetite  - Continue Omeprazole 20mg daily  - Continue Ondansetron 8mg [id number] PRN  - Continue Metoclopramide 10mg [id number] PRN  - Encouraged good hydration   - Encouraged small more frequent meals  - Encouraged increase in caloric intake and protein supplements  - Referral to dietician (10/3/2024)  - Discussed use of lemon heads to assist with taste  - Discussed medications to help stimulate appetite - patient declined today     Altered Mood  Chronic anxiety and depression related to health concerns   Home regimen:   - Continue Duloxetine 60mg once daily at bed  - Continue Buspirone 5mg TID  - Continue Buproprion SR 150mg BID  - Following with counselor weekly to every 2 weeks  - Established with Dr. Ugalde - Onco Psychiatry    Hypotension/Dizziness  - Today (11/15/2024) received IVF 1L Sodium Cloride 0.9%  - Received Mag 2G (level 1.4)  - Discussed reaching out to neurologist    Next Follow-Up Visit:  Return to clinic in 5-6 weeks by telephone    Signature and billing  Medical complexity was moderate level due to due to complexity of problems, extensive data review, and high risk of management/treatment.  Time was spent on the following: Prep Time, Time Directly with Patient/Family/Caregiver, Documentation Time. Total time spent: 45      Data  Diagnostic tests and information reviewed for today's visit:  Most recent labs and imaging results     Some elements copied from Palliative Care note on 10/3/2024, the  elements have been updated and all reflect current decision making from today, 11/15/2024.    Plan of Care discussed with: Patient    SIGNATURE: DANILO Orourke    Contact information:  Supportive and Palliative Oncology  Monday-Friday 8 AM-5 PM  Phone:  [phone], press option #5, then option #1.   Or Epic Secure Chat

## 2024-11-11 NOTE — TELEPHONE ENCOUNTER
Patient last seen by DEMETRIUS Hirsch on 10/3 with plan to continue pregabalin 75mg BID. Follow up visit is scheduled for 11/15. OARRS reviewed and no aberrancy noted. Patient last filled pregabalin 75mg #60/30 days on 10/10. Patient last filled Prescription pended to provider to approve.

## 2024-11-12 ENCOUNTER — HOSPITAL ENCOUNTER (OUTPATIENT)
Dept: CARDIOLOGY | Facility: HOSPITAL | Age: 69
Discharge: HOME | End: 2024-11-12
Payer: MEDICARE

## 2024-11-12 VITALS
TEMPERATURE: 97.5 F | OXYGEN SATURATION: 100 % | RESPIRATION RATE: 14 BRPM | WEIGHT: 141.09 LBS | HEIGHT: 62 IN | BODY MASS INDEX: 25.96 KG/M2 | SYSTOLIC BLOOD PRESSURE: 130 MMHG | DIASTOLIC BLOOD PRESSURE: 66 MMHG | HEART RATE: 57 BPM

## 2024-11-12 DIAGNOSIS — C56.9 MALIGNANT NEOPLASM OF OVARY, UNSPECIFIED LATERALITY (MULTI): ICD-10-CM

## 2024-11-12 PROCEDURE — C1788 PORT, INDWELLING, IMP: HCPCS

## 2024-11-12 PROCEDURE — 99152 MOD SED SAME PHYS/QHP 5/>YRS: CPT

## 2024-11-12 PROCEDURE — 7100000010 HC PHASE TWO TIME - EACH INCREMENTAL 1 MINUTE

## 2024-11-12 PROCEDURE — 76937 US GUIDE VASCULAR ACCESS: CPT | Performed by: RADIOLOGY

## 2024-11-12 PROCEDURE — 2780000003 HC OR 278 NO HCPCS

## 2024-11-12 PROCEDURE — 7100000009 HC PHASE TWO TIME - INITIAL BASE CHARGE

## 2024-11-12 PROCEDURE — 2500000004 HC RX 250 GENERAL PHARMACY W/ HCPCS (ALT 636 FOR OP/ED): Performed by: RADIOLOGY

## 2024-11-12 PROCEDURE — 36561 INSERT TUNNELED CV CATH: CPT | Mod: RT | Performed by: RADIOLOGY

## 2024-11-12 PROCEDURE — 99153 MOD SED SAME PHYS/QHP EA: CPT

## 2024-11-12 PROCEDURE — 77001 FLUOROGUIDE FOR VEIN DEVICE: CPT | Performed by: RADIOLOGY

## 2024-11-12 RX ORDER — FENTANYL CITRATE 50 UG/ML
INJECTION, SOLUTION INTRAMUSCULAR; INTRAVENOUS AS NEEDED
Status: DISCONTINUED | OUTPATIENT
Start: 2024-11-12 | End: 2024-11-12 | Stop reason: HOSPADM

## 2024-11-12 RX ORDER — OXYCODONE HYDROCHLORIDE 5 MG/1
5 CAPSULE ORAL EVERY 4 HOURS PRN
COMMUNITY

## 2024-11-12 RX ORDER — LIDOCAINE HYDROCHLORIDE AND EPINEPHRINE 20; 10 MG/ML; UG/ML
INJECTION, SOLUTION INFILTRATION; PERINEURAL AS NEEDED
Status: DISCONTINUED | OUTPATIENT
Start: 2024-11-12 | End: 2024-11-12 | Stop reason: HOSPADM

## 2024-11-12 RX ORDER — MIDAZOLAM HYDROCHLORIDE 1 MG/ML
INJECTION, SOLUTION INTRAMUSCULAR; INTRAVENOUS AS NEEDED
Status: DISCONTINUED | OUTPATIENT
Start: 2024-11-12 | End: 2024-11-12 | Stop reason: HOSPADM

## 2024-11-12 ASSESSMENT — ENCOUNTER SYMPTOMS
EYES NEGATIVE: 1
ENDOCRINE NEGATIVE: 1
GASTROINTESTINAL NEGATIVE: 1
ALLERGIC/IMMUNOLOGIC NEGATIVE: 1
RESPIRATORY NEGATIVE: 1
HEMATOLOGIC/LYMPHATIC NEGATIVE: 1
PSYCHIATRIC NEGATIVE: 1
CONSTITUTIONAL NEGATIVE: 1
MUSCULOSKELETAL NEGATIVE: 1
CARDIOVASCULAR NEGATIVE: 1
NEUROLOGICAL NEGATIVE: 1

## 2024-11-12 ASSESSMENT — PAIN SCALES - GENERAL: PAINLEVEL_OUTOF10: 0 - NO PAIN

## 2024-11-12 ASSESSMENT — PAIN - FUNCTIONAL ASSESSMENT: PAIN_FUNCTIONAL_ASSESSMENT: 0-10

## 2024-11-12 NOTE — H&P
History Of Present Illness  Saba Park is a 69 y.o. female presenting with hx of ovarian CA, here for mediport placement. PMH includes open cholecystectomy 7/25/24, chemotherapy-induced peripheral neuropathy, hx of CNS lymphoma.     Past Medical History:  Past Medical History:   Diagnosis Date    Anxiety disorder, unspecified     Anxiety    Family history of malignant neoplasm of breast 02/28/2019    Family history of breast cancer    Gynecologic cancer (Multi) 09/18/2023    Other conditions influencing health status     Nephrolithiasis    Ovarian cancer (Multi)     Personal history of malignant neoplasm of other parts of uterus 08/14/2020    History of malignant neoplasm of other parts of uterus    Personal history of non-Hodgkin lymphomas 08/14/2020    History of non-Hodgkin's lymphoma    Personal history of other diseases of the circulatory system     History of hypertension    Personal history of other diseases of the digestive system     History of irritable bowel syndrome    Personal history of other diseases of the respiratory system 01/10/2014    History of acute bronchitis    Personal history of other endocrine, nutritional and metabolic disease     History of hypercholesterolemia    Personal history of other malignant neoplasm of skin     History of basal cell carcinoma    Personal history of other specified conditions 10/28/2014    History of fibrocystic disease of breast    Portal vein thrombosis 09/18/2023    PORTAL VEIN THROMBOSIS I81    Presence of spectacles and contact lenses 12/09/2014    Wears contact lenses        Past Surgical History:  Past Surgical History:   Procedure Laterality Date    LITHOTRIPSY  08/19/2013    Renal Lithotripsy    OTHER SURGICAL HISTORY  08/19/2013    Lithotomy    OTHER SURGICAL HISTORY  11/25/2019    Ileostomy closure          Social History:   reports that she has never smoked. She has never used smokeless tobacco. She reports current alcohol use. She reports  current drug use. Drug: Marijuana.     Family History:  Family History   Problem Relation Name Age of Onset    Breast cancer Mother  60    Stroke Mother      Colon cancer Father      Breast cancer Mother's Sister  40    Breast cancer Cousin  50        Allergies:  Allergies   Allergen Reactions    Erythromycin Other        Home Medications:  Current Outpatient Medications   Medication Instructions    amoxicillin (AMOXIL) 2,000 mg, Once as needed    buPROPion SR (WELLBUTRIN SR) 150 mg, oral, 2 times daily, Do not crush, chew, or split.    busPIRone (BUSPAR) 5 mg, oral, 2 times daily    calcium carbonate (Oscal) 500 mg calcium (1,250 mg) tablet 1 tablet, 2 times daily (morning and late afternoon)    cholecalciferol (Vitamin D-3) 50 mcg (2,000 unit) capsule 1 capsule, Daily    clobetasol (Temovate) 0.05 % cream APPLY AS DIRECTED EVERY DAY    DULoxetine (Cymbalta) 60 mg DR capsule TAKE 1 CAPSULE DAILY IN THE EVENING    imiquimod (Aldara) 5 % cream APPLY AT BEDTIME MONDAY, WED, FRIDAY FOR 4 WEEKS TO UPPER FOREHEAD AND BRIDGE OF NOSE    magnesium (as gluconate) (MAGONATE) 54 mg, oral, 2 times daily    magnesium, amino acid chelate, 133 mg tablet 1 tablet, 2 times daily    methadone (DOLOPHINE) 10 mg, oral, Every 12 hours    ondansetron (ZOFRAN) 8 mg, oral, Every 8 hours PRN    oxyCODONE (OXY-IR) 5 mg, oral, Every 4 hours PRN    pregabalin (LYRICA) 75 mg, oral, 2 times daily    primidone (MYSOLINE) 175 mg, oral, Nightly, 3.5 tabs at bedtime.    propranolol (Inderal) 60 mg tablet Take one tablet in the morning and half tablet in the afternoon after a week if heart rate is above 60 after that increase to one tablet twice a day after one week       Inpatient Medications:            Review of Systems   Constitutional: Negative.    HENT: Negative.     Eyes: Negative.    Respiratory: Negative.     Cardiovascular: Negative.    Gastrointestinal: Negative.    Endocrine: Negative.    Genitourinary: Negative.    Musculoskeletal:  "Negative.    Skin: Negative.    Allergic/Immunologic: Negative.    Neurological: Negative.    Hematological: Negative.    Psychiatric/Behavioral: Negative.            Physical Exam  Constitutional:       General: She is awake. She is not in acute distress.     Appearance: She is not ill-appearing.   Cardiovascular:      Rate and Rhythm: Normal rate and regular rhythm.      Pulses: Normal pulses.           Radial pulses are 2+ on the right side and 2+ on the left side.        Dorsalis pedis pulses are 2+ on the right side and 2+ on the left side.      Heart sounds: Normal heart sounds. No murmur heard.  Pulmonary:      Effort: Pulmonary effort is normal.      Breath sounds: Normal breath sounds and air entry.   Abdominal:      General: Bowel sounds are normal.      Palpations: Abdomen is soft.      Tenderness: There is no abdominal tenderness.   Musculoskeletal:      Right lower leg: No edema.      Left lower leg: No edema.   Skin:     General: Skin is warm and dry.   Neurological:      General: No focal deficit present.      Mental Status: She is alert and oriented to person, place, and time.      GCS: GCS eye subscore is 4. GCS verbal subscore is 5. GCS motor subscore is 6.   Psychiatric:         Mood and Affect: Mood normal.         Behavior: Behavior is cooperative.        Sedation Plan    ASA 3     Mallampati class: II.           NPO since last night around 2200    Last Recorded Vitals  Blood pressure 125/69, pulse 60, temperature 36 °C (96.8 °F), temperature source Tympanic, resp. rate 18, height 1.575 m (5' 2\"), weight 64 kg (141 lb 1.5 oz), SpO2 99%.         Vitals from the Past 24 Hours  Heart Rate:  [60]   Temp:  [36 °C (96.8 °F)]   Resp:  [18]   BP: (125)/(69)   Height:  [157.5 cm (5' 2\")]   Weight:  [64 kg (141 lb 1.5 oz)]   SpO2:  [99 %]          Relevant Results    Labs    CBC:   Recent Labs     10/28/24  0928 10/25/24  1154 10/07/24  0941 10/04/24  1236 09/13/24  1147 07/29/24  0533   WBC 5.0 3.0* 6.1 " "3.4* 7.4 8.6   HGB 12.8 11.4* 13.3 12.1 12.6 10.0*   HCT 38.4 34.9* 40.3 37.0 39.5 31.5*   * 93* 117* 108* 209 150   MCV 99 99 100 99 100 101*     BMP/CMP:   Recent Labs     10/25/24  1154 10/04/24  1236 09/13/24  1147 08/13/24  1515 08/06/24  1209 07/29/24  0533 07/28/24  0605    144 142  --  142 141 139   K 4.7 4.4 4.8  --  5.3 4.0 3.4*    106 104  --  108* 107 106   BUN 17 21 22  --  24* 12 13   CREATININE 0.80 0.78 0.90  --  0.95 0.71 0.85   CO2 30 30 30  --  27 23 25   CALCIUM 9.0 9.1 9.2  --  9.8 8.3* 7.8*   PROT 6.0* 6.2* 6.1* 6.9 6.4 4.6* 4.7*   BILITOT 0.3 0.3 0.5 0.5 0.4 0.6 1.0   ALKPHOS 106 107 110 135 153* 205* 226*   ALT 26 26 18 47* 64* 453* 625*   AST 16 18 12 19 13 68* 194*   GLUCOSE 116* 125* 139*  --  90 84 125*      Lipid Panel:   Recent Labs     12/01/23  1506 05/12/22  1148 11/11/20  1304   CHOL 277* 249* 247*   HDL 60.4 57.8 52.3   CHHDL 4.6 4.3 4.7   VLDL 30 23 27   TRIG 150* 113 137   NHDL 217*  --   --      Cardiac       No lab exists for component: \"CK\", \"CKMBP\"   Hemoglobin A1C: No results for input(s): \"HGBA1C\" in the last 50829 hours.  TSH/ Free T4:   Recent Labs     09/07/23  1230 10/04/22  1409 09/21/21  1502 10/09/20  1245 01/02/19  1011 03/30/18  1045   TSH 0.92 0.99 0.94 1.10 1.85 1.71     Iron:   Recent Labs     11/27/22  0550   FERRITIN 146   TIBC 179*   IRONSAT 8*     Coag:     ABO: No results found for: \"ABO\"    Past Cardiology Tests (Last 3 Years):    EKG:  Recent Labs     07/25/24  0501 04/05/23  1243 11/20/22  0745   ATRRATE 61 59 69   VENTRATE 61 59 69   PRINT 126 146 140   QRSDUR 84 78 88   QTCFRED 460 433 444   QTCCALCB 461 431 454     Encounter Date: 07/25/24   ECG 12 lead   Result Value    Ventricular Rate 61    Atrial Rate 61    FL Interval 126    QRS Duration 84    QT Interval 458    QTC Calculation(Bazett) 461    P Axis 49    R Axis 45    T Axis 36    QRS Count 10    Q Onset 215    P Onset 152    P Offset 201    T Offset 444    QTC Fredericia 460 " "   Narrative    Normal sinus rhythm  Normal ECG  When compared with ECG of 05-APR-2023 12:43,  ST elevation now present in Inferior leads  Inverted T waves have replaced nonspecific T wave abnormality in Anterior leads  See ED provider note for full interpretation and clinical correlation  Confirmed by Valerie Choi ([id number]) on 7/25/2024 10:14:09 AM     Echo:  Echocardiogram: No results found for this or any previous visit from the past 1800 days.    Ejection Fractions:  No results found for: \"EF\"  Cath:  Coronary Angiography: No results found for this or any previous visit from the past 1800 days.    Right Heart Cath: No results found for this or any previous visit from the past 1800 days.    Stress Test:  Nuclear:No results found for this or any previous visit from the past 1800 days.    Metabolic Stress: No results found for this or any previous visit from the past 1800 days.    Cardiac Imaging:  Cardiac Scoring:   CT cardiac scoring wo IV contrast 11/04/2023    FINDINGS:  The score and distribution of calcium in the coronary arteries is as  follows:    LM             0  LAD           0  LCx            0  RCA           0    Total         0    The visualized mid/lower ascending thoracic aorta measures 3.1 cm in  diameter. The heart is normal in size. No pericardial effusion is  present.    No gross evidence of mediastinal or hilar lymphadenopathy or masses  is identified. The visualized segments of the lungs demonstrate  bibasilar and layering atelectasis.    The visualized subdiaphragmatic structures appear intact.    Impression  1. Coronary artery calcium score of  0*.    Cardiac MRI: No results found for this or any previous visit from the past 1800 days.         Assessment/Plan  Assessment/Plan   Active Problems:  There are no active Hospital Problems.      Ovarian CA  -here for mediport placement with Dr. Riley on 11/12/24        NP discussed with Dr. Riley regarding plan of care/ discharge plan      I spent 30 " minutes in the professional and overall care of this patient.      Misbah Hunt, APRN-CNP, DNP

## 2024-11-12 NOTE — DISCHARGE INSTRUCTIONS
What is a Central Venous Access Port? Patient and Family Education    What is a Central Venous Access Port?  A central venous access port is a small device made up of 2 parts:  ? The port, which is a hollow metal or plastic disk with a rubber center and  ? The tube (also called a catheter) that connects to the port. The catheter is  threaded through a vein that leads to your heart.  A doctor places the port under the skin in the chest near the collarbone, or in the arm. The port  feels like a small bump. Once your port site heals it should not hurt. A port provides easy  access to a vein. A port is useful if you need frequent intravenous (IV) treatments, medicines,  blood tests or blood products. A port can stay in for months or years if it is cared for correctly  and working as it should. A port may also be called a Mediport, Power Port or Port-a-cath.    Before your Port is Placed:  ? If you take any medicine that thins your blood or helps prevent clots, talk with  your doctor. Before your port is placed, ask your doctor if your dose of these  medicines needs to be changed to help prevent bleeding problems. If your doctor tells  you to stop taking these medicines, ask him or her when you should restart them. If  your port placement is cancelled, call your doctor and ask if you need to restart your  medicine or change your dose. These medicines include, but are not limited to:  Warfarin (Coumadin), Lovenox (enoxaparin), Eliquis (apixaban), Plavix (clopidogrel),  Pradaxa (dabigatran) and Xarelto (rivaroxaban).  ? Do not eat or drink anything 8 hours before your port placement. If you have  been told to take certain medicines, take them with a sip of water.  ? Take your daily medicines, especially any cardiac (heart), high blood pressure, seizure,  and antibiotic medicines. If you take insulin for diabetes, ask your doctor how to adjust  your insulin dose the morning of your port placement. Be sure to tell your  doctor that  you have to fast for 8 hours before the port is placed.  ? Talk with your doctor, nurse or dietitian before taking probiotics. Ask if it’s safe for  you to take them when you have a central line. Some patients should avoid taking  certain probiotics because they may increase their chance of getting an infection.    The Day your Port is Placed:  ? A doctor in Radiology will place your port. The port placement takes about 60 to 90  minutes but plan on being here for 3 to 4 hours. This allows time for your check-in and  recovery.  ? A staff member will talk with you about the procedure, ask you questions and help  answer your questions. For women: Tell your doctor or technologist if there is any  chance you are pregnant.  ? You will be asked to change into a hospital gown for the procedure. You must remove  necklaces and earrings because they can get in the way during your port placement. An  IV will be placed in your arm and you will be asked to lay on a cart. Once we are  ready, we will take you to the procedure room. A family member may stay in our  waiting room while your port is placed.    In the Procedure Room:  You will get medicine through your IV to help you relax. While the port is placed, some  people fall asleep and some are awake but feel very relaxed. We will wash the area where the  port is being placed with a germ killing solution. This solution helps lower your chances of  getting an infection. Next, the doctor injects a numbing medicine into your skin, around the  port site. Once your skin is numb, the doctor makes 2 small incisions (cuts) to place the port  under your skin. The incisions are closed with skin glue or sutures and paper Band-Aids called  steri-strips. A gauze bandage is then placed over the port site.    After the Port is Placed:  ? You will be taken to the recovery area. We will check your pulse and blood pressure  often and check your port site for bleeding and swelling.  Some bruising is normal. If  you see bleeding, apply pressure with your fingertips and call your nurse right away. If  you feel swelling or more pain at the site, call your nurse.  ? You may have some soreness where your port is placed but it should improve each  day as the site heals. The incisions used to place the port are small and should heal in  10 to 14 days.  ? Once healed, you do not need to have a dressing over the site unless the port has a  needle in it.    If You go Home After Your Port is Placed:  ? You must have someone drive you home. We cannot let you drive or travel home alone in  a cab or on a bus. Do not drive for 24 hours after your port is placed.  ? Someone should stay with you through the night.  ? Resume your routine normal diet. You may resume your normal medicines but if you  take blood-thinning medicine, ask your doctor when you should start taking it again.  ? Rest until morning.   ? Lifting your arm on the same side of the mediport should be restricted to 10-15 pounds   or less for 1 week.  ? Avoid pushing, pulling, straining, or any strenuous activities.  ? You may climb stairs.  ? You may return to work when you feel you are ready at any point after surgery.  If you are not able to contact your doctor, go to the nearest Emergency Room.    Caring for Your Incisions:  ? Remove the gauze dressing after 48 hours. You do not need to replace it. Don’t try to peel  off the surgical glue or steri-strips. Let them fall off on their own, which often happens  after 2 weeks.  ? Do not let your incisions get wet until they are fully healed, which often takes 10 to 14  days. When you shower, cover your incisions with a dressing made from plastic wrap  (like Saran wrap or Glad Press n’ Seal) or a plastic bag and tape to keep the area dry.  After you shower, remove the plastic wrap and pat the incisions dry. Don’t swim or soak  in a tub or Jacuzzi until your incisions are fully healed.  ? Do not get  your port site wet if it has a needle in it. Follow the steps above to make  sure your port site is covered with plastic wrap or a plastic bag when you shower.  ? Look at your incisions each day. Call your doctor right away if you have any of the signs  of infection that are listed on the next page.    Caring for Your Port:  -A trained nurse must use a special non-coring needle, called a Cueva needle, each time they  access your port. The needle is placed through your skin and into the rubber center of the port.  -You will likely feel slight pricking when your nurse inserts the needle. The needle stays in  place for your treatments and can be removed afterwards.  -Your port needs to be flushed every 4 to 6 weeks to help prevent blood clots  from forming in the catheter. If blood clots form and cause a blockage, your  port may need to be removed. Your nurse will flush your port with saline. If  needed, they may also flush it with a blood thinning medicine called heparin.  -Port flushes are done right before the needle is taken out. Some patients are  taught how to do these flushes at home. If you need to flush your port at home,  your nurse will show you how. If your port is not being used at least once  every 4 to 6 weeks, talk with your doctor or nurse about scheduling port  flushes.    Sedation:  If you received medication for sedation, it will be active in your body for approximately 24  hours. So you may feel a little sleepy. Therefore, for the next 24 hours:  ? DO NOT drive a car, operate machinery or power tools. It is recommended that a   responsible adult be with you for the first 24 hours.  ? DO NOT drink any alcoholic beverages or take any non-prescriptive medications that   contain alcohol.  ? DO NOT make any important decisions or sign any legal/important documents.    When to Call Your Doctor:  ? Redness, swelling or drainage near the port or over the port site.  ? Drainage from the port site.  ? Shake  or chills.  ? Temperature of 100.4°F (38°C) or higher.  ? Pain, warm or soreness at the port site.  ? Swelling of your arm, check at the port site.  ? Also call if the port has been moved  ? If you have any questions about your port.     How to Reach your Doctor:    Call [phone] with problems or questions    This info is a general resource. It is not meant to replace your health care provider’s advice.  Ask your doctor or health care team any questions. Always follow their instructions.

## 2024-11-12 NOTE — NURSING NOTE
1545: Handoff received from Angélica RIVERO at this time.    1555: Discharge instructions reviewed with patient and family, no further questions at this time.     1610: Misbah NP contacted via [email or web address], per NP, port can be accessed immediately for use.    1615: Patient dressed with family assistance.     1620: Peripheral IV removed with no complications.     1630: Patient to main lobby via transport with all belongings in stable condition. Phase 2 complete.

## 2024-11-12 NOTE — POST-PROCEDURE NOTE
Interventional Radiology Brief Postprocedure Note    Attending: Balwinder Riley MD     Assistant: none    Diagnosis: need for IV abx    Description of procedure: Successful placement of a right chest port. The port catheter tip position was confirmed with fluoroscopy and the port is ready to use.       Anesthesia:  Moderate sedation      Complications: None    Estimated Blood Loss: minimal    Medications (Filter: Administrations occurring from 1741 to 1741 on 11/12/24) As of 11/12/24 1741      None          No specimens collected      See detailed result report with images in PACS.    The patient tolerated the procedure well without incident or complication and is in stable condition.

## 2024-11-14 ENCOUNTER — INFUSION (OUTPATIENT)
Dept: HEMATOLOGY/ONCOLOGY | Facility: CLINIC | Age: 69
End: 2024-11-14
Payer: MEDICARE

## 2024-11-14 ENCOUNTER — HOSPITAL ENCOUNTER (OUTPATIENT)
Dept: RADIOLOGY | Facility: CLINIC | Age: 69
Discharge: HOME | End: 2024-11-14
Payer: MEDICARE

## 2024-11-14 VITALS
RESPIRATION RATE: 18 BRPM | SYSTOLIC BLOOD PRESSURE: 131 MMHG | TEMPERATURE: 95.7 F | DIASTOLIC BLOOD PRESSURE: 63 MMHG | HEART RATE: 73 BPM | OXYGEN SATURATION: 91 %

## 2024-11-14 DIAGNOSIS — C56.9 MALIGNANT NEOPLASM OF OVARY, UNSPECIFIED LATERALITY (MULTI): ICD-10-CM

## 2024-11-14 LAB
ALBUMIN SERPL BCP-MCNC: 3.7 G/DL (ref 3.4–5)
ALP SERPL-CCNC: 91 U/L (ref 33–136)
ALT SERPL W P-5'-P-CCNC: 18 U/L (ref 7–45)
ANION GAP SERPL CALC-SCNC: 13 MMOL/L (ref 10–20)
AST SERPL W P-5'-P-CCNC: 11 U/L (ref 9–39)
BASOPHILS # BLD AUTO: 0.01 X10*3/UL (ref 0–0.1)
BASOPHILS NFR BLD AUTO: 0.3 %
BILIRUB SERPL-MCNC: 0.3 MG/DL (ref 0–1.2)
BUN SERPL-MCNC: 21 MG/DL (ref 6–23)
CALCIUM SERPL-MCNC: 8.4 MG/DL (ref 8.6–10.6)
CANCER AG125 SERPL-ACNC: 18.6 U/ML (ref 0–30.2)
CHLORIDE SERPL-SCNC: 102 MMOL/L (ref 98–107)
CO2 SERPL-SCNC: 27 MMOL/L (ref 21–32)
CREAT SERPL-MCNC: 0.83 MG/DL (ref 0.5–1.05)
EGFRCR SERPLBLD CKD-EPI 2021: 76 ML/MIN/1.73M*2
EOSINOPHIL # BLD AUTO: 0 X10*3/UL (ref 0–0.7)
EOSINOPHIL NFR BLD AUTO: 0 %
ERYTHROCYTE [DISTWIDTH] IN BLOOD BY AUTOMATED COUNT: 14.7 % (ref 11.5–14.5)
GLUCOSE SERPL-MCNC: 94 MG/DL (ref 74–99)
HCT VFR BLD AUTO: 29.3 % (ref 36–46)
HGB BLD-MCNC: 9.7 G/DL (ref 12–16)
IMM GRANULOCYTES # BLD AUTO: 0.01 X10*3/UL (ref 0–0.7)
IMM GRANULOCYTES NFR BLD AUTO: 0.3 % (ref 0–0.9)
LYMPHOCYTES # BLD AUTO: 1.72 X10*3/UL (ref 1.2–4.8)
LYMPHOCYTES NFR BLD AUTO: 50.1 %
MAGNESIUM SERPL-MCNC: 1.4 MG/DL (ref 1.6–2.4)
MCH RBC QN AUTO: 33.1 PG (ref 26–34)
MCHC RBC AUTO-ENTMCNC: 33.1 G/DL (ref 32–36)
MCV RBC AUTO: 100 FL (ref 80–100)
MONOCYTES # BLD AUTO: 0.49 X10*3/UL (ref 0.1–1)
MONOCYTES NFR BLD AUTO: 14.3 %
NEUTROPHILS # BLD AUTO: 1.2 X10*3/UL (ref 1.2–7.7)
NEUTROPHILS NFR BLD AUTO: 35 %
NRBC BLD-RTO: ABNORMAL /100{WBCS}
PLATELET # BLD AUTO: 60 X10*3/UL (ref 150–450)
POTASSIUM SERPL-SCNC: 4.4 MMOL/L (ref 3.5–5.3)
PROT SERPL-MCNC: 5.7 G/DL (ref 6.4–8.2)
RBC # BLD AUTO: 2.93 X10*6/UL (ref 4–5.2)
SODIUM SERPL-SCNC: 138 MMOL/L (ref 136–145)
WBC # BLD AUTO: 3.4 X10*3/UL (ref 4.4–11.3)

## 2024-11-14 PROCEDURE — 83735 ASSAY OF MAGNESIUM: CPT

## 2024-11-14 PROCEDURE — 80053 COMPREHEN METABOLIC PANEL: CPT

## 2024-11-14 PROCEDURE — 2500000004 HC RX 250 GENERAL PHARMACY W/ HCPCS (ALT 636 FOR OP/ED): Performed by: NURSE PRACTITIONER

## 2024-11-14 PROCEDURE — 85025 COMPLETE CBC W/AUTO DIFF WBC: CPT

## 2024-11-14 PROCEDURE — 36591 DRAW BLOOD OFF VENOUS DEVICE: CPT

## 2024-11-14 PROCEDURE — 86304 IMMUNOASSAY TUMOR CA 125: CPT

## 2024-11-14 PROCEDURE — 2550000001 HC RX 255 CONTRASTS: Performed by: OBSTETRICS & GYNECOLOGY

## 2024-11-14 PROCEDURE — 74177 CT ABD & PELVIS W/CONTRAST: CPT

## 2024-11-14 RX ORDER — HEPARIN SODIUM,PORCINE/PF 10 UNIT/ML
50 SYRINGE (ML) INTRAVENOUS AS NEEDED
OUTPATIENT
Start: 2024-11-14

## 2024-11-14 RX ORDER — HEPARIN 100 UNIT/ML
500 SYRINGE INTRAVENOUS AS NEEDED
Status: DISCONTINUED | OUTPATIENT
Start: 2024-11-14 | End: 2024-11-14 | Stop reason: HOSPADM

## 2024-11-14 RX ORDER — HEPARIN 100 UNIT/ML
500 SYRINGE INTRAVENOUS AS NEEDED
OUTPATIENT
Start: 2024-11-14

## 2024-11-15 ENCOUNTER — TELEPHONE (OUTPATIENT)
Dept: GYNECOLOGIC ONCOLOGY | Facility: HOSPITAL | Age: 69
End: 2024-11-15

## 2024-11-15 ENCOUNTER — OFFICE VISIT (OUTPATIENT)
Dept: PALLIATIVE MEDICINE | Facility: CLINIC | Age: 69
End: 2024-11-15
Payer: MEDICARE

## 2024-11-15 ENCOUNTER — INFUSION (OUTPATIENT)
Dept: HEMATOLOGY/ONCOLOGY | Facility: CLINIC | Age: 69
End: 2024-11-15
Payer: MEDICARE

## 2024-11-15 VITALS
SYSTOLIC BLOOD PRESSURE: 97 MMHG | OXYGEN SATURATION: 97 % | DIASTOLIC BLOOD PRESSURE: 67 MMHG | TEMPERATURE: 96.8 F | RESPIRATION RATE: 16 BRPM | BODY MASS INDEX: 25.48 KG/M2 | WEIGHT: 139.33 LBS | HEART RATE: 67 BPM

## 2024-11-15 DIAGNOSIS — Z79.891 ENCOUNTER FOR MONITORING OPIOID MAINTENANCE THERAPY: ICD-10-CM

## 2024-11-15 DIAGNOSIS — Z51.81 ENCOUNTER FOR MONITORING OPIOID MAINTENANCE THERAPY: ICD-10-CM

## 2024-11-15 DIAGNOSIS — M79.2 NEUROPATHIC PAIN: ICD-10-CM

## 2024-11-15 DIAGNOSIS — G62.0 CHEMOTHERAPY-INDUCED PERIPHERAL NEUROPATHY (MULTI): ICD-10-CM

## 2024-11-15 DIAGNOSIS — F33.1 MODERATE EPISODE OF RECURRENT MAJOR DEPRESSIVE DISORDER: ICD-10-CM

## 2024-11-15 DIAGNOSIS — G89.3 CANCER RELATED PAIN: ICD-10-CM

## 2024-11-15 DIAGNOSIS — C56.9 MALIGNANT NEOPLASM OF OVARY, UNSPECIFIED LATERALITY (MULTI): ICD-10-CM

## 2024-11-15 DIAGNOSIS — Z51.5 PALLIATIVE CARE ENCOUNTER: Primary | ICD-10-CM

## 2024-11-15 DIAGNOSIS — G89.3 CHRONIC PAIN AFTER CANCER TREATMENT: ICD-10-CM

## 2024-11-15 DIAGNOSIS — G89.29 OTHER CHRONIC PAIN: ICD-10-CM

## 2024-11-15 DIAGNOSIS — R11.0 NAUSEA: ICD-10-CM

## 2024-11-15 DIAGNOSIS — Z85.43 HISTORY OF OVARIAN CANCER: ICD-10-CM

## 2024-11-15 DIAGNOSIS — T45.1X5A CHEMOTHERAPY-INDUCED PERIPHERAL NEUROPATHY (MULTI): ICD-10-CM

## 2024-11-15 DIAGNOSIS — R53.1 WEAKNESS: ICD-10-CM

## 2024-11-15 DIAGNOSIS — R63.0 LOSS OF APPETITE: ICD-10-CM

## 2024-11-15 DIAGNOSIS — R53.0 NEOPLASTIC MALIGNANT RELATED FATIGUE: ICD-10-CM

## 2024-11-15 DIAGNOSIS — R43.2 DYSGEUSIA: ICD-10-CM

## 2024-11-15 DIAGNOSIS — F41.9 ANXIETY: ICD-10-CM

## 2024-11-15 DIAGNOSIS — R53.83 OTHER FATIGUE: ICD-10-CM

## 2024-11-15 DIAGNOSIS — C85.89: ICD-10-CM

## 2024-11-15 PROCEDURE — 96365 THER/PROPH/DIAG IV INF INIT: CPT | Mod: INF

## 2024-11-15 PROCEDURE — 1157F ADVNC CARE PLAN IN RCRD: CPT | Performed by: NURSE PRACTITIONER

## 2024-11-15 PROCEDURE — 2500000004 HC RX 250 GENERAL PHARMACY W/ HCPCS (ALT 636 FOR OP/ED): Performed by: NURSE PRACTITIONER

## 2024-11-15 PROCEDURE — 96361 HYDRATE IV INFUSION ADD-ON: CPT | Mod: INF

## 2024-11-15 PROCEDURE — 1126F AMNT PAIN NOTED NONE PRSNT: CPT | Performed by: NURSE PRACTITIONER

## 2024-11-15 PROCEDURE — 3078F DIAST BP <80 MM HG: CPT | Performed by: NURSE PRACTITIONER

## 2024-11-15 PROCEDURE — 3074F SYST BP LT 130 MM HG: CPT | Performed by: NURSE PRACTITIONER

## 2024-11-15 PROCEDURE — 99215 OFFICE O/P EST HI 40 MIN: CPT | Performed by: NURSE PRACTITIONER

## 2024-11-15 PROCEDURE — 1159F MED LIST DOCD IN RCRD: CPT | Performed by: NURSE PRACTITIONER

## 2024-11-15 RX ORDER — METHADONE HYDROCHLORIDE 10 MG/1
10 TABLET ORAL EVERY 12 HOURS
Qty: 60 TABLET | Refills: 0 | Status: SHIPPED | OUTPATIENT
Start: 2024-11-27 | End: 2024-12-27

## 2024-11-15 RX ORDER — MAGNESIUM SULFATE HEPTAHYDRATE 40 MG/ML
2 INJECTION, SOLUTION INTRAVENOUS ONCE
Status: COMPLETED | OUTPATIENT
Start: 2024-11-15 | End: 2024-11-15

## 2024-11-15 ASSESSMENT — PAIN SCALES - GENERAL: PAINLEVEL_OUTOF10: 0-NO PAIN

## 2024-11-15 NOTE — TELEPHONE ENCOUNTER
PLT count 60 and ANC 1200.  Call to patient and LM that she will need her CBC/diff repeated on Monday to recheck her PLT count and ANC since her blood counts are below parameters for treatment.  Magnesium level is 1.4 and patient is getting IV Magnesium today ordered by Palliative care provider Arin Hirsch.  Patient was told that she should still take her Dexamethasone 20mg PO on Sunday night in anticipation of treatment on Monday pending repeat labs.  Dr. Kenny will review CT results with patient on Monday.  CBC/diff order pended to MD for Monday

## 2024-11-15 NOTE — PROGRESS NOTES
Dr. Kenny notified of plt level 60.    Mg2+ replaced by Pall Med.   IVF given today.    Post infusions:   Pt reporting dizziness upon standing.  She is visibly unsteady on her feet.   States these symptoms have been going on for months - predating chemo per patient.  Has noticed it getting worse.      Message sent to Dr. Kenny and pall med team to notify.    Requested Saba call neurologist as well because of her history.      Saba verbalized understanding.

## 2024-11-18 ENCOUNTER — TELEPHONE (OUTPATIENT)
Dept: NEUROLOGY | Facility: CLINIC | Age: 69
End: 2024-11-18

## 2024-11-18 ENCOUNTER — OFFICE VISIT (OUTPATIENT)
Dept: GYNECOLOGIC ONCOLOGY | Facility: CLINIC | Age: 69
End: 2024-11-18
Payer: MEDICARE

## 2024-11-18 ENCOUNTER — TELEPHONE (OUTPATIENT)
Dept: GYNECOLOGIC ONCOLOGY | Facility: HOSPITAL | Age: 69
End: 2024-11-18

## 2024-11-18 ENCOUNTER — INFUSION (OUTPATIENT)
Dept: HEMATOLOGY/ONCOLOGY | Facility: CLINIC | Age: 69
End: 2024-11-18
Payer: MEDICARE

## 2024-11-18 VITALS
SYSTOLIC BLOOD PRESSURE: 130 MMHG | BODY MASS INDEX: 25.4 KG/M2 | OXYGEN SATURATION: 95 % | RESPIRATION RATE: 16 BRPM | TEMPERATURE: 97 F | DIASTOLIC BLOOD PRESSURE: 64 MMHG | HEART RATE: 75 BPM | WEIGHT: 138.89 LBS

## 2024-11-18 DIAGNOSIS — Z51.11 CHEMOTHERAPY MANAGEMENT, ENCOUNTER FOR: ICD-10-CM

## 2024-11-18 DIAGNOSIS — C56.9 MALIGNANT NEOPLASM OF OVARY, UNSPECIFIED LATERALITY (MULTI): ICD-10-CM

## 2024-11-18 DIAGNOSIS — D69.6 LOW PLATELET COUNT (CMS-HCC): Primary | ICD-10-CM

## 2024-11-18 DIAGNOSIS — C56.9 MALIGNANT NEOPLASM OF OVARY, UNSPECIFIED LATERALITY (MULTI): Primary | ICD-10-CM

## 2024-11-18 LAB
BASOPHILS # BLD AUTO: 0.01 X10*3/UL (ref 0–0.1)
BASOPHILS NFR BLD AUTO: 0.2 %
EOSINOPHIL # BLD AUTO: 0 X10*3/UL (ref 0–0.7)
EOSINOPHIL NFR BLD AUTO: 0 %
ERYTHROCYTE [DISTWIDTH] IN BLOOD BY AUTOMATED COUNT: 15 % (ref 11.5–14.5)
HCT VFR BLD AUTO: 32.4 % (ref 36–46)
HGB BLD-MCNC: 10.8 G/DL (ref 12–16)
IMM GRANULOCYTES # BLD AUTO: 0.02 X10*3/UL (ref 0–0.7)
IMM GRANULOCYTES NFR BLD AUTO: 0.4 % (ref 0–0.9)
LYMPHOCYTES # BLD AUTO: 0.92 X10*3/UL (ref 1.2–4.8)
LYMPHOCYTES NFR BLD AUTO: 18.9 %
MCH RBC QN AUTO: 33.4 PG (ref 26–34)
MCHC RBC AUTO-ENTMCNC: 33.3 G/DL (ref 32–36)
MCV RBC AUTO: 100 FL (ref 80–100)
MONOCYTES # BLD AUTO: 0.09 X10*3/UL (ref 0.1–1)
MONOCYTES NFR BLD AUTO: 1.8 %
NEUTROPHILS # BLD AUTO: 3.83 X10*3/UL (ref 1.2–7.7)
NEUTROPHILS NFR BLD AUTO: 78.7 %
PLATELET # BLD AUTO: 61 X10*3/UL (ref 150–450)
RBC # BLD AUTO: 3.23 X10*6/UL (ref 4–5.2)
WBC # BLD AUTO: 4.9 X10*3/UL (ref 4.4–11.3)

## 2024-11-18 PROCEDURE — 99215 OFFICE O/P EST HI 40 MIN: CPT | Performed by: OBSTETRICS & GYNECOLOGY

## 2024-11-18 PROCEDURE — 3075F SYST BP GE 130 - 139MM HG: CPT | Performed by: OBSTETRICS & GYNECOLOGY

## 2024-11-18 PROCEDURE — 1126F AMNT PAIN NOTED NONE PRSNT: CPT | Performed by: OBSTETRICS & GYNECOLOGY

## 2024-11-18 PROCEDURE — 36591 DRAW BLOOD OFF VENOUS DEVICE: CPT

## 2024-11-18 PROCEDURE — 1160F RVW MEDS BY RX/DR IN RCRD: CPT | Performed by: OBSTETRICS & GYNECOLOGY

## 2024-11-18 PROCEDURE — 3078F DIAST BP <80 MM HG: CPT | Performed by: OBSTETRICS & GYNECOLOGY

## 2024-11-18 PROCEDURE — 1157F ADVNC CARE PLAN IN RCRD: CPT | Performed by: OBSTETRICS & GYNECOLOGY

## 2024-11-18 PROCEDURE — 85025 COMPLETE CBC W/AUTO DIFF WBC: CPT

## 2024-11-18 PROCEDURE — 1159F MED LIST DOCD IN RCRD: CPT | Performed by: OBSTETRICS & GYNECOLOGY

## 2024-11-18 RX ORDER — FAMOTIDINE 10 MG/ML
20 INJECTION INTRAVENOUS ONCE
OUTPATIENT
Start: 2024-12-02

## 2024-11-18 RX ORDER — ALBUTEROL SULFATE 0.83 MG/ML
3 SOLUTION RESPIRATORY (INHALATION) AS NEEDED
OUTPATIENT
Start: 2024-12-02

## 2024-11-18 RX ORDER — PALONOSETRON 0.05 MG/ML
0.25 INJECTION, SOLUTION INTRAVENOUS ONCE
OUTPATIENT
Start: 2024-12-02

## 2024-11-18 RX ORDER — DIPHENHYDRAMINE HYDROCHLORIDE 50 MG/ML
50 INJECTION INTRAMUSCULAR; INTRAVENOUS AS NEEDED
OUTPATIENT
Start: 2024-12-02

## 2024-11-18 RX ORDER — EPINEPHRINE 0.3 MG/.3ML
0.3 INJECTION SUBCUTANEOUS EVERY 5 MIN PRN
OUTPATIENT
Start: 2024-12-02

## 2024-11-18 RX ORDER — PROCHLORPERAZINE EDISYLATE 5 MG/ML
10 INJECTION INTRAMUSCULAR; INTRAVENOUS EVERY 6 HOURS PRN
OUTPATIENT
Start: 2024-12-02

## 2024-11-18 RX ORDER — PROCHLORPERAZINE MALEATE 10 MG
10 TABLET ORAL EVERY 6 HOURS PRN
OUTPATIENT
Start: 2024-12-02

## 2024-11-18 RX ORDER — FAMOTIDINE 10 MG/ML
20 INJECTION INTRAVENOUS ONCE AS NEEDED
OUTPATIENT
Start: 2024-12-02

## 2024-11-18 RX ORDER — DIPHENHYDRAMINE HCL 25 MG
50 CAPSULE ORAL ONCE
OUTPATIENT
Start: 2024-12-02

## 2024-11-18 ASSESSMENT — PAIN SCALES - GENERAL: PAINLEVEL_OUTOF10: 0-NO PAIN

## 2024-11-18 NOTE — TELEPHONE ENCOUNTER
Call from patient stating that she wants to postpone her treatment until 12/2/24 instead of 11/25/24 due to the holiday.   New appointment requests released.  I will follow up on appointment times and will call patient tomorrow with updated appointments.  [facility]

## 2024-11-18 NOTE — PROGRESS NOTES
"Patient ID: Saba Park is a 69 y.o. female.  Referring Physician: No referring provider defined for this encounter.  Primary Care Provider: Zraa Mcdermott, DEMETRIUS-CNP    Subjective    Pretreatment labs with neutropenia and thrombocytopenia    She continues to have some \"dizzy\" spells.  Feels off balance when standing up.  Reports this has been most common within the last few months.  Has not discussed with her neurologist.  Had brain imaging before starting treatment that was negative    No worsening of neuropathy  Feels tremors are stable  No nausea or vomiting    Eating well        Objective    BSA: 1.66 meters squared  /64 (BP Location: Right arm, Patient Position: Sitting, BP Cuff Size: Adult)   Pulse 75   Temp 36.1 °C (97 °F) (Temporal)   Resp 16   Wt 63 kg (138 lb 14.2 oz)   SpO2 95%   BMI 25.40 kg/m²      Physical Exam  Vitals and nursing note reviewed.   Constitutional:       Appearance: Normal appearance.   HENT:      Head: Normocephalic.   Eyes:      Pupils: Pupils are equal, round, and reactive to light.   Cardiovascular:      Rate and Rhythm: Normal rate and regular rhythm.   Pulmonary:      Effort: Pulmonary effort is normal.      Breath sounds: Normal breath sounds.   Abdominal:      General: Abdomen is flat.      Palpations: Abdomen is soft.      Tenderness: There is no abdominal tenderness.   Musculoskeletal:         General: Normal range of motion.      Cervical back: Normal range of motion and neck supple.   Skin:     General: Skin is warm and dry.   Neurological:      Mental Status: She is alert and oriented to person, place, and time.   Psychiatric:         Mood and Affect: Mood normal.         Behavior: Behavior normal.         Performance Status:  Symptomatic; in bed <50% of the day    Assessment/Plan     Oncology History Overview Note   Cancer History:     -Dx 2019 -high grade serous ovarian cancerCT (1/24/19): mixed solid cystic mass in her right ovary for malignancy. " Questionable invasion into the uterus.  Questionable mesenteric implants.  It looks like one of them is small bowel mesentery, maybe omentum.   The rest look more omental to me. kidney stone.  Complex lesion in her left kidney presumably cystic but can't rule out solid area.   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  Felt to be undebulkable on laparoscopy, plan for NACT  - 3 cycles NACT  -Genetics: SLX-4 gene of unk significance.  - May 2019 debulking, included ileostomy.  - 3 additional cycles chemo - carbo/taxol  - 8/26/19 last chemotherapy  8/2024 recurrence - mediastinal and internal mammary Lns  - bx of LN c/w recurrent high grade serous carcinoma  9/2024 started carbo/taxol       Malignant neoplasm of unspecified ovary (Multi)   12/1/2022 Initial Diagnosis    Malignant neoplasm of unspecified ovary (Multi)     9/17/2024 -  Chemotherapy    PACLitaxel / CARBOplatin, 21 Day Cycles - Gyn          Problem List Items Addressed This Visit             ICD-10-CM    Malignant neoplasm of unspecified ovary (Multi) - Primary C56.9    Relevant Orders    Clinic Appointment Request    Chemotherapy management, encounter for Z51.11       Treatment Plans       Name Type Plan Dates Plan Provider         Active    PACLitaxel / CARBOplatin, 21 Day Cycles - Gyn Oncology Treatment 9/16/2024 - Present Krista Kenny MD                    Reviewed pretreatment labs and CT results.  Consistent with response to treatment.  Cycle #4 today pending repeat CBC with differential  Follow-up with neurologist to discuss dizziness  Discussed findings of kidney stones on CT scan -no symptoms of infection at this time    Follow-up per chemo calendar

## 2024-11-19 DIAGNOSIS — C56.9 MALIGNANT NEOPLASM OF OVARY, UNSPECIFIED LATERALITY (MULTI): ICD-10-CM

## 2024-11-20 ENCOUNTER — TELEPHONE (OUTPATIENT)
Dept: GYNECOLOGIC ONCOLOGY | Facility: HOSPITAL | Age: 69
End: 2024-11-20
Payer: MEDICARE

## 2024-11-20 DIAGNOSIS — C56.9 MALIGNANT NEOPLASM OF OVARY, UNSPECIFIED LATERALITY (MULTI): ICD-10-CM

## 2024-11-20 RX ORDER — DEXAMETHASONE 4 MG/1
TABLET ORAL
Qty: 66 TABLET | Refills: 1 | Status: SHIPPED | OUTPATIENT
Start: 2024-11-20 | End: 2024-11-22

## 2024-11-20 NOTE — TELEPHONE ENCOUNTER
Call from patient to request refills on Dexamethasone.  Refill request sent to NP Concetta Aguilar.

## 2024-11-22 DIAGNOSIS — C56.9 MALIGNANT NEOPLASM OF OVARY, UNSPECIFIED LATERALITY (MULTI): ICD-10-CM

## 2024-11-22 RX ORDER — DEXAMETHASONE 4 MG/1
TABLET ORAL
Qty: 11 TABLET | Refills: 0 | Status: SHIPPED | OUTPATIENT
Start: 2024-11-22

## 2024-11-25 ENCOUNTER — APPOINTMENT (OUTPATIENT)
Dept: HEMATOLOGY/ONCOLOGY | Facility: CLINIC | Age: 69
End: 2024-11-25
Payer: MEDICARE

## 2024-11-25 ENCOUNTER — APPOINTMENT (OUTPATIENT)
Dept: NEUROLOGY | Facility: CLINIC | Age: 69
End: 2024-11-25
Payer: MEDICARE

## 2024-11-25 VITALS
DIASTOLIC BLOOD PRESSURE: 60 MMHG | WEIGHT: 137 LBS | BODY MASS INDEX: 25.06 KG/M2 | SYSTOLIC BLOOD PRESSURE: 89 MMHG | RESPIRATION RATE: 16 BRPM | HEART RATE: 66 BPM

## 2024-11-25 DIAGNOSIS — G25.0 ESSENTIAL TREMOR: Primary | ICD-10-CM

## 2024-11-25 DIAGNOSIS — R26.9 GAIT DISORDER: ICD-10-CM

## 2024-11-25 PROCEDURE — 1159F MED LIST DOCD IN RCRD: CPT | Performed by: NURSE PRACTITIONER

## 2024-11-25 PROCEDURE — 3074F SYST BP LT 130 MM HG: CPT | Performed by: NURSE PRACTITIONER

## 2024-11-25 PROCEDURE — 1036F TOBACCO NON-USER: CPT | Performed by: NURSE PRACTITIONER

## 2024-11-25 PROCEDURE — 3078F DIAST BP <80 MM HG: CPT | Performed by: NURSE PRACTITIONER

## 2024-11-25 PROCEDURE — 1160F RVW MEDS BY RX/DR IN RCRD: CPT | Performed by: NURSE PRACTITIONER

## 2024-11-25 PROCEDURE — 1157F ADVNC CARE PLAN IN RCRD: CPT | Performed by: NURSE PRACTITIONER

## 2024-11-25 PROCEDURE — 99214 OFFICE O/P EST MOD 30 MIN: CPT | Performed by: NURSE PRACTITIONER

## 2024-11-25 RX ORDER — PROPRANOLOL HYDROCHLORIDE 20 MG/1
40 TABLET ORAL 2 TIMES DAILY
Qty: 120 TABLET | Refills: 2 | Status: SHIPPED | OUTPATIENT
Start: 2024-11-25

## 2024-11-25 ASSESSMENT — ENCOUNTER SYMPTOMS
DEPRESSION: 0
OCCASIONAL FEELINGS OF UNSTEADINESS: 1
LOSS OF SENSATION IN FEET: 1

## 2024-11-25 NOTE — PROGRESS NOTES
"Subjective     Saba Park is a 69 y.o. year old female who presents with Tremors and Dizziness, here for follow up visit.    HPI  Here with .     Last visit 9/18/24 with Dr. Ng\" If it had to be I am okay for her to reduce primidone to 100 mg nightly she is currently taking primidone 175 mg nightly. While she is on chemo she will reduce it to 100 mg nightly. After chemo is over she can go back to 175 mg nightly dose. \"    She is on a lower dose of primidone and tremors have been worse. Everyday is different, some days cannot bring the spoon up to her mouth. Does not want to go out to eat because of this. Some days not too bad.     Finished 3rd round of chemo for ovarian cancer, had f/u CT and plans for 3 more chemo as it is responding.   She is not feeling well d/t chemo, fatigued and dizzy spells.     Here for dizzy spells when she gets up, feels like she will walk backwards, has not fallen, is unsteady and cannot move quickly.   Gave her a bad of IVF when she was getting chemo and felt this way and trying to drink more water.     She had unsteadiness for at least year and dizziness started since she started chemo in July.  When she first gets up she is dizzy, so waits because of the sensation and denies feeling the room is spinning but lightheaded like something is rushing up to her head lasting seconds just when getting up quickly, bending over, getting OOB in the am.     Drinks more iced tea than water--mostly decafe.   Appetite decreased, lost weight.    She feels neuropathy improved, lower dose of Lyrica (used to be 200mg BID)       Current ET meds:  Primidone 50mg, 2 tabs nightly - After chemo is over she can go back to 175 mg nightly dose  Propranolol 60mg 2 times a day  Pregabalin 75mg 2 times a day       Patient Health Questionnaire-2 Score: 0            Current Outpatient Medications:     buPROPion SR (Wellbutrin SR) 150 mg 12 hr tablet, TAKE 1 TABLET (150 MG) BY MOUTH 2 TIMES A DAY. DO NOT " CRUSH, CHEW, OR SPLIT., Disp: 180 tablet, Rfl: 1    busPIRone (Buspar) 5 mg tablet, Take 1 tablet (5 mg) by mouth 2 times a day., Disp: 180 tablet, Rfl: 1    calcium carbonate (Oscal) 500 mg calcium (1,250 mg) tablet, Take 1 tablet (1,250 mg) by mouth 2 times daily (morning and late afternoon)., Disp: , Rfl:     cholecalciferol (Vitamin D-3) 50 mcg (2,000 unit) capsule, Take 1 capsule (50 mcg) by mouth once daily., Disp: , Rfl:     clobetasol (Temovate) 0.05 % cream, APPLY AS DIRECTED EVERY DAY, Disp: , Rfl:     dexAMETHasone (Decadron) 4 mg tablet, Take 20 mg (5 tabs) at bedtime the night before your treatment and then take 8 mg (2 tabs) for 3 days after each treatment., Disp: 11 tablet, Rfl: 0    DULoxetine (Cymbalta) 60 mg DR capsule, TAKE 1 CAPSULE DAILY IN THE EVENING, Disp: 90 capsule, Rfl: 2    imiquimod (Aldara) 5 % cream, APPLY AT BEDTIME MONDAY, WED, FRIDAY FOR 4 WEEKS TO UPPER FOREHEAD AND BRIDGE OF NOSE, Disp: , Rfl:     magnesium, amino acid chelate, 133 mg tablet, Take 1 tablet (133 mg) by mouth 2 times a day., Disp: , Rfl:     magnesium, as gluconate, (Magonate) 27 mg magnesium (500 mg) tablet, Take 2 tablets (54 mg) by mouth 2 times a day., Disp: 120 tablet, Rfl: 5    [START ON 11/27/2024] methadone (Dolophine) 10 mg tablet, Take 1 tablet (10 mg) by mouth every 12 hours. Do not fill before November 27, 2024., Disp: 60 tablet, Rfl: 0    ondansetron (Zofran) 8 mg tablet, Take 1 tablet (8 mg) by mouth every 8 hours if needed for nausea or vomiting., Disp: 30 tablet, Rfl: 5    oxyCODONE (Oxy-IR) 5 mg immediate release capsule, Take 1 capsule (5 mg) by mouth every 4 hours if needed for severe pain (7 - 10)., Disp: , Rfl:     pregabalin (Lyrica) 75 mg capsule, Take 1 capsule (75 mg) by mouth 2 times a day., Disp: 60 capsule, Rfl: 0    primidone (Mysoline) 50 mg tablet, Take 3.5 tablets (175 mg) by mouth once daily at bedtime. 3.5 tabs at bedtime. (Patient taking differently: Take 1 tablet (50 mg) by mouth  once daily at bedtime. 2 tabs at bedtime.), Disp: 315 tablet, Rfl: 3    propranolol (Inderal) 20 mg tablet, Take 2 tablets (40 mg) by mouth 2 times a day., Disp: 120 tablet, Rfl: 2    propranolol (Inderal) 60 mg tablet, Take one tablet in the morning and half tablet in the afternoon after a week if heart rate is above 60 after that increase to one tablet twice a day after one week, Disp: 60 tablet, Rfl: 11     Objective   Vitals:    11/25/24 1459 11/25/24 1526 11/25/24 1530   BP: 105/64 98/67 89/60   BP Location: Right arm     Patient Position: Sitting Sitting Standing   BP Cuff Size: Adult     Pulse: 64 61 66   Resp: 16     Weight: 62.1 kg (137 lb)                     Physical Exam  No postural tremor  No resting tremor  Kinetic tremor is 1+ BUE    Strength intact BUE and BLE  Reflexes: brisk patellar BLE 3+ and 2+ achilles, BUE 2+    Drawings  R- A and B are 2+, C-1+  L-A,B,C are 2+  Writing is very mild tremor    Gait is cautious/slow.     Assessment/Plan   Ms. Saab Park is a 69 y.o. with history of CNS lymphoma and neuropathy and now has ovarian cancer for which she is doing chemotherapy. Seen today for ET. Primidone was lowered last visit d/t request from oncologist and tremor has worsened, some days better than others. After she is done with her chemotherapy and cancer and once it is back in remission we will repeat revisit the idea of deep brain stimulation because she has not been tolerant to primidone or propranolol. Has done DBS edu visit, on hold for now. She said during chemo she is not doing much and so not interested much in Jadyn watch or [employer] at this time.    Dizziness: new complaint, she d/w chemo nurse and they wanted her to see neuro. She has low BP and orthostasis in office, will lower propranolol and monitor [place] vitals.     Gait disorder: Offered PT, she would like to wait until after chemo, she also said Dr. Ng recommended and is considering, says she will let me know after chemo.  Neuropathy, deconditioning, meds/chemo are likely multifactorial causes and dizziness from hypotension worsening this. Strength and reflexes intact though brisk in BLE and she denies back pain (PT notes previously years ago having 3+ reflexes in patellar)      Diagnoses and all orders for this visit:  Essential tremor  -     propranolol (Inderal) 20 mg tablet; Take 2 tablets (40 mg) by mouth 2 times a day.  Gait disorder      #Decrease propranolol to 40mg 2 times a day x 1 week. If dizziness is no better, decrease to 20mg 2 times a day x 1 week.     Touch base with me in 1 week via [employer] with blood pressure readings, let me know how dizziness is.     Will continue to slowly wean if not better.    #Monitor blood pressure at home starting tomorrow for 1 week     #Blood Pressure Monitoring: for dizziness and or low blood pressure  Sitting and standing blood pressure: Please take sitting blood pressure and heart rate and after standing up for 3 minutes while you are still standing take blood pressure and heart rate again  Do this twice a day (mid morning and mid afternoon) for 1 week   then send me the readings via [employer] or fax ([phone])    #Follow up virtual with me in 6 weeks          Max Croft NP-C  Adult/Gerontological Nurse Practitioner   [facility], Department of Neurology  [facility] [place]  [facility]  [address] [place] [address]  Phone: [phone]  Fax: [phone]

## 2024-11-25 NOTE — PATIENT INSTRUCTIONS
#Decrease propranolol to 40mg 2 times a day x 1 week. If dizziness is no better, decrease to 20mg 2 times a day x 1 week.     Touch base with me in 1 week via [employer] with blood pressure readings, let me know how dizziness is.     Will continue to slowly wean if not better.    #Monitor blood pressure at home starting tomorrow for 1 week     #Blood Pressure Monitoring: for dizziness and or low blood pressure  Sitting and standing blood pressure: Please take sitting blood pressure and heart rate and after standing up for 3 minutes while you are still standing take blood pressure and heart rate again  Do this twice a day (mid morning and mid afternoon) for 1 week   then send me the readings via [employer] or fax ([phone])    #Follow up virtual with me in 6 weeks    KIA ShoemakerC  Adult/Gerontological Nurse Practitioner   [facility], Department of Neurology  [facility] [place]  [facility]  [address] [place] [address]  Phone: [phone]  Fax: [phone]

## 2024-11-26 ENCOUNTER — TELEPHONE (OUTPATIENT)
Dept: GYNECOLOGIC ONCOLOGY | Facility: HOSPITAL | Age: 69
End: 2024-11-26
Payer: MEDICARE

## 2024-11-26 NOTE — TELEPHONE ENCOUNTER
Call from patient to report congestion in her chest.  She denies cough, mucous, sore throat or fever.  She is taking Mucinex with relief.  Suggested that she push PO fluids and take OTC Tylenol or Ibuprofen PRN.  She was told that she should see her PCP or urgent care if she gets worse.  She is scheduled for blood work on Monday 12/2 and chemotherapy on 12/3.

## 2024-11-29 ENCOUNTER — APPOINTMENT (OUTPATIENT)
Dept: HEMATOLOGY/ONCOLOGY | Facility: CLINIC | Age: 69
End: 2024-11-29
Payer: MEDICARE

## 2024-12-02 ENCOUNTER — TELEPHONE (OUTPATIENT)
Dept: GYNECOLOGIC ONCOLOGY | Facility: HOSPITAL | Age: 69
End: 2024-12-02
Payer: MEDICARE

## 2024-12-02 ENCOUNTER — INFUSION (OUTPATIENT)
Dept: HEMATOLOGY/ONCOLOGY | Facility: CLINIC | Age: 69
End: 2024-12-02
Payer: MEDICARE

## 2024-12-02 VITALS — BODY MASS INDEX: 25.69 KG/M2 | WEIGHT: 140.43 LBS

## 2024-12-02 DIAGNOSIS — C56.9 MALIGNANT NEOPLASM OF OVARY, UNSPECIFIED LATERALITY (MULTI): ICD-10-CM

## 2024-12-02 DIAGNOSIS — D69.6 LOW PLATELET COUNT (CMS-HCC): ICD-10-CM

## 2024-12-02 LAB
ALBUMIN SERPL BCP-MCNC: 3.8 G/DL (ref 3.4–5)
ALP SERPL-CCNC: 95 U/L (ref 33–136)
ALT SERPL W P-5'-P-CCNC: 18 U/L (ref 7–45)
ANION GAP SERPL CALC-SCNC: 11 MMOL/L (ref 10–20)
AST SERPL W P-5'-P-CCNC: 13 U/L (ref 9–39)
BASOPHILS # BLD AUTO: 0.01 X10*3/UL (ref 0–0.1)
BASOPHILS NFR BLD AUTO: 0.2 %
BILIRUB SERPL-MCNC: 0.4 MG/DL (ref 0–1.2)
BUN SERPL-MCNC: 21 MG/DL (ref 6–23)
CALCIUM SERPL-MCNC: 9.1 MG/DL (ref 8.6–10.6)
CHLORIDE SERPL-SCNC: 105 MMOL/L (ref 98–107)
CO2 SERPL-SCNC: 31 MMOL/L (ref 21–32)
CREAT SERPL-MCNC: 0.84 MG/DL (ref 0.5–1.05)
EGFRCR SERPLBLD CKD-EPI 2021: 75 ML/MIN/1.73M*2
EOSINOPHIL # BLD AUTO: 0.07 X10*3/UL (ref 0–0.7)
EOSINOPHIL NFR BLD AUTO: 1.4 %
ERYTHROCYTE [DISTWIDTH] IN BLOOD BY AUTOMATED COUNT: 16.2 % (ref 11.5–14.5)
GLUCOSE SERPL-MCNC: 82 MG/DL (ref 74–99)
HCT VFR BLD AUTO: 31.9 % (ref 36–46)
HGB BLD-MCNC: 10.3 G/DL (ref 12–16)
IMM GRANULOCYTES # BLD AUTO: 0.01 X10*3/UL (ref 0–0.7)
IMM GRANULOCYTES NFR BLD AUTO: 0.2 % (ref 0–0.9)
LYMPHOCYTES # BLD AUTO: 1.67 X10*3/UL (ref 1.2–4.8)
LYMPHOCYTES NFR BLD AUTO: 34 %
MCH RBC QN AUTO: 33.4 PG (ref 26–34)
MCHC RBC AUTO-ENTMCNC: 32.3 G/DL (ref 32–36)
MCV RBC AUTO: 104 FL (ref 80–100)
MONOCYTES # BLD AUTO: 0.44 X10*3/UL (ref 0.1–1)
MONOCYTES NFR BLD AUTO: 9 %
NEUTROPHILS # BLD AUTO: 2.71 X10*3/UL (ref 1.2–7.7)
NEUTROPHILS NFR BLD AUTO: 55.2 %
NRBC BLD-RTO: ABNORMAL /100{WBCS}
PLATELET # BLD AUTO: 237 X10*3/UL (ref 150–450)
POTASSIUM SERPL-SCNC: 4.5 MMOL/L (ref 3.5–5.3)
PROT SERPL-MCNC: 6 G/DL (ref 6.4–8.2)
RBC # BLD AUTO: 3.08 X10*6/UL (ref 4–5.2)
SODIUM SERPL-SCNC: 142 MMOL/L (ref 136–145)
WBC # BLD AUTO: 4.9 X10*3/UL (ref 4.4–11.3)

## 2024-12-02 PROCEDURE — 2500000004 HC RX 250 GENERAL PHARMACY W/ HCPCS (ALT 636 FOR OP/ED): Performed by: NURSE PRACTITIONER

## 2024-12-02 PROCEDURE — 36591 DRAW BLOOD OFF VENOUS DEVICE: CPT

## 2024-12-02 PROCEDURE — 85025 COMPLETE CBC W/AUTO DIFF WBC: CPT

## 2024-12-02 PROCEDURE — 80053 COMPREHEN METABOLIC PANEL: CPT

## 2024-12-02 RX ORDER — HEPARIN 100 UNIT/ML
500 SYRINGE INTRAVENOUS AS NEEDED
Status: DISCONTINUED | OUTPATIENT
Start: 2024-12-02 | End: 2024-12-02 | Stop reason: HOSPADM

## 2024-12-02 RX ORDER — HEPARIN 100 UNIT/ML
500 SYRINGE INTRAVENOUS AS NEEDED
Status: CANCELLED | OUTPATIENT
Start: 2024-12-02

## 2024-12-02 RX ORDER — HEPARIN SODIUM,PORCINE/PF 10 UNIT/ML
50 SYRINGE (ML) INTRAVENOUS AS NEEDED
Status: CANCELLED | OUTPATIENT
Start: 2024-12-02

## 2024-12-02 RX ORDER — HEPARIN SODIUM,PORCINE/PF 10 UNIT/ML
50 SYRINGE (ML) INTRAVENOUS AS NEEDED
Status: DISCONTINUED | OUTPATIENT
Start: 2024-12-02 | End: 2024-12-02 | Stop reason: HOSPADM

## 2024-12-02 NOTE — PROGRESS NOTES
Patient ID: Saba Park is a 69 y.o. female.  Referring Physician: No referring provider defined for this encounter.  Primary Care Provider: Zara Mcdermott, DEMETRIUS-CNP    Subjective    S/P 3 cycles. Cycle 4 delayed last week  Patient called office with complaints of URI sx  Labs Pending       IMPRESSION:  CHEST:  1.  Interval decreased size of previously seen right cardiophrenic  lymph nodes as above.  2. No new intrathoracic metastatic disease.      ABDOMEN-PELVIS:  1.  No new subdiaphragmatic metastatic disease.  2. Interval cholecystectomy.  3. Multiple right renal pelvic calcifications are now noted measuring  9 and 7 mm associated urothelial enhancement of the right renal  pelvis and proximal ureter. Clinically correlate for exclusion of  ascending infection.  4. Remaining findings appear similar to prior comparison imaging.      Nursing Note:  Call from patient to report congestion in her chest.  She denies cough, mucous, sore throat or fever.  She is taking Mucinex with relief.  Suggested that she push PO fluids and take OTC Tylenol or Ibuprofen PRN.  She was told that she should see her PCP or urgent care if she gets worse.  She is scheduled for blood work on Monday 12/2 and chemotherapy on 12/3.        Interval:  Patient states she has been having some constipation taking stool softeners daily and increasing to twice a day after treatments, still having issues.   Patient appetite is decreased, states taste of food has changed, eats small amounts frequently thru the day.   Patient has baseline neuropathy from previous treatments, feels improved from before treatments.   Patient denies chest pain or shortness of breath but has chest heaviness, congestion.  She states it has been improving with time, was taking Mucinex, denies cough or mucus production.   Denies nausea but states she has been having discomfort in upper abdomen below ribs for a few weeks after treatment.       Objective    BSA: 1.66 meters  squared  /76 (BP Location: Right arm, Patient Position: Sitting, BP Cuff Size: Adult)   Pulse 77   Temp 36.1 °C (97 °F) (Temporal)   Resp 16   Wt 62.9 kg (138 lb 10.7 oz)   SpO2 97%   BMI 25.36 kg/m²      Physical Exam:    Constitutional: Doing well.   Eyes: PERRL  ENMT: Moist mucus membranes  Head/Neck: Supple. Symmetrical  Cardiovascular: Regular, rate and rhythm. 2+ equal pulses of the extremities  Respiratory/Thorax: CTA. RRR. Chest rise symmetrical.  Musculoskeletal: ROM intact, no joint swelling, normal strength  Extremities: No edema  Neurological: Alert and oriented x 3. Pleasant and cooperative.  Lymphatic: No lymphedema  Psychological: Appropriate mood and behavior  Skin: Warm and dry, no lesions, no rashes    A complete review of systems was performed and all systems were normal except what is noted in the interval history.      Performance Status:  Symptomatic; in bed <50% of the day    Assessment/Plan   Patient is a 69 year old female with recurrent ovarian cancer now s/p 3 cycles Carbo/Taxol.  Improving URI symptoms.  Labs are WNL.  GERD symptoms and constipation are biggest treatment effects.      Plan:  Cycle #4 today, no dose modifications  Miralax daily for constipation  Omeprazole 20 mg every day, has RX at home will start and call if needs refill  Follow up per chemo calendar        Oncology History Overview Note   Cancer History:     -Dx 2019 -high grade serous ovarian cancerCT (1/24/19): mixed solid cystic mass in her right ovary for malignancy. Questionable invasion into the uterus.  Questionable mesenteric implants.  It looks like one of them is small bowel mesentery, maybe omentum.   The rest look more omental to me. kidney stone.  Complex lesion in her left kidney presumably cystic but can't rule out solid area.   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  Felt to be undebulkable on laparoscopy,  plan for NACT  - 3 cycles NACT  -Genetics: SLX-4 gene of unk significance.  - May 2019 debulking, included ileostomy.  - 3 additional cycles chemo - carbo/taxol  - 8/26/19 last chemotherapy  8/2024 recurrence - mediastinal and internal mammary Lns  - bx of LN c/w recurrent high grade serous carcinoma  9/2024 started carbo/taxol       Malignant neoplasm of unspecified ovary (Multi)   12/1/2022 Initial Diagnosis    Malignant neoplasm of unspecified ovary (Multi)     9/17/2024 -  Chemotherapy    PACLitaxel / CARBOplatin, 21 Day Cycles - Gyn         Treatment Plans       Name Type Plan Dates Plan Provider         Active    PACLitaxel / CARBOplatin, 21 Day Cycles - Gyn Oncology Treatment 9/16/2024 - Present Krista Kenny MD

## 2024-12-02 NOTE — TELEPHONE ENCOUNTER
Call from patient to report that she still has ongoing symptoms of URI from last week.  She sounds significantly better on the phone but is still having chest heaviness.  She reports her cough is better and she is overall feeling better today.  She is scheduled for repeat blood work today at [facility] and is scheduled for C4 T/C tomorrow at 9:30am.  Last cycle delayed on 11/18 due to low platelets.  She was told that she can be added to Concetta's schedule tomorrow at 9:00am prior to her 9:30am Infusion so she can be assess for treatment clearance since she is still having chest heaviness.  Coordinators notified that patient needs to be urgently added to Concetta's schedule for tomorrow.     Addendum - call to patient and LM that her CBC/diff meets parameters for treatment tomorrow.  Concetta Aguilar NP is aware she is seeing the patient prior to her 9:30am treatment tomorrow.

## 2024-12-03 ENCOUNTER — INFUSION (OUTPATIENT)
Dept: HEMATOLOGY/ONCOLOGY | Facility: CLINIC | Age: 69
End: 2024-12-03
Payer: MEDICARE

## 2024-12-03 ENCOUNTER — SOCIAL WORK (OUTPATIENT)
Dept: HEMATOLOGY/ONCOLOGY | Facility: CLINIC | Age: 69
End: 2024-12-03
Payer: MEDICARE

## 2024-12-03 ENCOUNTER — OFFICE VISIT (OUTPATIENT)
Dept: GYNECOLOGIC ONCOLOGY | Facility: CLINIC | Age: 69
End: 2024-12-03
Payer: MEDICARE

## 2024-12-03 VITALS
HEART RATE: 77 BPM | OXYGEN SATURATION: 97 % | SYSTOLIC BLOOD PRESSURE: 142 MMHG | BODY MASS INDEX: 25.36 KG/M2 | TEMPERATURE: 97 F | RESPIRATION RATE: 16 BRPM | WEIGHT: 138.67 LBS | DIASTOLIC BLOOD PRESSURE: 76 MMHG

## 2024-12-03 DIAGNOSIS — K59.03 DRUG-INDUCED CONSTIPATION: ICD-10-CM

## 2024-12-03 DIAGNOSIS — C56.9 MALIGNANT NEOPLASM OF OVARY, UNSPECIFIED LATERALITY (MULTI): ICD-10-CM

## 2024-12-03 DIAGNOSIS — C56.9 MALIGNANT NEOPLASM OF OVARY, UNSPECIFIED LATERALITY (MULTI): Primary | ICD-10-CM

## 2024-12-03 DIAGNOSIS — Z51.11 CHEMOTHERAPY MANAGEMENT, ENCOUNTER FOR: ICD-10-CM

## 2024-12-03 PROCEDURE — 99215 OFFICE O/P EST HI 40 MIN: CPT | Mod: 25 | Performed by: NURSE PRACTITIONER

## 2024-12-03 PROCEDURE — 1159F MED LIST DOCD IN RCRD: CPT | Performed by: NURSE PRACTITIONER

## 2024-12-03 PROCEDURE — 2500000001 HC RX 250 WO HCPCS SELF ADMINISTERED DRUGS (ALT 637 FOR MEDICARE OP): Performed by: OBSTETRICS & GYNECOLOGY

## 2024-12-03 PROCEDURE — 1126F AMNT PAIN NOTED NONE PRSNT: CPT | Performed by: NURSE PRACTITIONER

## 2024-12-03 PROCEDURE — 96413 CHEMO IV INFUSION 1 HR: CPT

## 2024-12-03 PROCEDURE — 2500000004 HC RX 250 GENERAL PHARMACY W/ HCPCS (ALT 636 FOR OP/ED): Performed by: NURSE PRACTITIONER

## 2024-12-03 PROCEDURE — 96417 CHEMO IV INFUS EACH ADDL SEQ: CPT

## 2024-12-03 PROCEDURE — 2500000004 HC RX 250 GENERAL PHARMACY W/ HCPCS (ALT 636 FOR OP/ED): Performed by: OBSTETRICS & GYNECOLOGY

## 2024-12-03 PROCEDURE — 96415 CHEMO IV INFUSION ADDL HR: CPT

## 2024-12-03 PROCEDURE — 1157F ADVNC CARE PLAN IN RCRD: CPT | Performed by: NURSE PRACTITIONER

## 2024-12-03 PROCEDURE — 99215 OFFICE O/P EST HI 40 MIN: CPT | Performed by: NURSE PRACTITIONER

## 2024-12-03 PROCEDURE — 3078F DIAST BP <80 MM HG: CPT | Performed by: NURSE PRACTITIONER

## 2024-12-03 PROCEDURE — 3077F SYST BP >= 140 MM HG: CPT | Performed by: NURSE PRACTITIONER

## 2024-12-03 PROCEDURE — 96375 TX/PRO/DX INJ NEW DRUG ADDON: CPT | Mod: INF

## 2024-12-03 RX ORDER — PROCHLORPERAZINE EDISYLATE 5 MG/ML
10 INJECTION INTRAMUSCULAR; INTRAVENOUS EVERY 6 HOURS PRN
Status: DISCONTINUED | OUTPATIENT
Start: 2024-12-03 | End: 2024-12-03 | Stop reason: HOSPADM

## 2024-12-03 RX ORDER — DIPHENHYDRAMINE HYDROCHLORIDE 50 MG/ML
50 INJECTION INTRAMUSCULAR; INTRAVENOUS AS NEEDED
Status: DISCONTINUED | OUTPATIENT
Start: 2024-12-03 | End: 2024-12-03 | Stop reason: HOSPADM

## 2024-12-03 RX ORDER — EPINEPHRINE 0.3 MG/.3ML
0.3 INJECTION SUBCUTANEOUS EVERY 5 MIN PRN
Status: DISCONTINUED | OUTPATIENT
Start: 2024-12-03 | End: 2024-12-03 | Stop reason: HOSPADM

## 2024-12-03 RX ORDER — DIPHENHYDRAMINE HCL 25 MG
50 CAPSULE ORAL ONCE
Status: COMPLETED | OUTPATIENT
Start: 2024-12-03 | End: 2024-12-03

## 2024-12-03 RX ORDER — FAMOTIDINE 10 MG/ML
20 INJECTION INTRAVENOUS ONCE AS NEEDED
Status: DISCONTINUED | OUTPATIENT
Start: 2024-12-03 | End: 2024-12-03 | Stop reason: HOSPADM

## 2024-12-03 RX ORDER — HEPARIN 100 UNIT/ML
500 SYRINGE INTRAVENOUS AS NEEDED
Status: DISCONTINUED | OUTPATIENT
Start: 2024-12-03 | End: 2024-12-03 | Stop reason: HOSPADM

## 2024-12-03 RX ORDER — HEPARIN SODIUM,PORCINE/PF 10 UNIT/ML
50 SYRINGE (ML) INTRAVENOUS AS NEEDED
OUTPATIENT
Start: 2024-12-03

## 2024-12-03 RX ORDER — PROCHLORPERAZINE MALEATE 10 MG
10 TABLET ORAL EVERY 6 HOURS PRN
Status: DISCONTINUED | OUTPATIENT
Start: 2024-12-03 | End: 2024-12-03 | Stop reason: HOSPADM

## 2024-12-03 RX ORDER — HEPARIN SODIUM,PORCINE/PF 10 UNIT/ML
50 SYRINGE (ML) INTRAVENOUS AS NEEDED
Status: DISCONTINUED | OUTPATIENT
Start: 2024-12-03 | End: 2024-12-03 | Stop reason: HOSPADM

## 2024-12-03 RX ORDER — ALBUTEROL SULFATE 0.83 MG/ML
3 SOLUTION RESPIRATORY (INHALATION) AS NEEDED
Status: DISCONTINUED | OUTPATIENT
Start: 2024-12-03 | End: 2024-12-03 | Stop reason: HOSPADM

## 2024-12-03 RX ORDER — FAMOTIDINE 10 MG/ML
20 INJECTION INTRAVENOUS ONCE
Status: COMPLETED | OUTPATIENT
Start: 2024-12-03 | End: 2024-12-03

## 2024-12-03 RX ORDER — PALONOSETRON 0.05 MG/ML
0.25 INJECTION, SOLUTION INTRAVENOUS ONCE
Status: COMPLETED | OUTPATIENT
Start: 2024-12-03 | End: 2024-12-03

## 2024-12-03 RX ORDER — HEPARIN 100 UNIT/ML
500 SYRINGE INTRAVENOUS AS NEEDED
OUTPATIENT
Start: 2024-12-03

## 2024-12-03 ASSESSMENT — PAIN SCALES - GENERAL: PAINLEVEL_OUTOF10: 0-NO PAIN

## 2024-12-03 NOTE — PROGRESS NOTES
PSYCHOSOCIAL ASSESSMENT     Demographic Information  Saba Park  1955  [id number]  Preferred Name: Saba  Assessment Type:  Initial  Date of assessment: 12/03/24  Provider(s): Dr. Kenny  Diagnosis: Ovarian Cancer  Person(s) present during assessment: Patient and friend Hermelinda  Primary language: English   Interpretive services used: None    Distress Thermometer  Distress Score: N/A  Distress Concerns: N/A                Living Environment/Support Systems  Partner Status:   Support systems: , children and friends  Primary caregiver: Self and Spouse/Partner  Current Living Situation: House Own  Resides with:   Concerns with Housing Environment: None  Comments:     Safety  Patient safe at home? Patient feels safe at home  History of Domestic Violence: No history of domestic violence was disclosed at this time      Functional Status  Functional status: Independent  Patient currently ambulates: Independently  Patient has following equipment: None  Other physical health issues that the patient is experiencing: Patient follows with Dr. Harris  What supports are in place to assist the patient: None  Transportation:  Self and Family/Friends:    Comments:         Finances/Insurance  Insurance: An[employer] Medicare  Does the patient have any pending insurance applications: No   Hospital Financial Assistance: None  Patient's income source: [employer]   History: No  Background  Food Insecurity: No   Does the patient have any financial concerns: Yes,    Any difficulties affording medications? Yes,    Applicable [place]: No  Comments:      Advance Directives  Has Advance Directives on file  Health Care Agent Status:Not Activated  Health Care Agent, When applicable: Kasi Park ()  Comments:    Legal Involvement  Relevant current or previous legal concerns: No current legal concerns noted at this time     Nondenominational or Spiritual Identity  Comments: Patient identifies as  [facility]  Active SI/HI: No  Mood: Appropriate for the situation  Concerns relating to substance use (including alcohol/tobacco): No concerns noted  Cognitive Comments: No cognitive deficits noted  Relevant Providers: Patient follows with Dr. Harris ([facility]) for management of Lymphoma  Comments:       Assessment  Potential Barriers to Care:  None Identified  Patient Strengths:  Healthy Coping Skills, Motivated for Treatment, Self-Advocate, and Strong Support System    Plan  Referrals: Financial Counselors/Navigators  Applications:  HCAP application for financial assistance  Other: N/A    Narrative: LSW met with patient today during her treatment visit.  Patient is a 69 year old female who follows with Dr. Kenny and Concetta Aguilar, CNP APRN for management of recurrent ovarian cancer.  Patient open to meeting with  and was easily engaged in conversation.  Patient's friend, Hermelinda was also present during the conversation.      Patient shared that she has had financial concerns due to the amount owed for her medical care over the years.  She states that she reached out to someone in billing regarding a payment plan and was told that she would need to pay $100 a month - patient stating that it is to high of an amount to pay monthly and that she is unable to afford this.  Patient felt that the  was unsympathetic to her situation and informed her that there was nothing they could do to adjust the monthly payment amount.  Patient is inquiring if there is any other financial assistance options available to her.    She shared that she lives at home with her .  They only have income from social security - but she is unsure of the monthly amount.  She believes that they are below $82065 a year.  LSW provided information on the hospital assistance program (HCAP) which patient believes she may have applied for in the past.  She is open to apply again to see if  it could help with her current debt.  Patient completed an application today and will plan to email me hers and her 's social security award letters.  Once received, LSW will plan to forward application to the financial counseling group.      Patient denied any other issues or concerns at this time. Provided my contact information and stated she could reach out if additional questions arise.  Social work will continue to follow.

## 2024-12-03 NOTE — PROGRESS NOTES
Dr Kenny made aware Benadryl 50mg is too much for patient she is unable to function . Will decrease per MD her next cycle to Benadryl 25mg

## 2024-12-05 ENCOUNTER — TELEPHONE (OUTPATIENT)
Dept: GYNECOLOGIC ONCOLOGY | Facility: HOSPITAL | Age: 69
End: 2024-12-05
Payer: MEDICARE

## 2024-12-05 NOTE — TELEPHONE ENCOUNTER
Call from patient to discuss benadryl toxicity during treatment on Tuesday.  Reassured her that her that some patients are sensitive to the 50mg dosage.  She was told that her Benadryl will be decreased to 25mg with her next treatment.  Patient verbalized understanding and agreement regarding discussed information via verbal feedback.

## 2024-12-06 ENCOUNTER — APPOINTMENT (OUTPATIENT)
Dept: HEMATOLOGY/ONCOLOGY | Facility: CLINIC | Age: 69
End: 2024-12-06
Payer: MEDICARE

## 2024-12-09 ENCOUNTER — APPOINTMENT (OUTPATIENT)
Dept: GYNECOLOGIC ONCOLOGY | Facility: CLINIC | Age: 69
End: 2024-12-09
Payer: MEDICARE

## 2024-12-09 ENCOUNTER — APPOINTMENT (OUTPATIENT)
Dept: HEMATOLOGY/ONCOLOGY | Facility: CLINIC | Age: 69
End: 2024-12-09
Payer: MEDICARE

## 2024-12-11 ENCOUNTER — HOSPITAL ENCOUNTER (OUTPATIENT)
Dept: RADIOLOGY | Facility: CLINIC | Age: 69
Discharge: HOME | End: 2024-12-11
Payer: MEDICARE

## 2024-12-11 ENCOUNTER — OFFICE VISIT (OUTPATIENT)
Dept: URGENT CARE | Age: 69
End: 2024-12-11
Payer: MEDICARE

## 2024-12-11 ENCOUNTER — TELEPHONE (OUTPATIENT)
Dept: GYNECOLOGIC ONCOLOGY | Facility: HOSPITAL | Age: 69
End: 2024-12-11
Payer: MEDICARE

## 2024-12-11 VITALS
RESPIRATION RATE: 18 BRPM | BODY MASS INDEX: 25.24 KG/M2 | WEIGHT: 138 LBS | DIASTOLIC BLOOD PRESSURE: 58 MMHG | HEART RATE: 70 BPM | OXYGEN SATURATION: 95 % | SYSTOLIC BLOOD PRESSURE: 114 MMHG | TEMPERATURE: 97.6 F

## 2024-12-11 DIAGNOSIS — R05.1 ACUTE COUGH: ICD-10-CM

## 2024-12-11 DIAGNOSIS — R05.1 ACUTE COUGH: Primary | ICD-10-CM

## 2024-12-11 PROCEDURE — 71046 X-RAY EXAM CHEST 2 VIEWS: CPT

## 2024-12-11 PROCEDURE — 1159F MED LIST DOCD IN RCRD: CPT | Performed by: FAMILY MEDICINE

## 2024-12-11 PROCEDURE — 1036F TOBACCO NON-USER: CPT | Performed by: FAMILY MEDICINE

## 2024-12-11 PROCEDURE — 99204 OFFICE O/P NEW MOD 45 MIN: CPT | Performed by: FAMILY MEDICINE

## 2024-12-11 PROCEDURE — 3078F DIAST BP <80 MM HG: CPT | Performed by: FAMILY MEDICINE

## 2024-12-11 PROCEDURE — 1157F ADVNC CARE PLAN IN RCRD: CPT | Performed by: FAMILY MEDICINE

## 2024-12-11 PROCEDURE — 1126F AMNT PAIN NOTED NONE PRSNT: CPT | Performed by: FAMILY MEDICINE

## 2024-12-11 PROCEDURE — 71046 X-RAY EXAM CHEST 2 VIEWS: CPT | Performed by: RADIOLOGY

## 2024-12-11 PROCEDURE — 3074F SYST BP LT 130 MM HG: CPT | Performed by: FAMILY MEDICINE

## 2024-12-11 PROCEDURE — 1160F RVW MEDS BY RX/DR IN RCRD: CPT | Performed by: FAMILY MEDICINE

## 2024-12-11 RX ORDER — BENZONATATE 100 MG/1
100 CAPSULE ORAL 3 TIMES DAILY PRN
Qty: 30 CAPSULE | Refills: 0 | Status: SHIPPED | OUTPATIENT
Start: 2024-12-11 | End: 2024-12-21

## 2024-12-11 RX ORDER — DOXYCYCLINE 100 MG/1
100 CAPSULE ORAL 2 TIMES DAILY
Qty: 14 CAPSULE | Refills: 0 | Status: SHIPPED | OUTPATIENT
Start: 2024-12-11 | End: 2024-12-18

## 2024-12-11 RX ORDER — ALBUTEROL SULFATE 90 UG/1
1-2 INHALANT RESPIRATORY (INHALATION) EVERY 6 HOURS PRN
Qty: 8.5 G | Refills: 0 | Status: SHIPPED | OUTPATIENT
Start: 2024-12-11 | End: 2025-01-10

## 2024-12-11 ASSESSMENT — PATIENT HEALTH QUESTIONNAIRE - PHQ9
5. POOR APPETITE OR OVEREATING: MORE THAN HALF THE DAYS
6. FEELING BAD ABOUT YOURSELF - OR THAT YOU ARE A FAILURE OR HAVE LET YOURSELF OR YOUR FAMILY DOWN: MORE THAN HALF THE DAYS
8. MOVING OR SPEAKING SO SLOWLY THAT OTHER PEOPLE COULD HAVE NOTICED. OR THE OPPOSITE, BEING SO FIGETY OR RESTLESS THAT YOU HAVE BEEN MOVING AROUND A LOT MORE THAN USUAL: NOT AT ALL
9. THOUGHTS THAT YOU WOULD BE BETTER OFF DEAD, OR OF HURTING YOURSELF: NOT AT ALL
4. FEELING TIRED OR HAVING LITTLE ENERGY: NEARLY EVERY DAY
7. TROUBLE CONCENTRATING ON THINGS, SUCH AS READING THE NEWSPAPER OR WATCHING TELEVISION: MORE THAN HALF THE DAYS
2. FEELING DOWN, DEPRESSED OR HOPELESS: NEARLY EVERY DAY
1. LITTLE INTEREST OR PLEASURE IN DOING THINGS: NEARLY EVERY DAY
SUM OF ALL RESPONSES TO PHQ9 QUESTIONS 1 AND 2: 6
3. TROUBLE FALLING OR STAYING ASLEEP OR SLEEPING TOO MUCH: MORE THAN HALF THE DAYS
SUM OF ALL RESPONSES TO PHQ QUESTIONS 1-9: 17
10. IF YOU CHECKED OFF ANY PROBLEMS, HOW DIFFICULT HAVE THESE PROBLEMS MADE IT FOR YOU TO DO YOUR WORK, TAKE CARE OF THINGS AT HOME, OR GET ALONG WITH OTHER PEOPLE: SOMEWHAT DIFFICULT

## 2024-12-11 ASSESSMENT — PAIN SCALES - GENERAL: PAINLEVEL_OUTOF10: 0-NO PAIN

## 2024-12-11 NOTE — TELEPHONE ENCOUNTER
Call from patient reporting URI symptoms lasting 2+ weeks.  She feels that her chest is heavy.  She has an intermittent cough but is not coughing up any yellow or green mucous.  She is afebrile but states that she feels miserable.  She has not taken a COVID test.  Advised that she be evaluated at  [facility] to r/o pneumonia/Covid.  Also discussed recent episode with constipation.

## 2024-12-11 NOTE — PROGRESS NOTES
HPI:  Patient is here with her .  Pt states that for the past 2 weeks she had cough, chest congestion, fatigue.  No fever.  Had chemo around the same time, but cough started before.  Pt states that it is her 4th round and she did not have the same cough from chemo before.  No relief with OTC Mucinex.        ROS:  No fever  +cough with occasional sputum production  No dyspnea    PE:    A&O x3  NCAT  No conjunctival erythema  TM clear bl  No pharyngeal erythema, +pnd  Sinus rhythm  No w/r/r, good air movement  Judgement normal  No submandibular nodes    Results:  CXR: initial read negative    A/P:   Cough  Fatigue    We will call you with xray results if you need further treatment.  Fatigue can be secondary to chemo.    Increase fluids.  Rest.  Vaporub.  Cool mist humidifier. Eat yogurt and take probiotics when on medication.  Keep a diary of symptoms.  Recheck with your doctor in a week if no improvement. Go to the ER if starts getting worse.

## 2024-12-12 NOTE — PROGRESS NOTES
SUPPORTIVE AND PALLIATIVE ONCOLOGY OUTPATIENT FOLLOW-UP    Virtual or Telephone Consent    A telephone visit (audio only) between the patient (at the originating site) and the provider (at the distant site) was utilized to provide this telehealth service.   Verbal consent was requested and obtained from Saba Park on this date, 12/20/2024 for a telehealth visit.     SERVICE DATE: 12/20/2024    Subjective   HISTORY OF PRESENT ILLNESS: Saba Park is a 69 y.o. female who presents with history of CNS lymphoma and ovarian cancer. Currently in active surveillance CNS lymphoma through Dr. Harris at [facility]. Patient with recurrence of Ovarian Ca noted in 2 retroperitoneal LN on CT 7/2024. Patient follows with Supportive Oncology/Palliative Care for chronic pain r/t disease history/treatment and further symptom management.       Pain Assessment:  Pain Score:  0  Location:    Description:      Symptom Assessment:  Pain:none - continued intermittent discomfort in her right hip but well managed with Methadone. Has not needed any Oxycodone  Numbness or Tingling in hands/feet/other: a little - neuropathy with well managed with Methadone and Pregabalin 75mg BID  Sore Muscles/Spasms: none  Headache: none  Dizziness:none  Constipation: a little - reports after last chemotherapy she has extreme constipation and tried many laxatives at home to relieve. Ended up needing to self disimpact   Diarrhea: none  Nausea: none  Vomiting: none  Lack of Appetite: none   Weight Loss: none  Taste changes: none  Dry Mouth: none  Pain in Mouth/Swallowing: none  Lack of Energy: a little - reports some general fatigue but has also been getting over bronchitis  Difficulty Sleeping: none  Worrying: none  Anxiety: none  Depression: none  Shortness of breath: a little  Other: none      Information obtained from: chart review and interview of patient  ______________________________________________________________________        Objective     Infusion  on 12/02/2024   Component Date Value Ref Range Status    WBC 12/02/2024 4.9  4.4 - 11.3 x10*3/uL Final    nRBC 12/02/2024    Final    RBC 12/02/2024 3.08 (L)  4.00 - 5.20 x10*6/uL Final    Hemoglobin 12/02/2024 10.3 (L)  12.0 - 16.0 g/dL Final    Hematocrit 12/02/2024 31.9 (L)  36.0 - 46.0 % Final    MCV 12/02/2024 104 (H)  80 - 100 fL Final    MCH 12/02/2024 33.4  26.0 - 34.0 pg Final    MCHC 12/02/2024 32.3  32.0 - 36.0 g/dL Final    RDW 12/02/2024 16.2 (H)  11.5 - 14.5 % Final    Platelets 12/02/2024 237  150 - 450 x10*3/uL Final    Neutrophils % 12/02/2024 55.2  40.0 - 80.0 % Final    Immature Granulocytes %, Automated 12/02/2024 0.2  0.0 - 0.9 % Final    Lymphocytes % 12/02/2024 34.0  13.0 - 44.0 % Final    Monocytes % 12/02/2024 9.0  2.0 - 10.0 % Final    Eosinophils % 12/02/2024 1.4  0.0 - 6.0 % Final    Basophils % 12/02/2024 0.2  0.0 - 2.0 % Final    Neutrophils Absolute 12/02/2024 2.71  1.20 - 7.70 x10*3/uL Final    Immature Granulocytes Absolute, Au* 12/02/2024 0.01  0.00 - 0.70 x10*3/uL Final    Lymphocytes Absolute 12/02/2024 1.67  1.20 - 4.80 x10*3/uL Final    Monocytes Absolute 12/02/2024 0.44  0.10 - 1.00 x10*3/uL Final    Eosinophils Absolute 12/02/2024 0.07  0.00 - 0.70 x10*3/uL Final    Basophils Absolute 12/02/2024 0.01  0.00 - 0.10 x10*3/uL Final    Glucose 12/02/2024 82  74 - 99 mg/dL Final    Sodium 12/02/2024 142  136 - 145 mmol/L Final    Potassium 12/02/2024 4.5  3.5 - 5.3 mmol/L Final    Chloride 12/02/2024 105  98 - 107 mmol/L Final    Bicarbonate 12/02/2024 31  21 - 32 mmol/L Final    Anion Gap 12/02/2024 11  10 - 20 mmol/L Final    Urea Nitrogen 12/02/2024 21  6 - 23 mg/dL Final    Creatinine 12/02/2024 0.84  0.50 - 1.05 mg/dL Final    eGFR 12/02/2024 75  >60 mL/min/1.73m*2 Final    Calcium 12/02/2024 9.1  8.6 - 10.6 mg/dL Final    Albumin 12/02/2024 3.8  3.4 - 5.0 g/dL Final    Alkaline Phosphatase 12/02/2024 95  33 - 136 U/L Final    Total Protein 12/02/2024 6.0 (L)  6.4 - 8.2 g/dL  Final    AST 12/02/2024 13  9 - 39 U/L Final    Bilirubin, Total 12/02/2024 0.4  0.0 - 1.2 mg/dL Final    ALT 12/02/2024 18  7 - 45 U/L Final       PHYSICAL EXAMINATION   Vital Signs:   Vital signs reviewed      11/18/2024     8:28 AM 11/25/2024     2:59 PM 11/25/2024     3:26 PM 11/25/2024     3:30 PM 12/2/2024     3:49 PM 12/3/2024     9:25 AM 12/11/2024     1:49 PM   Vitals   Systolic 130 105 98 89  142 114   Diastolic 64 64 67 60  76 58   BP Location Right arm Right arm    Right arm Left arm   Heart Rate 75 64 61 66  77 70   Temp 36.1 °C (97 °F)     36.1 °C (97 °F) 36.4 °C (97.6 °F)   Resp 16 16    16 18   Weight (lb) 138.89 137   140.43 138.67 138   BMI 25.4 kg/m2 25.06 kg/m2   25.69 kg/m2 25.36 kg/m2 25.24 kg/m2   BSA (m2) 1.66 m2 1.65 m2   1.67 m2 1.66 m2 1.65 m2   Visit Report Report Report Report Report  Report Report       Physical Exam  Not completed dt telephone visit    ASSESSMENT/PLAN    Chronic Right Hip/Leg Pain and Neuropathic Pain  Pain is: chronic - 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 Completely anbx therapy  Type: neuropathic  Pain control: sub-optimally controlled  Home regimen:   - S/P [facility] 5/19/22  - Continue Ibuprofen 600mg q6 hours PRN  - Continue Methadone 10mg BID  - EKG (4/5/23) QTc 431. (7/25/2024) . Repeat with increase in Methadone dose  - Pursed [facility] 7/8/24 to 7/18/24 (total of 8 sessions) - noted worsening radiating pain from hip, but slight improvement in numbness/tingling of her foot. Did not complete further sessions due to extreme hip/leg pain  - Continue Pregabalin 75mg BID  - Continue Acetaminophen 500mg [id number] PRN  - Continue Oxycodone 5mg [id number] PRN for breakthrough pain - rarely uses  -  Continue Duloxetine 60mg/daily  - Completed program through chiropractor w/o change in pain  - Referral to Symptom Clinic - Dr Silverman - pursued acupuncture with notable change in neuropathy/pain, has not continued  - Continue following with Chronic Pain (Dr. Alarcon) - no follow-up scheduled at this time   - Continue following with podiatry   Intolerances/previously tried:   - Alpha Liproic Acid - not effective  - Gabapentin - caused excessive lethargy     Opioid Use  Medication Management:   - OARRS report reviewed with no aberrant behavior; consistent with  prescriptions/records and patient history  - MED 90.  Overdose Risk Score 100.   This has been discussed with patient.   - We will continue to closely monitor the patient for signs of prescription misuse including UDS, OARRS review and subjective reports at each visit.  - No concurrent benzodiazepine use   - I am a provider who either is or has consulted and collaborated with a provider certified in Hospice and Palliative Medicine and have conducted a face-face visit and examination for this patient.  - Routine Urine Drug Screen: 8/23/2024 appropriately positive for opioids and negative for illicit substances.  - Controlled Substance Agreement: 8/23/2024  - Specifically discussed that controlled substance prescriptions will only be provided by our group as outlined in the completed agreement  - Prescribed naloxone: patient declined  - Red Flags: NA     Constipation  - Start Senna-S 1-2 tabs BID PRN  - Start Miralax 17gram 1-2x daily PRN  - Discussed starting Loperamide 2mg PRN diarrhea - MAX 8caps/24 hours     Nausea/Loss of Appetite  - Continue Omeprazole 20mg daily  - Continue Ondansetron 8mg [id number] PRN  - Continue Metoclopramide 10mg [id number] PRN  - Encouraged good hydration   - Encouraged small more frequent meals  - Encouraged increase in caloric intake and protein supplements  - Referral to dietician (10/3/2024)  - Discussed use of lemon heads to assist  with taste  - Discussed medications to help stimulate appetite - patient declined today     Altered Mood  Chronic anxiety and depression related to health concerns   Home regimen:   - Continue Duloxetine 60mg once daily at bed  - Continue Buspirone 5mg TID  - Continue Buproprion SR 150mg BID  - Following with counselor weekly to every 2 weeks  - Established with Dr. Ugalde - Onco Psychiatry     Hypotension/Dizziness  - Today (11/15/2024) received IVF 1L Sodium Cloride 0.9%  - Received Mag 2G (level 1.4)  - Discussed reaching out to neurologist    Next Follow-Up Visit:  Return to clinic in 6 weeks in person    Signature and billing  Medical complexity was moderate level due to due to complexity of problems, extensive data review, and high risk of management/treatment.  Time was spent on the following: Prep Time, Time Directly with Patient/Family/Caregiver, Documentation Time. Total time spent: 30      Data  Diagnostic tests and information reviewed for today's visit:  Most recent labs and imaging results, Medications     Some elements copied from Palliative Care note on 11/15/2024, the elements have been updated and all reflect current decision making from today, 12/20/2024.    Plan of Care discussed with: Patient    SIGNATURE: DEMETRIUS Orourke-CNP    Contact information:  Supportive and Palliative Oncology  Monday-Friday 8 AM-5 PM  Phone:  [phone], press option #5, then option #1.   Or Epic Secure Chat

## 2024-12-16 ENCOUNTER — TELEPHONE (OUTPATIENT)
Dept: ADMISSION | Facility: HOSPITAL | Age: 69
End: 2024-12-16
Payer: MEDICARE

## 2024-12-16 DIAGNOSIS — G89.3 CANCER RELATED PAIN: ICD-10-CM

## 2024-12-16 NOTE — TELEPHONE ENCOUNTER
Refill Request  Pregabalin (Lyrica) 75mg BID    Preferred Pharmacy  [id number] ([facility])

## 2024-12-16 NOTE — TELEPHONE ENCOUNTER
Patient last seen by DEMETRIUS Hirsch on 11/15 with plan to continue lyrica 75mg BID. Follow up visit is scheduled for 12/20. OARRS reviewed and no aberrancy noted. Patient last filled lyrica 75mg #60/30 days on 11/11. Prescription pended to provider to approve.

## 2024-12-17 ENCOUNTER — TELEPHONE (OUTPATIENT)
Dept: GYNECOLOGIC ONCOLOGY | Facility: HOSPITAL | Age: 69
End: 2024-12-17
Payer: MEDICARE

## 2024-12-17 DIAGNOSIS — C56.9 MALIGNANT NEOPLASM OF OVARY, UNSPECIFIED LATERALITY (MULTI): ICD-10-CM

## 2024-12-17 DIAGNOSIS — G25.0 ESSENTIAL TREMOR: ICD-10-CM

## 2024-12-17 RX ORDER — PREGABALIN 75 MG/1
75 CAPSULE ORAL 2 TIMES DAILY
Qty: 60 CAPSULE | Refills: 0 | Status: SHIPPED | OUTPATIENT
Start: 2024-12-17 | End: 2024-12-20 | Stop reason: SDUPTHER

## 2024-12-17 NOTE — TELEPHONE ENCOUNTER
Return call from patient who states that she went to urgent care and got treatment for URI + Bronchitis on 12/11.  She is getting better slowly.  Discussed constipation management with next cycle.  She will start taking Mirlax every night at bedtime starting her day of treamtnet (12/30) and will continue X 7-10 days.  If no BM after 3 days of Miralax, she was instructed to take Milk of Magnesia (30ml) X1 dose.  Patient verbalized understanding and agreement regarding discussed information via verbal feedback.  She requests her C6 treatment to be rescheduled to 1/27/25 instead of 1/20/25 due to personal plans.  New appointment requests entered.

## 2024-12-19 RX ORDER — PROPRANOLOL HYDROCHLORIDE 20 MG/1
40 TABLET ORAL 2 TIMES DAILY
Qty: 360 TABLET | Refills: 0 | Status: SHIPPED | OUTPATIENT
Start: 2024-12-19

## 2024-12-20 ENCOUNTER — TELEMEDICINE (OUTPATIENT)
Dept: PALLIATIVE MEDICINE | Facility: CLINIC | Age: 69
End: 2024-12-20
Payer: MEDICARE

## 2024-12-20 DIAGNOSIS — K59.03 DRUG-INDUCED CONSTIPATION: ICD-10-CM

## 2024-12-20 DIAGNOSIS — G89.29 OTHER CHRONIC PAIN: ICD-10-CM

## 2024-12-20 DIAGNOSIS — R53.1 WEAKNESS: ICD-10-CM

## 2024-12-20 DIAGNOSIS — R11.0 NAUSEA: ICD-10-CM

## 2024-12-20 DIAGNOSIS — G89.3 CANCER RELATED PAIN: ICD-10-CM

## 2024-12-20 DIAGNOSIS — C56.9 MALIGNANT NEOPLASM OF OVARY, UNSPECIFIED LATERALITY (MULTI): ICD-10-CM

## 2024-12-20 DIAGNOSIS — T45.1X5A CHEMOTHERAPY-INDUCED PERIPHERAL NEUROPATHY (MULTI): ICD-10-CM

## 2024-12-20 DIAGNOSIS — F41.9 ANXIETY: ICD-10-CM

## 2024-12-20 DIAGNOSIS — Z79.891 ENCOUNTER FOR MONITORING OPIOID MAINTENANCE THERAPY: ICD-10-CM

## 2024-12-20 DIAGNOSIS — G89.3 CHRONIC PAIN AFTER CANCER TREATMENT: ICD-10-CM

## 2024-12-20 DIAGNOSIS — Z51.81 ENCOUNTER FOR MONITORING OPIOID MAINTENANCE THERAPY: ICD-10-CM

## 2024-12-20 DIAGNOSIS — M79.2 NEUROPATHIC PAIN: ICD-10-CM

## 2024-12-20 DIAGNOSIS — R53.83 OTHER FATIGUE: ICD-10-CM

## 2024-12-20 DIAGNOSIS — F33.1 MODERATE EPISODE OF RECURRENT MAJOR DEPRESSIVE DISORDER: ICD-10-CM

## 2024-12-20 DIAGNOSIS — C85.89: ICD-10-CM

## 2024-12-20 DIAGNOSIS — G62.0 CHEMOTHERAPY-INDUCED PERIPHERAL NEUROPATHY (MULTI): ICD-10-CM

## 2024-12-20 DIAGNOSIS — R53.0 NEOPLASTIC MALIGNANT RELATED FATIGUE: ICD-10-CM

## 2024-12-20 DIAGNOSIS — Z51.5 PALLIATIVE CARE ENCOUNTER: Primary | ICD-10-CM

## 2024-12-20 PROCEDURE — 1157F ADVNC CARE PLAN IN RCRD: CPT | Performed by: NURSE PRACTITIONER

## 2024-12-20 PROCEDURE — 99203 OFFICE O/P NEW LOW 30 MIN: CPT | Performed by: NURSE PRACTITIONER

## 2024-12-20 RX ORDER — METHADONE HYDROCHLORIDE 10 MG/1
10 TABLET ORAL EVERY 12 HOURS
Qty: 60 TABLET | Refills: 0 | Status: SHIPPED | OUTPATIENT
Start: 2024-12-26 | End: 2025-01-25

## 2024-12-20 RX ORDER — AMOXICILLIN 250 MG
1-2 CAPSULE ORAL 2 TIMES DAILY PRN
Qty: 120 TABLET | Refills: 5 | Status: SHIPPED | OUTPATIENT
Start: 2024-12-20 | End: 2025-06-18

## 2024-12-20 RX ORDER — POLYETHYLENE GLYCOL 3350 17 G/17G
17 POWDER, FOR SOLUTION ORAL 2 TIMES DAILY PRN
Qty: 60 PACKET | Refills: 2 | Status: SHIPPED | OUTPATIENT
Start: 2024-12-20 | End: 2025-03-20

## 2024-12-20 RX ORDER — PREGABALIN 75 MG/1
75 CAPSULE ORAL 2 TIMES DAILY
Qty: 60 CAPSULE | Refills: 5 | Status: SHIPPED | OUTPATIENT
Start: 2025-01-15 | End: 2025-07-14

## 2024-12-27 ENCOUNTER — INFUSION (OUTPATIENT)
Dept: HEMATOLOGY/ONCOLOGY | Facility: CLINIC | Age: 69
End: 2024-12-27
Payer: MEDICARE

## 2024-12-27 ENCOUNTER — APPOINTMENT (OUTPATIENT)
Dept: HEMATOLOGY/ONCOLOGY | Facility: CLINIC | Age: 69
End: 2024-12-27
Payer: MEDICARE

## 2024-12-27 VITALS
TEMPERATURE: 97.3 F | RESPIRATION RATE: 18 BRPM | OXYGEN SATURATION: 98 % | DIASTOLIC BLOOD PRESSURE: 72 MMHG | HEART RATE: 75 BPM | SYSTOLIC BLOOD PRESSURE: 132 MMHG

## 2024-12-27 DIAGNOSIS — C56.9 MALIGNANT NEOPLASM OF OVARY, UNSPECIFIED LATERALITY (MULTI): ICD-10-CM

## 2024-12-27 LAB
ALBUMIN SERPL BCP-MCNC: 3.6 G/DL (ref 3.4–5)
ALP SERPL-CCNC: 78 U/L (ref 33–136)
ALT SERPL W P-5'-P-CCNC: 15 U/L (ref 7–45)
ANION GAP SERPL CALC-SCNC: 12 MMOL/L (ref 10–20)
AST SERPL W P-5'-P-CCNC: 11 U/L (ref 9–39)
BASOPHILS # BLD AUTO: 0.02 X10*3/UL (ref 0–0.1)
BASOPHILS NFR BLD AUTO: 0.4 %
BILIRUB SERPL-MCNC: 0.3 MG/DL (ref 0–1.2)
BUN SERPL-MCNC: 19 MG/DL (ref 6–23)
CALCIUM SERPL-MCNC: 9 MG/DL (ref 8.6–10.6)
CANCER AG125 SERPL-ACNC: 17.8 U/ML (ref 0–30.2)
CHLORIDE SERPL-SCNC: 103 MMOL/L (ref 98–107)
CO2 SERPL-SCNC: 30 MMOL/L (ref 21–32)
CREAT SERPL-MCNC: 0.71 MG/DL (ref 0.5–1.05)
EGFRCR SERPLBLD CKD-EPI 2021: >90 ML/MIN/1.73M*2
EOSINOPHIL # BLD AUTO: 0.04 X10*3/UL (ref 0–0.7)
EOSINOPHIL NFR BLD AUTO: 0.8 %
ERYTHROCYTE [DISTWIDTH] IN BLOOD BY AUTOMATED COUNT: 16.5 % (ref 11.5–14.5)
GLUCOSE SERPL-MCNC: 80 MG/DL (ref 74–99)
HCT VFR BLD AUTO: 30.8 % (ref 36–46)
HGB BLD-MCNC: 10 G/DL (ref 12–16)
IMM GRANULOCYTES # BLD AUTO: 0 X10*3/UL (ref 0–0.7)
IMM GRANULOCYTES NFR BLD AUTO: 0 % (ref 0–0.9)
LYMPHOCYTES # BLD AUTO: 1.86 X10*3/UL (ref 1.2–4.8)
LYMPHOCYTES NFR BLD AUTO: 34.9 %
MAGNESIUM SERPL-MCNC: 1.67 MG/DL (ref 1.6–2.4)
MCH RBC QN AUTO: 34.4 PG (ref 26–34)
MCHC RBC AUTO-ENTMCNC: 32.5 G/DL (ref 32–36)
MCV RBC AUTO: 106 FL (ref 80–100)
MONOCYTES # BLD AUTO: 0.4 X10*3/UL (ref 0.1–1)
MONOCYTES NFR BLD AUTO: 7.5 %
NEUTROPHILS # BLD AUTO: 3.01 X10*3/UL (ref 1.2–7.7)
NEUTROPHILS NFR BLD AUTO: 56.4 %
NRBC BLD-RTO: ABNORMAL /100{WBCS}
PLATELET # BLD AUTO: 146 X10*3/UL (ref 150–450)
POTASSIUM SERPL-SCNC: 4.5 MMOL/L (ref 3.5–5.3)
PROT SERPL-MCNC: 5.8 G/DL (ref 6.4–8.2)
RBC # BLD AUTO: 2.91 X10*6/UL (ref 4–5.2)
SODIUM SERPL-SCNC: 140 MMOL/L (ref 136–145)
WBC # BLD AUTO: 5.3 X10*3/UL (ref 4.4–11.3)

## 2024-12-27 PROCEDURE — 2500000004 HC RX 250 GENERAL PHARMACY W/ HCPCS (ALT 636 FOR OP/ED): Performed by: NURSE PRACTITIONER

## 2024-12-27 PROCEDURE — 83735 ASSAY OF MAGNESIUM: CPT

## 2024-12-27 PROCEDURE — 36591 DRAW BLOOD OFF VENOUS DEVICE: CPT

## 2024-12-27 PROCEDURE — 80053 COMPREHEN METABOLIC PANEL: CPT

## 2024-12-27 PROCEDURE — 85025 COMPLETE CBC W/AUTO DIFF WBC: CPT

## 2024-12-27 PROCEDURE — 86304 IMMUNOASSAY TUMOR CA 125: CPT

## 2024-12-27 RX ORDER — HEPARIN 100 UNIT/ML
500 SYRINGE INTRAVENOUS AS NEEDED
Status: CANCELLED | OUTPATIENT
Start: 2024-12-27

## 2024-12-27 RX ORDER — HEPARIN SODIUM,PORCINE/PF 10 UNIT/ML
50 SYRINGE (ML) INTRAVENOUS AS NEEDED
Status: CANCELLED | OUTPATIENT
Start: 2024-12-27

## 2024-12-27 RX ORDER — HEPARIN 100 UNIT/ML
500 SYRINGE INTRAVENOUS AS NEEDED
Status: DISCONTINUED | OUTPATIENT
Start: 2024-12-27 | End: 2024-12-30 | Stop reason: HOSPADM

## 2024-12-27 RX ORDER — HEPARIN SODIUM,PORCINE/PF 10 UNIT/ML
50 SYRINGE (ML) INTRAVENOUS AS NEEDED
Status: DISCONTINUED | OUTPATIENT
Start: 2024-12-27 | End: 2024-12-30 | Stop reason: HOSPADM

## 2024-12-27 RX ADMIN — HEPARIN 500 UNITS: 100 SYRINGE at 13:50

## 2024-12-27 ASSESSMENT — PAIN SCALES - GENERAL: PAINLEVEL_OUTOF10: 0-NO PAIN

## 2024-12-30 ENCOUNTER — APPOINTMENT (OUTPATIENT)
Dept: GYNECOLOGIC ONCOLOGY | Facility: CLINIC | Age: 69
End: 2024-12-30
Payer: MEDICARE

## 2024-12-30 ENCOUNTER — OFFICE VISIT (OUTPATIENT)
Dept: GYNECOLOGIC ONCOLOGY | Facility: CLINIC | Age: 69
End: 2024-12-30
Payer: MEDICARE

## 2024-12-30 ENCOUNTER — APPOINTMENT (OUTPATIENT)
Dept: HEMATOLOGY/ONCOLOGY | Facility: CLINIC | Age: 69
End: 2024-12-30
Payer: MEDICARE

## 2024-12-30 ENCOUNTER — SOCIAL WORK (OUTPATIENT)
Dept: HEMATOLOGY/ONCOLOGY | Facility: CLINIC | Age: 69
End: 2024-12-30

## 2024-12-30 ENCOUNTER — INFUSION (OUTPATIENT)
Dept: HEMATOLOGY/ONCOLOGY | Facility: CLINIC | Age: 69
End: 2024-12-30
Payer: MEDICARE

## 2024-12-30 VITALS
HEART RATE: 86 BPM | BODY MASS INDEX: 25.81 KG/M2 | TEMPERATURE: 97.7 F | DIASTOLIC BLOOD PRESSURE: 85 MMHG | OXYGEN SATURATION: 98 % | SYSTOLIC BLOOD PRESSURE: 134 MMHG | RESPIRATION RATE: 16 BRPM | WEIGHT: 141.09 LBS

## 2024-12-30 DIAGNOSIS — Z51.11 CHEMOTHERAPY MANAGEMENT, ENCOUNTER FOR: ICD-10-CM

## 2024-12-30 DIAGNOSIS — C56.9 MALIGNANT NEOPLASM OF OVARY, UNSPECIFIED LATERALITY (MULTI): Primary | ICD-10-CM

## 2024-12-30 DIAGNOSIS — K59.03 DRUG-INDUCED CONSTIPATION: ICD-10-CM

## 2024-12-30 DIAGNOSIS — C56.9 MALIGNANT NEOPLASM OF OVARY, UNSPECIFIED LATERALITY (MULTI): ICD-10-CM

## 2024-12-30 PROCEDURE — 2500000004 HC RX 250 GENERAL PHARMACY W/ HCPCS (ALT 636 FOR OP/ED): Performed by: NURSE PRACTITIONER

## 2024-12-30 PROCEDURE — 99215 OFFICE O/P EST HI 40 MIN: CPT | Performed by: NURSE PRACTITIONER

## 2024-12-30 PROCEDURE — 3075F SYST BP GE 130 - 139MM HG: CPT | Performed by: NURSE PRACTITIONER

## 2024-12-30 PROCEDURE — G2211 COMPLEX E/M VISIT ADD ON: HCPCS | Performed by: NURSE PRACTITIONER

## 2024-12-30 PROCEDURE — 2500000004 HC RX 250 GENERAL PHARMACY W/ HCPCS (ALT 636 FOR OP/ED): Performed by: STUDENT IN AN ORGANIZED HEALTH CARE EDUCATION/TRAINING PROGRAM

## 2024-12-30 PROCEDURE — 1126F AMNT PAIN NOTED NONE PRSNT: CPT | Performed by: NURSE PRACTITIONER

## 2024-12-30 PROCEDURE — 96413 CHEMO IV INFUSION 1 HR: CPT

## 2024-12-30 PROCEDURE — 96411 CHEMO IV PUSH ADDL DRUG: CPT

## 2024-12-30 PROCEDURE — 1159F MED LIST DOCD IN RCRD: CPT | Performed by: NURSE PRACTITIONER

## 2024-12-30 PROCEDURE — 2500000001 HC RX 250 WO HCPCS SELF ADMINISTERED DRUGS (ALT 637 FOR MEDICARE OP): Performed by: STUDENT IN AN ORGANIZED HEALTH CARE EDUCATION/TRAINING PROGRAM

## 2024-12-30 PROCEDURE — 96415 CHEMO IV INFUSION ADDL HR: CPT

## 2024-12-30 PROCEDURE — 3079F DIAST BP 80-89 MM HG: CPT | Performed by: NURSE PRACTITIONER

## 2024-12-30 PROCEDURE — 96417 CHEMO IV INFUS EACH ADDL SEQ: CPT

## 2024-12-30 PROCEDURE — 96375 TX/PRO/DX INJ NEW DRUG ADDON: CPT | Mod: INF

## 2024-12-30 PROCEDURE — 1157F ADVNC CARE PLAN IN RCRD: CPT | Performed by: NURSE PRACTITIONER

## 2024-12-30 RX ORDER — PALONOSETRON 0.05 MG/ML
0.25 INJECTION, SOLUTION INTRAVENOUS ONCE
Status: COMPLETED | OUTPATIENT
Start: 2024-12-30 | End: 2024-12-30

## 2024-12-30 RX ORDER — HEPARIN 100 UNIT/ML
500 SYRINGE INTRAVENOUS AS NEEDED
OUTPATIENT
Start: 2024-12-30

## 2024-12-30 RX ORDER — DIPHENHYDRAMINE HYDROCHLORIDE 50 MG/ML
50 INJECTION INTRAMUSCULAR; INTRAVENOUS AS NEEDED
Status: DISCONTINUED | OUTPATIENT
Start: 2024-12-30 | End: 2024-12-30 | Stop reason: HOSPADM

## 2024-12-30 RX ORDER — EPINEPHRINE 0.3 MG/.3ML
0.3 INJECTION SUBCUTANEOUS EVERY 5 MIN PRN
Status: DISCONTINUED | OUTPATIENT
Start: 2024-12-30 | End: 2024-12-30 | Stop reason: HOSPADM

## 2024-12-30 RX ORDER — HEPARIN SODIUM,PORCINE/PF 10 UNIT/ML
50 SYRINGE (ML) INTRAVENOUS AS NEEDED
Status: DISCONTINUED | OUTPATIENT
Start: 2024-12-30 | End: 2024-12-30 | Stop reason: HOSPADM

## 2024-12-30 RX ORDER — FAMOTIDINE 10 MG/ML
20 INJECTION INTRAVENOUS ONCE AS NEEDED
Status: DISCONTINUED | OUTPATIENT
Start: 2024-12-30 | End: 2024-12-30 | Stop reason: HOSPADM

## 2024-12-30 RX ORDER — HEPARIN 100 UNIT/ML
500 SYRINGE INTRAVENOUS AS NEEDED
Status: DISCONTINUED | OUTPATIENT
Start: 2024-12-30 | End: 2024-12-30 | Stop reason: HOSPADM

## 2024-12-30 RX ORDER — DIPHENHYDRAMINE HCL 25 MG
25 CAPSULE ORAL ONCE
Status: COMPLETED | OUTPATIENT
Start: 2024-12-30 | End: 2024-12-30

## 2024-12-30 RX ORDER — PROCHLORPERAZINE EDISYLATE 5 MG/ML
10 INJECTION INTRAMUSCULAR; INTRAVENOUS EVERY 6 HOURS PRN
Status: DISCONTINUED | OUTPATIENT
Start: 2024-12-30 | End: 2024-12-30 | Stop reason: HOSPADM

## 2024-12-30 RX ORDER — FAMOTIDINE 10 MG/ML
20 INJECTION INTRAVENOUS ONCE
Status: COMPLETED | OUTPATIENT
Start: 2024-12-30 | End: 2024-12-30

## 2024-12-30 RX ORDER — ALBUTEROL SULFATE 0.83 MG/ML
3 SOLUTION RESPIRATORY (INHALATION) AS NEEDED
Status: DISCONTINUED | OUTPATIENT
Start: 2024-12-30 | End: 2024-12-30 | Stop reason: HOSPADM

## 2024-12-30 RX ORDER — HEPARIN SODIUM,PORCINE/PF 10 UNIT/ML
50 SYRINGE (ML) INTRAVENOUS AS NEEDED
OUTPATIENT
Start: 2024-12-30

## 2024-12-30 RX ORDER — PROCHLORPERAZINE MALEATE 10 MG
10 TABLET ORAL EVERY 6 HOURS PRN
Status: DISCONTINUED | OUTPATIENT
Start: 2024-12-30 | End: 2024-12-30 | Stop reason: HOSPADM

## 2024-12-30 RX ADMIN — DEXAMETHASONE 20 MG: 6 TABLET ORAL at 10:08

## 2024-12-30 RX ADMIN — PALONOSETRON HYDROCHLORIDE 250 MCG: 0.25 INJECTION INTRAVENOUS at 10:09

## 2024-12-30 RX ADMIN — PACLITAXEL 228 MG: 6 INJECTION, SOLUTION INTRAVENOUS at 10:37

## 2024-12-30 RX ADMIN — CARBOPLATIN 460 MG: 600 INJECTION, SOLUTION INTRAVENOUS at 13:42

## 2024-12-30 RX ADMIN — Medication 500 UNITS: at 14:24

## 2024-12-30 RX ADMIN — FAMOTIDINE 20 MG: 10 INJECTION INTRAVENOUS at 10:08

## 2024-12-30 RX ADMIN — DIPHENHYDRAMINE HYDROCHLORIDE 25 MG: 25 CAPSULE ORAL at 10:08

## 2024-12-30 ASSESSMENT — PAIN SCALES - GENERAL: PAINLEVEL_OUTOF10: 0-NO PAIN

## 2024-12-30 NOTE — PROGRESS NOTES
Patient ID: Saba Park is a 69 y.o. female.  Referring Physician: No referring provider defined for this encounter.  Primary Care Provider: DANILO Orourke    Subjective    Presenting for cycle 5 carbo/taxol. Overall tolerating treatment well. Has some intermittent stomach discomfort due to constipation. Has been taking Miralax daily and will add Senna if needed. Appetite decreased for several days following treatment. Reports stable neuropathy. No new lower extremity edema. Denies SOB, chest pain. Recently recovered from bronchitis.      IMPRESSION:  CHEST:  1.  Interval decreased size of previously seen right cardiophrenic  lymph nodes as above.  2. No new intrathoracic metastatic disease.      ABDOMEN-PELVIS:  1.  No new subdiaphragmatic metastatic disease.  2. Interval cholecystectomy.  3. Multiple right renal pelvic calcifications are now noted measuring  9 and 7 mm associated urothelial enhancement of the right renal  pelvis and proximal ureter. Clinically correlate for exclusion of  ascending infection.  4. Remaining findings appear similar to prior comparison imaging.      Objective    BSA: 1.67 meters squared  /85 (BP Location: Right arm, Patient Position: Sitting, BP Cuff Size: Adult)   Pulse 86   Temp 36.5 °C (97.7 °F) (Temporal)   Resp 16   Wt 64 kg (141 lb 1.5 oz)   SpO2 98%   BMI 25.81 kg/m²      Physical Exam:    Constitutional: Doing well.   Eyes: PERRL  ENMT: Moist mucus membranes  Head/Neck: Supple. Symmetrical  Cardiovascular: Regular, rate and rhythm. 2+ equal pulses of the extremities  Respiratory/Thorax: CTA. RRR. Chest rise symmetrical.  Musculoskeletal: ROM intact, no joint swelling, normal strength  Extremities: No edema  Neurological: Alert and oriented x 3. Pleasant and cooperative.  Lymphatic: No lymphedema  Psychological: Appropriate mood and behavior  Skin: Warm and dry, no lesions, no rashes    A complete review of systems was performed and all systems were  normal except what is noted in the interval history.      Performance Status:  Symptomatic; in bed <50% of the day    Assessment/Plan   Patient is a 69 year old female with recurrent ovarian cancer now s/p 3 cycles Carbo/Taxol.  Improving URI symptoms.  Labs are WNL.        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 Questionable invasion into the uterus.  Questionable mesenteric implants.  It looks like one of them is small bowel mesentery, maybe omentum.   The rest look more omental to me. kidney stone.  Complex lesion in her left kidney presumably cystic but can't rule out solid area.   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  Felt to be undebulkable on laparoscopy, plan for NACT  - 3 cycles NACT  -Genetics: SLX-4 gene of unk significance.  - May 2019 debulking, included ileostomy.  - 3 additional cycles chemo - carbo/taxol  - 8/26/19 last chemotherapy  8/2024 recurrence - mediastinal and internal mammary Lns  - bx of LN c/w recurrent high grade serous carcinoma  9/2024 started carbo/taxol       Malignant neoplasm of unspecified ovary (Multi)   12/1/2022 Initial Diagnosis    Malignant neoplasm of unspecified ovary (Multi)     9/17/2024 -  Chemotherapy    PACLitaxel / CARBOplatin, 21 Day Cycles - Gyn         Treatment Plans       Name Type Plan Dates Plan Provider         Active    PACLitaxel / CARBOplatin, 21 Day Cycles - Gyn Oncology Treatment 9/16/2024 - Present Krista Kenny MD

## 2024-12-30 NOTE — PROGRESS NOTES
Social work continues to follow this patient for ongoing assessment and support. I am covering for CALEB Alvares today. I met with the patient per her request as she was awaiting treatment. She was very pleasant and easily engaged in conversation. She and Paradise spoke about applying for hospital assistance previously and the patient brought documentation for her application. I reviewed what was provided. I explained that the patient will also need to provide her and her 's Social Security Award Letters for 2024 and her 's for 2023; I explained that this letter can be obtained from Social Security if they don't already have them. The patient expressed understanding and will review with her  when she gets home today. She is aware I am covering for Paradise this week and will call me with an update. She is aware she can email, drop off, or bring the remaining documentation to her next appointment. She expressed appreciation for the assistance provided today. Paradise will be updated upon her return. Social work will remain available to assist this patient.    ABELARDO Malik, CECI-S

## 2024-12-31 ENCOUNTER — HOSPITAL ENCOUNTER (OUTPATIENT)
Dept: RADIOLOGY | Facility: CLINIC | Age: 69
Discharge: HOME | End: 2024-12-31
Payer: MEDICARE

## 2024-12-31 VITALS — HEIGHT: 62 IN | WEIGHT: 141.09 LBS | BODY MASS INDEX: 25.96 KG/M2

## 2024-12-31 DIAGNOSIS — Z12.31 SCREENING MAMMOGRAM FOR BREAST CANCER: ICD-10-CM

## 2024-12-31 PROCEDURE — 77067 SCR MAMMO BI INCL CAD: CPT

## 2024-12-31 PROCEDURE — 77067 SCR MAMMO BI INCL CAD: CPT | Performed by: RADIOLOGY

## 2024-12-31 PROCEDURE — 77063 BREAST TOMOSYNTHESIS BI: CPT | Performed by: RADIOLOGY

## 2025-01-08 ENCOUNTER — APPOINTMENT (OUTPATIENT)
Dept: BEHAVIORAL HEALTH | Facility: CLINIC | Age: 70
End: 2025-01-08
Payer: MEDICARE

## 2025-01-08 DIAGNOSIS — F33.0 MILD EPISODE OF RECURRENT MAJOR DEPRESSIVE DISORDER (CMS-HCC): ICD-10-CM

## 2025-01-08 DIAGNOSIS — F41.9 ANXIETY: ICD-10-CM

## 2025-01-08 PROCEDURE — 99214 OFFICE O/P EST MOD 30 MIN: CPT | Performed by: PSYCHIATRY & NEUROLOGY

## 2025-01-08 PROCEDURE — 1159F MED LIST DOCD IN RCRD: CPT | Performed by: PSYCHIATRY & NEUROLOGY

## 2025-01-08 PROCEDURE — 1157F ADVNC CARE PLAN IN RCRD: CPT | Performed by: PSYCHIATRY & NEUROLOGY

## 2025-01-08 PROCEDURE — 1036F TOBACCO NON-USER: CPT | Performed by: PSYCHIATRY & NEUROLOGY

## 2025-01-08 PROCEDURE — 1160F RVW MEDS BY RX/DR IN RCRD: CPT | Performed by: PSYCHIATRY & NEUROLOGY

## 2025-01-08 RX ORDER — BUPROPION HYDROCHLORIDE 150 MG/1
150 TABLET, EXTENDED RELEASE ORAL 2 TIMES DAILY
Qty: 180 TABLET | Refills: 1 | Status: SHIPPED | OUTPATIENT
Start: 2025-01-08 | End: 2025-07-07

## 2025-01-08 RX ORDER — DULOXETIN HYDROCHLORIDE 60 MG/1
60 CAPSULE, DELAYED RELEASE ORAL DAILY
Qty: 90 CAPSULE | Refills: 1 | Status: SHIPPED | OUTPATIENT
Start: 2025-01-08

## 2025-01-08 NOTE — PROGRESS NOTES
"Outpatient Psychiatry FUV      Subjective   Saba Park, a 69 y.o. female, for virtual FUV  Virtual or Telephone Consent    An interactive audio and video telecommunication system which permits real time communications between the patient (at the originating site) and provider (at the distant site) was utilized to provide this telehealth service.   Verbal consent was requested and obtained from Saba Park on this date, 01/08/25 for a telehealth visit.   Confirmed to be home for appt today        Assessment/Plan   Patient Discussion:  CONTINUE duloxetine 60mg daily at bedtime   CONTINUE  buspirone 5mg 2x/day  CONTINUE bupropion SR 150mg 2x/day      RETURN to clinic 2/26 at 4:00PM for virtual FUV     Call with questions/concerns [phone]    Assessment:   69 y.o.  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 AVN R hip dx 2016. 2019 dx with ovarian CA in surveillance s/p resection and chemo. Pt had been see several time in 2015 ahead of MD maternity leave and transferred care to Dr. Nilay Farr, who has since left . 2021 pt re-established care and here for follow up     FUV 1/8/2025  Since last appt, contiues on chemo but has been responding, mood down given impact of chemo, hopeful for 1 more session.  Follow up 2 months, sooner if needed.     Diagnosis:   MDD, recurrent, p remission  Other spec anxiety r/o adjustment vs part of depression     Treatment Plan/Recommendations:   1. Safety Assessment: denies active thoughts of death/self harm though has had \"dark thoughts\". no h/o SI/SA. passive death wish in the past but not recently . RF include age, W, pain, cancer. PF include , engaged in care, no hx, no substance abuse, no guns. Currently low imminent risk     2. MDD, other spec anxiety r/o element of MDD vs ISIS  CONTINUE duloxetine 60mg PO QHS   CONTINUE buspar 5mg PO BID for now--consider " tapering  CONTINUE bupropion SR 150mg PO BID, monitor anxiety.      Consider resuming therapy, supportive therapy during appt--pt has also   stopped seeing therapist related to worse health and not able to keep up, defer for now     3. 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  elevated  Now back on chemo carboplatin/taxol      4. Social: lives with , continue to encourage behavioral activation. Does better when out and about but frustrated by physical limitations, has been limited this summer due to health    Reason for Visit:     FUV for depression, anxiety    Subjective:  Last appt 10/2024  Mostly consumed by chemo  2 weeks to recover, then 1 week until next treatment  Had to stop medication for essential tremor so that has been worse  Discussed DBS as alternative but unsure    Scan did show response to chemo, hoping 1 more session and won't need more    Feels like broken record seeing friends, nothing new, tired of talking about health    Sleeping a lot    Discussed meds, doing ok though down with limited activity. Does find enjoyment in activities at home (audiobooks, TV. Puzzles)    no SI or thoughts of not wanting to go on  No a/e to meds    Current Medications:    Current Outpatient Medications:     albuterol (ProAir HFA) 90 mcg/actuation inhaler, Inhale 1-2 puffs every 6 hours if needed for wheezing or shortness of breath., Disp: 8.5 g, Rfl: 0     buPROPion SR (Wellbutrin SR) 150 mg 12 hr tablet, TAKE 1 TABLET (150 MG) BY MOUTH 2 TIMES A DAY. DO NOT CRUSH, CHEW, OR SPLIT., Disp: 180 tablet, Rfl: 1    busPIRone (Buspar) 5 mg tablet, Take 1 tablet (5 mg) by mouth 2 times a day., Disp: 180 tablet, Rfl: 1    calcium carbonate (Oscal) 500 mg calcium (1,250 mg) tablet, Take 1 tablet (1,250 mg) by mouth 2 times daily (morning and late afternoon)., Disp: , Rfl:     cholecalciferol (Vitamin D-3) 50 mcg (2,000 unit) capsule, Take 1 capsule (50 mcg) by mouth once daily., Disp: , Rfl:     clobetasol (Temovate) 0.05 % cream, APPLY AS DIRECTED EVERY DAY, Disp: , Rfl:     dexAMETHasone (Decadron) 4 mg tablet, Take 20 mg (5 tabs) at bedtime the night before your treatment and then take 8 mg (2 tabs) for 3 days after each treatment., Disp: 11 tablet, Rfl: 0    DULoxetine (Cymbalta) 60 mg DR capsule, TAKE 1 CAPSULE DAILY IN THE EVENING, Disp: 90 capsule, Rfl: 2    imiquimod (Aldara) 5 % cream, APPLY AT BEDTIME MONDAY, WED, FRIDAY FOR 4 WEEKS TO UPPER FOREHEAD AND BRIDGE OF NOSE, Disp: , Rfl:     magnesium, amino acid chelate, 133 mg tablet, Take 1 tablet (133 mg) by mouth 2 times a day., Disp: , Rfl:     magnesium, as gluconate, (Magonate) 27 mg magnesium (500 mg) tablet, Take 2 tablets (54 mg) by mouth 2 times a day., Disp: 120 tablet, Rfl: 5    methadone (Dolophine) 10 mg tablet, Take 1 tablet (10 mg) by mouth every 12 hours. Do not fill before December 26, 2024., Disp: 60 tablet, Rfl: 0    ondansetron (Zofran) 8 mg tablet, Take 1 tablet (8 mg) by mouth every 8 hours if needed for nausea or vomiting., Disp: 30 tablet, Rfl: 5    oxyCODONE (Oxy-IR) 5 mg immediate release capsule, Take 1 capsule (5 mg) by mouth every 4 hours if needed for severe pain (7 - 10)., Disp: , Rfl:     polyethylene glycol (Glycolax, Miralax) 17 gram packet, Take 17 g by mouth 2 times a day as needed (constipation). Mix 1 cap (17g) into 8 ounces of fluid., Disp: 60 packet, Rfl: 2    [START ON 1/15/2025]  pregabalin (Lyrica) 75 mg capsule, Take 1 capsule (75 mg) by mouth 2 times a day. Do not fill before January 15, 2025., Disp: 60 capsule, Rfl: 5    propranolol (Inderal) 20 mg tablet, TAKE 2 TABLETS (40 MG) BY MOUTH 2 TIMES A DAY., Disp: 360 tablet, Rfl: 0    sennosides-docusate sodium (Joy-Colace) 8.6-50 mg tablet, Take 1-2 tablets by mouth 2 times a day as needed for constipation., Disp: 120 tablet, Rfl: 5  Medical History:  Past Medical History:   Diagnosis Date    Anxiety     Anxiety disorder, unspecified     Anxiety    Arthritis     Depression     Family history of malignant neoplasm of breast 02/28/2019    Family history of breast cancer    Gynecologic cancer (Multi) 09/18/2023    Hx antineoplastic chemo     Neuromuscular disorder (Multi)     Other conditions influencing health status     Nephrolithiasis    Ovarian cancer (Multi)     Personal history of malignant neoplasm of other parts of uterus 08/14/2020    History of malignant neoplasm of other parts of uterus    Personal history of non-Hodgkin lymphomas 08/14/2020    History of non-Hodgkin's lymphoma    Personal history of other diseases of the circulatory system     History of hypertension    Personal history of other diseases of the digestive system     History of irritable bowel syndrome    Personal history of other diseases of the respiratory system 01/10/2014    History of acute bronchitis    Personal history of other endocrine, nutritional and metabolic disease     History of hypercholesterolemia    Personal history of other malignant neoplasm of skin     History of basal cell carcinoma    Personal history of other specified conditions 10/28/2014    History of fibrocystic disease of breast    Portal vein thrombosis 09/18/2023    PORTAL VEIN THROMBOSIS I81    Presence of spectacles and contact lenses 12/09/2014    Wears contact lenses    Varicella     Visual impairment        Surgical History:  Past Surgical History:   Procedure Laterality Date     BRAIN SURGERY      CHOLECYSTECTOMY      EYE SURGERY      HYSTERECTOMY      JOINT REPLACEMENT      LITHOTRIPSY  2013    Renal Lithotripsy    LYMPH NODE BIOPSY      OTHER SURGICAL HISTORY  2013    Lithotomy    OTHER SURGICAL HISTORY  2019    Ileostomy closure    TONSILLECTOMY         Family History:  Family History   Problem Relation Name Age of Onset    Breast cancer Mother Mary Foley -  60    Stroke Mother Mary Foley -      Colon cancer Father Sherif Foley -      Cancer Father Sherif Foley -      COPD Father Sherif Foley -      Hearing loss Father Sherif Foley -      Heart disease Father Sherif Foley -      Ovarian cancer Maternal Grandmother      Breast cancer Mother's Sister Zamzam Campbell -  40    Breast cancer Cousin  50       Social History:  Social History     Socioeconomic History    Marital status:      Spouse name: Not on file    Number of children: Not on file    Years of education: Not on file    Highest education level: Not on file   Occupational History    Not on file   Tobacco Use    Smoking status: Never    Smokeless tobacco: Never   Substance and Sexual Activity    Alcohol use: Yes     Comment: 1 glass of wine daily    Drug use: Yes     Types: Marijuana     Comment: medical card / gummies    Sexual activity: Not on file   Other Topics Concern    Not on file   Social History Narrative    Not on file     Social Drivers of Health     Financial Resource Strain: Low Risk  (2024)    Overall Financial Resource Strain (CARDIA)     Difficulty of Paying Living Expenses: Not hard at all   Food Insecurity: No Food Insecurity (2024)    Hunger Vital Sign     Worried About Running Out of Food in the Last Year: Never true     Ran Out of Food in the Last Year: Never true   Transportation Needs: No Transportation Needs (2024)    PRAPARE - Transportation     Lack of Transportation (Medical): No     Lack of  "Transportation (Non-Medical): No   Physical Activity: Inactive (7/26/2024)    Exercise Vital Sign     Days of Exercise per Week: 0 days     Minutes of Exercise per Session: 0 min   Stress: Stress Concern Present (7/26/2024)    [employer] - Occupational Stress Questionnaire     Feeling of Stress : To some extent   Social Connections: Moderately Isolated (7/26/2024)    Social Connection and Isolation Panel [NHANES]     Frequency of Communication with Friends and Family: More than three times a week     Frequency of Social Gatherings with Friends and Family: Once a week     Attends Shinto Services: Never     Active Member of Clubs or Organizations: No     Attends Club or Organization Meetings: Never     Marital Status:    Intimate Partner Violence: Not At Risk (7/26/2024)    Humiliation, Afraid, Rape, and Kick questionnaire     Fear of Current or Ex-Partner: No     Emotionally Abused: No     Physically Abused: No     Sexually Abused: No   Housing Stability: Low Risk  (7/25/2024)    Housing Stability Vital Sign     Unable to Pay for Housing in the Last Year: No     Number of Times Moved in the Last Year: 1     Homeless in the Last Year: No              Medical Review Of Systems:  +neuropathy  +pain but better  +alopecia    Psychiatric Review Of Systems:  +more down       Objective   Mental Status Exam:     Appearance: dressed neat and clean.   Attitude: cooperative and engaged.   Behavior: appropriate eye contact via video.   Motor Activity: mild truncal tremor noted.   Speech: regular volume, prosody, no aphasia.   Mood: \"down\".   Affect: congruent, appropriate to circumstance  Thought Process: on topic.   Thought Content: no delusions, no SI/HI, less hopeless.   Thought Perception: no AVH.   Cognition: alert, oriented to person, place, time. attn intact.   Insight: fair.   Judgment: fair.       Vitals:  There were no vitals filed for this visit.  Encounter Date: 07/25/24   ECG " 12 lead   Result Value    Ventricular Rate 61    Atrial Rate 61    [place] Interval 126    QRS Duration 84    QT Interval 458    QTC Calculation(Bazett) 461    P Axis 49    R Axis 45    T Axis 36    QRS Count 10    Q Onset 215    P Onset 152    P Offset 201    T Offset 444    QTC Fredericia 460    Narrative    Normal sinus rhythm  Normal ECG  When compared with ECG of 05-APR-2023 12:43,  ST elevation now present in Inferior leads  Inverted T waves have replaced nonspecific T wave abnormality in Anterior leads  See ED provider note for full interpretation and clinical correlation  Confirmed by Valerie Choi ([id number]) on 7/25/2024 10:14:09 AM     Lab Results   Component Value Date    WBC 5.3 12/27/2024    HGB 10.0 (L) 12/27/2024    HCT 30.8 (L) 12/27/2024     (H) 12/27/2024     (L) 12/27/2024     Lab Results   Component Value Date    GLUCOSE 80 12/27/2024    CALCIUM 9.0 12/27/2024     12/27/2024    K 4.5 12/27/2024    CO2 30 12/27/2024     12/27/2024    BUN 19 12/27/2024    CREATININE 0.71 12/27/2024       Psychotherapy   Time: 15 minutes  Type: supportive  Target: mood, anxiety  Techniques: problem solving, validation  Goal: improved sx management  Follow up: next appt  Response: therese Ugalde MD

## 2025-01-10 ENCOUNTER — SOCIAL WORK (OUTPATIENT)
Dept: HEMATOLOGY/ONCOLOGY | Facility: CLINIC | Age: 70
End: 2025-01-10

## 2025-01-10 ENCOUNTER — APPOINTMENT (OUTPATIENT)
Dept: NEUROLOGY | Facility: CLINIC | Age: 70
End: 2025-01-10
Payer: MEDICARE

## 2025-01-10 DIAGNOSIS — G25.0 ESSENTIAL TREMOR: Primary | ICD-10-CM

## 2025-01-10 DIAGNOSIS — R42 ORTHOSTATIC LIGHTHEADEDNESS: ICD-10-CM

## 2025-01-10 PROCEDURE — 1157F ADVNC CARE PLAN IN RCRD: CPT | Performed by: NURSE PRACTITIONER

## 2025-01-10 PROCEDURE — 99214 OFFICE O/P EST MOD 30 MIN: CPT | Performed by: NURSE PRACTITIONER

## 2025-01-10 RX ORDER — PRIMIDONE 50 MG/1
100 TABLET ORAL NIGHTLY
COMMUNITY

## 2025-01-10 NOTE — PROGRESS NOTES
Subjective     Saba Park is a 69 y.o. year old female who presents with Tremors, here for follow up visit.    A video visit between the patient (at the originating site) and the provider (at the distant site) was utilized to provide this telehealth service.     Verbal consent was requested and obtained from the patient on this date for a telehealth visit. The patient was informed about the telehealth clinical encounter including benefits to avoiding travel, limitations to the assessment, and billing for the service. In person care may be recommended if needed. Telehealth sessions are not being recorded and personal health information is protected. All questions were answered and verbal informal consent was obtained from the patient to proceed.      HPI  Last visit 11/25/24 with me:  #Decrease propranolol to 40mg 2 times a day x 1 week. If dizziness is no better, decrease to 20mg 2 times a day x 1 week.   Touch base with me in 1 week via [employer] with blood pressure readings, let me know how dizziness is.    Will continue to slowly wean if not better.  #Monitor blood pressure at home starting tomorrow for 1 week   #Blood Pressure Monitoring: for dizziness and or low blood pressure  Sitting and standing blood pressure: Please take sitting blood pressure and heart rate and after standing up for 3 minutes while you are still standing take blood pressure and heart rate again  Do this twice a day (mid morning and mid afternoon) for 1 week   then send me the readings via [employer] or fax ([phone])  #Follow up virtual with me in 6 weeks    She did have SBP sitting in the 80s and 90s when standing.   Dizziness worse when bending over and standing up.     Dizzy spells are not any better, she has had 2 falls, no syncope.  Main complaint is in the dark she is unsteady, fell twice, once   Drinking more water.     Tremors are horrible, some days worse than others. Difficulty eating, would not go out to eat.  Difficulty  with makeup and contacts  Some days worse than others.     Continues chemo for ovarian cancer which is responding to chemo--this has been rough.    She feels neuropathy has not worsened, continues on lower dose of Lyrica (used to be 200mg BID)       Current ET meds:  Primidone 50mg, 2 tabs nightly - After chemo is over she can go back to 175 mg nightly dose per Dr. Ng  Propranolol 20mg, 2 tabs 2 times a day    Other prescriber  Pregabalin 75mg 2 times a day                    Current Outpatient Medications:     albuterol (ProAir HFA) 90 mcg/actuation inhaler, Inhale 1-2 puffs every 6 hours if needed for wheezing or shortness of breath., Disp: 8.5 g, Rfl: 0    buPROPion SR (Wellbutrin SR) 150 mg 12 hr tablet, Take 1 tablet (150 mg) by mouth 2 times a day. Do not crush, chew, or split., Disp: 180 tablet, Rfl: 1    busPIRone (Buspar) 5 mg tablet, Take 1 tablet (5 mg) by mouth 2 times a day., Disp: 180 tablet, Rfl: 1    calcium carbonate (Oscal) 500 mg calcium (1,250 mg) tablet, Take 1 tablet (1,250 mg) by mouth 2 times daily (morning and late afternoon)., Disp: , Rfl:     cholecalciferol (Vitamin D-3) 50 mcg (2,000 unit) capsule, Take 1 capsule (50 mcg) by mouth once daily., Disp: , Rfl:     clobetasol (Temovate) 0.05 % cream, APPLY AS DIRECTED EVERY DAY, Disp: , Rfl:     dexAMETHasone (Decadron) 4 mg tablet, Take 20 mg (5 tabs) at bedtime the night before your treatment and then take 8 mg (2 tabs) for 3 days after each treatment., Disp: 11 tablet, Rfl: 0    DULoxetine (Cymbalta) 60 mg DR capsule, Take 1 capsule (60 mg) by mouth once daily. Do not crush or chew., Disp: 90 capsule, Rfl: 1    imiquimod (Aldara) 5 % cream, APPLY AT BEDTIME MONDAY, WED, FRIDAY FOR 4 WEEKS TO UPPER FOREHEAD AND BRIDGE OF NOSE, Disp: , Rfl:     magnesium, amino acid chelate, 133 mg tablet, Take 1 tablet (133 mg) by mouth 2 times a day., Disp: , Rfl:     magnesium, as gluconate, (Magonate) 27 mg magnesium (500 mg) tablet, Take 2 tablets  (54 mg) by mouth 2 times a day., Disp: 120 tablet, Rfl: 5    methadone (Dolophine) 10 mg tablet, Take 1 tablet (10 mg) by mouth every 12 hours. Do not fill before December 26, 2024., Disp: 60 tablet, Rfl: 0    ondansetron (Zofran) 8 mg tablet, Take 1 tablet (8 mg) by mouth every 8 hours if needed for nausea or vomiting., Disp: 30 tablet, Rfl: 5    oxyCODONE (Oxy-IR) 5 mg immediate release capsule, Take 1 capsule (5 mg) by mouth every 4 hours if needed for severe pain (7 - 10)., Disp: , Rfl:     polyethylene glycol (Glycolax, Miralax) 17 gram packet, Take 17 g by mouth 2 times a day as needed (constipation). Mix 1 cap (17g) into 8 ounces of fluid., Disp: 60 packet, Rfl: 2    [START ON 1/15/2025] pregabalin (Lyrica) 75 mg capsule, Take 1 capsule (75 mg) by mouth 2 times a day. Do not fill before January 15, 2025., Disp: 60 capsule, Rfl: 5    primidone (Mysoline) 50 mg tablet, Take 2 tablets (100 mg) by mouth once daily at bedtime., Disp: , Rfl:     sennosides-docusate sodium (Joy-Colace) 8.6-50 mg tablet, Take 1-2 tablets by mouth 2 times a day as needed for constipation., Disp: 120 tablet, Rfl: 5     Objective   There were no vitals filed for this visit.                  Physical Exam  BUE 1+ postural and kinetic tremors+ achilles, BUE 2+    Handwriting with some tremor/legible  Spirals LUE 2+ and RUE 1+    Assessment/Plan   Ms. Saba Park is a 69 y.o. with history of CNS lymphoma and neuropathy and ovarian cancer for which she is doing chemotherapy. Seen virtually today for ET. Primidone was lowered last visit d/t request from oncologist and tremor has worsened. Propranolol also lowered by me last visit for [place] sx and falls, not improved at lower dose so will wean off and monitor BP.    After she is done with her chemotherapy and cancer and once it is back in remission we will repeat revisit the idea of deep brain stimulation because she has not been tolerant to primidone or propranolol. Has done DBS edu visit,  on hold for now.      Gait disorder: Continues from last in person visit. Offered PT previously, she would like to wait until after chemo. Neuropathy, deconditioning, meds/chemo are likely multifactorial causes and dizziness from hypotension.      Diagnoses and all orders for this visit:  Essential tremor  Orthostatic lightheadedness    #Decrease Propranolol 20mg 1 tab 2 times a day x 1 week, then 1 tab daily for a week, then stop.     After 2 weeks, send me a MyChart to let me know if tremors are any worse and how dizziness is.   Monitor BPs during this process and send to me too.     #Blood Pressure Monitoring: for dizziness and or low blood pressure  Sitting and standing blood pressure: Please take sitting blood pressure and heart rate and after standing up for 3 minutes while you are still standing take blood pressure and heart rate again  Do this twice a day (mid morning and mid afternoon) for 1 week   then send me the readings via [employer] or fax ([phone])    #check on insurance coverage with Jadyn kiQ watch  Jadyn kIQ watch for tremor: (not to be used during pregnancy, with DBS, pacemaker or implanted devices)  [email or web address]  Side effect: Some patients experienced electrical burns on the skin.     Some non medication option for tremor include:    -light wrist weights    -weighted utensils to help absorb tremor when you are eating  <Http://www.[employer]/>    Consider Liftware utensils for tremor and eating.   <https://www.liftware.com/>    Please visit https://essentialtremor.org/resource/assistive-devices/ for further information on devices available.      #Follow up in April/May with me--OK for virtual, would need to see you in person if you wanted to do the watch above as I would need to measure your wrist      Max Croft, AREN-C  Adult/Gerontological Nurse Practitioner   [facility], Department of Neurology  [facility]  [facility]  [address] [place] [address]  Phone: [phone]  Fax: [phone]

## 2025-01-10 NOTE — PATIENT INSTRUCTIONS
#Decrease Propranolol 20mg 1 tab 2 times a day x 1 week, then 1 tab daily for a week, then stop.     After 2 weeks, send me a MyChart to let me know if tremors are any worse and how dizziness is.   Monitor BPs during this process and send to me too.       #Blood Pressure Monitoring: for dizziness and or low blood pressure  Sitting and standing blood pressure: Please take sitting blood pressure and heart rate and after standing up for 3 minutes while you are still standing take blood pressure and heart rate again  Do this twice a day (mid morning and mid afternoon) for 1 week   then send me the readings via [employer] or fax ([phone])    #check on insurance coverage with Jadyn kiQ watch  Jadyn kIQ watch for tremor: (not to be used during pregnancy, with DBS, pacemaker or implanted devices)  [email or web address]  Side effect: Some patients experienced electrical burns on the skin.     Some non medication option for tremor include:    -light wrist weights    -weighted utensils to help absorb tremor when you are eating  <Http://www.[employer]/>    Consider Liftware utensils for tremor and eating.   <https://www.liftware.com/>    Please visit https://essentialtremor.org/resource/assistive-devices/ for further information on devices available.      #Follow up in April/May with me--OK for virtual, would need to see you in person if you wanted to do the watch above as I would need to measure your wrist      KIA ShoemakerC  Adult/Gerontological Nurse Practitioner   [facility], Department of Neurology  [facility]  [address]  [place] [id number]  Phone: [phone]  Fax: [phone]

## 2025-01-13 ENCOUNTER — TELEPHONE (OUTPATIENT)
Dept: GYNECOLOGIC ONCOLOGY | Facility: HOSPITAL | Age: 70
End: 2025-01-13
Payer: MEDICARE

## 2025-01-13 NOTE — TELEPHONE ENCOUNTER
Call from patient to discuss timing of CT scan after C6 treatment.  Patient was told that Dr. Kenny will order her CT scan when she sees her on 1/27/25 for C6.  CT will be scheduled on day of mediport draw (2/14) so Dr. Kenny can review it with her during clinic visit on 2/17.  Patient is aware that she is scheduled for a course # 7 treatment just in case on 2/17.

## 2025-01-17 ENCOUNTER — APPOINTMENT (OUTPATIENT)
Dept: HEMATOLOGY/ONCOLOGY | Facility: CLINIC | Age: 70
End: 2025-01-17
Payer: MEDICARE

## 2025-01-17 NOTE — PROGRESS NOTES
CALEB reached out to patient via phone today as patient called regarding questions about financial resources for outstanding medical debt. CALEB previously met with patient a few months ago during her infusion visit to go over applying for HCAP assistance through the hospital.  Patient also met recently with DUSTIN Sapp (acting as covering SW) who reviewed guidelines for HCAP.  Patient shared that she believes she has all the income documents needed and is requesting to meet [facility] me at her next appointment.  CALEB confirmed that I could meet with her in person and will plan to see her during her infusion visit.  She will bring all her income documents with her.  Social work will continue to follow.

## 2025-01-20 ENCOUNTER — APPOINTMENT (OUTPATIENT)
Dept: GYNECOLOGIC ONCOLOGY | Facility: CLINIC | Age: 70
End: 2025-01-20
Payer: MEDICARE

## 2025-01-20 ENCOUNTER — APPOINTMENT (OUTPATIENT)
Dept: HEMATOLOGY/ONCOLOGY | Facility: CLINIC | Age: 70
End: 2025-01-20
Payer: MEDICARE

## 2025-01-24 ENCOUNTER — INFUSION (OUTPATIENT)
Dept: HEMATOLOGY/ONCOLOGY | Facility: CLINIC | Age: 70
End: 2025-01-24
Payer: MEDICARE

## 2025-01-24 VITALS
OXYGEN SATURATION: 95 % | TEMPERATURE: 97.9 F | SYSTOLIC BLOOD PRESSURE: 142 MMHG | RESPIRATION RATE: 18 BRPM | HEART RATE: 77 BPM | DIASTOLIC BLOOD PRESSURE: 82 MMHG

## 2025-01-24 DIAGNOSIS — C56.9 MALIGNANT NEOPLASM OF OVARY, UNSPECIFIED LATERALITY (MULTI): ICD-10-CM

## 2025-01-24 LAB
BASOPHILS # BLD AUTO: 0.03 X10*3/UL (ref 0–0.1)
BASOPHILS NFR BLD AUTO: 0.8 %
EOSINOPHIL # BLD AUTO: 0.04 X10*3/UL (ref 0–0.7)
EOSINOPHIL NFR BLD AUTO: 1 %
ERYTHROCYTE [DISTWIDTH] IN BLOOD BY AUTOMATED COUNT: 15.1 % (ref 11.5–14.5)
HCT VFR BLD AUTO: 27 % (ref 36–46)
HGB BLD-MCNC: 8.9 G/DL (ref 12–16)
IMM GRANULOCYTES # BLD AUTO: 0 X10*3/UL (ref 0–0.7)
IMM GRANULOCYTES NFR BLD AUTO: 0 % (ref 0–0.9)
LYMPHOCYTES # BLD AUTO: 1.59 X10*3/UL (ref 1.2–4.8)
LYMPHOCYTES NFR BLD AUTO: 41.3 %
MCH RBC QN AUTO: 35.9 PG (ref 26–34)
MCHC RBC AUTO-ENTMCNC: 33 G/DL (ref 32–36)
MCV RBC AUTO: 109 FL (ref 80–100)
MONOCYTES # BLD AUTO: 0.34 X10*3/UL (ref 0.1–1)
MONOCYTES NFR BLD AUTO: 8.8 %
NEUTROPHILS # BLD AUTO: 1.85 X10*3/UL (ref 1.2–7.7)
NEUTROPHILS NFR BLD AUTO: 48.1 %
NRBC BLD-RTO: ABNORMAL /100{WBCS}
PLATELET # BLD AUTO: 175 X10*3/UL (ref 150–450)
RBC # BLD AUTO: 2.48 X10*6/UL (ref 4–5.2)
WBC # BLD AUTO: 3.9 X10*3/UL (ref 4.4–11.3)

## 2025-01-24 PROCEDURE — 36591 DRAW BLOOD OFF VENOUS DEVICE: CPT

## 2025-01-24 PROCEDURE — 83735 ASSAY OF MAGNESIUM: CPT

## 2025-01-24 PROCEDURE — 86304 IMMUNOASSAY TUMOR CA 125: CPT

## 2025-01-24 PROCEDURE — 2500000004 HC RX 250 GENERAL PHARMACY W/ HCPCS (ALT 636 FOR OP/ED): Performed by: NURSE PRACTITIONER

## 2025-01-24 PROCEDURE — 80053 COMPREHEN METABOLIC PANEL: CPT

## 2025-01-24 PROCEDURE — 85025 COMPLETE CBC W/AUTO DIFF WBC: CPT

## 2025-01-24 RX ORDER — HEPARIN 100 UNIT/ML
500 SYRINGE INTRAVENOUS AS NEEDED
OUTPATIENT
Start: 2025-01-24

## 2025-01-24 RX ORDER — HEPARIN 100 UNIT/ML
500 SYRINGE INTRAVENOUS AS NEEDED
Status: DISCONTINUED | OUTPATIENT
Start: 2025-01-24 | End: 2025-01-24 | Stop reason: HOSPADM

## 2025-01-24 RX ORDER — HEPARIN SODIUM,PORCINE/PF 10 UNIT/ML
50 SYRINGE (ML) INTRAVENOUS AS NEEDED
OUTPATIENT
Start: 2025-01-24

## 2025-01-24 RX ADMIN — HEPARIN 500 UNITS: 100 SYRINGE at 14:24

## 2025-01-24 ASSESSMENT — PAIN SCALES - GENERAL: PAINLEVEL_OUTOF10: 0-NO PAIN

## 2025-01-25 LAB
ALBUMIN SERPL BCP-MCNC: 3.9 G/DL (ref 3.4–5)
ALP SERPL-CCNC: 84 U/L (ref 33–136)
ALT SERPL W P-5'-P-CCNC: 11 U/L (ref 7–45)
ANION GAP SERPL CALC-SCNC: 14 MMOL/L (ref 10–20)
AST SERPL W P-5'-P-CCNC: 11 U/L (ref 9–39)
BILIRUB SERPL-MCNC: 0.3 MG/DL (ref 0–1.2)
BUN SERPL-MCNC: 18 MG/DL (ref 6–23)
CALCIUM SERPL-MCNC: 9.1 MG/DL (ref 8.6–10.6)
CANCER AG125 SERPL-ACNC: 17.1 U/ML (ref 0–30.2)
CHLORIDE SERPL-SCNC: 104 MMOL/L (ref 98–107)
CO2 SERPL-SCNC: 28 MMOL/L (ref 21–32)
CREAT SERPL-MCNC: 1.08 MG/DL (ref 0.5–1.05)
EGFRCR SERPLBLD CKD-EPI 2021: 56 ML/MIN/1.73M*2
GLUCOSE SERPL-MCNC: 76 MG/DL (ref 74–99)
MAGNESIUM SERPL-MCNC: 1.79 MG/DL (ref 1.6–2.4)
POTASSIUM SERPL-SCNC: 4.6 MMOL/L (ref 3.5–5.3)
PROT SERPL-MCNC: 6.1 G/DL (ref 6.4–8.2)
SODIUM SERPL-SCNC: 141 MMOL/L (ref 136–145)

## 2025-01-25 RX ORDER — DIPHENHYDRAMINE HYDROCHLORIDE 50 MG/ML
50 INJECTION INTRAMUSCULAR; INTRAVENOUS AS NEEDED
OUTPATIENT
Start: 2025-01-27

## 2025-01-25 RX ORDER — FAMOTIDINE 10 MG/ML
20 INJECTION INTRAVENOUS ONCE
OUTPATIENT
Start: 2025-01-27

## 2025-01-25 RX ORDER — EPINEPHRINE 0.3 MG/.3ML
0.3 INJECTION SUBCUTANEOUS EVERY 5 MIN PRN
OUTPATIENT
Start: 2025-01-27

## 2025-01-25 RX ORDER — PROCHLORPERAZINE EDISYLATE 5 MG/ML
10 INJECTION INTRAMUSCULAR; INTRAVENOUS EVERY 6 HOURS PRN
OUTPATIENT
Start: 2025-01-27

## 2025-01-25 RX ORDER — PROCHLORPERAZINE MALEATE 10 MG
10 TABLET ORAL EVERY 6 HOURS PRN
OUTPATIENT
Start: 2025-01-27

## 2025-01-25 RX ORDER — FAMOTIDINE 10 MG/ML
20 INJECTION INTRAVENOUS ONCE AS NEEDED
OUTPATIENT
Start: 2025-01-27

## 2025-01-25 RX ORDER — PALONOSETRON 0.05 MG/ML
0.25 INJECTION, SOLUTION INTRAVENOUS ONCE
OUTPATIENT
Start: 2025-01-27

## 2025-01-25 RX ORDER — DIPHENHYDRAMINE HCL 25 MG
25 CAPSULE ORAL ONCE
OUTPATIENT
Start: 2025-01-27

## 2025-01-25 RX ORDER — ALBUTEROL SULFATE 0.83 MG/ML
3 SOLUTION RESPIRATORY (INHALATION) AS NEEDED
OUTPATIENT
Start: 2025-01-27

## 2025-01-27 ENCOUNTER — SOCIAL WORK (OUTPATIENT)
Dept: HEMATOLOGY/ONCOLOGY | Facility: CLINIC | Age: 70
End: 2025-01-27

## 2025-01-27 ENCOUNTER — INFUSION (OUTPATIENT)
Dept: HEMATOLOGY/ONCOLOGY | Facility: CLINIC | Age: 70
End: 2025-01-27
Payer: MEDICARE

## 2025-01-27 ENCOUNTER — OFFICE VISIT (OUTPATIENT)
Dept: GYNECOLOGIC ONCOLOGY | Facility: CLINIC | Age: 70
End: 2025-01-27
Payer: MEDICARE

## 2025-01-27 VITALS
BODY MASS INDEX: 25.24 KG/M2 | HEART RATE: 96 BPM | RESPIRATION RATE: 17 BRPM | SYSTOLIC BLOOD PRESSURE: 159 MMHG | OXYGEN SATURATION: 94 % | TEMPERATURE: 97.2 F | DIASTOLIC BLOOD PRESSURE: 101 MMHG | WEIGHT: 138.01 LBS

## 2025-01-27 VITALS — DIASTOLIC BLOOD PRESSURE: 90 MMHG | SYSTOLIC BLOOD PRESSURE: 158 MMHG

## 2025-01-27 DIAGNOSIS — Z51.11 CHEMOTHERAPY MANAGEMENT, ENCOUNTER FOR: ICD-10-CM

## 2025-01-27 DIAGNOSIS — C56.9 MALIGNANT NEOPLASM OF OVARY, UNSPECIFIED LATERALITY (MULTI): Primary | ICD-10-CM

## 2025-01-27 DIAGNOSIS — C56.9 MALIGNANT NEOPLASM OF OVARY, UNSPECIFIED LATERALITY (MULTI): ICD-10-CM

## 2025-01-27 PROCEDURE — 96413 CHEMO IV INFUSION 1 HR: CPT

## 2025-01-27 PROCEDURE — 1157F ADVNC CARE PLAN IN RCRD: CPT | Performed by: NURSE PRACTITIONER

## 2025-01-27 PROCEDURE — 2500000001 HC RX 250 WO HCPCS SELF ADMINISTERED DRUGS (ALT 637 FOR MEDICARE OP): Performed by: OBSTETRICS & GYNECOLOGY

## 2025-01-27 PROCEDURE — 1125F AMNT PAIN NOTED PAIN PRSNT: CPT | Performed by: NURSE PRACTITIONER

## 2025-01-27 PROCEDURE — 2500000004 HC RX 250 GENERAL PHARMACY W/ HCPCS (ALT 636 FOR OP/ED): Performed by: OBSTETRICS & GYNECOLOGY

## 2025-01-27 PROCEDURE — 1159F MED LIST DOCD IN RCRD: CPT | Performed by: NURSE PRACTITIONER

## 2025-01-27 PROCEDURE — 96375 TX/PRO/DX INJ NEW DRUG ADDON: CPT | Mod: INF

## 2025-01-27 PROCEDURE — 3077F SYST BP >= 140 MM HG: CPT | Performed by: NURSE PRACTITIONER

## 2025-01-27 PROCEDURE — 96415 CHEMO IV INFUSION ADDL HR: CPT

## 2025-01-27 PROCEDURE — 2500000004 HC RX 250 GENERAL PHARMACY W/ HCPCS (ALT 636 FOR OP/ED): Performed by: NURSE PRACTITIONER

## 2025-01-27 PROCEDURE — G2211 COMPLEX E/M VISIT ADD ON: HCPCS | Performed by: NURSE PRACTITIONER

## 2025-01-27 PROCEDURE — 96417 CHEMO IV INFUS EACH ADDL SEQ: CPT

## 2025-01-27 PROCEDURE — 99215 OFFICE O/P EST HI 40 MIN: CPT | Mod: 25 | Performed by: NURSE PRACTITIONER

## 2025-01-27 PROCEDURE — 99215 OFFICE O/P EST HI 40 MIN: CPT | Performed by: NURSE PRACTITIONER

## 2025-01-27 PROCEDURE — 3080F DIAST BP >= 90 MM HG: CPT | Performed by: NURSE PRACTITIONER

## 2025-01-27 RX ORDER — ALBUTEROL SULFATE 0.83 MG/ML
3 SOLUTION RESPIRATORY (INHALATION) AS NEEDED
Status: DISCONTINUED | OUTPATIENT
Start: 2025-01-27 | End: 2025-01-27 | Stop reason: HOSPADM

## 2025-01-27 RX ORDER — HEPARIN SODIUM,PORCINE/PF 10 UNIT/ML
50 SYRINGE (ML) INTRAVENOUS AS NEEDED
OUTPATIENT
Start: 2025-01-27

## 2025-01-27 RX ORDER — PROCHLORPERAZINE EDISYLATE 5 MG/ML
10 INJECTION INTRAMUSCULAR; INTRAVENOUS EVERY 6 HOURS PRN
Status: DISCONTINUED | OUTPATIENT
Start: 2025-01-27 | End: 2025-01-27 | Stop reason: HOSPADM

## 2025-01-27 RX ORDER — HEPARIN 100 UNIT/ML
500 SYRINGE INTRAVENOUS AS NEEDED
OUTPATIENT
Start: 2025-01-27

## 2025-01-27 RX ORDER — PALONOSETRON 0.05 MG/ML
0.25 INJECTION, SOLUTION INTRAVENOUS ONCE
Status: COMPLETED | OUTPATIENT
Start: 2025-01-27 | End: 2025-01-27

## 2025-01-27 RX ORDER — PROCHLORPERAZINE MALEATE 10 MG
10 TABLET ORAL EVERY 6 HOURS PRN
Status: DISCONTINUED | OUTPATIENT
Start: 2025-01-27 | End: 2025-01-27 | Stop reason: HOSPADM

## 2025-01-27 RX ORDER — HEPARIN SODIUM,PORCINE/PF 10 UNIT/ML
50 SYRINGE (ML) INTRAVENOUS AS NEEDED
Status: DISCONTINUED | OUTPATIENT
Start: 2025-01-27 | End: 2025-01-27 | Stop reason: HOSPADM

## 2025-01-27 RX ORDER — EPINEPHRINE 0.3 MG/.3ML
0.3 INJECTION SUBCUTANEOUS EVERY 5 MIN PRN
Status: DISCONTINUED | OUTPATIENT
Start: 2025-01-27 | End: 2025-01-27 | Stop reason: HOSPADM

## 2025-01-27 RX ORDER — HEPARIN 100 UNIT/ML
500 SYRINGE INTRAVENOUS AS NEEDED
Status: DISCONTINUED | OUTPATIENT
Start: 2025-01-27 | End: 2025-01-27 | Stop reason: HOSPADM

## 2025-01-27 RX ORDER — FAMOTIDINE 10 MG/ML
20 INJECTION INTRAVENOUS ONCE
Status: COMPLETED | OUTPATIENT
Start: 2025-01-27 | End: 2025-01-27

## 2025-01-27 RX ORDER — DIPHENHYDRAMINE HCL 25 MG
25 CAPSULE ORAL ONCE
Status: COMPLETED | OUTPATIENT
Start: 2025-01-27 | End: 2025-01-27

## 2025-01-27 RX ORDER — PROPRANOLOL HYDROCHLORIDE 40 MG/1
40 TABLET ORAL DAILY
COMMUNITY

## 2025-01-27 RX ORDER — FAMOTIDINE 10 MG/ML
20 INJECTION INTRAVENOUS ONCE AS NEEDED
Status: DISCONTINUED | OUTPATIENT
Start: 2025-01-27 | End: 2025-01-27 | Stop reason: HOSPADM

## 2025-01-27 RX ORDER — DIPHENHYDRAMINE HYDROCHLORIDE 50 MG/ML
50 INJECTION INTRAMUSCULAR; INTRAVENOUS AS NEEDED
Status: DISCONTINUED | OUTPATIENT
Start: 2025-01-27 | End: 2025-01-27 | Stop reason: HOSPADM

## 2025-01-27 RX ADMIN — PALONOSETRON HYDROCHLORIDE 250 MCG: 0.25 INJECTION INTRAVENOUS at 10:45

## 2025-01-27 RX ADMIN — HEPARIN 500 UNITS: 100 SYRINGE at 15:10

## 2025-01-27 RX ADMIN — DEXAMETHASONE 20 MG: 6 TABLET ORAL at 10:45

## 2025-01-27 RX ADMIN — DIPHENHYDRAMINE HYDROCHLORIDE 25 MG: 25 CAPSULE ORAL at 10:45

## 2025-01-27 RX ADMIN — DEXTROSE MONOHYDRATE 228 MG: 50 INJECTION, SOLUTION INTRAVENOUS at 11:21

## 2025-01-27 RX ADMIN — CARBOPLATIN 350 MG: 10 INJECTION INTRAVENOUS at 14:21

## 2025-01-27 RX ADMIN — FAMOTIDINE 20 MG: 10 INJECTION INTRAVENOUS at 10:45

## 2025-01-27 ASSESSMENT — PAIN SCALES - GENERAL: PAINLEVEL_OUTOF10: 2

## 2025-01-27 NOTE — PROGRESS NOTES
Patient ID: Saba Park is a 69 y.o. female.  Referring Physician: No referring provider defined for this encounter.  Primary Care Provider: DEMETRIUS Orourke-CNP    Subjective    Presenting for cycle 6 carbo/taxol. Overall tolerating treatment well. Has stable chronic right hip pain. Constipation has been managed with Miralax. Appetite decreased for several days following treatment. Reports improvement in neuropathy. No new lower extremity edema. Denies SOB, chest pain.       IMPRESSION:  CHEST:  1.  Interval decreased size of previously seen right cardiophrenic  lymph nodes as above.  2. No new intrathoracic metastatic disease.      ABDOMEN-PELVIS:  1.  No new subdiaphragmatic metastatic disease.  2. Interval cholecystectomy.  3. Multiple right renal pelvic calcifications are now noted measuring  9 and 7 mm associated urothelial enhancement of the right renal  pelvis and proximal ureter. Clinically correlate for exclusion of  ascending infection.  4. Remaining findings appear similar to prior comparison imaging.      Objective    BSA: 1.65 meters squared  Pulse 96   Temp 36.2 °C (97.2 °F) (Temporal)   Resp 17   Wt 62.6 kg (138 lb 0.1 oz)   SpO2 94%   BMI 25.24 kg/m²      Physical Exam:    Constitutional: Doing well.   Eyes: PERRL  ENMT: Moist mucus membranes  Head/Neck: Supple. Symmetrical  Cardiovascular: Regular, rate and rhythm. 2+ equal pulses of the extremities  Respiratory/Thorax: CTA. RRR. Chest rise symmetrical.  Musculoskeletal: ROM intact, no joint swelling, normal strength  Extremities: No edema  Neurological: Alert and oriented x 3. Pleasant and cooperative.  Lymphatic: No lymphedema  Psychological: Appropriate mood and behavior  Skin: Warm and dry, no lesions, no rashes    A complete review of systems was performed and all systems were normal except what is noted in the interval history.      Performance Status:  Symptomatic; in bed <50% of the day    Assessment/Plan   Patient is a 69 year  old female with recurrent ovarian cancer now s/p 5 cycles Carbo/Taxol.     Plan:    Cycle #6 today, no dose modifications    CT after cycle this cycle    Follow up per chemo calendar    Creatinine elevated from 0.71 mto 1.08, will continue to monitor, encouraged PO hydration       Oncology History Overview Note   Cancer History:     -Dx 2019 -high grade serous ovarian cancerCT (1/24/19): mixed solid cystic mass in her right ovary for malignancy. Questionable invasion into the uterus.  Questionable mesenteric implants.  It looks like one of them is small bowel mesentery, maybe omentum.   The rest look more omental to me. kidney stone.  Complex lesion in her left kidney presumably cystic but can't rule out solid area.   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  Felt to be undebulkable on laparoscopy, plan for NACT  - 3 cycles NACT  -Genetics: SLX-4 gene of unk significance.  - May 2019 debulking, included ileostomy.  - 3 additional cycles chemo - carbo/taxol  - 8/26/19 last chemotherapy  8/2024 recurrence - mediastinal and internal mammary Lns  - bx of LN c/w recurrent high grade serous carcinoma  9/2024 started carbo/taxol       Malignant neoplasm of unspecified ovary (Multi)   12/1/2022 Initial Diagnosis    Malignant neoplasm of unspecified ovary (Multi)     9/17/2024 -  Chemotherapy    PACLitaxel / CARBOplatin, 21 Day Cycles - Gyn         Treatment Plans       Name Type Plan Dates Plan Provider         Active    PACLitaxel / CARBOplatin, 21 Day Cycles - Gyn Oncology Treatment 9/16/2024 - Present Krista Kenny MD

## 2025-01-28 ENCOUNTER — TELEPHONE (OUTPATIENT)
Dept: GYNECOLOGIC ONCOLOGY | Facility: HOSPITAL | Age: 70
End: 2025-01-28
Payer: MEDICARE

## 2025-01-28 NOTE — PROGRESS NOTES
Social work continues to follow patient for ongoing support and assessment.  Met with patient today during her infusion visit.  Patient brought in paperwork as she is trying to apply for hospital assistance to help with medical debt.  LSW reviewed paperwork and explained that we would still need additional income documents for her and her  from 2023, 2024 and 2025.  Patient states that she will speak with her  and then will plan to bring the documents back in. Provided her with a check list of the documents that are needed.  Social work will continue to follow.

## 2025-01-28 NOTE — TELEPHONE ENCOUNTER
Received message that patient had questions after yesterday's visit. Call to patient and she did not have questions. Verified that she was having her mediport blood work and CT at [facility]. She states she is ok.

## 2025-01-29 ENCOUNTER — TELEPHONE (OUTPATIENT)
Dept: GYNECOLOGIC ONCOLOGY | Facility: HOSPITAL | Age: 70
End: 2025-01-29
Payer: MEDICARE

## 2025-01-29 ENCOUNTER — TELEPHONE (OUTPATIENT)
Dept: ADMISSION | Facility: HOSPITAL | Age: 70
End: 2025-01-29
Payer: MEDICARE

## 2025-01-29 DIAGNOSIS — T45.1X5A CHEMOTHERAPY-INDUCED PERIPHERAL NEUROPATHY (MULTI): ICD-10-CM

## 2025-01-29 DIAGNOSIS — G62.0 CHEMOTHERAPY-INDUCED PERIPHERAL NEUROPATHY (MULTI): ICD-10-CM

## 2025-01-29 DIAGNOSIS — G89.29 OTHER CHRONIC PAIN: ICD-10-CM

## 2025-01-29 DIAGNOSIS — C56.9 MALIGNANT NEOPLASM OF OVARY, UNSPECIFIED LATERALITY (MULTI): ICD-10-CM

## 2025-01-29 RX ORDER — DEXAMETHASONE 4 MG/1
TABLET ORAL
Qty: 11 TABLET | Refills: 0 | Status: SHIPPED | OUTPATIENT
Start: 2025-01-29

## 2025-01-29 RX ORDER — METHADONE HYDROCHLORIDE 10 MG/1
10 TABLET ORAL EVERY 12 HOURS
Qty: 60 TABLET | Refills: 0 | Status: SHIPPED | OUTPATIENT
Start: 2025-01-29 | End: 2025-02-28

## 2025-01-29 NOTE — TELEPHONE ENCOUNTER
Call from patient to discuss toxicities and how hers have worsened with the last few cycles.  Discussed balance in particular.  She discussed wanting to do some PT to gain strength.  She knows a private PT/OT that can come to her home.  She will need a referral.  I will ask Dr. Kenny to enter a referral and I will print it out and mail it to the patient.  Patient does not want  PT/OT services at this time.  Discussed that she will need to take the Dexamethasone at bedtime prior to her next treatment.  New Rx sent to [facility] to send in.

## 2025-01-29 NOTE — TELEPHONE ENCOUNTER
Patient last seen by DEMETRIUS Hirsch on 12/20 with plan to continue methadone 10mg BID. Follow up visit is scheduled for 2/7. OARRS reviewed and no aberrancy noted. Patient last filled methadone 10mg #60/30 days on 12/30. Prescription pended to provider to approve.

## 2025-01-29 NOTE — TELEPHONE ENCOUNTER
PT requesting refill   Methadone 10mg. 1 tablet every 12 hrs   Pharmacy: [facility] [address] in [place]  Last FUV 12/20, next FUV 2/7

## 2025-01-30 ENCOUNTER — DOCUMENTATION (OUTPATIENT)
Dept: GYNECOLOGIC ONCOLOGY | Facility: HOSPITAL | Age: 70
End: 2025-01-30
Payer: MEDICARE

## 2025-01-30 NOTE — PROGRESS NOTES
PT and OT paper referrals mailed to patient per her request.  She is going to see a non- PT/OT.

## 2025-01-31 ENCOUNTER — APPOINTMENT (OUTPATIENT)
Dept: PALLIATIVE MEDICINE | Facility: CLINIC | Age: 70
End: 2025-01-31
Payer: MEDICARE

## 2025-01-31 DIAGNOSIS — F41.9 ANXIETY: ICD-10-CM

## 2025-01-31 DIAGNOSIS — F33.0 MILD EPISODE OF RECURRENT MAJOR DEPRESSIVE DISORDER (CMS-HCC): ICD-10-CM

## 2025-01-31 RX ORDER — BUSPIRONE HYDROCHLORIDE 5 MG/1
5 TABLET ORAL 2 TIMES DAILY
Qty: 180 TABLET | Refills: 1 | Status: SHIPPED | OUTPATIENT
Start: 2025-01-31

## 2025-02-03 NOTE — PROGRESS NOTES
[facility]IVE [facility] PALLIATIVE ONCOLOGY OUTPATIENT FOLLOW-UP      SERVICE DATE: 2/7/2025    Subjective   HISTORY OF PRESENT ILLNESS: Saba Park is a 69 y.o. female who presents with history of CNS lymphoma and ovarian cancer. Currently in active surveillance CNS lymphoma through Dr. Harris at [facility]. Patient with recurrence of Ovarian Ca noted in 2 retroperitoneal LN on CT 7/2024. Patient follows with Supportive Oncology/Palliative Care for chronic pain r/t disease history/treatment and further symptom management.       Pain Assessment:  Pain Score:  3  Location:  right hip  Description:      Symptom Assessment:  Pain:none - intermittent pain in the right hip, more aggravated when she is active on it. Well managed  Numbness or Tingling in hands/feet/other: a little - neuropathy with well managed with Methadone and Pregabalin 75mg BID  Sore Muscles/Spasms: none  Headache: none  Dizziness:none  Constipation: a little - has been more manageable recently using laxatives as needed  Diarrhea: none  Nausea: none  Vomiting: none  Lack of Appetite: none   Weight Loss: none  Taste changes: none  Dry Mouth: none  Pain in Mouth/Swallowing: none  Lack of Energy: a little - reports some general fatigue but has also been getting over bronchitis  Difficulty Sleeping: none  Worrying: none  Anxiety: none  Depression: none  Shortness of breath: none  Other: a little - has had continued issues of balance and being unsteady on her feet. Continues to follow with her neurologist. Note Dr. Kenny referred her to PT and OT      Information obtained from: chart review and interview of patient  ______________________________________________________________________        Objective     Infusion on 01/24/2025   Component Date Value Ref Range Status    WBC 01/24/2025 3.9 (L)  4.4 - 11.3 x10*3/uL Final    nRBC 01/24/2025    Final    RBC 01/24/2025 2.48 (L)  4.00 - 5.20 x10*6/uL Final    Hemoglobin 01/24/2025 8.9 (L)  12.0 - 16.0 g/dL Final     Hematocrit 01/24/2025 27.0 (L)  36.0 - 46.0 % Final    MCV 01/24/2025 109 (H)  80 - 100 fL Final    MCH 01/24/2025 35.9 (H)  26.0 - 34.0 pg Final    MCHC 01/24/2025 33.0  32.0 - 36.0 g/dL Final    RDW 01/24/2025 15.1 (H)  11.5 - 14.5 % Final    Platelets 01/24/2025 175  150 - 450 x10*3/uL Final    Neutrophils % 01/24/2025 48.1  40.0 - 80.0 % Final    Immature Granulocytes %, Automated 01/24/2025 0.0  0.0 - 0.9 % Final    Lymphocytes % 01/24/2025 41.3  13.0 - 44.0 % Final    Monocytes % 01/24/2025 8.8  2.0 - 10.0 % Final    Eosinophils % 01/24/2025 1.0  0.0 - 6.0 % Final    Basophils % 01/24/2025 0.8  0.0 - 2.0 % Final    Neutrophils Absolute 01/24/2025 1.85  1.20 - 7.70 x10*3/uL Final    Immature Granulocytes Absolute, Au* 01/24/2025 0.00  0.00 - 0.70 x10*3/uL Final    Lymphocytes Absolute 01/24/2025 1.59  1.20 - 4.80 x10*3/uL Final    Monocytes Absolute 01/24/2025 0.34  0.10 - 1.00 x10*3/uL Final    Eosinophils Absolute 01/24/2025 0.04  0.00 - 0.70 x10*3/uL Final    Basophils Absolute 01/24/2025 0.03  0.00 - 0.10 x10*3/uL Final    Glucose 01/24/2025 76  74 - 99 mg/dL Final    Sodium 01/24/2025 141  136 - 145 mmol/L Final    Potassium 01/24/2025 4.6  3.5 - 5.3 mmol/L Final    Chloride 01/24/2025 104  98 - 107 mmol/L Final    Bicarbonate 01/24/2025 28  21 - 32 mmol/L Final    Anion Gap 01/24/2025 14  10 - 20 mmol/L Final    Urea Nitrogen 01/24/2025 18  6 - 23 mg/dL Final    Creatinine 01/24/2025 1.08 (H)  0.50 - 1.05 mg/dL Final    eGFR 01/24/2025 56 (L)  >60 mL/min/1.73m*2 Final    Calcium 01/24/2025 9.1  8.6 - 10.6 mg/dL Final    Albumin 01/24/2025 3.9  3.4 - 5.0 g/dL Final    Alkaline Phosphatase 01/24/2025 84  33 - 136 U/L Final    Total Protein 01/24/2025 6.1 (L)  6.4 - 8.2 g/dL Final    AST 01/24/2025 11  9 - 39 U/L Final    Bilirubin, Total 01/24/2025 0.3  0.0 - 1.2 mg/dL Final    ALT 01/24/2025 11  7 - 45 U/L Final    Cancer  01/24/2025 17.1  0.0 - 30.2 U/mL Final    Magnesium 01/24/2025 1.79  1.60 -  "2.40 mg/dL Final       PHYSICAL EXAMINATION   Vital Signs:   Vital signs reviewed      12/30/2024     9:19 AM 12/31/2024     2:37 PM 1/24/2025     2:18 PM 1/27/2025     9:36 AM 1/27/2025    10:21 AM 1/27/2025     1:00 PM 2/7/2025    12:05 PM   Vitals   Systolic 134  142 159 163 158 118   Diastolic 85  82 101 97 90 70   BP Location Right arm  Left arm Right arm      Heart Rate 86  77 96   78   Temp 36.5 °C (97.7 °F)  36.6 °C (97.9 °F) 36.2 °C (97.2 °F)   36.5 °C (97.7 °F)   Resp 16  18 17   18   Height  1.575 m (5' 2.01\")        Weight (lb) 141.09 141.09  138.01   138.67   BMI 25.81 kg/m2 25.8 kg/m2  25.24 kg/m2   25.36 kg/m2   BSA (m2) 1.67 m2 1.67 m2  1.65 m2   1.66 m2   Visit Report Report   Report Report Report Report     Physical Exam  Vitals reviewed.   Constitutional:       Appearance: Normal appearance.   HENT:      Head: Normocephalic.      Comments: alopecia  Cardiovascular:      Rate and Rhythm: Normal rate and regular rhythm.   Pulmonary:      Effort: Pulmonary effort is normal.   Abdominal:      General: Abdomen is flat.      Palpations: Abdomen is soft.   Musculoskeletal:         General: Normal range of motion.   Skin:     General: Skin is warm and dry.   Neurological:      General: No focal deficit present.      Mental Status: She is alert and oriented to person, place, and time. Mental status is at baseline.      Motor: Weakness present.      Coordination: Coordination abnormal.      Comments: tremors   Psychiatric:         Mood and Affect: Mood normal.         Behavior: Behavior normal.         Thought Content: Thought content normal.         Judgment: Judgment normal.         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 Completely anbx therapy  Type: neuropathic  Pain control: sub-optimally controlled  Home regimen:   - S/P [facility] 5/19/22  - Continue Ibuprofen 600mg q6 hours PRN  - Continue Methadone 10mg BID  - EKG (4/5/23) QTc 431. (7/25/2024) . Repeat with increase in Methadone dose  - Pursed [facility] 7/8/24 to 7/18/24 (total of 8 sessions) - noted worsening radiating pain from hip, but slight improvement in numbness/tingling of her foot. Did not complete further sessions due to extreme hip/leg pain  - Continue Pregabalin 75mg BID  - Continue Acetaminophen 500mg [id number] PRN  - Continue Oxycodone 5mg [id number] PRN for breakthrough pain - rarely uses  - Continue Duloxetine 60mg/daily  - Completed program through chiropractor w/o change in pain  - Referral to Symptom Clinic - Dr Silverman - pursued acupuncture with notable change in neuropathy/pain, has not continued  - Continue following with Chronic Pain (Dr. Alarcon) - no follow-up scheduled at this time   - Continue following with podiatry   Intolerances/previously tried:   - Alpha Liproic Acid - not effective  - Gabapentin - caused excessive lethargy     Opioid Use  Medication Management:   - OARRS report reviewed with no aberrant behavior; consistent with  prescriptions/records and patient history  - MED 90.  Overdose Risk Score 100.   This has been discussed with patient.   - We will continue to closely monitor the patient for signs of prescription misuse including UDS, OARRS review and subjective reports at each visit.  - No concurrent benzodiazepine use   - I am a provider who either is or has consulted and collaborated with a provider certified in Hospice and Palliative Medicine and have conducted a face-face visit and examination for this patient.  - Routine Urine Drug Screen: 8/23/2024 appropriately positive for opioids and negative for illicit substances.  - Controlled Substance Agreement:  8/23/2024  - Specifically discussed that controlled substance prescriptions will only be provided by our group as outlined in the completed agreement  - Prescribed naloxone: patient declined  - Red Flags: NA     Constipation  - Continue Senna-S 1-2 tabs BID PRN  - Continue Miralax 17gram 1-2x daily PRN  - Discussed starting Loperamide 2mg PRN diarrhea - MAX 8caps/24 hours     Nausea/Loss of Appetite  - Continue Omeprazole 20mg daily  - Continue Ondansetron 8mg [id number] PRN  - Continue Metoclopramide 10mg [id number] PRN  - Encouraged good hydration   - Encouraged small more frequent meals  - Encouraged increase in caloric intake and protein supplements  - Referral to dietician (10/3/2024)  - Discussed use of lemon heads to assist with taste  - Discussed medications to help stimulate appetite - patient declined today     Altered Mood  Chronic anxiety and depression related to health concerns   Home regimen:   - Continue Duloxetine 60mg once daily at bed  - Continue Buspirone 5mg TID  - Continue Buproprion SR 150mg BID  - Following with counselor weekly to every 2 weeks  - Established with Dr. Ugalde - Onco Psychiatry     Tremors/Balance/Coordination  - Continue following with neurologist  - Propranolol and Primidone  - Referral to PT and OT by Dr. Kenny    Next Follow-Up Visit:  Return to clinic in 6 weeks virtual    Signature and billing  Medical complexity was moderate level due to due to complexity of problems, extensive data review, and high risk of management/treatment.  Time was spent on the following: Prep Time, Time Directly with Patient/Family/Caregiver, Documentation Time. Total time spent: 40      Data  Diagnostic tests and information reviewed for today's visit:  Most recent labs and imaging results, Medications     Some elements copied from Palliative Care note on 12/20/2024, the elements have been updated and all reflect current decision making from today, 2/7/2025.    Plan of Care discussed with:  Patient    SIGNATURE: DEMETRIUS Orourke-CNP    Contact information:  [facility] Pall[facility]  Monday-Friday 8 AM-5 PM  Phone:  [phone], press option #5, then option #1.   Or Epic Secure Chat

## 2025-02-07 ENCOUNTER — OFFICE VISIT (OUTPATIENT)
Dept: PALLIATIVE MEDICINE | Facility: CLINIC | Age: 70
End: 2025-02-07
Payer: MEDICARE

## 2025-02-07 VITALS
RESPIRATION RATE: 18 BRPM | HEART RATE: 78 BPM | OXYGEN SATURATION: 97 % | DIASTOLIC BLOOD PRESSURE: 70 MMHG | TEMPERATURE: 97.7 F | SYSTOLIC BLOOD PRESSURE: 118 MMHG | BODY MASS INDEX: 25.36 KG/M2 | WEIGHT: 138.67 LBS

## 2025-02-07 DIAGNOSIS — M79.2 NEUROPATHIC PAIN: ICD-10-CM

## 2025-02-07 DIAGNOSIS — G89.3 CANCER RELATED PAIN: ICD-10-CM

## 2025-02-07 DIAGNOSIS — G62.9 NEUROPATHY: ICD-10-CM

## 2025-02-07 DIAGNOSIS — R53.0 NEOPLASTIC MALIGNANT RELATED FATIGUE: ICD-10-CM

## 2025-02-07 DIAGNOSIS — R11.0 NAUSEA: ICD-10-CM

## 2025-02-07 DIAGNOSIS — R53.83 OTHER FATIGUE: ICD-10-CM

## 2025-02-07 DIAGNOSIS — C85.89: ICD-10-CM

## 2025-02-07 DIAGNOSIS — G62.0 CHEMOTHERAPY-INDUCED PERIPHERAL NEUROPATHY (MULTI): ICD-10-CM

## 2025-02-07 DIAGNOSIS — K59.03 DRUG-INDUCED CONSTIPATION: ICD-10-CM

## 2025-02-07 DIAGNOSIS — F41.9 ANXIETY: ICD-10-CM

## 2025-02-07 DIAGNOSIS — R63.0 LOSS OF APPETITE: ICD-10-CM

## 2025-02-07 DIAGNOSIS — Z51.5 PALLIATIVE CARE ENCOUNTER: Primary | ICD-10-CM

## 2025-02-07 DIAGNOSIS — C56.9 MALIGNANT NEOPLASM OF OVARY, UNSPECIFIED LATERALITY (MULTI): ICD-10-CM

## 2025-02-07 DIAGNOSIS — Z51.81 ENCOUNTER FOR MONITORING OPIOID MAINTENANCE THERAPY: ICD-10-CM

## 2025-02-07 DIAGNOSIS — F33.1 MODERATE EPISODE OF RECURRENT MAJOR DEPRESSIVE DISORDER: ICD-10-CM

## 2025-02-07 DIAGNOSIS — T45.1X5A CHEMOTHERAPY-INDUCED PERIPHERAL NEUROPATHY (MULTI): ICD-10-CM

## 2025-02-07 DIAGNOSIS — Z79.891 ENCOUNTER FOR MONITORING OPIOID MAINTENANCE THERAPY: ICD-10-CM

## 2025-02-07 DIAGNOSIS — G89.3 CHRONIC PAIN AFTER CANCER TREATMENT: ICD-10-CM

## 2025-02-07 DIAGNOSIS — R53.1 WEAKNESS: ICD-10-CM

## 2025-02-07 PROCEDURE — 1157F ADVNC CARE PLAN IN RCRD: CPT | Performed by: NURSE PRACTITIONER

## 2025-02-07 PROCEDURE — 3074F SYST BP LT 130 MM HG: CPT | Performed by: NURSE PRACTITIONER

## 2025-02-07 PROCEDURE — 99215 OFFICE O/P EST HI 40 MIN: CPT | Performed by: NURSE PRACTITIONER

## 2025-02-07 PROCEDURE — 1125F AMNT PAIN NOTED PAIN PRSNT: CPT | Performed by: NURSE PRACTITIONER

## 2025-02-07 PROCEDURE — 1159F MED LIST DOCD IN RCRD: CPT | Performed by: NURSE PRACTITIONER

## 2025-02-07 PROCEDURE — 3078F DIAST BP <80 MM HG: CPT | Performed by: NURSE PRACTITIONER

## 2025-02-07 ASSESSMENT — PAIN SCALES - GENERAL: PAINLEVEL_OUTOF10: 3

## 2025-02-12 ENCOUNTER — SOCIAL WORK (OUTPATIENT)
Dept: HEMATOLOGY/ONCOLOGY | Facility: CLINIC | Age: 70
End: 2025-02-12
Payer: MEDICARE

## 2025-02-14 ENCOUNTER — INFUSION (OUTPATIENT)
Dept: HEMATOLOGY/ONCOLOGY | Facility: CLINIC | Age: 70
End: 2025-02-14
Payer: MEDICARE

## 2025-02-14 ENCOUNTER — HOSPITAL ENCOUNTER (OUTPATIENT)
Dept: RADIOLOGY | Facility: CLINIC | Age: 70
Discharge: HOME | End: 2025-02-14
Payer: MEDICARE

## 2025-02-14 ENCOUNTER — TELEPHONE (OUTPATIENT)
Dept: GYNECOLOGIC ONCOLOGY | Facility: HOSPITAL | Age: 70
End: 2025-02-14
Payer: MEDICARE

## 2025-02-14 ENCOUNTER — TELEPHONE (OUTPATIENT)
Dept: GYNECOLOGIC ONCOLOGY | Facility: HOSPITAL | Age: 70
End: 2025-02-14

## 2025-02-14 VITALS
TEMPERATURE: 96.8 F | HEART RATE: 77 BPM | RESPIRATION RATE: 16 BRPM | DIASTOLIC BLOOD PRESSURE: 63 MMHG | SYSTOLIC BLOOD PRESSURE: 97 MMHG

## 2025-02-14 DIAGNOSIS — C56.9 MALIGNANT NEOPLASM OF OVARY, UNSPECIFIED LATERALITY (MULTI): ICD-10-CM

## 2025-02-14 LAB
ALBUMIN SERPL BCP-MCNC: 3.8 G/DL (ref 3.4–5)
ALP SERPL-CCNC: 73 U/L (ref 33–136)
ALT SERPL W P-5'-P-CCNC: 12 U/L (ref 7–45)
ANION GAP SERPL CALC-SCNC: 13 MMOL/L (ref 10–20)
AST SERPL W P-5'-P-CCNC: 10 U/L (ref 9–39)
BASOPHILS # BLD AUTO: 0.01 X10*3/UL (ref 0–0.1)
BASOPHILS NFR BLD AUTO: 0.2 %
BILIRUB SERPL-MCNC: 0.2 MG/DL (ref 0–1.2)
BUN SERPL-MCNC: 23 MG/DL (ref 6–23)
CALCIUM SERPL-MCNC: 8.8 MG/DL (ref 8.6–10.6)
CANCER AG125 SERPL-ACNC: 13.8 U/ML (ref 0–30.2)
CHLORIDE SERPL-SCNC: 104 MMOL/L (ref 98–107)
CO2 SERPL-SCNC: 27 MMOL/L (ref 21–32)
CREAT SERPL-MCNC: 0.93 MG/DL (ref 0.5–1.05)
EGFRCR SERPLBLD CKD-EPI 2021: 67 ML/MIN/1.73M*2
EOSINOPHIL # BLD AUTO: 0.01 X10*3/UL (ref 0–0.7)
EOSINOPHIL NFR BLD AUTO: 0.2 %
ERYTHROCYTE [DISTWIDTH] IN BLOOD BY AUTOMATED COUNT: 14 % (ref 11.5–14.5)
GLUCOSE SERPL-MCNC: 67 MG/DL (ref 74–99)
HCT VFR BLD AUTO: 26.2 % (ref 36–46)
HGB BLD-MCNC: 8.7 G/DL (ref 12–16)
IMM GRANULOCYTES # BLD AUTO: 0 X10*3/UL (ref 0–0.7)
IMM GRANULOCYTES NFR BLD AUTO: 0 % (ref 0–0.9)
LYMPHOCYTES # BLD AUTO: 2.23 X10*3/UL (ref 1.2–4.8)
LYMPHOCYTES NFR BLD AUTO: 54.9 %
MAGNESIUM SERPL-MCNC: 1.69 MG/DL (ref 1.6–2.4)
MCH RBC QN AUTO: 36.4 PG (ref 26–34)
MCHC RBC AUTO-ENTMCNC: 33.2 G/DL (ref 32–36)
MCV RBC AUTO: 110 FL (ref 80–100)
MONOCYTES # BLD AUTO: 0.44 X10*3/UL (ref 0.1–1)
MONOCYTES NFR BLD AUTO: 10.8 %
NEUTROPHILS # BLD AUTO: 1.37 X10*3/UL (ref 1.2–7.7)
NEUTROPHILS NFR BLD AUTO: 33.9 %
NRBC BLD-RTO: ABNORMAL /100{WBCS}
PLATELET # BLD AUTO: 48 X10*3/UL (ref 150–450)
POTASSIUM SERPL-SCNC: 4.7 MMOL/L (ref 3.5–5.3)
PROT SERPL-MCNC: 5.9 G/DL (ref 6.4–8.2)
RBC # BLD AUTO: 2.39 X10*6/UL (ref 4–5.2)
SODIUM SERPL-SCNC: 139 MMOL/L (ref 136–145)
WBC # BLD AUTO: 4.1 X10*3/UL (ref 4.4–11.3)

## 2025-02-14 PROCEDURE — 74177 CT ABD & PELVIS W/CONTRAST: CPT

## 2025-02-14 PROCEDURE — 36591 DRAW BLOOD OFF VENOUS DEVICE: CPT

## 2025-02-14 PROCEDURE — 83735 ASSAY OF MAGNESIUM: CPT

## 2025-02-14 PROCEDURE — 2500000004 HC RX 250 GENERAL PHARMACY W/ HCPCS (ALT 636 FOR OP/ED): Performed by: NURSE PRACTITIONER

## 2025-02-14 PROCEDURE — 86304 IMMUNOASSAY TUMOR CA 125: CPT

## 2025-02-14 PROCEDURE — 2550000001 HC RX 255 CONTRASTS: Performed by: NURSE PRACTITIONER

## 2025-02-14 PROCEDURE — 80053 COMPREHEN METABOLIC PANEL: CPT

## 2025-02-14 PROCEDURE — 85025 COMPLETE CBC W/AUTO DIFF WBC: CPT

## 2025-02-14 RX ORDER — HEPARIN 100 UNIT/ML
500 SYRINGE INTRAVENOUS AS NEEDED
Status: DISCONTINUED | OUTPATIENT
Start: 2025-02-14 | End: 2025-02-14 | Stop reason: HOSPADM

## 2025-02-14 RX ORDER — HEPARIN 100 UNIT/ML
500 SYRINGE INTRAVENOUS AS NEEDED
OUTPATIENT
Start: 2025-02-14

## 2025-02-14 RX ORDER — HEPARIN SODIUM,PORCINE/PF 10 UNIT/ML
50 SYRINGE (ML) INTRAVENOUS AS NEEDED
OUTPATIENT
Start: 2025-02-14

## 2025-02-14 RX ADMIN — IOHEXOL 75 ML: 350 INJECTION, SOLUTION INTRAVENOUS at 15:55

## 2025-02-14 RX ADMIN — HEPARIN 500 UNITS: 100 SYRINGE at 16:04

## 2025-02-14 ASSESSMENT — PAIN SCALES - GENERAL: PAINLEVEL_OUTOF10: 0-NO PAIN

## 2025-02-14 NOTE — TELEPHONE ENCOUNTER
Patient's Plts=48K. Call to patient and she reports she had small amount of blood when she blew her nose. No further episodes. Reviewed bleeding precautions and when to call the on the office. Provided the on call service number. Notified  Dr. Kenny, and Dr. Piedra and Dr. Bowden who are on call this weekend.

## 2025-02-14 NOTE — TELEPHONE ENCOUNTER
Received call from patient who LM asking if she should take Dexamethasone on Sunday prior to treatment on Monday since she is not sure if she will be getting another treatment (further treatment will be based on CT scan being done today). Patient was instructed to take her Dexamethasone on Sunday and to plan for a treatment on Monday just in case.  Her CT results and treatment planning will be discussed during her FUV on Monday.

## 2025-02-17 ENCOUNTER — OFFICE VISIT (OUTPATIENT)
Dept: GYNECOLOGIC ONCOLOGY | Facility: CLINIC | Age: 70
End: 2025-02-17
Payer: MEDICARE

## 2025-02-17 ENCOUNTER — INFUSION (OUTPATIENT)
Dept: HEMATOLOGY/ONCOLOGY | Facility: CLINIC | Age: 70
End: 2025-02-17
Payer: MEDICARE

## 2025-02-17 VITALS
WEIGHT: 138.67 LBS | RESPIRATION RATE: 16 BRPM | OXYGEN SATURATION: 97 % | TEMPERATURE: 97.3 F | HEART RATE: 91 BPM | DIASTOLIC BLOOD PRESSURE: 88 MMHG | BODY MASS INDEX: 25.36 KG/M2 | SYSTOLIC BLOOD PRESSURE: 156 MMHG

## 2025-02-17 DIAGNOSIS — Z51.11 CHEMOTHERAPY MANAGEMENT, ENCOUNTER FOR: ICD-10-CM

## 2025-02-17 DIAGNOSIS — C56.9 MALIGNANT NEOPLASM OF OVARY, UNSPECIFIED LATERALITY (MULTI): Primary | ICD-10-CM

## 2025-02-17 DIAGNOSIS — C56.9 MALIGNANT NEOPLASM OF OVARY, UNSPECIFIED LATERALITY (MULTI): ICD-10-CM

## 2025-02-17 PROCEDURE — 3079F DIAST BP 80-89 MM HG: CPT | Performed by: OBSTETRICS & GYNECOLOGY

## 2025-02-17 PROCEDURE — 1157F ADVNC CARE PLAN IN RCRD: CPT | Performed by: OBSTETRICS & GYNECOLOGY

## 2025-02-17 PROCEDURE — 1036F TOBACCO NON-USER: CPT | Performed by: OBSTETRICS & GYNECOLOGY

## 2025-02-17 PROCEDURE — 1126F AMNT PAIN NOTED NONE PRSNT: CPT | Performed by: OBSTETRICS & GYNECOLOGY

## 2025-02-17 PROCEDURE — 99215 OFFICE O/P EST HI 40 MIN: CPT | Performed by: OBSTETRICS & GYNECOLOGY

## 2025-02-17 PROCEDURE — 3077F SYST BP >= 140 MM HG: CPT | Performed by: OBSTETRICS & GYNECOLOGY

## 2025-02-17 PROCEDURE — 1159F MED LIST DOCD IN RCRD: CPT | Performed by: OBSTETRICS & GYNECOLOGY

## 2025-02-17 ASSESSMENT — PAIN SCALES - GENERAL: PAINLEVEL_OUTOF10: 0-NO PAIN

## 2025-02-17 NOTE — PROGRESS NOTES
Patient ID: Saba Park is a 69 y.o. female.  Referring Physician: No referring provider defined for this encounter.  Primary Care Provider: Arin Hirsch, APRN-CNP    Subjective    CT [facility]E with obstructing kidney stone  Pretreatment labs with neutropenia / low platelets    Overall did okay with last treatment    Had some constipation    Has history of kidney stones last seen by urology in 2021        Objective    BSA: 1.66 meters squared  /88 (BP Location: Right arm, Patient Position: Sitting, BP Cuff Size: Adult)   Pulse 91   Temp 36.3 °C (97.3 °F) (Temporal)   Resp 16   Wt 62.9 kg (138 lb 10.7 oz)   SpO2 97%   BMI 25.36 kg/m²      Physical Exam  Vitals and nursing note reviewed.   Constitutional:       Appearance: Normal appearance.   HENT:      Head: Normocephalic.   Eyes:      Pupils: Pupils are equal, round, and reactive to light.   Cardiovascular:      Rate and Rhythm: Normal rate and regular rhythm.   Pulmonary:      Effort: Pulmonary effort is normal.      Breath sounds: Normal breath sounds.   Abdominal:      General: Abdomen is flat.      Palpations: Abdomen is soft.      Tenderness: There is no abdominal tenderness.   Musculoskeletal:         General: Normal range of motion.      Cervical back: Normal range of motion and neck supple.   Skin:     General: Skin is warm and dry.   Neurological:      Mental Status: She is alert and oriented to person, place, and time.   Psychiatric:         Mood and Affect: Mood normal.         Behavior: Behavior normal.         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 Questionable invasion into the uterus.  Questionable mesenteric implants.  It looks like one of them is small bowel mesentery, maybe omentum.   The rest look more omental to me. kidney stone.   Complex lesion in her left kidney presumably cystic but can't rule out solid area.   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  Felt to be undebulkable on laparoscopy, plan for NACT  - 3 cycles NACT  -Genetics: SLX-4 gene of unk significance.  - May 2019 debulking, included ileostomy.  - 3 additional cycles chemo - carbo/taxol  - 8/26/19 last chemotherapy  8/2024 recurrence - mediastinal and internal mammary Lns  - bx of LN c/w recurrent high grade serous carcinoma  9/2024 started carbo/taxol       Malignant neoplasm of unspecified ovary (Multi)   12/1/2022 Initial Diagnosis    Malignant neoplasm of unspecified ovary (Multi)     9/17/2024 -  Chemotherapy    PACLitaxel / CARBOplatin, 21 Day Cycles - Gyn          Problem List Items Addressed This Visit             ICD-10-CM    Malignant neoplasm of unspecified ovary (Multi) - Primary C56.9    Relevant Orders    Referral to Urology    CBC and Auto Differential    Cancer Antigen 125    Chemotherapy management, encounter for Z51.11       Treatment Plans       Name Type Plan Dates Plan Provider         Active    PACLitaxel / CARBOplatin, 21 Day Cycles - Gyn Oncology Treatment 9/16/2024 - Present Krista Kenny MD                  Reviewed CT which is [facility]E.  Discussed finding of kidney stones.    Discussed natural history and prognosis of recurrent ovarian cancer.  Recommended treatment break at this time.  Repeat CBC with differential next week to ensure recovery of bone marrow.  Requires port draw at [facility].  Will also schedule port flushes.    Follow-up in 3 months for cancer surveillance visit with  before the visit    Referral to urology

## 2025-02-17 NOTE — PROGRESS NOTES
LSW continues to follow patient for support and ongoing assessment.  Patient  brought in a signed copy of her HCAP application.  She also provided LSW with the income documents for 2023, 2024 and 2025.  Explained that I would forward the documents to the financial counseling group to request that the documents are reviewed and uploaded for processing.  Patient expressed appreciation for the assistance.  Social work will continue to follow.

## 2025-02-18 ENCOUNTER — TELEMEDICINE (OUTPATIENT)
Dept: UROLOGY | Facility: CLINIC | Age: 70
End: 2025-02-18
Payer: MEDICARE

## 2025-02-18 DIAGNOSIS — N13.2 HYDRONEPHROSIS WITH URINARY OBSTRUCTION DUE TO URETERAL CALCULUS: ICD-10-CM

## 2025-02-18 DIAGNOSIS — N20.0 CALCULUS OF KIDNEY: ICD-10-CM

## 2025-02-18 DIAGNOSIS — D70.9 NEUTROPENIC FEVER (CMS-HCC): Primary | ICD-10-CM

## 2025-02-18 DIAGNOSIS — N20.0 NEPHROLITHIASIS: ICD-10-CM

## 2025-02-18 DIAGNOSIS — R50.81 NEUTROPENIC FEVER (CMS-HCC): Primary | ICD-10-CM

## 2025-02-18 DIAGNOSIS — C56.9 MALIGNANT NEOPLASM OF OVARY, UNSPECIFIED LATERALITY (MULTI): ICD-10-CM

## 2025-02-18 PROCEDURE — 1157F ADVNC CARE PLAN IN RCRD: CPT | Performed by: UROLOGY

## 2025-02-18 PROCEDURE — 99205 OFFICE O/P NEW HI 60 MIN: CPT | Performed by: UROLOGY

## 2025-02-18 NOTE — PROGRESS NOTES
Virtual or Telephone Consent    An interactive audio and video telecommunication system which permits real time communications between the patient (at the originating site) and provider (at the distant site) was utilized to provide this telehealth service.   Verbal consent was requested and obtained from Saba Park on this date, 02/18/25 for a telehealth visit.     Last visit 11/9/21  68yo F with LEFT ureteral stone s/p stent placement. s/p bilateral ureteroscopy and lithotripsy. s/p stent removal. some fragments present, small. Now with one fragment in right ureter  -repeat ct scan in 6 weeks  -discussed warning signs    Today's visit:  #nephrolithiasis   - left ureteral stone s/p bilateral ureteroscopy and lithotripsy. s/p stent removal in 2020  -now with right ureteral stone and hydronephrosis    Had cervical cancer recently  Oncology notes reviewed  -recurrent ovarian cancer s/p 5 cycles of chemotherapy   --Dr. Krista Kenny manages ovarian ca     CT chest, abd, pelvis, 2/14/25, personally reviewed/interpreted:  1. Postsurgical changes status post hysterectomy without evidence to  suggest locoregional disease recurrence. No evidence of new  metastatic disease within the chest, abdomen, or pelvis.  2. Obstructive distal right ureteral calculi with moderate to severe  hydronephrosis and delayed right nephrogram consistent with  obstructive uropathy. Urological consultation is recommended.    Labs  CBC:  Component      Latest Ref Rng 2/14/2025   WBC      4.4 - 11.3 x10*3/uL 4.1 (L)    RBC      4.00 - 5.20 x10*6/uL 2.39 (L)    HEMOGLOBIN      12.0 - 16.0 g/dL 8.7 (L)    HEMATOCRIT      36.0 - 46.0 % 26.2 (L)    MCV      80 - 100 fL 110 (H)    MCHC      32.0 - 36.0 g/dL 33.2    Platelets      150 - 450 x10*3/uL 48 (L)    RED CELL DISTRIBUTION WIDTH      11.5 - 14.5 % 14.0    Neutrophils %      40.0 - 80.0 % 33.9    Immature Granulocytes %, Automated      0.0 - 0.9 % 0.0    Lymphocytes %      13.0 - 44.0 % 54.9     Monocytes %      2.0 - 10.0 % 10.8    Eosinophils %      0.0 - 6.0 % 0.2    Basophils %      0.0 - 2.0 % 0.2    Neutrophils Absolute      1.20 - 7.70 x10*3/uL 1.37    Lymphocytes Absolute      1.20 - 4.80 x10*3/uL 2.23    Monocytes Absolute      0.10 - 1.00 x10*3/uL 0.44    Eosinophils Absolute      0.00 - 0.70 x10*3/uL 0.01    Basophils Absolute      0.00 - 0.10 x10*3/uL 0.01    nRBC --    MCH      26.0 - 34.0 pg 36.4 (H)    Immature Granulocytes Absolute, Automated      0.00 - 0.70 x10*3/uL 0.00       Legend:  (L) Low  (H) High    CMP:  Component      Latest Ref Rng 2/14/2025   GLUCOSE      74 - 99 mg/dL 67 (L)    SODIUM      136 - 145 mmol/L 139    POTASSIUM      3.5 - 5.3 mmol/L 4.7    CHLORIDE      98 - 107 mmol/L 104    Bicarbonate      21 - 32 mmol/L 27    Anion Gap      10 - 20 mmol/L 13    Blood Urea Nitrogen      6 - 23 mg/dL 23    Creatinine      0.50 - 1.05 mg/dL 0.93    Calcium      8.6 - 10.6 mg/dL 8.8    Albumin      3.4 - 5.0 g/dL 3.8    Alkaline Phosphatase      33 - 136 U/L 73    Total Protein      6.4 - 8.2 g/dL 5.9 (L)    AST      9 - 39 U/L 10    Bilirubin Total      0.0 - 1.2 mg/dL 0.2    ALT      7 - 45 U/L 12    EGFR      >60 mL/min/1.73m*2 67       Legend:  (L) Low  Medications:    Current Outpatient Medications:     albuterol (ProAir HFA) 90 mcg/actuation inhaler, Inhale 1-2 puffs every 6 hours if needed for wheezing or shortness of breath., Disp: 8.5 g, Rfl: 0    buPROPion SR (Wellbutrin SR) 150 mg 12 hr tablet, Take 1 tablet (150 mg) by mouth 2 times a day. Do not crush, chew, or split., Disp: 180 tablet, Rfl: 1    busPIRone (Buspar) 5 mg tablet, TAKE 1 TABLET BY MOUTH TWICE A DAY, Disp: 180 tablet, Rfl: 1    calcium carbonate (Oscal) 500 mg calcium (1,250 mg) tablet, Take 1 tablet (1,250 mg) by mouth 2 times daily (morning and late afternoon)., Disp: , Rfl:     cholecalciferol (Vitamin D-3) 50 mcg (2,000 unit) capsule, Take 1 capsule (50 mcg) by mouth once daily., Disp: , Rfl:      clobetasol (Temovate) 0.05 % cream, APPLY AS DIRECTED EVERY DAY (Patient taking differently: once daily as needed.), Disp: , Rfl:     dexAMETHasone (Decadron) 4 mg tablet, Take 20 mg (5 tabs) at bedtime the night before your treatment and then take 8 mg (2 tabs) for 3 days after each treatment., Disp: 11 tablet, Rfl: 0    DULoxetine (Cymbalta) 60 mg DR capsule, Take 1 capsule (60 mg) by mouth once daily. Do not crush or chew., Disp: 90 capsule, Rfl: 1    imiquimod (Aldara) 5 % cream, , Disp: , Rfl:     magnesium, amino acid chelate, 133 mg tablet, Take 1 tablet (133 mg) by mouth 2 times a day., Disp: , Rfl:     magnesium, as gluconate, (Magonate) 27 mg magnesium (500 mg) tablet, Take 2 tablets (54 mg) by mouth 2 times a day., Disp: 120 tablet, Rfl: 5    methadone (Dolophine) 10 mg tablet, Take 1 tablet (10 mg) by mouth every 12 hours., Disp: 60 tablet, Rfl: 0    ondansetron (Zofran) 8 mg tablet, Take 1 tablet (8 mg) by mouth every 8 hours if needed for nausea or vomiting., Disp: 30 tablet, Rfl: 5    oxyCODONE (Oxy-IR) 5 mg immediate release capsule, Take 1 capsule (5 mg) by mouth every 4 hours if needed for severe pain (7 - 10)., Disp: , Rfl:     polyethylene glycol (Glycolax, Miralax) 17 gram packet, Take 17 g by mouth 2 times a day as needed (constipation). Mix 1 cap (17g) into 8 ounces of fluid., Disp: 60 packet, Rfl: 2    pregabalin (Lyrica) 75 mg capsule, Take 1 capsule (75 mg) by mouth 2 times a day. Do not fill before January 15, 2025., Disp: 60 capsule, Rfl: 5    primidone (Mysoline) 50 mg tablet, Take 2 tablets (100 mg) by mouth once daily at bedtime., Disp: , Rfl:     propranolol (Inderal) 40 mg tablet, Take 1 tablet (40 mg) by mouth once daily. (Patient taking differently: Take 0.5 tablets (20 mg) by mouth once daily.), Disp: , Rfl:     sennosides-docusate sodium (Joy-Colace) 8.6-50 mg tablet, Take 1-2 tablets by mouth 2 times a day as needed for constipation., Disp: 120 tablet, Rfl:  5    Allergy:  Allergies   Allergen Reactions    Erythromycin Other        Exam  CONSTITUTIONAL:        No acute distress    HEAD:        Normocephalic and atraumatic    CHEST / RESPIRATORY      no excess work of breathing, no respiratory distress,    ABDOMEN / GASTROINTESTINAL:        Abdomen nondistended          Assessment/Plan  66yo F with LEFT ureteral stone s/p stent placement. s/p bilateral ureteroscopy and lithotripsy. s/p stent removal. some fragments present, small. Now with right ureteral stones x 3 and hdyronephrosis Cr wnl.      -We discussed the risks, benefits, alternatives and complications associated with ureteroscopy with laser lithotripsy and stone extraction, including but not limited to ureteral perforation, extravasation, stricture formation, bleeding, sepsis, obstruction, inability to reach the stone, inability to fragment the stone, and inability to retrieve all stone fragments, etc.  -we also discussed other options such as observation, ESWL, etc  -We also discussed the need for ancillary procedures such as stent placement and removal, retrograde urography, and percutaneous nephrostomy were also discussed, and the patient was given the opportunity to ask any questions. All questions were answered to her satisfaction. The patient understands that all anti-coagulation including platelet inhibitors must be discontinued several days prior to the procedure unless other arrangements are made in conjunction with me and the patient's cardiologist or internist.   -The patient also understood that a ureteral stent would likely be placed and removal of the stent is critical. Failure to follow up for stent removal can result in recurrent UTIs, encrustation of the stent, loss of kidney function and need for nephrectomy.   -discussed warning signs  -discussed need for multiple procedures/staged procedure if we are not able to get all fragments   -patient verbalizes understanding and would like to proceed  with right ureteroscopy  -communicated with Dr. Kenny, will plan on proceeding with 1-2 months to giver her neutropenia a chance to recover. Discussed balancing risk of infection with risk of kidney dysfunction, though her cr is currently stable.   - stone most likely impacted, so may need stent alone then return procedure for ureteroscopy  -UCX today     MDM: complex surgery with risk factors    Follow up for Right ureteroscopy/LL/Stent

## 2025-02-19 ENCOUNTER — TELEPHONE (OUTPATIENT)
Dept: GYNECOLOGIC ONCOLOGY | Facility: HOSPITAL | Age: 70
End: 2025-02-19
Payer: MEDICARE

## 2025-02-19 ENCOUNTER — PREP FOR PROCEDURE (OUTPATIENT)
Dept: UROLOGY | Facility: CLINIC | Age: 70
End: 2025-02-19
Payer: MEDICARE

## 2025-02-19 DIAGNOSIS — N20.0 NEPHROLITHIASIS: Primary | ICD-10-CM

## 2025-02-19 DIAGNOSIS — C56.9 MALIGNANT NEOPLASM OF OVARY, UNSPECIFIED LATERALITY (MULTI): ICD-10-CM

## 2025-02-19 DIAGNOSIS — N13.2 HYDRONEPHROSIS WITH URINARY OBSTRUCTION DUE TO URETERAL CALCULUS: ICD-10-CM

## 2025-02-19 RX ORDER — ACETAMINOPHEN 325 MG/1
975 TABLET ORAL ONCE
OUTPATIENT
Start: 2025-02-19 | End: 2025-02-19

## 2025-02-19 RX ORDER — CELECOXIB 400 MG/1
400 CAPSULE ORAL ONCE
OUTPATIENT
Start: 2025-02-19 | End: 2025-02-19

## 2025-02-19 RX ORDER — GABAPENTIN 300 MG/1
600 CAPSULE ORAL ONCE
OUTPATIENT
Start: 2025-02-19 | End: 2025-02-19

## 2025-02-19 RX ORDER — CEFAZOLIN SODIUM 2 G/100ML
2 INJECTION, SOLUTION INTRAVENOUS ONCE
OUTPATIENT
Start: 2025-02-19 | End: 2025-02-19

## 2025-02-19 NOTE — TELEPHONE ENCOUNTER
Patient had a question about her repeat lab work, [facility], and mediport draws while in follow up. Entered mediport scheduling orders. Patient asked if she should continue her magnesium. Her last few magnesium levels were normal. She should continue her oral magnesium until she finishes her bottle. Will redraw mag level in 3 months.

## 2025-02-24 ENCOUNTER — TELEPHONE (OUTPATIENT)
Dept: PREADMISSION TESTING | Facility: HOSPITAL | Age: 70
End: 2025-02-24
Payer: MEDICARE

## 2025-02-26 ENCOUNTER — APPOINTMENT (OUTPATIENT)
Dept: BEHAVIORAL HEALTH | Facility: CLINIC | Age: 70
End: 2025-02-26
Payer: MEDICARE

## 2025-02-26 ENCOUNTER — TELEPHONE (OUTPATIENT)
Dept: ADMISSION | Facility: HOSPITAL | Age: 70
End: 2025-02-26

## 2025-02-26 DIAGNOSIS — F41.9 ANXIETY: ICD-10-CM

## 2025-02-26 DIAGNOSIS — T45.1X5A CHEMOTHERAPY-INDUCED PERIPHERAL NEUROPATHY (MULTI): ICD-10-CM

## 2025-02-26 DIAGNOSIS — G62.0 CHEMOTHERAPY-INDUCED PERIPHERAL NEUROPATHY (MULTI): ICD-10-CM

## 2025-02-26 DIAGNOSIS — F33.0 MILD EPISODE OF RECURRENT MAJOR DEPRESSIVE DISORDER (CMS-HCC): ICD-10-CM

## 2025-02-26 DIAGNOSIS — G89.29 OTHER CHRONIC PAIN: ICD-10-CM

## 2025-02-26 PROCEDURE — 90833 PSYTX W PT W E/M 30 MIN: CPT | Performed by: PSYCHIATRY & NEUROLOGY

## 2025-02-26 PROCEDURE — 99214 OFFICE O/P EST MOD 30 MIN: CPT | Performed by: PSYCHIATRY & NEUROLOGY

## 2025-02-26 PROCEDURE — 1157F ADVNC CARE PLAN IN RCRD: CPT | Performed by: PSYCHIATRY & NEUROLOGY

## 2025-02-26 PROCEDURE — 1036F TOBACCO NON-USER: CPT | Performed by: PSYCHIATRY & NEUROLOGY

## 2025-02-26 RX ORDER — METHADONE HYDROCHLORIDE 10 MG/1
10 TABLET ORAL EVERY 12 HOURS
Qty: 60 TABLET | Refills: 0 | Status: SHIPPED | OUTPATIENT
Start: 2025-02-27 | End: 2025-03-29

## 2025-02-26 RX ORDER — BUPROPION HYDROCHLORIDE 200 MG/1
200 TABLET, EXTENDED RELEASE ORAL 2 TIMES DAILY
Qty: 180 TABLET | Refills: 1 | Status: SHIPPED | OUTPATIENT
Start: 2025-02-26 | End: 2025-08-25

## 2025-02-26 NOTE — TELEPHONE ENCOUNTER
Patient last seen by DEMETRIUS Hirsch on 2/7 with plan to continue methadone 10mg BID. Follow up visit is scheduled for 3/14. OARRS reviewed and no aberrancy noted. Patient last filled methadone 10mg #60/30 days on 1/29. Prescription pended to provider to approve.

## 2025-02-26 NOTE — TELEPHONE ENCOUNTER
Pt requesting refill   Methadone 10mg. 1 tablet every 12 hrs   Pharmacy: [facility] on [facility] in [place]  Last FUV 2/7, next FUV 3/14

## 2025-02-26 NOTE — PROGRESS NOTES
"Outpatient Psychiatry FUV      Subjective   Saba Park, a 69 y.o. female, for virtual FUV  Virtual or Telephone Consent    An interactive audio and video telecommunication system which permits real time communications between the patient (at the originating site) and provider (at the distant site) was utilized to provide this telehealth service.   Verbal consent was requested and obtained from Saba Park on this date, 02/26/25 for a telehealth visit.   Confirmed to be home for appt today        Assessment/Plan   Patient Discussion:  CONTINUE duloxetine 60mg daily at bedtime   CONTINUE  buspirone 5mg 2x/day  INCREASE to bupropion SR 200mg 2x/day      RETURN to clinic 5/7 at 4:00PM for virtual FUV     Call with questions/concerns [phone]    Assessment:   69 y.o.  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 AVN R hip dx 2016. 2019 dx with ovarian CA in surveillance s/p resection and chemo. Pt had been see several time in 2015 ahead of MD maternity leave and transferred care to Dr. Nilay Farr, who has since left . 2021 pt re-established care and here for follow up     FUV 2/26/2025  Since last appt, completed chemo, in remission. Continues with low motivation. Will titrate bupuropion, consider stopping buspar. Follow up 2 months, sooner if needed.     Diagnosis:   MDD, recurrent, p remission  Other spec anxiety r/o adjustment vs part of depression     Treatment Plan/Recommendations:   1. Safety Assessment: denies active thoughts of death/self harm though has had \"dark thoughts\". no h/o SI/SA. passive death wish in the past but not recently . RF include age, W, pain, cancer. PF include , engaged in care, no hx, no substance abuse, no guns. Currently low imminent risk     2. MDD, other spec anxiety r/o element of MDD vs ISIS  CONTINUE duloxetine 60mg PO QHS   CONTINUE buspar 5mg PO BID for " now--consider tapering  INCREASE to bupropion SR 200mg PO BID, monitor anxiety.      Consider resuming therapy, supportive therapy during appt--pt has also   stopped seeing therapist related to worse health and not able to keep up, defer for now     3. 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  elevated  completed chemo carboplatin/taxol -- in remission, next scan 3 months  Having stents for kidney stones     4. Social: lives with , continue to encourage behavioral activation. Does better when out and about but frustrated by physical limitations, has been limited this summer due to health    Reason for Visit:     FUV for depression, anxiety    Subjective:  Last appt 1/2025  In remission, glad to be done with chemo  Still no motivation to go out  Mornings are tough  Tremors are worse, balance is bad  Emailed neuro to see if can adjust meds had to be lowered for chemo, or DBS vs device (calia)    Having surgery for kidney stones next month, waiting for platelets to come back up from chemo, denies any bleeding    Open to titration of bupropion, discussed stopping buspirone     no SI or thoughts of not wanting to go on  No a/e to meds    Current Medications:    Current Outpatient Medications:     albuterol (ProAir HFA) 90 mcg/actuation inhaler, Inhale 1-2 puffs every 6 hours if needed for wheezing or shortness of breath., Disp: 8.5 g, Rfl: 0    buPROPion SR  (Wellbutrin SR) 150 mg 12 hr tablet, Take 1 tablet (150 mg) by mouth 2 times a day. Do not crush, chew, or split., Disp: 180 tablet, Rfl: 1    busPIRone (Buspar) 5 mg tablet, TAKE 1 TABLET BY MOUTH TWICE A DAY, Disp: 180 tablet, Rfl: 1    calcium carbonate (Oscal) 500 mg calcium (1,250 mg) tablet, Take 1 tablet (1,250 mg) by mouth 2 times daily (morning and late afternoon)., Disp: , Rfl:     cholecalciferol (Vitamin D-3) 50 mcg (2,000 unit) capsule, Take 1 capsule (50 mcg) by mouth once daily., Disp: , Rfl:     clobetasol (Temovate) 0.05 % cream, APPLY AS DIRECTED EVERY DAY (Patient taking differently: once daily as needed.), Disp: , Rfl:     dexAMETHasone (Decadron) 4 mg tablet, Take 20 mg (5 tabs) at bedtime the night before your treatment and then take 8 mg (2 tabs) for 3 days after each treatment., Disp: 11 tablet, Rfl: 0    DULoxetine (Cymbalta) 60 mg DR capsule, Take 1 capsule (60 mg) by mouth once daily. Do not crush or chew., Disp: 90 capsule, Rfl: 1    imiquimod (Aldara) 5 % cream, , Disp: , Rfl:     magnesium, amino acid chelate, 133 mg tablet, Take 1 tablet (133 mg) by mouth 2 times a day., Disp: , Rfl:     magnesium, as gluconate, (Magonate) 27 mg magnesium (500 mg) tablet, Take 2 tablets (54 mg) by mouth 2 times a day., Disp: 120 tablet, Rfl: 5    [START ON 2/27/2025] methadone (Dolophine) 10 mg tablet, Take 1 tablet (10 mg) by mouth every 12 hours. Do not fill before February 27, 2025., Disp: 60 tablet, Rfl: 0    ondansetron (Zofran) 8 mg tablet, Take 1 tablet (8 mg) by mouth every 8 hours if needed for nausea or vomiting., Disp: 30 tablet, Rfl: 5    oxyCODONE (Oxy-IR) 5 mg immediate release capsule, Take 1 capsule (5 mg) by mouth every 4 hours if needed for severe pain (7 - 10)., Disp: , Rfl:     polyethylene glycol (Glycolax, Miralax) 17 gram packet, Take 17 g by mouth 2 times a day as needed (constipation). Mix 1 cap (17g) into 8 ounces of fluid., Disp: 60 packet, Rfl: 2    pregabalin (Lyrica) 75 mg  capsule, Take 1 capsule (75 mg) by mouth 2 times a day. Do not fill before January 15, 2025., Disp: 60 capsule, Rfl: 5    primidone (Mysoline) 50 mg tablet, Take 2 tablets (100 mg) by mouth once daily at bedtime., Disp: , Rfl:     propranolol (Inderal) 40 mg tablet, Take 1 tablet (40 mg) by mouth once daily. (Patient taking differently: Take 0.5 tablets (20 mg) by mouth once daily.), Disp: , Rfl:     sennosides-docusate sodium (Joy-Colace) 8.6-50 mg tablet, Take 1-2 tablets by mouth 2 times a day as needed for constipation., Disp: 120 tablet, Rfl: 5  Medical History:  Past Medical History:   Diagnosis Date    Anxiety     Arthritis     Binge eating disorder     Chemotherapy-induced peripheral neuropathy (Multi)     Claustrophobia     Colitis     Depression     Dizziness     Low BP, saw neurology NP Ananya 1/10/25, meds adjusted    Dry eye syndrome     Essential tremor     Family history of malignant neoplasm of breast 02/28/2019    Family history of breast cancer    Gynecologic cancer (Multi) 09/18/2023    HLD (hyperlipidemia)     HTN (hypertension)     Hx antineoplastic chemo     Hydronephrosis with renal and ureteral calculus obstruction     IBS (irritable bowel syndrome)     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)     Kidney stones     Low back pain     Low platelet count (CMS-HCC)     Platelets=48 on 2/14/25    Lymphoma     Neuromuscular disorder (Multi)     OA (osteoarthritis)     Osteopenia     Ovarian cancer (Multi)     Personal history of malignant neoplasm of other parts of uterus 08/14/2020    History of malignant neoplasm of other parts of uterus    Personal history of non-Hodgkin lymphomas 08/14/2020    History of non-Hodgkin's lymphoma    Personal history of other specified conditions 10/28/2014    History of fibrocystic disease of breast    Portal vein thrombosis 09/18/2023    PORTAL VEIN THROMBOSIS I81    Presence of spectacles and contact lenses 12/09/2014    Wears contact lenses     Recurrent malignant neoplasm of ovary (Multi)     follows with oncology, s/p 6 cycles of Carbo/taxol    Right rotator cuff tendonitis     Skin cancer     basal cell    Thyroid nodule     Visual impairment        Surgical History:  Past Surgical History:   Procedure Laterality Date    BRAIN SURGERY      CHOLECYSTECTOMY      EXPLORATORY LAPAROTOMY      EYE SURGERY      laser    HYSTERECTOMY      JOINT REPLACEMENT Right     hip, right THR    LITHOTRIPSY  2013    Renal Lithotripsy    LYMPH NODE BIOPSY      OTHER SURGICAL HISTORY  2013    Lithotomy    OTHER SURGICAL HISTORY  2019    Ileostomy closure    TONSILLECTOMY         Family History:  Family History   Problem Relation Name Age of Onset    Breast cancer Mother Mary Foley -  60    Stroke Mother Mary Foley -      Colon cancer Father Sherif Foley -      Cancer Father Sherif Foley -      COPD Father Sherif Foley -      Hearing loss Father Sherif Foley -      Heart disease Father Sherif Foley -      Ovarian cancer Maternal Grandmother      Breast cancer Mother's Sister Zamzam Campbell -  40    Breast cancer Cousin  50       Social History:  Social History     Socioeconomic History    Marital status:      Spouse name: Not on file    Number of children: Not on file    Years of education: Not on file    Highest education level: Not on file   Occupational History    Not on file   Tobacco Use    Smoking status: Never    Smokeless tobacco: Never   Substance and Sexual Activity    Alcohol use: Yes     Comment: 1 glass of wine daily    Drug use: Yes     Types: Marijuana     Comment: medical card / gummies    Sexual activity: Not on file   Other Topics Concern    Not on file   Social History Narrative    Not on file     Social Drivers of Health     Financial Resource Strain: Low Risk  (2024)    Overall Financial Resource Strain (CARDIA)     Difficulty of Paying Living Expenses: Not hard  "at all   Food Insecurity: No Food Insecurity (7/26/2024)    Hunger Vital Sign     Worried About Running Out of Food in the Last Year: Never true     Ran Out of Food in the Last Year: Never true   Transportation Needs: No Transportation Needs (7/25/2024)    PRAPARE - Transportation     Lack of Transportation (Medical): No     Lack of Transportation (Non-Medical): No   Physical Activity: Inactive (7/26/2024)    Exercise Vital Sign     Days of Exercise per Week: 0 days     Minutes of Exercise per Session: 0 min   Stress: Stress Concern Present (7/26/2024)    [employer] - Occupational Stress Questionnaire     Feeling of Stress : To some extent   Social Connections: Moderately Isolated (7/26/2024)    Social Connection and Isolation Panel [NHANES]     Frequency of Communication with Friends and Family: More than three times a week     Frequency of Social Gatherings with Friends and Family: Once a week     Attends Congregational Services: Never     Active Member of Clubs or Organizations: No     Attends Club or Organization Meetings: Never     Marital Status:    Intimate Partner Violence: Not At Risk (7/26/2024)    Humiliation, Afraid, Rape, and Kick questionnaire     Fear of Current or Ex-Partner: No     Emotionally Abused: No     Physically Abused: No     Sexually Abused: No   Housing Stability: Low Risk  (7/25/2024)    Housing Stability Vital Sign     Unable to Pay for Housing in the Last Year: No     Number of Times Moved in the Last Year: 1     Homeless in the Last Year: No              Medical Review Of Systems:  +neuropathy  +pain but better  +alopecia    Psychiatric Review Of Systems:  +low motivation       Objective   Mental Status Exam:     Appearance: dressed neat and clean.   Attitude: cooperative and engaged.   Behavior: appropriate eye contact via video.   Motor Activity: mild truncal tremor noted.   Speech: regular volume, prosody, no aphasia.   Mood: \"down\".   Affect: " congruent, appropriate to circumstance  Thought Process: on topic.   Thought Content: no delusions, no SI/HI, less hopeless.   Thought Perception: no AVH.   Cognition: alert, oriented to person, place, time. attn intact.   Insight: fair.   Judgment: fair.       Vitals:  There were no vitals filed for this visit.  No results found. However, due to the size of the patient record, not all encounters were searched. Please check Results Review for a complete set of results.    Lab Results   Component Value Date    WBC 4.1 (L) 02/14/2025    HGB 8.7 (L) 02/14/2025    HCT 26.2 (L) 02/14/2025     (H) 02/14/2025    PLT 48 (L) 02/14/2025     Lab Results   Component Value Date    GLUCOSE 67 (L) 02/14/2025    CALCIUM 8.8 02/14/2025     02/14/2025    K 4.7 02/14/2025    CO2 27 02/14/2025     02/14/2025    BUN 23 02/14/2025    CREATININE 0.93 02/14/2025       Psychotherapy   Time: 17 minutes  Type: supportive  Target: mood, anxiety  Techniques: problem solving, validation  Goal: improved sx management  Follow up: next appt  Response: fair        Anabella Ugalde MD

## 2025-02-27 ENCOUNTER — DOCUMENTATION (OUTPATIENT)
Dept: PREADMISSION TESTING | Facility: HOSPITAL | Age: 70
End: 2025-02-27

## 2025-02-27 ENCOUNTER — PRE-ADMISSION TESTING (OUTPATIENT)
Dept: PREADMISSION TESTING | Facility: HOSPITAL | Age: 70
End: 2025-02-27
Payer: MEDICARE

## 2025-02-27 ENCOUNTER — LAB (OUTPATIENT)
Dept: LAB | Facility: HOSPITAL | Age: 70
End: 2025-02-27
Payer: MEDICARE

## 2025-02-27 VITALS
WEIGHT: 138.89 LBS | RESPIRATION RATE: 16 BRPM | TEMPERATURE: 96 F | HEIGHT: 63 IN | SYSTOLIC BLOOD PRESSURE: 116 MMHG | BODY MASS INDEX: 24.61 KG/M2 | OXYGEN SATURATION: 98 % | DIASTOLIC BLOOD PRESSURE: 62 MMHG | HEART RATE: 75 BPM

## 2025-02-27 DIAGNOSIS — I10 PRIMARY HYPERTENSION: Primary | ICD-10-CM

## 2025-02-27 DIAGNOSIS — R30.0 DYSURIA: ICD-10-CM

## 2025-02-27 LAB
APPEARANCE UR: CLEAR
ATRIAL RATE: 72 BPM
BILIRUB UR STRIP.AUTO-MCNC: NEGATIVE MG/DL
COLOR UR: ABNORMAL
GLUCOSE UR STRIP.AUTO-MCNC: NORMAL MG/DL
KETONES UR STRIP.AUTO-MCNC: NEGATIVE MG/DL
LEUKOCYTE ESTERASE UR QL STRIP.AUTO: ABNORMAL
MUCOUS THREADS #/AREA URNS AUTO: ABNORMAL /LPF
NITRITE UR QL STRIP.AUTO: NEGATIVE
P AXIS: 49 DEGREES
P OFFSET: 197 MS
P ONSET: 147 MS
PH UR STRIP.AUTO: 6 [PH]
PR INTERVAL: 146 MS
PROT UR STRIP.AUTO-MCNC: NEGATIVE MG/DL
Q ONSET: 220 MS
QRS COUNT: 12 BEATS
QRS DURATION: 78 MS
QT INTERVAL: 402 MS
QTC CALCULATION(BAZETT): 440 MS
QTC FREDERICIA: 427 MS
R AXIS: 52 DEGREES
RBC # UR STRIP.AUTO: NEGATIVE MG/DL
RBC #/AREA URNS AUTO: ABNORMAL /HPF
SP GR UR STRIP.AUTO: 1.02
T AXIS: 47 DEGREES
T OFFSET: 421 MS
UROBILINOGEN UR STRIP.AUTO-MCNC: NORMAL MG/DL
VENTRICULAR RATE: 72 BPM
WBC #/AREA URNS AUTO: >50 /HPF

## 2025-02-27 PROCEDURE — 87086 URINE CULTURE/COLONY COUNT: CPT

## 2025-02-27 PROCEDURE — 87077 CULTURE AEROBIC IDENTIFY: CPT

## 2025-02-27 PROCEDURE — 93010 ELECTROCARDIOGRAM REPORT: CPT | Performed by: INTERNAL MEDICINE

## 2025-02-27 PROCEDURE — 99204 OFFICE O/P NEW MOD 45 MIN: CPT | Performed by: NURSE PRACTITIONER

## 2025-02-27 PROCEDURE — 93005 ELECTROCARDIOGRAM TRACING: CPT | Performed by: NURSE PRACTITIONER

## 2025-02-27 PROCEDURE — 81001 URINALYSIS AUTO W/SCOPE: CPT

## 2025-02-27 ASSESSMENT — ENCOUNTER SYMPTOMS
RESPIRATORY NEGATIVE: 1
NECK NEGATIVE: 1
ARTHRALGIAS: 1
CARDIOVASCULAR NEGATIVE: 1
ENDOCRINE NEGATIVE: 1
CONSTITUTIONAL NEGATIVE: 1
GASTROINTESTINAL NEGATIVE: 1
ROS SKIN COMMENTS: RIGHT CHEST MEDIPORT

## 2025-02-27 NOTE — CPM/PAT H&P
CPM/PAT Evaluation       Name: Saba Park (aSba Park)  /Age: 1955/69 y.o.     In-Person         Date of Consult: 25    Referring Provider: Dr. Reddy     Date, Surgery, and Length:  3/13/25, cystoscopy, right ureteroscopy, holmium laser lithotripsy, ureteral stent insertion, 75 minutes      left ureteral stone s/p bilateral ureteroscopy and lithotripsy. s/p stent removal in   -now with right ureteral stone and hydronephrosis        This note was created in part upon personal review of patient's medical records.        Pt denies any past history of anesthetic complications such as PONV, awareness, prolonged sedation, dental damage, aspiration, cardiac arrest, difficult intubation, difficult I.V. access or unexpected hospital admissions. No history of malignant hyperthermia and or pseudocholinesterase deficiency.    No history of blood transfusions.    The patient IS NOT a Mosque and will accept blood and blood products if medically indicated.     Type and screen WAS sent.      Past Medical History:   Diagnosis Date    Anxiety     Arthritis     Binge eating disorder     Chemotherapy-induced peripheral neuropathy (Multi)     Claustrophobia     Colitis     Depression     Dizziness     Low BP, saw neurology NP Ananya 1/10/25, meds adjusted    Dry eye syndrome     Essential tremor     Family history of malignant neoplasm of breast 2019    Family history of breast cancer    Gynecologic cancer (Multi) 2023    HLD (hyperlipidemia)     HTN (hypertension)     Hx antineoplastic chemo     Hydronephrosis with renal and ureteral calculus obstruction     IBS (irritable bowel syndrome)     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)     Kidney stones     Low back pain     Low platelet count (CMS-[facility])     Platelets=48 on 25    Lymphoma     Neuromuscular disorder (Multi)     OA (osteoarthritis)     Osteopenia     Ovarian cancer (Multi)     Personal history of  malignant neoplasm of other parts of uterus 2020    History of malignant neoplasm of other parts of uterus    Personal history of non-Hodgkin lymphomas 2020    History of non-Hodgkin's lymphoma    Personal history of other specified conditions 10/28/2014    History of fibrocystic disease of breast    Portal vein thrombosis 2023    PORTAL VEIN THROMBOSIS I81    Presence of spectacles and contact lenses 2014    Wears contact lenses    Recurrent malignant neoplasm of ovary (Multi)     follows with oncology, s/p 6 cycles of Carbo/taxol    Right rotator cuff tendonitis     Skin cancer     basal cell    Thyroid nodule     Visual impairment        Past Surgical History:   Procedure Laterality Date    BRAIN SURGERY      CHOLECYSTECTOMY      EXPLORATORY LAPAROTOMY      EYE SURGERY      laser    HYSTERECTOMY      JOINT REPLACEMENT Right     hip, right THR    LITHOTRIPSY  2013    Renal Lithotripsy    LYMPH NODE BIOPSY      OTHER SURGICAL HISTORY  2013    Lithotomy    OTHER SURGICAL HISTORY  2019    Ileostomy closure    TONSILLECTOMY         Family History   Problem Relation Name Age of Onset    Breast cancer Mother Mary Foley -  60    Stroke Mother Mary Foley -      Colon cancer Father Sherif Foley -      Cancer Father Sherif Og -      COPD Father Sherif Foley -      Hearing loss Father Sherif Foley -      Heart disease Father Sherif Foley -      Ovarian cancer Maternal Grandmother      Breast cancer Mother's Sister Zamzam Campbell -  40    Breast cancer Cousin  50     Social History     Tobacco Use   Smoking Status Never   Smokeless Tobacco Never     Social History     Substance and Sexual Activity   Alcohol Use Yes    Comment: 1 glass of wine daily     Social History     Substance and Sexual Activity   Drug Use Yes    Types: Marijuana    Comment: medical card / gummies       Allergies   Allergen Reactions     Erythromycin Other     Current Outpatient Medications   Medication Instructions    albuterol (ProAir HFA) 90 mcg/actuation inhaler 1-2 puffs, inhalation, Every 6 hours PRN    buPROPion SR (WELLBUTRIN SR) 200 mg, oral, 2 times daily, Do not crush, chew, or split.    busPIRone (BUSPAR) 5 mg, oral, 2 times daily    calcium carbonate (Oscal) 500 mg calcium (1,250 mg) tablet 1 tablet, 2 times daily (morning and late afternoon)    cholecalciferol (Vitamin D-3) 50 mcg (2,000 unit) capsule 1 capsule, Daily    clobetasol (Temovate) 0.05 % cream APPLY AS DIRECTED EVERY DAY    dexAMETHasone (Decadron) 4 mg tablet Take 20 mg (5 tabs) at bedtime the night before your treatment and then take 8 mg (2 tabs) for 3 days after each treatment.    DULoxetine (CYMBALTA) 60 mg, oral, Daily, Do not crush or chew.    imiquimod (Aldara) 5 % cream     magnesium gluconate (MAGONATE) 54 mg, oral, 2 times daily    magnesium, amino acid chelate, 133 mg tablet 1 tablet, 2 times daily    methadone (DOLOPHINE) 10 mg, oral, Every 12 hours    ondansetron (ZOFRAN) 8 mg, oral, Every 8 hours PRN    oxyCODONE (OXY-IR) 5 mg, Every 4 hours PRN    polyethylene glycol (GLYCOLAX, MIRALAX) 17 g, oral, 2 times daily PRN, Mix 1 cap (17g) into 8 ounces of fluid.    pregabalin (LYRICA) 75 mg, oral, 2 times daily    primidone (MYSOLINE) 100 mg, Nightly    propranolol (INDERAL) 40 mg, Daily    sennosides-docusate sodium (Joy-Colace) 8.6-50 mg tablet 1-2 tablets, oral, 2 times daily PRN       PAT ROS:   Constitutional:   neg    Neuro/Psych:    Neuropathy to feet  Eyes:    use of corrective lenses  Ears:   neg    Nose:   neg    Mouth:   neg    Throat:   neg    Neck:   neg    Cardio:   neg    Respiratory:   neg    Endocrine:   neg    GI:   neg    :   neg    Musculoskeletal:    arthralgias  Hematologic:   neg    Skin:   Right chest mediport      Physical Exam  Vitals reviewed. Physical exam within normal limits.  (except for comments below)  Skin:     Comments: Right  "chest mediport           [facility] AIRWAY:   Airway:     Mallampati::  II    Neck ROM::  Full  normal          Visit Vitals  /62   Pulse 75   Temp 35.6 °C (96 °F)   Resp 16   Ht 1.59 m (5' 2.6\")   Wt 63 kg (138 lb 14.2 oz)   SpO2 98%   BMI 24.92 kg/m²   OB Status Postmenopausal   Smoking Status Never   BSA 1.67 m²       Assessment and Plan:     Patient is a    Patient is at acceptable risk to proceed with planned surgical procedure. Further cardiac risk stratification deferred at this time.This patient is LOW/INTERMEDIATE/HIGH risk candidate undergoing LOW/MODERATE/HIGH risk procedure, patient is medically optimized for surgery.    Plan      Neuro:  {CPMPATNEURO:64175}  {CPMPATDELIRIUMRISKFACTORS:69354}  {CPMPATSTROKE:78855}  {CPMPATCVARISKFACTORS:38507}        Cardiovascular:    RCRI:  Risk of Mace:      .dcv    functional capacity      EKG in PAT             Pulmonary:  {CPMPATPULM:39061}  {CPMPATPULMRISKFACTORS:28898}  Stop Bang score is *** placing patient at *** risk for JAIRO  ARISCAT: {ARISCAT Score:19936} risk of in-hospital postoperative pulmonary complication  PRODIGY: {Low/Medium/High:76740} risk for opioid induced respiratory depression  {CPMPATPULMEDUCATION:83048}        Renal/endo:  .ckd      GI/:      Heme:  Patient instructed to ambulate as soon as possible postoperatively to decrease thromboembolic risk.    Initiate mechanical DVT prophylaxis as soon as possible and initiate chemical prophylaxis when deemed safe from a bleeding standpoint post surgery.        Caprini=        Risk assessment complete.  Patient is scheduled for a INTERMEDIATE surgical risk procedure.     She ISconsidered medically optimized for the planned procedure.        Labs/testing obtained in PAT on 2/27/25: CBC, BMP, T&S, UA FLEX, EKG      Follow up/communication: none      Preoperative medication instructions were provided and reviewed with the patient.  Any additional testing or evaluation was explained to the patient.  " Nothing by mouth instructions were discussed and patient's questions were answered prior to conclusion to this encounter.  Patient verbalized understanding of preoperative instructions given in preadmission testing; discharge instructions available in EMR.    This note was dictated with speech recognition.  Minor errors may have been detected during use of speech recognition.         with the patient.  Any additional testing or evaluation was explained to the patient.  Nothing by mouth instructions were discussed and patient's questions were answered prior to conclusion to this encounter.  Patient verbalized understanding of preoperative instructions given in preadmission testing; discharge instructions available in EMR.    This note was dictated with speech recognition.  Minor errors may have been detected during use of speech recognition.

## 2025-02-27 NOTE — PREPROCEDURE INSTRUCTIONS
Medication List            Accurate as of February 27, 2025 12:52 PM. Always use your most recent med list.                albuterol 90 mcg/actuation inhaler  Commonly known as: ProAir HFA  Inhale 1-2 puffs every 6 hours if needed for wheezing or shortness of breath.  Medication Adjustments for Surgery: Take/Use as prescribed     buPROPion  mg 12 hr tablet  Commonly known as: Wellbutrin SR  Take 1 tablet (200 mg) by mouth 2 times a day. Do not crush, chew, or split.  Medication Adjustments for Surgery: Take/Use as prescribed     busPIRone 5 mg tablet  Commonly known as: Buspar  TAKE 1 TABLET BY MOUTH TWICE A DAY  Medication Adjustments for Surgery: Take/Use as prescribed     calcium carbonate 500 mg calcium (1,250 mg) tablet  Commonly known as: Oscal  Additional Medication Adjustments for Surgery: Take last dose 7 days before surgery     cholecalciferol 50 mcg (2,000 unit) capsule  Commonly known as: Vitamin D-3  Additional Medication Adjustments for Surgery: Take last dose 7 days before surgery     clobetasol 0.05 % cream  Commonly known as: Temovate  Medication Adjustments for Surgery: Do Not take on the morning of surgery     dexAMETHasone 4 mg tablet  Commonly known as: Decadron  Take 20 mg (5 tabs) at bedtime the night before your treatment and then take 8 mg (2 tabs) for 3 days after each treatment.  Medication Adjustments for Surgery: Take/Use as prescribed     DULoxetine 60 mg DR capsule  Commonly known as: Cymbalta  Take 1 capsule (60 mg) by mouth once daily. Do not crush or chew.  Medication Adjustments for Surgery: Take/Use as prescribed     imiquimod 5 % cream  Commonly known as: Aldara  Medication Adjustments for Surgery: Do Not take on the morning of surgery     magnesium (amino acid chelate) 133 mg tablet  Additional Medication Adjustments for Surgery: Take last dose 7 days before surgery     magnesium gluconate 27 mg magnesium (500 mg) tablet  Commonly known as: Magonate  Take 2 tablets (54  mg) by mouth 2 times a day.  Additional Medication Adjustments for Surgery: Take last dose 7 days before surgery     methadone 10 mg tablet  Commonly known as: Dolophine  Take 1 tablet (10 mg) by mouth every 12 hours. Do not fill before February 27, 2025.  Medication Adjustments for Surgery: Take/Use as prescribed     ondansetron 8 mg tablet  Commonly known as: Zofran  Take 1 tablet (8 mg) by mouth every 8 hours if needed for nausea or vomiting.  Medication Adjustments for Surgery: Take/Use as prescribed     oxyCODONE 5 mg immediate release capsule  Commonly known as: Oxy-IR  Medication Adjustments for Surgery: Take/Use as prescribed     polyethylene glycol 17 gram packet  Commonly known as: Glycolax, Miralax  Take 17 g by mouth 2 times a day as needed (constipation). Mix 1 cap (17g) into 8 ounces of fluid.  Medication Adjustments for Surgery: Do Not take on the morning of surgery     pregabalin 75 mg capsule  Commonly known as: Lyrica  Take 1 capsule (75 mg) by mouth 2 times a day. Do not fill before January 15, 2025.  Medication Adjustments for Surgery: Take/Use as prescribed     primidone 50 mg tablet  Commonly known as: Mysoline  Medication Adjustments for Surgery: Take/Use as prescribed     propranolol 40 mg tablet  Commonly known as: Inderal  Medication Adjustments for Surgery: Take/Use as prescribed     sennosides-docusate sodium 8.6-50 mg tablet  Commonly known as: Joy-Colace  Take 1-2 tablets by mouth 2 times a day as needed for constipation.  Medication Adjustments for Surgery: Do Not take on the morning of surgery                Preoperative Brain Exercises    What are brain exercises?  A brain exercise is any activity that engages your thinking (cognitive) skills.    What types of activities are considered brain exercises?  Jigsaw puzzles, crossword puzzles, word jumble, memory games, word search, and many more.  Many can be found free online or on your phone via a mobile bridget.    Why should I do brain  exercises before my surgery?  More recent research has shown brain exercise before surgery can lower the risk of postoperative delirium (confusion) which can be especially important for older adults.  Patients who did brain exercises for 5 to 10 hours the days before surgery, cut their risk of postoperative delirium in half up to 1 week after surgery.        Preoperative Deep Breathing Exercises  Why it is important to do deep breathing exercises before my surgery?  Deep breathing exercises strengthen your breathing muscles.  This helps you to recover after your surgery and decreases the chance of breathing complications.  How are the deep breathing exercises done?  Sit straight with your back supported.  Breathe in deeply and slowly through your nose. Your lower rib cage should expand and your abdomen may move forward.  Hold that breath for 3 to 5 seconds.  Breathe out through pursed lips, slowly and completely.  Rest and repeat 10 times every hour while awake.  Rest longer if you become dizzy or lightheaded.        CONTACT SURGEON'S OFFICE IF YOU DEVELOP:  * Fever = 100.4 F   * New respiratory symptoms (e.g. cough, shortness of breath, respiratory distress, sore throat)  * Recent loss of taste or smell  *Flu like symptoms such as headache, fatigue or gastrointestinal symptoms  * You develop any open sores, shingles, burning or painful urination   AND/OR:  * You no longer wish to have the surgery.  * Any other personal circumstances change that may lead to the need to cancel or defer this surgery.  *You were admitted to any hospital within one week of your planned procedure.    SMOKING:  *Quitting smoking can make a huge difference to your health and recovery from surgery.    *If you need help with quitting, call 9-794-QUIT-NOW.    THE DAY OF SURGERY:  *Do not eat any food after midnight the night before your surgery.   *YOU MUST drink 14 OUNCES of clear liquids TWO hours before your instructed ARRIVAL TIME to the  hospital. This includes water, black tea/coffee (no milk or cream), apple juice, clear broth and electrolyte drinks (Gatorade).  Please avoid clear liquids that are red in color.   *You may chew gum/mints up to TWO hours before your surgery/procedure.    SURGICAL TIME:  *You will be contacted between 2 p.m. and 6 p.m. the business day before your surgery with your arrival time.  *If you haven't received a call by 6pm, call [phone].  *Scheduled surgery times may change and you will be notified if this occurs-check your personal voicemail for any updates.    ON THE MORNING OF SURGERY:  *Wear comfortable, loose fitting clothing.   *Do not use moisturizers, creams, lotions or perfume.  *All jewelry and valuables should be left at home.  *Prosthetic devices such as contact lenses, hearing aids, dentures, eyelash extensions, hairpins and body piercing must be removed before surgery.    BRING WITH YOU:  *Photo ID and insurance card  *Current list of medications and allergies  *Pacemaker/Defibrillator/Heart stent cards  *CPAP machine and mask  *Slings/splints/crutches  *Copy of your complete Advanced Directive/DHPOA-if applicable  *Neurostimulator implant remote    PARKING AND ARRIVAL:  *Check in at the Main Entrance desk and let them know you are here for surgery.  *You will be directed to the 2nd floor surgical waiting area.    IF YOU ARE HAVING OUTPATIENT/SAME DAY SURGERY:  *A responsible adult MUST accompany you at the time of discharge and stay with you for 24 hours after your surgery.  *You may NOT drive yourself home after surgery.  *You may use a taxi or ride sharing service ("[employer]", [employer]) to return home ONLY if you are accompanied by a friend or family member.  *Instructions for resuming your medications will be provided by your surgeon.

## 2025-02-27 NOTE — H&P (VIEW-ONLY)
[facility]/PAT Evaluation       Name: Saba Park (Saba Park)  /Age: 1955/69 y.o.     In-Person         Date of Consult: 25    Referring Provider: Dr. Reddy     Date, Surgery, and Length:  3/13/25, cystoscopy, right ureteroscopy, holmium laser lithotripsy, ureteral stent insertion, 75 minutes    Patient presents to [facility] for perioperative risk assessment prior to scheduled surgery. Patient presents with right ureteral stone and hydronephrosis. S/p left ureteral stone s/p bilateral ureteroscopy and lithotripsy in .    This note was created in part upon personal review of patient's medical records.      Pt denies any past history of anesthetic complications such as PONV, awareness, prolonged sedation, dental damage, aspiration, cardiac arrest, difficult intubation, difficult I.V. access or unexpected hospital admissions. No history of malignant hyperthermia and or pseudocholinesterase deficiency.    No history of blood transfusions.    The patient IS NOT a Uatsdin and will accept blood and blood products if medically indicated.     Type and screen WAS sent.      Past Medical History:   Diagnosis Date    Anxiety     Arthritis     Binge eating disorder     Chemotherapy-induced peripheral neuropathy (Multi)     Claustrophobia     Colitis     Depression     Dizziness     Low BP, saw neurology NP Ananya 1/10/25, meds adjusted    Dry eye syndrome     Essential tremor     Family history of malignant neoplasm of breast 2019    Family history of breast cancer    Gynecologic cancer (Multi) 2023    HLD (hyperlipidemia)     HTN (hypertension)     Hx antineoplastic chemo     Hydronephrosis with renal and ureteral calculus obstruction     IBS (irritable bowel syndrome)     Infection of right prosthetic hip joint (CMS-[facility])     Kidney stones     Low back pain     Low platelet count (CMS-HCC)     Platelets=48 on 25    Lymphoma     Neuromuscular disorder (Multi)     OA  (osteoarthritis)     Osteopenia     Ovarian cancer (Multi)     Personal history of malignant neoplasm of other parts of uterus 2020    History of malignant neoplasm of other parts of uterus    Personal history of non-Hodgkin lymphomas 2020    History of non-Hodgkin's lymphoma    Personal history of other specified conditions 10/28/2014    History of fibrocystic disease of breast    Portal vein thrombosis 2023    PORTAL VEIN THROMBOSIS I81    Presence of spectacles and contact lenses 2014    Wears contact lenses    Recurrent malignant neoplasm of ovary (Multi)     follows with oncology, s/p 6 cycles of Carbo/taxol    Right rotator cuff tendonitis     Skin cancer     basal cell    Thyroid nodule     Visual impairment        Past Surgical History:   Procedure Laterality Date    BRAIN SURGERY      CHOLECYSTECTOMY      EXPLORATORY LAPAROTOMY      EYE SURGERY      laser    HYSTERECTOMY      JOINT REPLACEMENT Right     hip, right THR    LITHOTRIPSY  2013    Renal Lithotripsy    LYMPH NODE BIOPSY      OTHER SURGICAL HISTORY  2013    Lithotomy    OTHER SURGICAL HISTORY  2019    Ileostomy closure    TONSILLECTOMY         Family History   Problem Relation Name Age of Onset    Breast cancer Mother Mary Foley -  60    Stroke Mother Mary Foley -      Colon cancer Father Sherif Foley -      Cancer Father Sherif Og -      COPD Father Sherif Foley -      Hearing loss Father Sherif Foley -      Heart disease Father Sherif Og -      Ovarian cancer Maternal Grandmother      Breast cancer Mother's Sister Zamzam Campbell -  40    Breast cancer Cousin  50     Social History     Tobacco Use   Smoking Status Never   Smokeless Tobacco Never     Social History     Substance and Sexual Activity   Alcohol Use Yes    Comment: 1 glass of wine daily     Social History     Substance and Sexual Activity   Drug Use Yes    Types: Marijuana     Comment: medical card / gummies       Allergies   Allergen Reactions    Erythromycin Other     Current Outpatient Medications   Medication Instructions    albuterol (ProAir HFA) 90 mcg/actuation inhaler 1-2 puffs, inhalation, Every 6 hours PRN    buPROPion SR (WELLBUTRIN SR) 200 mg, oral, 2 times daily, Do not crush, chew, or split.    busPIRone (BUSPAR) 5 mg, oral, 2 times daily    calcium carbonate (Oscal) 500 mg calcium (1,250 mg) tablet 1 tablet, 2 times daily (morning and late afternoon)    cholecalciferol (Vitamin D-3) 50 mcg (2,000 unit) capsule 1 capsule, Daily    clobetasol (Temovate) 0.05 % cream APPLY AS DIRECTED EVERY DAY    dexAMETHasone (Decadron) 4 mg tablet Take 20 mg (5 tabs) at bedtime the night before your treatment and then take 8 mg (2 tabs) for 3 days after each treatment.    DULoxetine (CYMBALTA) 60 mg, oral, Daily, Do not crush or chew.    imiquimod (Aldara) 5 % cream     magnesium gluconate (MAGONATE) 54 mg, oral, 2 times daily    magnesium, amino acid chelate, 133 mg tablet 1 tablet, 2 times daily    methadone (DOLOPHINE) 10 mg, oral, Every 12 hours    ondansetron (ZOFRAN) 8 mg, oral, Every 8 hours PRN    oxyCODONE (OXY-IR) 5 mg, Every 4 hours PRN    polyethylene glycol (GLYCOLAX, MIRALAX) 17 g, oral, 2 times daily PRN, Mix 1 cap (17g) into 8 ounces of fluid.    pregabalin (LYRICA) 75 mg, oral, 2 times daily    primidone (MYSOLINE) 100 mg, Nightly    propranolol (INDERAL) 40 mg, Daily    sennosides-docusate sodium (Joy-Colace) 8.6-50 mg tablet 1-2 tablets, oral, 2 times daily PRN       PAT ROS:   Constitutional:   neg    Neuro/Psych:    Neuropathy to feet  Eyes:    use of corrective lenses  Ears:   neg    Nose:   neg    Mouth:   neg    Throat:   neg    Neck:   neg    Cardio:   neg    Respiratory:   neg    Endocrine:   neg    GI:   neg    :   neg    Musculoskeletal:    arthralgias  Hematologic:   neg    Skin:   Right chest mediport      Physical Exam  Vitals reviewed. Physical exam  "within normal limits.  (except for comments below)  Skin:     Comments: Right chest mediport           PAT AIRWAY:   Airway:     Mallampati::  II    Neck ROM::  Full  normal          Visit Vitals  /62   Pulse 75   Temp 35.6 °C (96 °F)   Resp 16   Ht 1.59 m (5' 2.6\")   Wt 63 kg (138 lb 14.2 oz)   SpO2 98%   BMI 24.92 kg/m²   OB Status Postmenopausal   Smoking Status Never   BSA 1.67 m²     Assessment and Plan:     Patient is a 69 year old female scheduled for cystoscopy, right ureteroscopy, holmium laser lithotripsy, ureteral stent insertion with Dr. Brown on 3/13/25.    Patient is at acceptable risk to proceed with planned surgical procedure. Further cardiac risk stratification deferred at this time.This patient is INTERMEDIATE risk candidate undergoing MODERATE risk procedure, patient is medically optimized for surgery.    Plan    Neuro:  No neurologic diagnosis, however, the patient is at increased risk for perioperative delirium secondary to  age, depression, polypharmacy, renal insufficiency, Patient instructed on and provided cognitive exercises  Patient is at increased risk for perioperative CVA secondary to  HTN, increased age    Depression/anxiety- continue current regimen    Cardiovascular:    RCRI: 0 Risk of Mace:    Patient denies any chest pain, tightness, heaviness, pressure, radiating pain, palpitations, irregular heartbeats, lightheadedness, cough, congestion, shortness of breath, LUTZ, PND, near syncope, weight loss or gain.    Good functional capacity    EKG in PAT   Encounter Date: 02/27/25   ECG 12 Lead   Result Value    Ventricular Rate 72    Atrial Rate 72    SD Interval 146    QRS Duration 78    QT Interval 402    QTC Calculation(Bazett) 440    P Axis 49    R Axis 52    T Axis 47    QRS Count 12    Q Onset 220    P Onset 147    P Offset 197    T Offset 421    QTC Fredericia 427    Narrative    Normal sinus rhythm  Normal ECG  When compared with ECG of 25-JUL-2024 04:45,  No " significant change was found  Confirmed by Robbin Mireles ([id number]) on 2/27/2025 1:08:27 PM       Pulmonary:  No pulmonary diagnosis, however patient is at increased risk of perioperative complications secondary to  age > 60, types of anesthetic  Stop Bang score is 3 placing patient at LOW risk for JAIRO  ARISCAT: 26-44 points, 13.3% risk of in-hospital postoperative pulmonary complication  PRODIGY: Moderate risk for opioid induced respiratory depression    Renal/endo:  Recommendations to avoid nephrotoxic drugs and carefully monitor fluid status to maintain euvolemia. Use dose adjusted medications as needed for the underlying level of renal function.    GI/:    Ovarian cancer- completed chemo    Heme:  Patient instructed to ambulate as soon as possible postoperatively to decrease thromboembolic risk.    Initiate mechanical DVT prophylaxis as soon as possible and initiate chemical prophylaxis when deemed safe from a bleeding standpoint post surgery.      Caprini= 4      Risk assessment complete.  Patient is scheduled for a INTERMEDIATE surgical risk procedure.    She  IS considered medically optimized for the planned procedure.        Labs/testing obtained in PAT on 2/27/25: CBC, BMP, T&S, UA FLEX, EKG    Lab Results   Component Value Date    WBC 4.4 02/28/2025    HGB 9.5 (L) 02/28/2025    HCT 29.4 (L) 02/28/2025     (H) 02/28/2025     02/28/2025     Lab Results   Component Value Date    GLUCOSE 59 (L) 02/28/2025    CALCIUM 9.3 02/28/2025     02/28/2025    K 4.8 02/28/2025    CO2 29 02/28/2025     02/28/2025    BUN 26 (H) 02/28/2025    CREATININE 1.01 02/28/2025     Component 4 d ago   ABO TYPE O   Rh TYPE NEG   Comment: 2nd ABO test required. Order and Collect VERAB   ANTIBODY SCREEN NEG   Resulting Agency [facility]             Specimen Collected: 02/28/25 16:13 Last Resulted: 02/28/25 19:47         Follow up/communication: none      Preoperative medication instructions were provided and reviewed  with the patient.  Any additional testing or evaluation was explained to the patient.  Nothing by mouth instructions were discussed and patient's questions were answered prior to conclusion to this encounter.  Patient verbalized understanding of preoperative instructions given in preadmission testing; discharge instructions available in EMR.    This note was dictated with speech recognition.  Minor errors may have been detected during use of speech recognition.

## 2025-02-27 NOTE — CPM/PAT NURSE NOTE
CPM/PAT Nurse Note      Name: Saba Park (Saba Park)  /Age: 1955/69 y.o.       Past Medical History:   Diagnosis Date    Anxiety     Arthritis     Binge eating disorder     Chemotherapy-induced peripheral neuropathy (Multi)     Claustrophobia     Colitis     Depression     Dizziness     Low BP, saw neurology NP Ananya 1/10/25, meds adjusted    Dry eye syndrome     Essential tremor     Family history of malignant neoplasm of breast 2019    Family history of breast cancer    Gynecologic cancer (Multi) 2023    HLD (hyperlipidemia)     HTN (hypertension)     Hx antineoplastic chemo     Hydronephrosis with renal and ureteral calculus obstruction     IBS (irritable bowel syndrome)     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)     Kidney stones     Low back pain     Low platelet count (CMS-HCC)     Platelets=48 on 25    Lymphoma     Neuromuscular disorder (Multi)     OA (osteoarthritis)     Osteopenia     Ovarian cancer (Multi)     Personal history of malignant neoplasm of other parts of uterus 2020    History of malignant neoplasm of other parts of uterus    Personal history of non-Hodgkin lymphomas 2020    History of non-Hodgkin's lymphoma    Personal history of other specified conditions 10/28/2014    History of fibrocystic disease of breast    Portal vein thrombosis 2023    PORTAL VEIN THROMBOSIS I81    Presence of spectacles and contact lenses 2014    Wears contact lenses    Recurrent malignant neoplasm of ovary (Multi)     follows with oncology, s/p 6 cycles of Carbo/taxol    Right rotator cuff tendonitis     Skin cancer     basal cell    Thyroid nodule     Visual impairment        Past Surgical History:   Procedure Laterality Date    BRAIN SURGERY      CHOLECYSTECTOMY      EXPLORATORY LAPAROTOMY      EYE SURGERY      laser    HYSTERECTOMY      JOINT REPLACEMENT Right     hip, right THR    LITHOTRIPSY  2013    Renal Lithotripsy    LYMPH  NODE BIOPSY      OTHER SURGICAL HISTORY  2013    Lithotomy    OTHER SURGICAL HISTORY  2019    Ileostomy closure    TONSILLECTOMY         Patient  has no history on file for sexual activity.    Family History   Problem Relation Name Age of Onset    Breast cancer Mother Mary Foley -  60    Stroke Mother Mary Foley -      Colon cancer Father Sherif Foley -      Cancer Father Sherif Foley -      COPD Father Sherif Foley -      Hearing loss Father Sherif Foley -      Heart disease Father Sherif Foley -      Ovarian cancer Maternal Grandmother      Breast cancer Mother's Sister Zamzam Campbell -  40    Breast cancer Cousin  50       Allergies   Allergen Reactions    Erythromycin Other       Prior to Admission medications    Medication Sig Start Date End Date Taking? Authorizing Provider   albuterol (ProAir HFA) 90 mcg/actuation inhaler Inhale 1-2 puffs every 6 hours if needed for wheezing or shortness of breath.  Patient not taking: Reported on 2025 12/11/24 1/10/25  Priscila Voss MD   buPROPion SR (Wellbutrin SR) 200 mg 12 hr tablet Take 1 tablet (200 mg) by mouth 2 times a day. Do not crush, chew, or split. 25  Anabella Ugalde MD   busPIRone (Buspar) 5 mg tablet TAKE 1 TABLET BY MOUTH TWICE A DAY 25   Anabelal Ugalde MD   calcium carbonate (Oscal) 500 mg calcium (1,250 mg) tablet Take 1 tablet (1,250 mg) by mouth 2 times daily (morning and late afternoon).    Historical Provider, MD   cholecalciferol (Vitamin D-3) 50 mcg (2,000 unit) capsule Take 1 capsule (50 mcg) by mouth once daily. 16   Historical Provider, MD   clobetasol (Temovate) 0.05 % cream APPLY AS DIRECTED EVERY DAY  Patient taking differently: once daily as needed. 24   Historical Provider, MD   dexAMETHasone (Decadron) 4 mg tablet Take 20 mg (5 tabs) at bedtime the night before your treatment and then take 8 mg (2 tabs) for 3 days  after each treatment.  Patient not taking: Reported on 2/27/2025 1/29/25   DANILO Jorgensen   DULoxetine (Cymbalta) 60 mg DR capsule Take 1 capsule (60 mg) by mouth once daily. Do not crush or chew. 1/8/25   Anabella Ugalde MD   imiquimod (Aldara) 5 % cream  4/4/24   Historical Provider, MD   magnesium, amino acid chelate, 133 mg tablet Take 1 tablet (133 mg) by mouth 2 times a day.    Historical Provider, MD   magnesium, as gluconate, (Magonate) 27 mg magnesium (500 mg) tablet Take 2 tablets (54 mg) by mouth 2 times a day. 9/10/24   Krista Kenny MD   methadone (Dolophine) 10 mg tablet Take 1 tablet (10 mg) by mouth every 12 hours. Do not fill before February 27, 2025. 2/27/25 3/29/25  DANILO Orourke   ondansetron (Zofran) 8 mg tablet Take 1 tablet (8 mg) by mouth every 8 hours if needed for nausea or vomiting. 9/10/24   Krista Kenny MD   oxyCODONE (Oxy-IR) 5 mg immediate release capsule Take 1 capsule (5 mg) by mouth every 4 hours if needed for severe pain (7 - 10).  Patient not taking: Reported on 2/27/2025    Historical Provider, MD   polyethylene glycol (Glycolax, Miralax) 17 gram packet Take 17 g by mouth 2 times a day as needed (constipation). Mix 1 cap (17g) into 8 ounces of fluid. 12/20/24 3/20/25  DANILO Orourke   pregabalin (Lyrica) 75 mg capsule Take 1 capsule (75 mg) by mouth 2 times a day. Do not fill before January 15, 2025. 1/15/25 7/14/25  DANILO Orourke   primidone (Mysoline) 50 mg tablet Take 2 tablets (100 mg) by mouth once daily at bedtime.    Historical Provider, MD   propranolol (Inderal) 40 mg tablet Take 1 tablet (40 mg) by mouth once daily.  Patient taking differently: Take 0.5 tablets (20 mg) by mouth once daily.    Historical Provider, MD   sennosides-docusate sodium (Joy-Colace) 8.6-50 mg tablet Take 1-2 tablets by mouth 2 times a day as needed for constipation.  Patient not taking: Reported on 2/27/2025 12/20/24 6/18/25  Arin DAILY  DEMETRIUS Hirsch-CNP   buPROPion SR (Wellbutrin SR) 150 mg 12 hr tablet Take 1 tablet (150 mg) by mouth 2 times a day. Do not crush, chew, or split. 1/8/25 2/26/25  Anabella Ugalde MD   methadone (Dolophine) 10 mg tablet Take 1 tablet (10 mg) by mouth every 12 hours. 1/29/25 2/26/25  DANILO Orourke        PAT ROS     DASI Risk Score    No data to display       Caprini DVT Assessment      Flowsheet Row ED to Hosp-Admission (Discharged) from 7/25/2024 in [facility] A 7 with Nestor Christiansen MD, Azael Tobar MD and Taurus Rivera MD   DVT Score (IF A SCORE IS NOT CALCULATING, MUST SELECT A BMI TO COMPLETE) 7 filed at 07/26/2024 0744   BMI (BMI MUST BE CHOSEN) 30 or less filed at 07/26/2024 0744   RETIRED: Current Status Minor surgery planned filed at 07/26/2024 0744   RETIRED: History Prior major surgery, Previous malignancy filed at 07/26/2024 0744   RETIRED: Age 60-75 years filed at 07/26/2024 0744          Modified Frailty Index    No data to display       CHADS2 Stroke Risk  Current as of 4 hours ago        N/A 3 to 100%: High Risk   2 to < 3%: Medium Risk   0 to < 2%: Low Risk     Last Change: N/A          This score determines the patient's risk of having a stroke if the patient has atrial fibrillation.        This score is not applicable to this patient. Components are not calculated.          Revised Cardiac Risk Index    No data to display       Apfel Simplified Score    No data to display       Risk Analysis Index Results This Encounter    No data found in the last 10 encounters.       Prodigy: High Risk  Total Score: 11              Prodigy Age Score      Prodigy Previous Opioid Use Score           ARISCAT Score for Postoperative Pulmonary Complications    No data to display       Hilton Perioperative Risk for Myocardial Infarction or Cardiac Arrest (KYLIE)    No data to display         Nurse Plan of Action:     After Visit Summary (AVS) reviewed and patient verbalized  good understanding of medications and NPO instructions.

## 2025-02-28 ENCOUNTER — INFUSION (OUTPATIENT)
Dept: HEMATOLOGY/ONCOLOGY | Facility: CLINIC | Age: 70
End: 2025-02-28
Payer: MEDICARE

## 2025-02-28 VITALS
TEMPERATURE: 97 F | DIASTOLIC BLOOD PRESSURE: 73 MMHG | SYSTOLIC BLOOD PRESSURE: 112 MMHG | HEART RATE: 72 BPM | RESPIRATION RATE: 16 BRPM

## 2025-02-28 DIAGNOSIS — C56.9 MALIGNANT NEOPLASM OF OVARY, UNSPECIFIED LATERALITY (MULTI): ICD-10-CM

## 2025-02-28 LAB
ABO GROUP (TYPE) IN BLOOD: NORMAL
ANION GAP SERPL CALC-SCNC: 13 MMOL/L (ref 10–20)
ANTIBODY SCREEN: NORMAL
BASOPHILS # BLD AUTO: 0.01 X10*3/UL (ref 0–0.1)
BASOPHILS NFR BLD AUTO: 0.2 %
BUN SERPL-MCNC: 26 MG/DL (ref 6–23)
CALCIUM SERPL-MCNC: 9.3 MG/DL (ref 8.6–10.6)
CHLORIDE SERPL-SCNC: 103 MMOL/L (ref 98–107)
CO2 SERPL-SCNC: 29 MMOL/L (ref 21–32)
CREAT SERPL-MCNC: 1.01 MG/DL (ref 0.5–1.05)
EGFRCR SERPLBLD CKD-EPI 2021: 60 ML/MIN/1.73M*2
EOSINOPHIL # BLD AUTO: 0.11 X10*3/UL (ref 0–0.7)
EOSINOPHIL NFR BLD AUTO: 2.5 %
ERYTHROCYTE [DISTWIDTH] IN BLOOD BY AUTOMATED COUNT: 14.5 % (ref 11.5–14.5)
GLUCOSE SERPL-MCNC: 59 MG/DL (ref 74–99)
HCT VFR BLD AUTO: 29.4 % (ref 36–46)
HGB BLD-MCNC: 9.5 G/DL (ref 12–16)
HOLD SPECIMEN: NORMAL
IMM GRANULOCYTES # BLD AUTO: 0 X10*3/UL (ref 0–0.7)
IMM GRANULOCYTES NFR BLD AUTO: 0 % (ref 0–0.9)
LYMPHOCYTES # BLD AUTO: 1.78 X10*3/UL (ref 1.2–4.8)
LYMPHOCYTES NFR BLD AUTO: 40.7 %
MCH RBC QN AUTO: 35.8 PG (ref 26–34)
MCHC RBC AUTO-ENTMCNC: 32.3 G/DL (ref 32–36)
MCV RBC AUTO: 111 FL (ref 80–100)
MONOCYTES # BLD AUTO: 0.47 X10*3/UL (ref 0.1–1)
MONOCYTES NFR BLD AUTO: 10.8 %
NEUTROPHILS # BLD AUTO: 2 X10*3/UL (ref 1.2–7.7)
NEUTROPHILS NFR BLD AUTO: 45.8 %
NRBC BLD-RTO: ABNORMAL /100{WBCS}
PLATELET # BLD AUTO: 232 X10*3/UL (ref 150–450)
POTASSIUM SERPL-SCNC: 4.8 MMOL/L (ref 3.5–5.3)
RBC # BLD AUTO: 2.65 X10*6/UL (ref 4–5.2)
RH FACTOR (ANTIGEN D): NORMAL
SODIUM SERPL-SCNC: 140 MMOL/L (ref 136–145)
WBC # BLD AUTO: 4.4 X10*3/UL (ref 4.4–11.3)

## 2025-02-28 PROCEDURE — 86850 RBC ANTIBODY SCREEN: CPT | Performed by: UROLOGY

## 2025-02-28 PROCEDURE — 36591 DRAW BLOOD OFF VENOUS DEVICE: CPT

## 2025-02-28 PROCEDURE — 2500000004 HC RX 250 GENERAL PHARMACY W/ HCPCS (ALT 636 FOR OP/ED): Performed by: NURSE PRACTITIONER

## 2025-02-28 PROCEDURE — 80048 BASIC METABOLIC PNL TOTAL CA: CPT | Performed by: UROLOGY

## 2025-02-28 PROCEDURE — 85025 COMPLETE CBC W/AUTO DIFF WBC: CPT | Performed by: UROLOGY

## 2025-02-28 RX ORDER — HEPARIN SODIUM,PORCINE/PF 10 UNIT/ML
50 SYRINGE (ML) INTRAVENOUS AS NEEDED
OUTPATIENT
Start: 2025-02-28

## 2025-02-28 RX ORDER — HEPARIN 100 UNIT/ML
500 SYRINGE INTRAVENOUS AS NEEDED
Status: DISCONTINUED | OUTPATIENT
Start: 2025-02-28 | End: 2025-02-28 | Stop reason: HOSPADM

## 2025-02-28 RX ORDER — HEPARIN SODIUM,PORCINE/PF 10 UNIT/ML
50 SYRINGE (ML) INTRAVENOUS AS NEEDED
Status: DISCONTINUED | OUTPATIENT
Start: 2025-02-28 | End: 2025-02-28 | Stop reason: HOSPADM

## 2025-02-28 RX ORDER — HEPARIN 100 UNIT/ML
500 SYRINGE INTRAVENOUS AS NEEDED
OUTPATIENT
Start: 2025-02-28

## 2025-02-28 RX ADMIN — HEPARIN 500 UNITS: 100 SYRINGE at 16:15

## 2025-02-28 ASSESSMENT — PAIN SCALES - GENERAL: PAINLEVEL_OUTOF10: 0-NO PAIN

## 2025-03-01 LAB — BACTERIA UR CULT: ABNORMAL

## 2025-03-03 DIAGNOSIS — N39.0 URINARY TRACT INFECTION WITHOUT HEMATURIA, SITE UNSPECIFIED: ICD-10-CM

## 2025-03-03 RX ORDER — AMOXICILLIN AND CLAVULANATE POTASSIUM 875; 125 MG/1; MG/1
1 TABLET, FILM COATED ORAL 2 TIMES DAILY
Qty: 14 TABLET | Refills: 0 | Status: SHIPPED | OUTPATIENT
Start: 2025-03-03 | End: 2025-03-13

## 2025-03-11 ENCOUNTER — TELEPHONE (OUTPATIENT)
Dept: ENDOCRINOLOGY | Facility: CLINIC | Age: 70
End: 2025-03-11
Payer: MEDICARE

## 2025-03-11 DIAGNOSIS — E04.1 THYROID NODULE: Primary | ICD-10-CM

## 2025-03-11 NOTE — TELEPHONE ENCOUNTER
Pt made ov on 09/12/25 and she would like to have any blood work and an ultrasound prior to that ov

## 2025-03-13 ENCOUNTER — ANESTHESIA (OUTPATIENT)
Dept: OPERATING ROOM | Facility: HOSPITAL | Age: 70
End: 2025-03-13
Payer: MEDICARE

## 2025-03-13 ENCOUNTER — APPOINTMENT (OUTPATIENT)
Dept: RADIOLOGY | Facility: HOSPITAL | Age: 70
End: 2025-03-13
Payer: MEDICARE

## 2025-03-13 ENCOUNTER — HOSPITAL ENCOUNTER (OUTPATIENT)
Facility: HOSPITAL | Age: 70
Setting detail: OUTPATIENT SURGERY
Discharge: HOME | End: 2025-03-13
Attending: UROLOGY | Admitting: UROLOGY
Payer: MEDICARE

## 2025-03-13 ENCOUNTER — ANESTHESIA EVENT (OUTPATIENT)
Dept: OPERATING ROOM | Facility: HOSPITAL | Age: 70
End: 2025-03-13
Payer: MEDICARE

## 2025-03-13 VITALS
SYSTOLIC BLOOD PRESSURE: 136 MMHG | HEIGHT: 63 IN | DIASTOLIC BLOOD PRESSURE: 66 MMHG | TEMPERATURE: 97 F | WEIGHT: 139.33 LBS | HEART RATE: 79 BPM | BODY MASS INDEX: 24.69 KG/M2 | RESPIRATION RATE: 12 BRPM | OXYGEN SATURATION: 98 %

## 2025-03-13 DIAGNOSIS — N20.0 NEPHROLITHIASIS: Primary | ICD-10-CM

## 2025-03-13 DIAGNOSIS — G89.18 ACUTE POST-OPERATIVE PAIN: ICD-10-CM

## 2025-03-13 DIAGNOSIS — N13.2 HYDRONEPHROSIS WITH URINARY OBSTRUCTION DUE TO URETERAL CALCULUS: ICD-10-CM

## 2025-03-13 DIAGNOSIS — Z79.2 NEED FOR ANTIBIOTIC PROPHYLAXIS FOR SURGICAL PROCEDURE: ICD-10-CM

## 2025-03-13 DIAGNOSIS — C56.9 MALIGNANT NEOPLASM OF OVARY, UNSPECIFIED LATERALITY (MULTI): ICD-10-CM

## 2025-03-13 PROCEDURE — 2500000004 HC RX 250 GENERAL PHARMACY W/ HCPCS (ALT 636 FOR OP/ED): Performed by: ANESTHESIOLOGIST ASSISTANT

## 2025-03-13 PROCEDURE — 3700000001 HC GENERAL ANESTHESIA TIME - INITIAL BASE CHARGE: Performed by: UROLOGY

## 2025-03-13 PROCEDURE — C2617 STENT, NON-COR, TEM W/O DEL: HCPCS | Performed by: UROLOGY

## 2025-03-13 PROCEDURE — 7100000010 HC PHASE TWO TIME - EACH INCREMENTAL 1 MINUTE: Performed by: UROLOGY

## 2025-03-13 PROCEDURE — 82365 CALCULUS SPECTROSCOPY: CPT | Performed by: UROLOGY

## 2025-03-13 PROCEDURE — 76000 FLUOROSCOPY <1 HR PHYS/QHP: CPT | Mod: RT

## 2025-03-13 PROCEDURE — 2500000004 HC RX 250 GENERAL PHARMACY W/ HCPCS (ALT 636 FOR OP/ED): Mod: JZ | Performed by: STUDENT IN AN ORGANIZED HEALTH CARE EDUCATION/TRAINING PROGRAM

## 2025-03-13 PROCEDURE — 7100000002 HC RECOVERY ROOM TIME - EACH INCREMENTAL 1 MINUTE: Performed by: UROLOGY

## 2025-03-13 PROCEDURE — 3600000004 HC OR TIME - INITIAL BASE CHARGE - PROCEDURE LEVEL FOUR: Performed by: UROLOGY

## 2025-03-13 PROCEDURE — 7100000009 HC PHASE TWO TIME - INITIAL BASE CHARGE: Performed by: UROLOGY

## 2025-03-13 PROCEDURE — 2720000007 HC OR 272 NO HCPCS: Performed by: UROLOGY

## 2025-03-13 PROCEDURE — 3700000002 HC GENERAL ANESTHESIA TIME - EACH INCREMENTAL 1 MINUTE: Performed by: UROLOGY

## 2025-03-13 PROCEDURE — 2500000001 HC RX 250 WO HCPCS SELF ADMINISTERED DRUGS (ALT 637 FOR MEDICARE OP): Performed by: STUDENT IN AN ORGANIZED HEALTH CARE EDUCATION/TRAINING PROGRAM

## 2025-03-13 PROCEDURE — 74420 UROGRAPHY RTRGR +-KUB: CPT | Performed by: UROLOGY

## 2025-03-13 PROCEDURE — 52356 CYSTO/URETERO W/LITHOTRIPSY: CPT | Performed by: UROLOGY

## 2025-03-13 PROCEDURE — 3600000009 HC OR TIME - EACH INCREMENTAL 1 MINUTE - PROCEDURE LEVEL FOUR: Performed by: UROLOGY

## 2025-03-13 PROCEDURE — 2500000001 HC RX 250 WO HCPCS SELF ADMINISTERED DRUGS (ALT 637 FOR MEDICARE OP): Performed by: UROLOGY

## 2025-03-13 PROCEDURE — 7100000001 HC RECOVERY ROOM TIME - INITIAL BASE CHARGE: Performed by: UROLOGY

## 2025-03-13 PROCEDURE — C1758 CATHETER, URETERAL: HCPCS | Performed by: UROLOGY

## 2025-03-13 PROCEDURE — 2500000005 HC RX 250 GENERAL PHARMACY W/O HCPCS: Performed by: UROLOGY

## 2025-03-13 PROCEDURE — 2780000003 HC OR 278 NO HCPCS: Performed by: UROLOGY

## 2025-03-13 PROCEDURE — C1769 GUIDE WIRE: HCPCS | Performed by: UROLOGY

## 2025-03-13 RX ORDER — OXYCODONE HYDROCHLORIDE 5 MG/1
5 TABLET ORAL EVERY 6 HOURS PRN
Qty: 4 TABLET | Refills: 0 | Status: SHIPPED | OUTPATIENT
Start: 2025-03-13

## 2025-03-13 RX ORDER — CEFAZOLIN SODIUM 2 G/100ML
2 INJECTION, SOLUTION INTRAVENOUS ONCE
Status: DISCONTINUED | OUTPATIENT
Start: 2025-03-13 | End: 2025-03-13

## 2025-03-13 RX ORDER — FUROSEMIDE 10 MG/ML
INJECTION INTRAMUSCULAR; INTRAVENOUS AS NEEDED
Status: DISCONTINUED | OUTPATIENT
Start: 2025-03-13 | End: 2025-03-13

## 2025-03-13 RX ORDER — CELECOXIB 200 MG/1
400 CAPSULE ORAL ONCE
Status: COMPLETED | OUTPATIENT
Start: 2025-03-13 | End: 2025-03-13

## 2025-03-13 RX ORDER — SODIUM CHLORIDE 0.9 G/100ML
INJECTION, SOLUTION IRRIGATION AS NEEDED
Status: DISCONTINUED | OUTPATIENT
Start: 2025-03-13 | End: 2025-03-13 | Stop reason: HOSPADM

## 2025-03-13 RX ORDER — LIDOCAINE HYDROCHLORIDE 20 MG/ML
INJECTION, SOLUTION INFILTRATION; PERINEURAL AS NEEDED
Status: DISCONTINUED | OUTPATIENT
Start: 2025-03-13 | End: 2025-03-13

## 2025-03-13 RX ORDER — LIDOCAINE HYDROCHLORIDE 10 MG/ML
0.1 INJECTION, SOLUTION EPIDURAL; INFILTRATION; INTRACAUDAL; PERINEURAL ONCE
Status: DISCONTINUED | OUTPATIENT
Start: 2025-03-13 | End: 2025-03-13 | Stop reason: HOSPADM

## 2025-03-13 RX ORDER — DIPHENHYDRAMINE HYDROCHLORIDE 50 MG/ML
12.5 INJECTION INTRAMUSCULAR; INTRAVENOUS ONCE AS NEEDED
Status: DISCONTINUED | OUTPATIENT
Start: 2025-03-13 | End: 2025-03-13 | Stop reason: HOSPADM

## 2025-03-13 RX ORDER — PROPOFOL 10 MG/ML
INJECTION, EMULSION INTRAVENOUS AS NEEDED
Status: DISCONTINUED | OUTPATIENT
Start: 2025-03-13 | End: 2025-03-13

## 2025-03-13 RX ORDER — IBUPROFEN 600 MG/1
600 TABLET ORAL EVERY 6 HOURS PRN
Qty: 30 TABLET | Refills: 0 | Status: SHIPPED | OUTPATIENT
Start: 2025-03-13

## 2025-03-13 RX ORDER — GABAPENTIN 300 MG/1
600 CAPSULE ORAL ONCE
Status: COMPLETED | OUTPATIENT
Start: 2025-03-13 | End: 2025-03-13

## 2025-03-13 RX ORDER — FENTANYL CITRATE 50 UG/ML
INJECTION, SOLUTION INTRAMUSCULAR; INTRAVENOUS AS NEEDED
Status: DISCONTINUED | OUTPATIENT
Start: 2025-03-13 | End: 2025-03-13

## 2025-03-13 RX ORDER — PHENAZOPYRIDINE HYDROCHLORIDE 200 MG/1
200 TABLET, FILM COATED ORAL 3 TIMES DAILY PRN
Qty: 10 TABLET | Refills: 0 | Status: SHIPPED | OUTPATIENT
Start: 2025-03-13

## 2025-03-13 RX ORDER — ACETAMINOPHEN 325 MG/1
975 TABLET ORAL ONCE
Status: COMPLETED | OUTPATIENT
Start: 2025-03-13 | End: 2025-03-13

## 2025-03-13 RX ORDER — ONDANSETRON HYDROCHLORIDE 2 MG/ML
4 INJECTION, SOLUTION INTRAVENOUS ONCE AS NEEDED
Status: DISCONTINUED | OUTPATIENT
Start: 2025-03-13 | End: 2025-03-13 | Stop reason: HOSPADM

## 2025-03-13 RX ORDER — ONDANSETRON HYDROCHLORIDE 2 MG/ML
INJECTION, SOLUTION INTRAVENOUS AS NEEDED
Status: DISCONTINUED | OUTPATIENT
Start: 2025-03-13 | End: 2025-03-13

## 2025-03-13 RX ORDER — SULFAMETHOXAZOLE AND TRIMETHOPRIM 800; 160 MG/1; MG/1
1 TABLET ORAL 2 TIMES DAILY
Qty: 2 TABLET | Refills: 0 | Status: SHIPPED | OUTPATIENT
Start: 2025-03-13 | End: 2025-03-14

## 2025-03-13 RX ORDER — ACETAMINOPHEN 325 MG/1
650 TABLET ORAL EVERY 6 HOURS PRN
Qty: 30 TABLET | Refills: 0 | Status: SHIPPED | OUTPATIENT
Start: 2025-03-13

## 2025-03-13 RX ORDER — LABETALOL HYDROCHLORIDE 5 MG/ML
5 INJECTION, SOLUTION INTRAVENOUS ONCE AS NEEDED
Status: DISCONTINUED | OUTPATIENT
Start: 2025-03-13 | End: 2025-03-13 | Stop reason: HOSPADM

## 2025-03-13 RX ORDER — HEPARIN 100 UNIT/ML
500 SYRINGE INTRAVENOUS AS NEEDED
Status: DISCONTINUED | OUTPATIENT
Start: 2025-03-13 | End: 2025-03-13 | Stop reason: HOSPADM

## 2025-03-13 RX ORDER — SODIUM CHLORIDE, SODIUM LACTATE, POTASSIUM CHLORIDE, CALCIUM CHLORIDE 600; 310; 30; 20 MG/100ML; MG/100ML; MG/100ML; MG/100ML
100 INJECTION, SOLUTION INTRAVENOUS CONTINUOUS
Status: DISCONTINUED | OUTPATIENT
Start: 2025-03-13 | End: 2025-03-13 | Stop reason: HOSPADM

## 2025-03-13 RX ORDER — MIDAZOLAM HYDROCHLORIDE 1 MG/ML
INJECTION INTRAMUSCULAR; INTRAVENOUS AS NEEDED
Status: DISCONTINUED | OUTPATIENT
Start: 2025-03-13 | End: 2025-03-13

## 2025-03-13 RX ORDER — OXYCODONE HYDROCHLORIDE 5 MG/1
5 TABLET ORAL EVERY 4 HOURS PRN
Status: DISCONTINUED | OUTPATIENT
Start: 2025-03-13 | End: 2025-03-13 | Stop reason: HOSPADM

## 2025-03-13 RX ORDER — PHENYLEPHRINE HCL IN 0.9% NACL 1 MG/10 ML
SYRINGE (ML) INTRAVENOUS AS NEEDED
Status: DISCONTINUED | OUTPATIENT
Start: 2025-03-13 | End: 2025-03-13

## 2025-03-13 RX ORDER — AMPICILLIN AND SULBACTAM 2; 1 G/1; G/1
INJECTION, POWDER, FOR SOLUTION INTRAMUSCULAR; INTRAVENOUS AS NEEDED
Status: DISCONTINUED | OUTPATIENT
Start: 2025-03-13 | End: 2025-03-13

## 2025-03-13 RX ORDER — HEPARIN SODIUM 1000 [USP'U]/ML
INJECTION, SOLUTION INTRAVENOUS; SUBCUTANEOUS AS NEEDED
Status: DISCONTINUED | OUTPATIENT
Start: 2025-03-13 | End: 2025-03-13

## 2025-03-13 RX ADMIN — Medication 100 MCG: at 07:50

## 2025-03-13 RX ADMIN — OXYCODONE HYDROCHLORIDE 5 MG: 5 TABLET ORAL at 09:45

## 2025-03-13 RX ADMIN — ONDANSETRON 4 MG: 2 INJECTION, SOLUTION INTRAMUSCULAR; INTRAVENOUS at 08:46

## 2025-03-13 RX ADMIN — Medication 50 MCG: at 08:13

## 2025-03-13 RX ADMIN — CELECOXIB 400 MG: 200 CAPSULE ORAL at 07:15

## 2025-03-13 RX ADMIN — Medication 50 MCG: at 08:30

## 2025-03-13 RX ADMIN — SODIUM CHLORIDE, POTASSIUM CHLORIDE, SODIUM LACTATE AND CALCIUM CHLORIDE: 600; 310; 30; 20 INJECTION, SOLUTION INTRAVENOUS at 07:29

## 2025-03-13 RX ADMIN — FUROSEMIDE 10 MG: 10 INJECTION, SOLUTION INTRAMUSCULAR; INTRAVENOUS at 08:36

## 2025-03-13 RX ADMIN — HEPARIN SODIUM 5000 UNITS: 1000 INJECTION, SOLUTION INTRAVENOUS; SUBCUTANEOUS at 07:50

## 2025-03-13 RX ADMIN — LIDOCAINE HYDROCHLORIDE 100 MG: 20 INJECTION, SOLUTION INFILTRATION; PERINEURAL at 07:31

## 2025-03-13 RX ADMIN — DEXAMETHASONE SODIUM PHOSPHATE 4 MG: 4 INJECTION, SOLUTION INTRAMUSCULAR; INTRAVENOUS at 07:56

## 2025-03-13 RX ADMIN — AMPICILLIN SODIUM, SULBACTAM SODIUM 3 G: 2; 1 INJECTION, POWDER, FOR SOLUTION INTRAMUSCULAR; INTRAVENOUS at 07:44

## 2025-03-13 RX ADMIN — GABAPENTIN 600 MG: 300 CAPSULE ORAL at 07:15

## 2025-03-13 RX ADMIN — HYDROMORPHONE HYDROCHLORIDE 0.5 MG: 1 INJECTION, SOLUTION INTRAMUSCULAR; INTRAVENOUS; SUBCUTANEOUS at 09:32

## 2025-03-13 RX ADMIN — Medication 50 MCG: at 08:38

## 2025-03-13 RX ADMIN — ACETAMINOPHEN 975 MG: 325 TABLET, FILM COATED ORAL at 07:15

## 2025-03-13 RX ADMIN — MIDAZOLAM HYDROCHLORIDE 2 MG: 1 INJECTION, SOLUTION INTRAMUSCULAR; INTRAVENOUS at 07:31

## 2025-03-13 RX ADMIN — HEPARIN 500 UNITS: 100 SYRINGE at 10:12

## 2025-03-13 RX ADMIN — Medication 50 MCG: at 08:05

## 2025-03-13 RX ADMIN — FENTANYL CITRATE 25 MCG: 50 INJECTION, SOLUTION INTRAMUSCULAR; INTRAVENOUS at 08:58

## 2025-03-13 RX ADMIN — Medication 100 MCG: at 07:58

## 2025-03-13 RX ADMIN — Medication 50 MCG: at 08:18

## 2025-03-13 RX ADMIN — PROPOFOL 150 MG: 10 INJECTION, EMULSION INTRAVENOUS at 07:32

## 2025-03-13 ASSESSMENT — PAIN - FUNCTIONAL ASSESSMENT
PAIN_FUNCTIONAL_ASSESSMENT: 0-10
PAIN_FUNCTIONAL_ASSESSMENT: UNABLE TO SELF-REPORT
PAIN_FUNCTIONAL_ASSESSMENT: 0-10
PAIN_FUNCTIONAL_ASSESSMENT: UNABLE TO SELF-REPORT
PAIN_FUNCTIONAL_ASSESSMENT: 0-10
PAIN_FUNCTIONAL_ASSESSMENT: UNABLE TO SELF-REPORT
PAIN_FUNCTIONAL_ASSESSMENT: 0-10

## 2025-03-13 ASSESSMENT — PAIN SCALES - GENERAL
PAINLEVEL_OUTOF10: 6
PAINLEVEL_OUTOF10: 4
PAINLEVEL_OUTOF10: 3
PAINLEVEL_OUTOF10: 4
PAINLEVEL_OUTOF10: 4
PAINLEVEL_OUTOF10: 6
PAINLEVEL_OUTOF10: 4
PAINLEVEL_OUTOF10: 3

## 2025-03-13 ASSESSMENT — PAIN DESCRIPTION - LOCATION: LOCATION: OTHER (COMMENT)

## 2025-03-13 ASSESSMENT — COLUMBIA-SUICIDE SEVERITY RATING SCALE - C-SSRS
1. IN THE PAST MONTH, HAVE YOU WISHED YOU WERE DEAD OR WISHED YOU COULD GO TO SLEEP AND NOT WAKE UP?: NO
2. HAVE YOU ACTUALLY HAD ANY THOUGHTS OF KILLING YOURSELF?: NO
6. HAVE YOU EVER DONE ANYTHING, STARTED TO DO ANYTHING, OR PREPARED TO DO ANYTHING TO END YOUR LIFE?: NO

## 2025-03-13 ASSESSMENT — PAIN DESCRIPTION - DESCRIPTORS
DESCRIPTORS: ACHING
DESCRIPTORS: PRESSURE;THROBBING
DESCRIPTORS: PRESSURE;THROBBING

## 2025-03-13 NOTE — NURSING NOTE
1023- Pt assisted with dressing with help of family. IV removed dressing applied. Discharge instructions provided to pt and family. Educated on medications, effects of anesthesia, and homecoming care. Pt and family verbalizing understanding of all instructions provided at this time. All questions and concerns answered. Pt given contact information for provider.     1030- Patient voided without difficulty. Urine light pink.     1057- Pt assisted to main lobby via  by transport. Dc in stable condition to home. All belongings taken with pt.

## 2025-03-13 NOTE — DISCHARGE INSTRUCTIONS
DEPARTMENT OF Urology  DISCHARGE INSTRUCTIONS Ureteroscopy Laser Lithotripsy  Outpatient Surgery    C O N F I D E N T I A L   I N F O R M A T I O N    [place]    Call [phone] during regular daytime business hours (8:00 am - 5:00 pm) and after 5:00 pm ask for the Urology resident with any questions or concerns.    If it is a life-threatening situation, proceed to the nearest emergency department.        Thank you for the opportunity to care for you today.  Your health and healing are very important to us.  We hope we made you feel as comfortable as possible and are committed to your recovery and continued well-being.      The following is a brief overview of your Ureteroscopy Laser Lithotripsy procedure today. Some of the information contained on this summary may be confidential.  This information should be kept in your records and should be shared with your regular doctor.    Physicians:   Dr. Reddy    Procedure performed: Lasering of your kidney stone    What to Expect During your Recovery and Home Care  Anesthesia Side Effects   You received anesthesia today.  You may feel sleepy, tired, or have a sore throat.   You may also feel drowsiness, dizziness, or inability to think clearly.  For your safety, do not drive, drink alcoholic beverages, take any unprescribed medication or make any important decisions for 24 hours.  A responsible adult should be with you for 24 hours.        Activity and Recovery    No heavy lifting today. Rest for the next 24 hours.    Pain Control  Frequency and urgency to urinate and mild discomfort are expected. Adequate pain management can include alternative measures to help ease your pain and that can include over the counter acetaminophen and ibuprofen and a heating pad. Do not take more than 4,000mg of Tylenol in a 24-hour period.    You may also have been prescribed pyridium (also known as Azo) for burning sensation. Pyridium will turn your urine bright  orange.    Nausea/Vomiting   Clear liquids are best tolerated at first. Start slow, advance your diet as tolerated to normal foods. Avoid spicy, greasy, heavy foods at first. Also, you may feel nauseous or like you need to vomit if you take any type of medication on an empty stomach.  Call your physician if you are unable to eat or drink and have persistent vomiting.    Signs of Bleeding   You could have some blood in your urine off and on over the next several weeks. Your urine will be light pink to yellow.    Treatment/wound care:   It is okay to shower 24 hours after time of surgery.    Signs of Infection  Signs of infection can include fever, chills, burning sensation with passing of urine, or severe abdominal pain.  If you see any of these occur, please contact your doctor's office at [phone].  Any fever higher than 100.4, especially if associated with an ill feeling, abdominal pain, chills, or nausea should be reported to your surgeon.      Assist in bowel movements/urination  Increase fiber in diet  Increase water (6 to 8 glasses)  Increase walking   Urination should occur within 6 hours of anesthesia  If you have tried these methods and your bladder still feels full and you cannot use the bathroom, please go to your nearest Emergency room/contact your physician.    Additional Instructions:   Ureteral Stent  You had a stent placed today from your kidney down into your bladder. This helps keep the urinary tract open and prevents blockages of urine flow.   The stent can be irritating and can cause some frequency, urgency and blood in your urine when you go to the bathroom.   It is important to drink plenty of water and take medications as prescribed.   You may be sent home with Pyridium which turns your urine bright orange.   Applying a heating pad to your back will also help with this kind of pain.   We will discuss the plan for the stent at your follow up appointment.  It was absolutely critical that  there is a plan in place to manage your stent. Stents can be safe for months at a time, but forgotten ureteral stents can put you are risk for infections, stones, and even kidney failure.

## 2025-03-13 NOTE — ANESTHESIA PREPROCEDURE EVALUATION
Patient: Saba Park    Procedure Information       Date/Time: 03/13/25 0977    Procedure: Cystoscopy; Right Ureteroscopy; Holmium Laser Lithotripsy; Ureteral Stent Insertion (Right: Ureter)    Location: [facility] OR 01 / Virtual [facility] OR    Surgeons: Karen Reddy MD          Gynecologic cancer (Multi)    Portal vein thrombosis PORTAL VEIN THROMBOSIS I81   Ovarian cancer (Multi)    Hx antineoplastic chemo    Neuromuscular disorder (Multi)    Arthritis    Anxiety    Visual impairment    Depression    Thyroid nodule    Right rotator cuff tendonitis    IBS (irritable bowel syndrome)    Recurrent malignant neoplasm of ovary (Multi) follows with oncology, s/p 6 cycles of Carbo/taxol   Dizziness Low BP, saw neurology NP Stantangelo 1/10/25, meds adjusted   Skin cancer basal cell   Binge eating disorder    Chemotherapy-induced peripheral neuropathy (Multi)    Low back pain    Claustrophobia    Colitis    Dry eye syndrome    Essential tremor    Lymphoma    Hydronephrosis with renal and ureteral calculus obstruction    HTN (hypertension)    HLD (hyperlipidemia)    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)    OA (osteoarthritis)    Kidney stones    Osteopenia    Low platelet count (CMS-[facility]) Platelets=48 on 2/14/25       nterpretation Summary  Show Result ComparisonNormal sinus rhythm  Normal ECG  When compared with ECG of 05-APR-2023 12:43,  ST elevation now present in Inferior leads  Inverted T waves have replaced nonspecific T wave abnormality in Anterior leads  See ED provider note for full interpretation and clinical correlation  Confirmed by Valerie Choi ([id number]) on 7/25/2024 10:14:09 AM    Relevant Problems   Cardiac   (+) Cardiac conduction disorder   (+) Essential (primary) hypertension   (+) Hyperlipidemia   (+) Hyperlipidemia, unspecified   (+) Hypertension      Neuro   (+) Anxiety   (+) Chemotherapy-induced peripheral neuropathy (Multi)   (+) Depression   (+) Depression, unspecified   (+) MDD (major  depressive disorder)   (+) Major depressive disorder, single episode, severe (Multi)   (+) Mild episode of recurrent major depressive disorder (CMS-HCC)   (+) Sciatica   (+) Sciatica of right side      /Renal   (+) Calculus of kidney   (+) Hydronephrosis with renal and ureteral calculus obstruction   (+) Hydronephrosis with urinary obstruction due to ureteral calculus   (+) Hydronephrosis, left   (+) Nephrolithiasis      Liver   (+) Cholecystitis      Hematology   (+) Oth types of non-hodg lymph, extrnod and solid organ sites (Multi)      Musculoskeletal   (+) Chronic low back pain with sciatica   (+) Deafferentation pain   (+) Osteoarthritis of right hip      ID   (+) Clostridioides difficile diarrhea   (+) 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)   (+) 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)   (+) Pyogenic arthritis of hip (Multi)   (+) Pyogenic arthritis, unspecified      GYN   (+) Malignant neoplasm of unspecified ovary (Multi)       Clinical information reviewed:                   NPO Detail:  No data recorded     Physical Exam    Airway  Mallampati: II  TM distance: >3 FB  Neck ROM: full     Cardiovascular   Rhythm: regular  Rate: normal     Dental    Pulmonary   Breath sounds clear to auscultation     Abdominal            Anesthesia Plan    History of general anesthesia?: yes  History of complications of general anesthesia?: no    ASA 3     general     intravenous induction   Anesthetic plan and risks discussed with patient.    Plan discussed with CRNA.

## 2025-03-13 NOTE — ANESTHESIA POSTPROCEDURE EVALUATION
Patient: Saba Park    Procedure Summary       Date: 03/13/25 Room / Location: [facility] A OR 01 / Virtual [facility] OR    Anesthesia Start: 0729 Anesthesia Stop: 0901    Procedure: Cystoscopy; Right Ureteroscopy; Retrograde Pyelogram, Holmium Laser Lithotripsy; Stone Basketing, Ureteral Stent Insertion (Right: Ureter) Diagnosis:       Nephrolithiasis      Hydronephrosis with urinary obstruction due to ureteral calculus      (Nephrolithiasis [N20.0])      (Hydronephrosis with urinary obstruction due to ureteral calculus [N13.2])    Surgeons: Karen Reddy MD Responsible Provider: Chris Pagan MD    Anesthesia Type: general ASA Status: 3            Anesthesia Type: general    Vitals Value Taken Time   /62 03/13/25 0901   Temp pending 03/13/25 0901   Pulse 74 03/13/25 0901   Resp 12 03/13/25 0901   SpO2 98 03/13/25 0901       Anesthesia Post Evaluation    Patient location during evaluation: PACU  Patient participation: complete - patient participated  Level of consciousness: awake  Pain management: adequate  Airway patency: patent  Cardiovascular status: acceptable  Respiratory status: acceptable  Hydration status: acceptable  Postoperative Nausea and Vomiting: none      No notable events documented.

## 2025-03-13 NOTE — OP NOTE
Cystoscopy; Right Ureteroscopy; Retrograde Pyelogram, Holmium Laser Lithotripsy; Stone Basketing, Ureteral Stent Insertion (R) Operative Note     Date: 3/13/2025  OR Location: [facility] OR    Name: Saba Park, : 1955, Age: 69 y.o., MRN: [id number], Sex: female    Diagnosis  Pre-op Diagnosis      * Nephrolithiasis [N20.0]     * Hydronephrosis with urinary obstruction due to ureteral calculus [N13.2] Post-op Diagnosis     * Nephrolithiasis [N20.0]     * Hydronephrosis with urinary obstruction due to ureteral calculus [N13.2]     Procedures  Cystoscopy; Right Ureteroscopy; Retrograde Pyelogram, Holmium Laser Lithotripsy; Stone Basketing, Ureteral Stent Insertion  75434 - LA CYSTO/URETERO W/LITHOTRIPSY &INDWELL STENT INSRT    CHG UROGRAPHY RETROGRADE WITH/WO KUB [61670]  LA CYSTOURETHROSCOPY W/URETERAL CATHETERIZATION [03513]  Surgeons      * Karen Reddy - Primary    Resident/Fellow/Other Assistant:  Surgeons and Role:     * Juan Manuel Caal MD - Resident - Assisting    Staff:   Circulator: Noy Chaudhari Person: Mayte    Anesthesia Staff: Anesthesiologist: Chris Pagan MD  C-AA: THOMPSON Kaba  KEVIN: Dereck Delgado    Procedure Summary  Anesthesia: General  ASA: III  Estimated Blood Loss: 1 mL  Intra-op Medications:   Administrations occurring from 0730 to 0845 on 25:   Medication Name Total Dose   sodium chloride 0.9 % irrigation solution 3,000 mL   ampicillin-sulbactam (Unasyn) vial 3 g 3 g   dexAMETHasone (Decadron) injection 4 mg/mL 4 mg   furosemide (Lasix) injection 10 mg   heparin injection 1,000 units/mL 5,000 Units   lidocaine (Xylocaine) injection 2 % 100 mg   midazolam PF (Versed) injection 1 mg/mL 2 mg   phenylephrine 100 mcg/mL syringe 10 mL (prefilled) 450 mcg   propofol (Diprivan) injection 10 mg/mL 150 mg              Anesthesia Record               Intraprocedure I/O Totals          Intake    LR bolus 1400.00 mL    Total Intake 1400 mL          Specimen:   ID  Type Source Tests Collected by Time   A : right ureteral calculi Calculus Ureter, Right CALCULI (STONE) ANALYSIS Karen Reddy MD 3/13/2025 0808                 Drains and/or Catheters: * None in log *    Tourniquet Times:         Implants:  Implants       Type Name Action Serial No.      Implant STENT, TRIA URETHERAL, SOFT, 6 X  26 - [id number] Wasted      Implant STENT, TRIA URETHERAL, SOFT, 6 X  26 - [id number] Implanted               Findings: Several distal right severally impacted ureteral stones, partially treated. Right 6x26 JJ ureteral stent placed without string.    Indications: Saba Park is an 69 y.o. female who is having surgery for Nephrolithiasis [N20.0]  Hydronephrosis with urinary obstruction due to ureteral calculus [N13.2].     The patient was seen in the preoperative area. The risks, benefits, complications, treatment options, non-operative alternatives, expected recovery and outcomes were discussed with the patient. The possibilities of reaction to medication, pulmonary aspiration, injury to surrounding structures, bleeding, recurrent infection, the need for additional procedures, failure to diagnose a condition, and creating a complication requiring transfusion or operation were discussed with the patient. The patient concurred with the proposed plan, giving informed consent.  The site of surgery was properly noted/marked if necessary per policy. The patient has been actively warmed in preoperative area. Preoperative antibiotics have been ordered and given within 1 hours of incision. Venous thrombosis prophylaxis have been ordered including bilateral sequential compression devices and chemical prophylaxis    Procedure Details: Patient consented to procedure in preoperative area. Risks and benefits discussed. Patient was marked on the right side. Allergies were reviewed and preoperative antibiotics were administered. Patient was brought to the operating room and placed in supine  position on the operating room table. A timeout was performed. All were in agreement. Patient underwent general anesthesia without complication. They were repositioned in dorsal lithotomy and prepped and draped in the usual sterile fashion. A 21 fr rigid cystoscope was used to perform cystourethroscopy. Urethra was normal. Both UOs were in normal orthotopic position. No masses or stones were identified.  Through the right UO, a sensor wire was placed through a 5 Occitan open-ended ureteral catheter, but it was unable to be passed to the kidney meeting resistance in the distal ureter.  Ureteral cannulation was reattempted with an angled Glidewire which again was unsuccessful.  A retrograde pyelogram was performed which demonstrated no passage of contrast beyond the distal ureter.    We then decided to proceed with ureteroscopy without a safety wire.  A semirigid ureteroscope was advanced into the bladder and the right ureter where a moderate-sized.  Yellow stone was encountered we again attempted to pass sequentially a sensor wire and a Glidewire beyond the stone but met obstruction just proximal to the stone.  We decided to proceed with lithotripsy to try to clear the obstruction enough to obtain a safety wire.  A holmium laser fiber was advanced through the scope and the stone fragmented into pieces which were able to pass passively out the ureter and into the bladder.  This revealed an additional stone which was similarly treated.  We then encountered a third severely impacted stone which again we were unable to pass a wire beyond.  The stone was fragmented into pieces and a second retrograde pyelogram was performed through the scope which then demonstrated passage of contrast up to the right kidney.  A wire was passed through the scope and to the kidney and secured as a safety wire.  We then repeated ureteroscopy and treated several additional large stone fragments.  At this point the ureter was very inflamed with  significant bullous edema limiting visualization.  We decided to abort further stone treatment.  The ureteroscope was withdrawn and  6 x 26 JJ stent was placed over the safety wire with proximal curl noted in kidney on fluoro and distal curl in the bladder on direct visualization. Bladder was drained, instruments removed. This concluded the procedure. Patient was awoken from anesthesia without complication and transferred to PACU in stable condition.    We will see her in clinic in a few weeks and likely plan for a second look ureteroscopy in the near future.    Complications:  None; patient tolerated the procedure well.    Disposition: PACU - hemodynamically stable.  Condition: stable                 Additional Details: N/a    Attending Attestation: Present/scrubbed for entire procedure    Karen Reddy  Phone Number: [phone]

## 2025-03-13 NOTE — ANESTHESIA PROCEDURE NOTES
Airway  Date/Time: 3/13/2025 7:34 AM  Urgency: elective      Staffing  Performed: THOMPSON   Authorized by: Chris aPgan MD    Performed by: THOMPSON Kaba  Patient location during procedure: OR    Indications and Patient Condition  Indications for airway management: anesthesia  Sedation level: deep  Preoxygenated: yes  Patient position: sniffing  Mask difficulty assessment: 1 - vent by mask    Final Airway Details  Final airway type: supraglottic airway      Successful airway: Size 4     Number of attempts at approach: 1

## 2025-03-13 NOTE — INTERVAL H&P NOTE
H&P reviewed. The patient was examined and there are no changes to the H&P.  Completed pre-operative antibiotics. Feel well.

## 2025-03-14 ENCOUNTER — APPOINTMENT (OUTPATIENT)
Dept: PALLIATIVE MEDICINE | Facility: CLINIC | Age: 70
End: 2025-03-14
Payer: MEDICARE

## 2025-03-14 ASSESSMENT — PAIN SCALES - GENERAL: PAINLEVEL_OUTOF10: 3

## 2025-03-14 NOTE — PROGRESS NOTES
Postop Phone Call  -pain controlled  -No fevers/chills/nausea/vomiting  -urinating ok, still some blood as expected

## 2025-03-17 LAB
APPEARANCE STONE: NORMAL
COMPN STONE: NORMAL
SPECIMEN WT: 75 MG

## 2025-03-18 RX ORDER — MAGNESIUM GLUCONATE 27 MG(500)
TABLET ORAL
Qty: 360 TABLET | Refills: 1 | Status: SHIPPED | OUTPATIENT
Start: 2025-03-18

## 2025-03-19 ENCOUNTER — APPOINTMENT (OUTPATIENT)
Dept: UROLOGY | Facility: CLINIC | Age: 70
End: 2025-03-19
Payer: MEDICARE

## 2025-03-19 DIAGNOSIS — N13.30 HYDRONEPHROSIS, UNSPECIFIED HYDRONEPHROSIS TYPE: ICD-10-CM

## 2025-03-19 DIAGNOSIS — N20.0 NEPHROLITHIASIS: Primary | ICD-10-CM

## 2025-03-19 DIAGNOSIS — F33.0 MILD EPISODE OF RECURRENT MAJOR DEPRESSIVE DISORDER (CMS-HCC): ICD-10-CM

## 2025-03-19 PROCEDURE — 1157F ADVNC CARE PLAN IN RCRD: CPT | Performed by: UROLOGY

## 2025-03-19 PROCEDURE — 99214 OFFICE O/P EST MOD 30 MIN: CPT | Performed by: UROLOGY

## 2025-03-19 PROCEDURE — G2211 COMPLEX E/M VISIT ADD ON: HCPCS | Performed by: UROLOGY

## 2025-03-19 RX ORDER — BUPROPION HYDROCHLORIDE 150 MG/1
150 TABLET, EXTENDED RELEASE ORAL 2 TIMES DAILY
Qty: 180 TABLET | Refills: 1 | Status: SHIPPED | OUTPATIENT
Start: 2025-03-19 | End: 2025-03-19 | Stop reason: ENTERED-IN-ERROR

## 2025-03-19 RX ORDER — CELECOXIB 400 MG/1
400 CAPSULE ORAL ONCE
OUTPATIENT
Start: 2025-03-19 | End: 2025-03-19

## 2025-03-19 RX ORDER — ACETAMINOPHEN 325 MG/1
975 TABLET ORAL ONCE
OUTPATIENT
Start: 2025-03-19 | End: 2025-03-19

## 2025-03-19 RX ORDER — GABAPENTIN 300 MG/1
600 CAPSULE ORAL ONCE
OUTPATIENT
Start: 2025-03-19 | End: 2025-03-19

## 2025-03-19 NOTE — H&P (VIEW-ONLY)
Virtual or Telephone Consent    An interactive audio and video telecommunication system which permits real time communications between the patient (at the originating site) and provider (at the distant site) was utilized to provide this telehealth service.   Verbal consent was requested and obtained from Saba Park on this date, 03/19/25 for a telehealth visit.     Last visit 2/18/25  70yo F with LEFT ureteral stone s/p stent placement. s/p bilateral ureteroscopy and lithotripsy. s/p stent removal. some fragments present, small. Now with one fragment in right ureter  Now s/p Right ureteroscopy/LL/Stent    Today's visit:  #nephrolithiasis   - left ureteral stone s/p bilateral ureteroscopy and lithotripsy. s/p stent removal in 2020  -right ureteral stone now s/p right ureteroscopy, retrograde pyelogram, holmium laser lithotripsy, stone basketing and ureteral stent insertion on 3/13/25  -stone analysis came back as calcium oxalate stones, reviewed with patient  -doing well  -no f/c/n/v    Had cervical cancer   -recurrent ovarian cancer s/p 5 cycles of chemotherapy   --Dr. Krista Kenny manages ovarian ca     CT chest, abd, pelvis, 2/14/25, personally reviewed/interpreted:  1. Postsurgical changes status post hysterectomy without evidence to  suggest locoregional disease recurrence. No evidence of new  metastatic disease within the chest, abdomen, or pelvis.  2. Obstructive distal right ureteral calculi with moderate to severe  hydronephrosis and delayed right nephrogram consistent with  obstructive uropathy. Urological consultation is recommended.      Medications:    Current Outpatient Medications:     acetaminophen (Tylenol) 325 mg tablet, Take 2 tablets (650 mg) by mouth every 6 hours if needed for mild pain (1 - 3)., Disp: 30 tablet, Rfl: 0    buPROPion SR (Wellbutrin SR) 200 mg 12 hr tablet, Take 1 tablet (200 mg) by mouth 2 times a day. Do not crush, chew, or split., Disp: 180 tablet, Rfl: 1    busPIRone  (Buspar) 5 mg tablet, TAKE 1 TABLET BY MOUTH TWICE A DAY, Disp: 180 tablet, Rfl: 1    calcium carbonate (Oscal) 500 mg calcium (1,250 mg) tablet, Take 1 tablet (1,250 mg) by mouth 2 times daily (morning and late afternoon)., Disp: , Rfl:     cholecalciferol (Vitamin D-3) 50 mcg (2,000 unit) capsule, Take 1 capsule (50 mcg) by mouth once daily., Disp: , Rfl:     DULoxetine (Cymbalta) 60 mg DR capsule, Take 1 capsule (60 mg) by mouth once daily. Do not crush or chew., Disp: 90 capsule, Rfl: 1    ibuprofen 600 mg tablet, Take 1 tablet (600 mg) by mouth every 6 hours if needed for mild pain (1 - 3)., Disp: 30 tablet, Rfl: 0    Mag-G 27 mg magnesium (500 mg) tablet, TAKE 2 TABLETS (54 MG) BY MOUTH 2 TIMES A DAY., Disp: 360 tablet, Rfl: 1    magnesium, amino acid chelate, 133 mg tablet, Take 1 tablet (133 mg) by mouth 2 times a day., Disp: , Rfl:     methadone (Dolophine) 10 mg tablet, Take 1 tablet (10 mg) by mouth every 12 hours. Do not fill before February 27, 2025., Disp: 60 tablet, Rfl: 0    ondansetron (Zofran) 8 mg tablet, Take 1 tablet (8 mg) by mouth every 8 hours if needed for nausea or vomiting., Disp: 30 tablet, Rfl: 5    oxyCODONE (Roxicodone) 5 mg immediate release tablet, Take 1 tablet (5 mg) by mouth every 6 hours if needed for severe pain (7 - 10)., Disp: 4 tablet, Rfl: 0    phenazopyridine (Pyridium) 200 mg tablet, Take 1 tablet (200 mg) by mouth 3 times a day as needed for bladder spasms., Disp: 10 tablet, Rfl: 0    pregabalin (Lyrica) 75 mg capsule, Take 1 capsule (75 mg) by mouth 2 times a day. Do not fill before January 15, 2025., Disp: 60 capsule, Rfl: 5    primidone (Mysoline) 50 mg tablet, Take 2 tablets (100 mg) by mouth once daily at bedtime., Disp: , Rfl:     Allergy:  Allergies   Allergen Reactions    Erythromycin GI Upset        Exam  CONSTITUTIONAL:        No acute distress    HEAD:        Normocephalic and atraumatic    CHEST / RESPIRATORY      no excess work of breathing, no respiratory  distress,    ABDOMEN / GASTROINTESTINAL:        Abdomen nondistended          Assessment/Plan  - left ureteral stone s/p bilateral ureteroscopy and lithotripsy. s/p stent removal in 2020  -right ureteral stone now s/p right ureteroscopy, retrograde pyelogram, holmium laser lithotripsy, stone basketing and ureteral stent insertion on 3/13/25    -repeat UCX  -plan on repeat right ureteroscopy for 2nd look- will be scheduled in a timely manner  -r/b/a discussed in detail, all questions answered    Scribe Attestation  By signing my name below, I, Helen Dumont , Scribe   attest that this documentation has been prepared under the direction and in the presence of Karen Reddy MD.

## 2025-03-21 ENCOUNTER — LAB (OUTPATIENT)
Dept: LAB | Facility: HOSPITAL | Age: 70
End: 2025-03-21
Payer: MEDICARE

## 2025-03-21 DIAGNOSIS — N13.30 UNSPECIFIED HYDRONEPHROSIS: ICD-10-CM

## 2025-03-21 DIAGNOSIS — N20.0 CALCULUS OF KIDNEY: Primary | ICD-10-CM

## 2025-03-21 LAB
ANION GAP SERPL CALC-SCNC: 15 MMOL/L (ref 10–20)
BUN SERPL-MCNC: 31 MG/DL (ref 6–23)
CALCIUM SERPL-MCNC: 9.5 MG/DL (ref 8.6–10.6)
CHLORIDE SERPL-SCNC: 106 MMOL/L (ref 98–107)
CO2 SERPL-SCNC: 29 MMOL/L (ref 21–32)
CREAT SERPL-MCNC: 1.11 MG/DL (ref 0.5–1.05)
EGFRCR SERPLBLD CKD-EPI 2021: 54 ML/MIN/1.73M*2
ERYTHROCYTE [DISTWIDTH] IN BLOOD BY AUTOMATED COUNT: 13 % (ref 11.5–14.5)
GLUCOSE SERPL-MCNC: 98 MG/DL (ref 74–99)
HCT VFR BLD AUTO: 35.5 % (ref 36–46)
HGB BLD-MCNC: 10.9 G/DL (ref 12–16)
MCH RBC QN AUTO: 34.2 PG (ref 26–34)
MCHC RBC AUTO-ENTMCNC: 30.7 G/DL (ref 32–36)
MCV RBC AUTO: 111 FL (ref 80–100)
NRBC BLD-RTO: 0 /100 WBCS (ref 0–0)
PLATELET # BLD AUTO: 230 X10*3/UL (ref 150–450)
POTASSIUM SERPL-SCNC: 4.6 MMOL/L (ref 3.5–5.3)
RBC # BLD AUTO: 3.19 X10*6/UL (ref 4–5.2)
SODIUM SERPL-SCNC: 145 MMOL/L (ref 136–145)
WBC # BLD AUTO: 7.6 X10*3/UL (ref 4.4–11.3)

## 2025-03-21 PROCEDURE — 85027 COMPLETE CBC AUTOMATED: CPT

## 2025-03-21 PROCEDURE — 80048 BASIC METABOLIC PNL TOTAL CA: CPT

## 2025-03-23 LAB — BACTERIA UR CULT: ABNORMAL

## 2025-03-24 LAB — BACTERIA UR CULT: ABNORMAL

## 2025-03-25 ENCOUNTER — TELEPHONE (OUTPATIENT)
Dept: UROLOGY | Facility: CLINIC | Age: 70
End: 2025-03-25
Payer: MEDICARE

## 2025-03-25 DIAGNOSIS — N39.0 URINARY TRACT INFECTION IN FEMALE: ICD-10-CM

## 2025-03-25 RX ORDER — AMOXICILLIN AND CLAVULANATE POTASSIUM 875; 125 MG/1; MG/1
1 TABLET, FILM COATED ORAL 2 TIMES DAILY
Qty: 14 TABLET | Refills: 0 | Status: SHIPPED | OUTPATIENT
Start: 2025-03-25 | End: 2025-04-01

## 2025-03-25 NOTE — TELEPHONE ENCOUNTER
Patient aware of urine culture results. Per Dr. Reddy patient is to take Augmentin 875 two times daily for 7 days. After completion of antibiotics, he would like her to repeat urine culture. All questions answered. Patient verbalized an understanding of plan. Pharmacy confirmed. Patient will contact the office with any new questions/concerns.

## 2025-03-25 NOTE — TELEPHONE ENCOUNTER
Pt called in to office stating that she received a call that her pre-surgery urine culture came back positive for UTI. Pt stated that she was told she would have an antibiotic sent to the pharmacy for her. Pt stated that there is no antibiotic att he pharmacy. Pt would like to know if she is to get the antibiotic today or tomorrow. Please advise.

## 2025-03-26 ENCOUNTER — TELEPHONE (OUTPATIENT)
Dept: ADMISSION | Facility: HOSPITAL | Age: 70
End: 2025-03-26
Payer: MEDICARE

## 2025-03-26 DIAGNOSIS — G89.29 OTHER CHRONIC PAIN: ICD-10-CM

## 2025-03-26 DIAGNOSIS — T45.1X5A CHEMOTHERAPY-INDUCED PERIPHERAL NEUROPATHY (MULTI): ICD-10-CM

## 2025-03-26 DIAGNOSIS — G62.0 CHEMOTHERAPY-INDUCED PERIPHERAL NEUROPATHY (MULTI): ICD-10-CM

## 2025-03-26 DIAGNOSIS — N20.0 NEPHROLITHIASIS: Primary | ICD-10-CM

## 2025-03-26 RX ORDER — CEPHALEXIN 500 MG/1
500 CAPSULE ORAL 2 TIMES DAILY
Qty: 20 CAPSULE | Refills: 0 | Status: SHIPPED | OUTPATIENT
Start: 2025-03-26 | End: 2025-04-05

## 2025-03-26 RX ORDER — METHADONE HYDROCHLORIDE 10 MG/1
10 TABLET ORAL EVERY 12 HOURS
Qty: 60 TABLET | Refills: 0 | Status: SHIPPED | OUTPATIENT
Start: 2025-03-28 | End: 2025-04-27

## 2025-03-26 NOTE — TELEPHONE ENCOUNTER
Pt called requesting a refill on her Methadone 10mg to be sent to [facility] in [place] on file. Message sent to the team.

## 2025-03-26 NOTE — TELEPHONE ENCOUNTER
Patient last seen by DEMETRIUS Hirsch on 1/27 with plan to continue methadone 10mg BID. Follow up visit is scheduled for 4/29. OARRS reviewed and no aberrancy noted. Patient last filled methadone 10mg #60/30 days on 2/27. Prescription pended to provider to approve.

## 2025-03-27 ENCOUNTER — CLINICAL SUPPORT (OUTPATIENT)
Dept: PREADMISSION TESTING | Facility: HOSPITAL | Age: 70
End: 2025-03-27
Payer: MEDICARE

## 2025-03-27 DIAGNOSIS — N20.0 NEPHROLITHIASIS: ICD-10-CM

## 2025-03-27 DIAGNOSIS — N13.30 HYDRONEPHROSIS, UNSPECIFIED HYDRONEPHROSIS TYPE: ICD-10-CM

## 2025-03-27 NOTE — CPM/PAT NURSE NOTE
CPM/PAT Nurse Note      Name: Saba Park (Saba Park)  /Age: 1955/69 y.o.       Past Medical History:   Diagnosis Date    Anxiety     Arthritis     Binge eating disorder     Chemotherapy-induced peripheral neuropathy (Multi)     Claustrophobia     Colitis     Depression     Dizziness     Low BP, saw neurology NP Ananya 1/10/25, meds adjusted    Dry eye syndrome     Essential tremor     Family history of malignant neoplasm of breast 2019    Family history of breast cancer    Gynecologic cancer (Multi) 2023    HLD (hyperlipidemia)     HTN (hypertension)     Hx antineoplastic chemo     Hydronephrosis with renal and ureteral calculus obstruction     IBS (irritable bowel syndrome)     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)     Kidney stones     Low back pain     Low platelet count (CMS-HCC)     Platelets=48 on 25    Lymphoma     Neuromuscular disorder (Multi)     OA (osteoarthritis)     Osteopenia     Ovarian cancer (Multi)     Personal history of malignant neoplasm of other parts of uterus 2020    History of malignant neoplasm of other parts of uterus    Personal history of non-Hodgkin lymphomas 2020    History of non-Hodgkin's lymphoma    Personal history of other specified conditions 10/28/2014    History of fibrocystic disease of breast    Portal vein thrombosis 2023    PORTAL VEIN THROMBOSIS I81    Presence of spectacles and contact lenses 2014    Wears contact lenses    Recurrent malignant neoplasm of ovary (Multi)     follows with oncology, s/p 6 cycles of Carbo/taxol    Right rotator cuff tendonitis     Skin cancer     basal cell    Thyroid nodule     Visual impairment        Past Surgical History:   Procedure Laterality Date    BRAIN SURGERY      CHOLECYSTECTOMY      EXPLORATORY LAPAROTOMY      EYE SURGERY      laser    HYSTERECTOMY      JOINT REPLACEMENT Right     hip, right THR    LITHOTRIPSY  2013    Renal Lithotripsy     LITHOTRIPSY  2025    LYMPH NODE BIOPSY      OTHER SURGICAL HISTORY  2013    Lithotomy    OTHER SURGICAL HISTORY  2019    Ileostomy closure    TONSILLECTOMY         Patient  has no history on file for sexual activity.    Family History   Problem Relation Name Age of Onset    Breast cancer Mother Mary Foley -  60    Stroke Mother Mary Foley -      Colon cancer Father Sherif Foley -      Cancer Father Sherif Foley -      COPD Father Sherif Foley -      Hearing loss Father Sherif Foley -      Heart disease Father Sherif Foley -      Ovarian cancer Maternal Grandmother      Breast cancer Mother's Sister Zamzam Campbell -  40    Breast cancer Cousin  50       Allergies   Allergen Reactions    Erythromycin GI Upset       Prior to Admission medications    Medication Sig Start Date End Date Taking? Authorizing Provider   acetaminophen (Tylenol) 325 mg tablet Take 2 tablets (650 mg) by mouth every 6 hours if needed for mild pain (1 - 3). 3/13/25   Juan Manuel Caal MD   amoxicillin-pot clavulanate (Augmentin) 875-125 mg tablet Take 1 tablet by mouth 2 times a day for 7 days. 3/25/25 4/1/25  Karen Reddy MD   buPROPion SR (Wellbutrin SR) 200 mg 12 hr tablet Take 1 tablet (200 mg) by mouth 2 times a day. Do not crush, chew, or split. 25  Anabella Ugalde MD   busPIRone (Buspar) 5 mg tablet TAKE 1 TABLET BY MOUTH TWICE A DAY 25   Anabella Ugalde MD   calcium carbonate (Oscal) 500 mg calcium (1,250 mg) tablet Take 1 tablet (1,250 mg) by mouth 2 times daily (morning and late afternoon).    Historical Provider, MD   cephalexin (Keflex) 500 mg capsule Take 1 capsule (500 mg) by mouth 2 times a day for 10 days.  Patient not taking: Reported on 3/27/2025 3/26/25 4/5/25  Sai Garcia MD   cholecalciferol (Vitamin D-3) 50 mcg (2,000 unit) capsule Take 1 capsule (50 mcg) by mouth once daily. 16   Historical  Provider, MD   DULoxetine (Cymbalta) 60 mg DR capsule Take 1 capsule (60 mg) by mouth once daily. Do not crush or chew. 1/8/25   Anabella gUalde MD   ibuprofen 600 mg tablet Take 1 tablet (600 mg) by mouth every 6 hours if needed for mild pain (1 - 3). 3/13/25   Juan Manuel Caal MD   methadone (Dolophine) 10 mg tablet Take 1 tablet (10 mg) by mouth every 12 hours. Do not fill before March 28, 2025. 3/28/25 4/27/25  DANILO Orourke   oxyCODONE (Roxicodone) 5 mg immediate release tablet Take 1 tablet (5 mg) by mouth every 6 hours if needed for severe pain (7 - 10). 3/13/25   Juan Manuel Caal MD   phenazopyridine (Pyridium) 200 mg tablet Take 1 tablet (200 mg) by mouth 3 times a day as needed for bladder spasms.  Patient not taking: Reported on 3/27/2025 3/13/25   Juan Manuel Caal MD   pregabalin (Lyrica) 75 mg capsule Take 1 capsule (75 mg) by mouth 2 times a day. Do not fill before January 15, 2025. 1/15/25 7/14/25  DANILO Orourke   primidone (Mysoline) 50 mg tablet Take 2 tablets (100 mg) by mouth once daily at bedtime.  Patient not taking: Reported on 3/27/2025    Historical Provider, MD   Mag-G 27 mg magnesium (500 mg) tablet TAKE 2 TABLETS (54 MG) BY MOUTH 2 TIMES A DAY. 3/18/25 3/27/25  Krista Kenny MD   magnesium, amino acid chelate, 133 mg tablet Take 1 tablet (133 mg) by mouth 2 times a day.  3/27/25  Historical Provider, MD   methadone (Dolophine) 10 mg tablet Take 1 tablet (10 mg) by mouth every 12 hours. Do not fill before February 27, 2025. 2/27/25 3/26/25  DANILO Orourke   ondansetron (Zofran) 8 mg tablet Take 1 tablet (8 mg) by mouth every 8 hours if needed for nausea or vomiting. 9/10/24 3/27/25  Krista Kenny MD        PAT ROS     DASI Risk Score    No data to display       Caprini DVT Assessment      Flowsheet Row ED to Hosp-Admission (Discharged) from 7/25/2024 in [facility] Bldg A 7 with Nestor Christiansen MD, Azael Tobar MD and Taurus Rivera MD    DVT Score (IF A SCORE IS NOT CALCULATING, MUST SELECT A BMI TO COMPLETE) 7 filed at 07/26/2024 0744   BMI (BMI MUST BE CHOSEN) 30 or less filed at 07/26/2024 0744   RETIRED: Current Status Minor surgery planned filed at 07/26/2024 0744   RETIRED: History Prior major surgery, Previous malignancy filed at 07/26/2024 0744   RETIRED: Age 60-75 years filed at 07/26/2024 0744          Modified Frailty Index    No data to display       [id number] Stroke Risk Points  Current as of just now        N/A 0 to 9 Points:      Last Change: N/A          The UTB6ME0-DAGh risk score (Lip INDIRA, et al. 2009. © 2010 American College [employer]) quantifies the risk of stroke for a patient with atrial fibrillation. For patients without atrial fibrillation or under the age of 18 this score appears as N/A. Higher score values generally indicate higher risk of stroke.        This score is not applicable to this patient. Components are not calculated.          Revised Cardiac Risk Index    No data to display       Apfel Simplified Score    No data to display       Break-the-Glass       Encounters that might contain data have been deemed confidential and restricted.            Risk Analysis Index Results This Encounter    No data found from available encounters.       Prodigy: High Risk  Total Score: 11              Prodigy Age Score      Prodigy Previous Opioid Use Score           ARISCAT Score for Postoperative Pulmonary Complications    No data to display       [place] Perioperative Risk for Myocardial Infarction or Cardiac Arrest (KYLIE)    No data to display         Nurse Plan of Action:     Med only RN screening call complete.  Reviewed allergies, medications and pharmacy.

## 2025-04-03 ENCOUNTER — PRE-ADMISSION TESTING (OUTPATIENT)
Dept: PREADMISSION TESTING | Facility: HOSPITAL | Age: 70
End: 2025-04-03
Payer: MEDICARE

## 2025-04-03 ENCOUNTER — APPOINTMENT (OUTPATIENT)
Dept: LAB | Facility: HOSPITAL | Age: 70
End: 2025-04-03
Payer: MEDICARE

## 2025-04-03 VITALS
HEIGHT: 63 IN | SYSTOLIC BLOOD PRESSURE: 101 MMHG | OXYGEN SATURATION: 97 % | DIASTOLIC BLOOD PRESSURE: 66 MMHG | TEMPERATURE: 97.6 F | BODY MASS INDEX: 23.83 KG/M2 | RESPIRATION RATE: 16 BRPM | WEIGHT: 134.48 LBS

## 2025-04-03 DIAGNOSIS — D64.9 ANEMIA, UNSPECIFIED TYPE: ICD-10-CM

## 2025-04-03 DIAGNOSIS — N13.2 HYDRONEPHROSIS WITH URINARY OBSTRUCTION DUE TO URETERAL CALCULUS: Primary | ICD-10-CM

## 2025-04-03 LAB
APPEARANCE UR: CLEAR
BILIRUB UR STRIP.AUTO-MCNC: NEGATIVE MG/DL
COLOR UR: YELLOW
GLUCOSE UR STRIP.AUTO-MCNC: NORMAL MG/DL
KETONES UR STRIP.AUTO-MCNC: NEGATIVE MG/DL
LEUKOCYTE ESTERASE UR QL STRIP.AUTO: ABNORMAL
MUCOUS THREADS #/AREA URNS AUTO: ABNORMAL /LPF
NITRITE UR QL STRIP.AUTO: NEGATIVE
PH UR STRIP.AUTO: 6.5 [PH]
PROT UR STRIP.AUTO-MCNC: ABNORMAL MG/DL
RBC # UR STRIP.AUTO: ABNORMAL MG/DL
RBC #/AREA URNS AUTO: >20 /HPF
SP GR UR STRIP.AUTO: 1.02
UROBILINOGEN UR STRIP.AUTO-MCNC: NORMAL MG/DL
WBC #/AREA URNS AUTO: ABNORMAL /HPF

## 2025-04-03 PROCEDURE — 99214 OFFICE O/P EST MOD 30 MIN: CPT | Performed by: NURSE PRACTITIONER

## 2025-04-03 PROCEDURE — 81001 URINALYSIS AUTO W/SCOPE: CPT

## 2025-04-03 PROCEDURE — 87086 URINE CULTURE/COLONY COUNT: CPT

## 2025-04-03 RX ORDER — PROPRANOLOL HYDROCHLORIDE 40 MG/1
40 TABLET ORAL DAILY
COMMUNITY

## 2025-04-03 RX ORDER — CYANOCOBALAMIN (VITAMIN B-12) 250 MCG
250 TABLET ORAL DAILY
COMMUNITY

## 2025-04-03 ASSESSMENT — ENCOUNTER SYMPTOMS
DYSURIA: 1
CONSTIPATION: 1
CONSTITUTIONAL NEGATIVE: 1
RESPIRATORY NEGATIVE: 1
NECK NEGATIVE: 1
ARTHRALGIAS: 1
CARDIOVASCULAR NEGATIVE: 1

## 2025-04-03 NOTE — PREPROCEDURE INSTRUCTIONS
Medication List            Accurate as of April 3, 2025  1:47 PM. Always use your most recent med list.                acetaminophen 325 mg tablet  Commonly known as: Tylenol  Take 2 tablets (650 mg) by mouth every 6 hours if needed for mild pain (1 - 3).  Medication Adjustments for Surgery: Take/Use as prescribed     buPROPion  mg 12 hr tablet  Commonly known as: Wellbutrin SR  Take 1 tablet (200 mg) by mouth 2 times a day. Do not crush, chew, or split.  Medication Adjustments for Surgery: Take on the morning of surgery     busPIRone 5 mg tablet  Commonly known as: Buspar  TAKE 1 TABLET BY MOUTH TWICE A DAY  Medication Adjustments for Surgery: Take on the morning of surgery     calcium carbonate 500 mg calcium (1,250 mg) tablet  Commonly known as: Oscal  Medication Adjustments for Surgery: Do Not take on the morning of surgery     cephalexin 500 mg capsule  Commonly known as: Keflex  Take 1 capsule (500 mg) by mouth 2 times a day for 10 days.  Notes to patient: NOT TAKING      cholecalciferol 50 mcg (2,000 unit) capsule  Commonly known as: Vitamin D-3  Medication Adjustments for Surgery: Do Not take on the morning of surgery     cyanocobalamin 250 mcg tablet  Commonly known as: Vitamin B-12  Medication Adjustments for Surgery: Do Not take on the morning of surgery     DULoxetine 60 mg DR capsule  Commonly known as: Cymbalta  Take 1 capsule (60 mg) by mouth once daily. Do not crush or chew.  Medication Adjustments for Surgery: Take on the morning of surgery     ibuprofen 600 mg tablet  Take 1 tablet (600 mg) by mouth every 6 hours if needed for mild pain (1 - 3).  Notes to patient: NOT TAKING      methadone 10 mg tablet  Commonly known as: Dolophine  Take 1 tablet (10 mg) by mouth every 12 hours. Do not fill before March 28, 2025.  Medication Adjustments for Surgery: Take on the morning of surgery     oxyCODONE 5 mg immediate release tablet  Commonly known as: Roxicodone  Take 1 tablet (5 mg) by mouth every  6 hours if needed for severe pain (7 - 10).  Medication Adjustments for Surgery: Take/Use as prescribed     phenazopyridine 200 mg tablet  Commonly known as: Pyridium  Take 1 tablet (200 mg) by mouth 3 times a day as needed for bladder spasms.  Notes to patient: NOT TAKING      pregabalin 75 mg capsule  Commonly known as: Lyrica  Take 1 capsule (75 mg) by mouth 2 times a day. Do not fill before January 15, 2025.  Medication Adjustments for Surgery: Do Not take on the morning of surgery     primidone 50 mg tablet  Commonly known as: Mysoline  Notes to patient: NOT TAKING      propranolol 40 mg tablet  Commonly known as: Inderal  Medication Adjustments for Surgery: Take on the morning of surgery                 CONTACT SURGEON'S OFFICE IF YOU DEVELOP:  * Fever = 100.4 F   * New respiratory symptoms (e.g. cough, shortness of breath, respiratory distress, sore throat)  * Recent loss of taste or smell  *Flu like symptoms such as headache, fatigue or gastrointestinal symptoms  * You develop any open sores, shingles, burning or painful urination   AND/OR:  * You no longer wish to have the surgery.  * Any other personal circumstances change that may lead to the need to cancel or defer this surgery.  *You were admitted to any hospital within one week of your planned procedure.    SMOKING:  *Quitting smoking can make a huge difference to your health and recovery from surgery.    *If you need help with quitting, call 4-711-QUIT-NOW.    THE DAY BEFORE SURGERY:  *Do not eat any food after midnight the night before surgery.   You may have up to 13.5 ounces of clear liquid until TWO hours before your instructed arrival time to the hospital  DIABETICS:  Please check fasting blood sugar  upon waking up.  If fasting sugar is <80 mg/dl, please drink 100ml/3oz of apple juice no later than 2 hours prior to surgery.      SURGICAL TIME  *You will be contacted between 2 p.m. and 6 p.m. the business day before your surgery with your arrival  time.  *If you haven't received a call by 6pm, call [phone].  *Scheduled surgery times may change and you will be notified if this occurs-check your personal voicemail for any updates.    ON THE MORNING OF SURGERY:  *Wear comfortable, loose fitting clothing.   *Do not use moisturizers, creams, lotions or perfume.  *All jewelry and valuables should be left at home.  *Prosthetic devices such as contact lenses, hearing aids, dentures, eyelash extensions, hairpins and body piercing must be removed before surgery.    BRING WITH YOU:  *Photo ID and insurance card  *Current list of medicines and allergies  *Pacemaker/Defibrillator/Heart stent cards  *CPAP machine and mask  *Slings/splints/crutches  *Copy of your complete Advanced Directive/DHPOA-if applicable  *Neurostimulator implant remote    PARKING AND ARRIVAL:  *Check in at the Main Entrance desk and let them know you are here for surgery.  *You will be directed to the 2nd floor surgical waiting area.    AFTER OUTPATIENT SURGERY:  *A responsible adult MUST accompany you at the time of discharge and stay with you for 24 hours after your surgery.  *You may NOT drive yourself home after surgery.  *You may use a taxi or ride sharing service ([email or web address], [employer]) to return home ONLY if you are accompanied by a friend or family member.  *Instructions for resuming your medications will be provided by your surgeon.

## 2025-04-03 NOTE — CPM/PAT NURSE NOTE
CPM/PAT Nurse Note      Name: Saba Park (Saba Park)  /Age: 1955/69 y.o.       Past Medical History:   Diagnosis Date    Anxiety     Arthritis     Binge eating disorder     Chemotherapy-induced peripheral neuropathy (Multi)     Claustrophobia     Colitis     Depression     Dizziness     Low BP, saw neurology NP Ananya 1/10/25, meds adjusted    Dry eye syndrome     Essential tremor     Family history of malignant neoplasm of breast 2019    Family history of breast cancer    Gynecologic cancer (Multi) 2023    HLD (hyperlipidemia)     HTN (hypertension)     Hx antineoplastic chemo     Hydronephrosis with renal and ureteral calculus obstruction     IBS (irritable bowel syndrome)     Infection of right prosthetic hip joint     Kidney stones     Low back pain     Low platelet count (CMS-HCC)     Platelets=48 on 25    Lymphoma     Neuromuscular disorder (Multi)     OA (osteoarthritis)     Osteopenia     Ovarian cancer (Multi)     Personal history of malignant neoplasm of other parts of uterus 2020    History of malignant neoplasm of other parts of uterus    Personal history of non-Hodgkin lymphomas 2020    History of non-Hodgkin's lymphoma    Personal history of other specified conditions 10/28/2014    History of fibrocystic disease of breast    Portal vein thrombosis 2023    PORTAL VEIN THROMBOSIS I81    Presence of spectacles and contact lenses 2014    Wears contact lenses    Recurrent malignant neoplasm of ovary (Multi)     follows with oncology, s/p 6 cycles of Carbo/taxol    Right rotator cuff tendonitis     Skin cancer     basal cell    Thyroid nodule     Visual impairment        Past Surgical History:   Procedure Laterality Date    BRAIN SURGERY      CHOLECYSTECTOMY      EXPLORATORY LAPAROTOMY      EYE SURGERY      laser    HYSTERECTOMY      JOINT REPLACEMENT Right     hip, right THR    LITHOTRIPSY  2013    Renal Lithotripsy    LITHOTRIPSY   2025    LYMPH NODE BIOPSY      OTHER SURGICAL HISTORY  2013    Lithotomy    OTHER SURGICAL HISTORY  2019    Ileostomy closure    TONSILLECTOMY         Patient  has no history on file for sexual activity.    Family History   Problem Relation Name Age of Onset    Breast cancer Mother Mary Foley -  60    Stroke Mother Mary Foley -      Colon cancer Father Sherif Foley -      Cancer Father Sherif Foley -      COPD Father Sherif Foley -      Hearing loss Father Sherif Foley -      Heart disease Father Sherif Foley -      Ovarian cancer Maternal Grandmother      Breast cancer Mother's Sister Zamzam Campbell -  40    Breast cancer Cousin  50       Allergies   Allergen Reactions    Erythromycin GI Upset       Prior to Admission medications    Medication Sig Start Date End Date Taking? Authorizing Provider   acetaminophen (Tylenol) 325 mg tablet Take 2 tablets (650 mg) by mouth every 6 hours if needed for mild pain (1 - 3). 3/13/25  Yes Juan Manuel Caal MD   buPROPion SR (Wellbutrin SR) 200 mg 12 hr tablet Take 1 tablet (200 mg) by mouth 2 times a day. Do not crush, chew, or split. 25 Yes Anabella Ugalde MD   busPIRone (Buspar) 5 mg tablet TAKE 1 TABLET BY MOUTH TWICE A DAY 25  Yes Anabella Ugalde MD   calcium carbonate (Oscal) 500 mg calcium (1,250 mg) tablet Take 1 tablet (1,250 mg) by mouth 2 times daily (morning and late afternoon).   Yes Historical Provider, MD   cholecalciferol (Vitamin D-3) 50 mcg (2,000 unit) capsule Take 1 capsule (50 mcg) by mouth once daily. 16  Yes Historical Provider, MD   cyanocobalamin (Vitamin B-12) 250 mcg tablet Take 1 tablet (250 mcg) by mouth once daily.   Yes Historical Provider, MD   DULoxetine (Cymbalta) 60 mg DR capsule Take 1 capsule (60 mg) by mouth once daily. Do not crush or chew. 25  Yes Anabella Ugalde MD   methadone (Dolophine) 10 mg tablet Take 1  tablet (10 mg) by mouth every 12 hours. Do not fill before March 28, 2025. 3/28/25 4/27/25 Yes DANILO Orourke   oxyCODONE (Roxicodone) 5 mg immediate release tablet Take 1 tablet (5 mg) by mouth every 6 hours if needed for severe pain (7 - 10). 3/13/25  Yes Juan Manuel Caal MD   pregabalin (Lyrica) 75 mg capsule Take 1 capsule (75 mg) by mouth 2 times a day. Do not fill before January 15, 2025. 1/15/25 7/14/25 Yes DANILO Orourke   propranolol (Inderal) 40 mg tablet Take 1 tablet (40 mg) by mouth once daily.   Yes Historical Provider, MD   amoxicillin-pot clavulanate (Augmentin) 875-125 mg tablet Take 1 tablet by mouth 2 times a day for 7 days.  Patient not taking: Reported on 4/3/2025 3/25/25 4/1/25  Karen Reddy MD   cephalexin (Keflex) 500 mg capsule Take 1 capsule (500 mg) by mouth 2 times a day for 10 days.  Patient not taking: Reported on 4/3/2025 3/26/25 4/5/25  Sai Garcia MD   ibuprofen 600 mg tablet Take 1 tablet (600 mg) by mouth every 6 hours if needed for mild pain (1 - 3).  Patient not taking: Reported on 4/3/2025 3/13/25   Juan Manuel Caal MD   phenazopyridine (Pyridium) 200 mg tablet Take 1 tablet (200 mg) by mouth 3 times a day as needed for bladder spasms.  Patient not taking: Reported on 4/3/2025 3/13/25   Juan Manuel Caal MD   primidone (Mysoline) 50 mg tablet Take 2 tablets (100 mg) by mouth once daily at bedtime.  Patient not taking: Reported on 4/3/2025    Historical Provider, MD NOEMI OCAMPO Risk Score    No data to display       Caprini DVT Assessment      Flowsheet Row ED to Hosp-Admission (Discharged) from 7/25/2024 in [facility] Bldg A 7 with Nestor Christiansen MD, Azael Tobar MD and Taurus Rivera MD   DVT Score (IF A SCORE IS NOT CALCULATING, MUST SELECT A BMI TO COMPLETE) 7 filed at 07/26/2024 0744   BMI (BMI MUST BE CHOSEN) 30 or less filed at 07/26/2024 0744   RETIRED: Current Status Minor surgery planned filed at 07/26/2024 0744    RETIRED: History Prior major surgery, Previous malignancy filed at 07/26/2024 0744   RETIRED: Age 60-75 years filed at 07/26/2024 0744          Modified Frailty Index    No data to display       [id number] Stroke Risk Points  Current as of just now        N/A 0 to 9 Points:      Last Change: N/A          The [id number] risk score (Klaudia JACOBSON, et al. 2009. © 2010 American College [employer] Chest [employer]icians) quantifies the risk of stroke for a patient with atrial fibrillation. For patients without atrial fibrillation or under the age of 18 this score appears as N/A. Higher score values generally indicate higher risk of stroke.        This score is not applicable to this patient. Components are not calculated.          Revised Cardiac Risk Index    No data to display       Apfel Simplified Score    No data to display       Risk Analysis Index Results This Encounter    No data found in the last 10 encounters.       Prodigy: High Risk  Total Score: 11              Prodigy Age Score      Prodigy Previous Opioid Use Score           ARISCAT Score for Postoperative Pulmonary Complications    No data to display       [place] Perioperative Risk for Myocardial Infarction or Cardiac Arrest (KYLIE)    No data to display         Nurse Plan of Action:       After Visit Summary (AVS) reviewed and patient verbalized good understanding of medications and NPO instructions.

## 2025-04-03 NOTE — H&P (VIEW-ONLY)
CPM/PAT Evaluation       Name: Saba Park (Saba Park)  /Age: 1955/69 y.o.     SURGEON :  Surgery, Date, and Length:  HPI:  This a y.o. fe-male who presents for presurgical evaluation for  . After discussion of the risks and benefits with  the patient elects to proceed with the planned procedure.       Past Medical History:   Diagnosis Date    Anxiety     Arthritis     Binge eating disorder     Chemotherapy-induced peripheral neuropathy (Multi)     Claustrophobia     Colitis     Depression     Dizziness     Low BP, saw neurology NP Manindertangelo 1/10/25, meds adjusted    Dry eye syndrome     Essential tremor     Family history of malignant neoplasm of breast 2019    Family history of breast cancer    Gynecologic cancer (Multi) 2023    HLD (hyperlipidemia)     HTN (hypertension)     Hx antineoplastic chemo     Hydronephrosis with renal and ureteral calculus obstruction     IBS (irritable bowel syndrome)     Infection of right prosthetic hip joint     Kidney stones     Low back pain     Low platelet count (CMS-HCC)     Platelets=48 on 25    Lymphoma     Neuromuscular disorder (Multi)     OA (osteoarthritis)     Osteopenia     Ovarian cancer (Multi)     Personal history of malignant neoplasm of other parts of uterus 2020    History of malignant neoplasm of other parts of uterus    Personal history of non-Hodgkin lymphomas 2020    History of non-Hodgkin's lymphoma    Personal history of other specified conditions 10/28/2014    History of fibrocystic disease of breast    Portal vein thrombosis 2023    PORTAL VEIN THROMBOSIS I81    Presence of spectacles and contact lenses 2014    Wears contact lenses    Recurrent malignant neoplasm of ovary (Multi)     follows with oncology, s/p 6 cycles of Carbo/taxol    Right rotator cuff tendonitis     Skin cancer     basal cell    Thyroid nodule     Visual impairment        Past Surgical History:   Procedure Laterality Date     BRAIN SURGERY      CHOLECYSTECTOMY      EXPLORATORY LAPAROTOMY      EYE SURGERY      laser    HYSTERECTOMY      JOINT REPLACEMENT Right     hip, right THR    LITHOTRIPSY  2013    Renal Lithotripsy    LITHOTRIPSY  2025    LYMPH NODE BIOPSY      OTHER SURGICAL HISTORY  2013    Lithotomy    OTHER SURGICAL HISTORY  2019    Ileostomy closure    TONSILLECTOMY     Anesthesia History    VERY DIFFICULT PERIPHERAL STICK, PT HAS MEDIPORT FOR ACCESS  Pt denies any past history of anesthetic complications such as PONV, awareness, prolonged sedation, dental damage, aspiration, cardiac arrest, difficult intubation, difficult I.V. access or unexpected hospital admissions.  NO malignant hyperthermia and or pseudo cholinesterase deficiency.    The patient is not  a Oriental orthodox and will accept blood and blood products if medically indicated.   No history of blood transfusions .Type and screen  ordered.      Social History  Social History     Substance and Sexual Activity   Drug Use Not Currently    Types: Marijuana    Comment: medical card / gummies      Social History     Substance and Sexual Activity   Alcohol Use Not Currently    Comment: 1 glass of wine daily      Social History     Tobacco Use   Smoking Status Never    Passive exposure: Never   Smokeless Tobacco Never          Family History   Problem Relation Name Age of Onset    Breast cancer Mother Mary Foley -  60    Stroke Mother Mary Foley -      Colon cancer Father Sherif Foley -      Cancer Father Sherif Foley -      COPD Father Sherif Foley -      Hearing loss Father Sherif Foley -      Heart disease Father Sherif Foley -      Ovarian cancer Maternal Grandmother      Breast cancer Mother's Sister Zamzam Campbell -  40    Breast cancer Cousin  50       Allergies   Allergen Reactions    Erythromycin GI Upset       Prior to Admission medications    Medication Sig Start Date End  Date Taking? Authorizing Provider   acetaminophen (Tylenol) 325 mg tablet Take 2 tablets (650 mg) by mouth every 6 hours if needed for mild pain (1 - 3). 3/13/25  Yes Juan Manuel Caal MD   buPROPion SR (Wellbutrin SR) 200 mg 12 hr tablet Take 1 tablet (200 mg) by mouth 2 times a day. Do not crush, chew, or split. 2/26/25 8/25/25 Yes Anabella Ugalde MD   busPIRone (Buspar) 5 mg tablet TAKE 1 TABLET BY MOUTH TWICE A DAY 1/31/25  Yes Anabella Ugalde MD   calcium carbonate (Oscal) 500 mg calcium (1,250 mg) tablet Take 1 tablet (1,250 mg) by mouth 2 times daily (morning and late afternoon).   Yes Historical Provider, MD   cholecalciferol (Vitamin D-3) 50 mcg (2,000 unit) capsule Take 1 capsule (50 mcg) by mouth once daily. 12/5/16  Yes Historical Provider, MD   cyanocobalamin (Vitamin B-12) 250 mcg tablet Take 1 tablet (250 mcg) by mouth once daily.   Yes Historical Provider, MD   DULoxetine (Cymbalta) 60 mg DR capsule Take 1 capsule (60 mg) by mouth once daily. Do not crush or chew. 1/8/25  Yes Anabella Ugalde MD   methadone (Dolophine) 10 mg tablet Take 1 tablet (10 mg) by mouth every 12 hours. Do not fill before March 28, 2025. 3/28/25 4/27/25 Yes DANILO Oroukre   oxyCODONE (Roxicodone) 5 mg immediate release tablet Take 1 tablet (5 mg) by mouth every 6 hours if needed for severe pain (7 - 10). 3/13/25  Yes Juan Manuel Caal MD   pregabalin (Lyrica) 75 mg capsule Take 1 capsule (75 mg) by mouth 2 times a day. Do not fill before January 15, 2025. 1/15/25 7/14/25 Yes DANILO Orourke   propranolol (Inderal) 40 mg tablet Take 1 tablet (40 mg) by mouth once daily.   Yes Historical Provider, MD   amoxicillin-pot clavulanate (Augmentin) 875-125 mg tablet Take 1 tablet by mouth 2 times a day for 7 days.  Patient not taking: Reported on 4/3/2025 3/25/25 4/1/25  Karen Reddy MD   cephalexin (Keflex) 500 mg capsule Take 1 capsule (500 mg) by mouth 2 times a day for 10 days.  Patient  not taking: Reported on 4/3/2025 3/26/25 4/5/25  Sai Garcia MD   ibuprofen 600 mg tablet Take 1 tablet (600 mg) by mouth every 6 hours if needed for mild pain (1 - 3).  Patient not taking: Reported on 4/3/2025 3/13/25   Juan Manuel Caal MD   phenazopyridine (Pyridium) 200 mg tablet Take 1 tablet (200 mg) by mouth 3 times a day as needed for bladder spasms.  Patient not taking: Reported on 4/3/2025 3/13/25   Juan Manuel Caal MD   primidone (Mysoline) 50 mg tablet Take 2 tablets (100 mg) by mouth once daily at bedtime.  Patient not taking: Reported on 4/3/2025    Historical Provider, MD FRANKLIN ROS:   Constitutional:   neg    Neuro/Psych:   Eyes:   Ears:   Nose:   neg    Mouth:   neg    Throat:   neg    Neck:   neg    Cardio:   neg    Respiratory:   neg    Endocrine:   GI:    constipation  :    dysuria  Musculoskeletal:    arthralgias (rt hip)  Hematologic:   neg    Skin:  neg        Physical Exam  Vitals reviewed.   Constitutional:       Appearance: Normal appearance.   HENT:      Head: Normocephalic.      Mouth/Throat:      Mouth: Mucous membranes are dry.   Eyes:      Extraocular Movements: Extraocular movements intact.      Pupils: Pupils are equal, round, and reactive to light.   Cardiovascular:      Rate and Rhythm: Normal rate and regular rhythm.      Pulses: Normal pulses.      Heart sounds: Normal heart sounds.   Pulmonary:      Effort: Pulmonary effort is normal.      Breath sounds: Normal breath sounds.   Musculoskeletal:         General: Normal range of motion.      Cervical back: Normal range of motion.   Skin:     General: Skin is warm and dry.   Neurological:      Mental Status: She is alert and oriented to person, place, and time.   Psychiatric:         Behavior: Behavior normal.          PAT AIRWAY:   Airway:     Mallampati::  II  normal        Testing/Diagnostic:   EKG IN EPIC     Lab Results   Component Value Date    WBC 7.6 03/21/2025    HGB 10.9 (L) 03/21/2025    HCT 35.5 (L) 03/21/2025     " (H) 03/21/2025     03/21/2025     Lab Results   Component Value Date    GLUCOSE 98 03/21/2025    CALCIUM 9.5 03/21/2025     03/21/2025    K 4.6 03/21/2025    CO2 29 03/21/2025     03/21/2025    BUN 31 (H) 03/21/2025    CREATININE 1.11 (H) 03/21/2025       T/S pending    Patient Specialist/PCP:     Visit Vitals  /66   Temp 36.4 °C (97.6 °F)   Resp 16   Ht 1.6 m (5' 2.99\")   Wt 61 kg (134 lb 7.7 oz)   SpO2 97%   BMI 23.83 kg/m²   OB Status Postmenopausal   Smoking Status Never   BSA 1.65 m²     ASSESSMENT/PLAN    Patient is a 69 year-old  scheduled for Cystoscopy; Right Ureteroscopy; Holmium Laser Lithotripsy; Ureteral Stent Insertion  with Dr. Watson   on  4/17/25 .  CARDIOVASCULAR:  RCRI score / Risk: The patients score is 0 based on history . Per ACC/AHA guidelines this places her  at  3.9% risk for MACE undergoing a low  risk procedure . The patient has the following risk factors: denies   Functional Capacity: The patients exercise tolerance is  3   METS. This is based on the patient's difficulty walking due to peripherial neuropathy . Patient denies  active cardiac symptoms or anginal equivalents .      PULMONARY:  The patient has the following factors that place them at increased risk of perioperative pulmonary complications;age greater than 65/BMI greater than 27/reater than 2.5 hour procedure.  Postoperatively the patient would benefit from early pulmonary toilet/incentive spirometry q 1-2 hours while awake/pulse oximetry/cautious use of respiratory depressant medications such as opioids/elevate the HOB/oral hygiene.  CKD:  The patient has had chronic kidney disease  due to obstruction is currently at stage3 a ,with creatinine levels that have been stable .  Recommendations: Avoid intraoperative hypotension. Avoid nephrotoxic drugs and radicontrast dyes.  Recheck BMP periodically in the post op period.    ANEMIA :  The patient has a history of anemia.  The patient’s " anemia appears stable when compared to prior labs.  Type and screen pending     DVT:  CAPRINI SCORE=7  The patient has the following factors that increase her  Risk for thrombus formation ; Virchow's triad , age>65, hx malignancy , Surgical procedure >2 hrs  procedure .    Recommendations: DVT prophylaxis  per Dr. Watson protocol . SCD's, HUY's, and early ambulation are recommended. Heparin or LMWH is recommended for the very high risk .      Risk assessment complete.  Patient is scheduled for  low  surgical risk procedure.  Patient is considered an acceptable risk to proceed with the planned procedure.      Preoperative medication instructions were provided and reviewed with the patient.  Any additional testing or evaluation was explained to the patient.  Nothing by mouth instructions were discussed and patient's questions were answered prior to conclusion to this encounter.  Patient verbalized understanding of preoperative instructions given in preadmission testing; discharge instructions available in EMR.

## 2025-04-03 NOTE — CPM/PAT H&P
CPM/PAT Evaluation       Name: Saba Park (Saba Park)  /Age: 1955/69 y.o.     SURGEON :  Surgery, Date, and Length:  HPI:  This a y.o. fe-male who presents for presurgical evaluation for  . After discussion of the risks and benefits with  the patient elects to proceed with the planned procedure.       Past Medical History:   Diagnosis Date    Anxiety     Arthritis     Binge eating disorder     Chemotherapy-induced peripheral neuropathy (Multi)     Claustrophobia     Colitis     Depression     Dizziness     Low BP, saw neurology NP Manindertangelo 1/10/25, meds adjusted    Dry eye syndrome     Essential tremor     Family history of malignant neoplasm of breast 2019    Family history of breast cancer    Gynecologic cancer (Multi) 2023    HLD (hyperlipidemia)     HTN (hypertension)     Hx antineoplastic chemo     Hydronephrosis with renal and ureteral calculus obstruction     IBS (irritable bowel syndrome)     Infection of right prosthetic hip joint     Kidney stones     Low back pain     Low platelet count (CMS-HCC)     Platelets=48 on 25    Lymphoma     Neuromuscular disorder (Multi)     OA (osteoarthritis)     Osteopenia     Ovarian cancer (Multi)     Personal history of malignant neoplasm of other parts of uterus 2020    History of malignant neoplasm of other parts of uterus    Personal history of non-Hodgkin lymphomas 2020    History of non-Hodgkin's lymphoma    Personal history of other specified conditions 10/28/2014    History of fibrocystic disease of breast    Portal vein thrombosis 2023    PORTAL VEIN THROMBOSIS I81    Presence of spectacles and contact lenses 2014    Wears contact lenses    Recurrent malignant neoplasm of ovary (Multi)     follows with oncology, s/p 6 cycles of Carbo/taxol    Right rotator cuff tendonitis     Skin cancer     basal cell    Thyroid nodule     Visual impairment        Past Surgical History:   Procedure Laterality Date     BRAIN SURGERY      CHOLECYSTECTOMY      EXPLORATORY LAPAROTOMY      EYE SURGERY      laser    HYSTERECTOMY      JOINT REPLACEMENT Right     hip, right THR    LITHOTRIPSY  2013    Renal Lithotripsy    LITHOTRIPSY  2025    LYMPH NODE BIOPSY      OTHER SURGICAL HISTORY  2013    Lithotomy    OTHER SURGICAL HISTORY  2019    Ileostomy closure    TONSILLECTOMY     Anesthesia History    VERY DIFFICULT PERIPHERAL STICK, PT HAS MEDIPORT FOR ACCESS  Pt denies any past history of anesthetic complications such as PONV, awareness, prolonged sedation, dental damage, aspiration, cardiac arrest, difficult intubation, difficult I.V. access or unexpected hospital admissions.  NO malignant hyperthermia and or pseudo cholinesterase deficiency.    The patient is not  a Yazidism and will accept blood and blood products if medically indicated.   No history of blood transfusions .Type and screen  ordered.      Social History  Social History     Substance and Sexual Activity   Drug Use Not Currently    Types: Marijuana    Comment: medical card / gummies      Social History     Substance and Sexual Activity   Alcohol Use Not Currently    Comment: 1 glass of wine daily      Social History     Tobacco Use   Smoking Status Never    Passive exposure: Never   Smokeless Tobacco Never          Family History   Problem Relation Name Age of Onset    Breast cancer Mother Mary Foley -  60    Stroke Mother Mary Foley -      Colon cancer Father Sherif Foley -      Cancer Father Sherif Foley -      COPD Father Sherif Foley -      Hearing loss Father Sherif Foley -      Heart disease Father Sherif Foley -      Ovarian cancer Maternal Grandmother      Breast cancer Mother's Sister Zamzam Campbell -  40    Breast cancer Cousin  50       Allergies   Allergen Reactions    Erythromycin GI Upset       Prior to Admission medications    Medication Sig Start Date End  Date Taking? Authorizing Provider   acetaminophen (Tylenol) 325 mg tablet Take 2 tablets (650 mg) by mouth every 6 hours if needed for mild pain (1 - 3). 3/13/25  Yes Juan Manuel Caal MD   buPROPion SR (Wellbutrin SR) 200 mg 12 hr tablet Take 1 tablet (200 mg) by mouth 2 times a day. Do not crush, chew, or split. 2/26/25 8/25/25 Yes Anabella Ugalde MD   busPIRone (Buspar) 5 mg tablet TAKE 1 TABLET BY MOUTH TWICE A DAY 1/31/25  Yes Anabella Ugalde MD   calcium carbonate (Oscal) 500 mg calcium (1,250 mg) tablet Take 1 tablet (1,250 mg) by mouth 2 times daily (morning and late afternoon).   Yes Historical Provider, MD   cholecalciferol (Vitamin D-3) 50 mcg (2,000 unit) capsule Take 1 capsule (50 mcg) by mouth once daily. 12/5/16  Yes Historical Provider, MD   cyanocobalamin (Vitamin B-12) 250 mcg tablet Take 1 tablet (250 mcg) by mouth once daily.   Yes Historical Provider, MD   DULoxetine (Cymbalta) 60 mg DR capsule Take 1 capsule (60 mg) by mouth once daily. Do not crush or chew. 1/8/25  Yes Anabella Ugalde MD   methadone (Dolophine) 10 mg tablet Take 1 tablet (10 mg) by mouth every 12 hours. Do not fill before March 28, 2025. 3/28/25 4/27/25 Yes DANILO Orourke   oxyCODONE (Roxicodone) 5 mg immediate release tablet Take 1 tablet (5 mg) by mouth every 6 hours if needed for severe pain (7 - 10). 3/13/25  Yes Juan Manuel Caal MD   pregabalin (Lyrica) 75 mg capsule Take 1 capsule (75 mg) by mouth 2 times a day. Do not fill before January 15, 2025. 1/15/25 7/14/25 Yes DANILO Orourke   propranolol (Inderal) 40 mg tablet Take 1 tablet (40 mg) by mouth once daily.   Yes Historical Provider, MD   amoxicillin-pot clavulanate (Augmentin) 875-125 mg tablet Take 1 tablet by mouth 2 times a day for 7 days.  Patient not taking: Reported on 4/3/2025 3/25/25 4/1/25  Karen Reddy MD   cephalexin (Keflex) 500 mg capsule Take 1 capsule (500 mg) by mouth 2 times a day for 10 days.  Patient  not taking: Reported on 4/3/2025 3/26/25 4/5/25  Sai Garcia MD   ibuprofen 600 mg tablet Take 1 tablet (600 mg) by mouth every 6 hours if needed for mild pain (1 - 3).  Patient not taking: Reported on 4/3/2025 3/13/25   Juan Manuel Caal MD   phenazopyridine (Pyridium) 200 mg tablet Take 1 tablet (200 mg) by mouth 3 times a day as needed for bladder spasms.  Patient not taking: Reported on 4/3/2025 3/13/25   Juan Manuel Caal MD   primidone (Mysoline) 50 mg tablet Take 2 tablets (100 mg) by mouth once daily at bedtime.  Patient not taking: Reported on 4/3/2025    Historical Provider, MD FRANKLIN ROS:   Constitutional:   neg    Neuro/Psych:   Eyes:   Ears:   Nose:   neg    Mouth:   neg    Throat:   neg    Neck:   neg    Cardio:   neg    Respiratory:   neg    Endocrine:   GI:    constipation  :    dysuria  Musculoskeletal:    arthralgias (rt hip)  Hematologic:   neg    Skin:  neg        Physical Exam  Vitals reviewed.   Constitutional:       Appearance: Normal appearance.   HENT:      Head: Normocephalic.      Mouth/Throat:      Mouth: Mucous membranes are dry.   Eyes:      Extraocular Movements: Extraocular movements intact.      Pupils: Pupils are equal, round, and reactive to light.   Cardiovascular:      Rate and Rhythm: Normal rate and regular rhythm.      Pulses: Normal pulses.      Heart sounds: Normal heart sounds.   Pulmonary:      Effort: Pulmonary effort is normal.      Breath sounds: Normal breath sounds.   Musculoskeletal:         General: Normal range of motion.      Cervical back: Normal range of motion.   Skin:     General: Skin is warm and dry.   Neurological:      Mental Status: She is alert and oriented to person, place, and time.   Psychiatric:         Behavior: Behavior normal.          PAT AIRWAY:   Airway:     Mallampati::  II  normal        Testing/Diagnostic:   EKG IN EPIC     Lab Results   Component Value Date    WBC 7.6 03/21/2025    HGB 10.9 (L) 03/21/2025    HCT 35.5 (L) 03/21/2025     " (H) 03/21/2025     03/21/2025     Lab Results   Component Value Date    GLUCOSE 98 03/21/2025    CALCIUM 9.5 03/21/2025     03/21/2025    K 4.6 03/21/2025    CO2 29 03/21/2025     03/21/2025    BUN 31 (H) 03/21/2025    CREATININE 1.11 (H) 03/21/2025       T/S pending    Patient Specialist/PCP:     Visit Vitals  /66   Temp 36.4 °C (97.6 °F)   Resp 16   Ht 1.6 m (5' 2.99\")   Wt 61 kg (134 lb 7.7 oz)   SpO2 97%   BMI 23.83 kg/m²   OB Status Postmenopausal   Smoking Status Never   BSA 1.65 m²     ASSESSMENT/PLAN    Patient is a 69 year-old  scheduled for Cystoscopy; Right Ureteroscopy; Holmium Laser Lithotripsy; Ureteral Stent Insertion  with Dr. Watson   on  4/17/25 .  CARDIOVASCULAR:  RCRI score / Risk: The patients score is 0 based on history . Per ACC/AHA guidelines this places her  at  3.9% risk for MACE undergoing a low  risk procedure . The patient has the following risk factors: denies   Functional Capacity: The patients exercise tolerance is  3   METS. This is based on the patient's difficulty walking due to peripherial neuropathy . Patient denies  active cardiac symptoms or anginal equivalents .      PULMONARY:  The patient has the following factors that place them at increased risk of perioperative pulmonary complications;age greater than 65/BMI greater than 27/reater than 2.5 hour procedure.  Postoperatively the patient would benefit from early pulmonary toilet/incentive spirometry q 1-2 hours while awake/pulse oximetry/cautious use of respiratory depressant medications such as opioids/elevate the HOB/oral hygiene.  CKD:  The patient has had chronic kidney disease  due to obstruction is currently at stage3 a ,with creatinine levels that have been stable .  Recommendations: Avoid intraoperative hypotension. Avoid nephrotoxic drugs and radicontrast dyes.  Recheck BMP periodically in the post op period.    ANEMIA :  The patient has a history of anemia.  The patient’s " anemia appears stable when compared to prior labs.  Type and screen pending     DVT:  CAPRINI SCORE=7  The patient has the following factors that increase her  Risk for thrombus formation ; Virchow's triad , age>65, hx malignancy , Surgical procedure >2 hrs  procedure .    Recommendations: DVT prophylaxis  per Dr. Watson protocol . SCD's, HUY's, and early ambulation are recommended. Heparin or LMWH is recommended for the very high risk .      Risk assessment complete.  Patient is scheduled for  low  surgical risk procedure.  Patient is considered an acceptable risk to proceed with the planned procedure.      Preoperative medication instructions were provided and reviewed with the patient.  Any additional testing or evaluation was explained to the patient.  Nothing by mouth instructions were discussed and patient's questions were answered prior to conclusion to this encounter.  Patient verbalized understanding of preoperative instructions given in preadmission testing; discharge instructions available in EMR.

## 2025-04-04 LAB
BACTERIA UR CULT: NORMAL
HOLD SPECIMEN: NORMAL

## 2025-04-07 ENCOUNTER — TELEPHONE (OUTPATIENT)
Dept: PREADMISSION TESTING | Facility: HOSPITAL | Age: 70
End: 2025-04-07

## 2025-04-08 ENCOUNTER — SOCIAL WORK (OUTPATIENT)
Dept: HEMATOLOGY/ONCOLOGY | Facility: CLINIC | Age: 70
End: 2025-04-08
Payer: MEDICARE

## 2025-04-08 NOTE — PROGRESS NOTES
CALEB reached out to patient today to update her that her [facility] assistance application was approved.  Patient was approved from 7/15/2024 through 10/13/2024; 10/28/2024 through 1/26/2025 and 1/27/2025 through 10/24/2025.  All debt owed is showing as cleared.  Informed patient that she should receive a letter in the mail regarding her approval and she could reach out to the billing office if she would like to verify if anything remains as owed.  Patient expressed appreciation.  Social work will continue to follow.

## 2025-04-11 ENCOUNTER — INFUSION (OUTPATIENT)
Dept: HEMATOLOGY/ONCOLOGY | Facility: CLINIC | Age: 70
End: 2025-04-11
Payer: MEDICARE

## 2025-04-11 ENCOUNTER — APPOINTMENT (OUTPATIENT)
Dept: HEMATOLOGY/ONCOLOGY | Facility: CLINIC | Age: 70
End: 2025-04-11
Payer: MEDICARE

## 2025-04-11 VITALS
SYSTOLIC BLOOD PRESSURE: 108 MMHG | TEMPERATURE: 97 F | DIASTOLIC BLOOD PRESSURE: 66 MMHG | HEART RATE: 76 BPM | RESPIRATION RATE: 16 BRPM

## 2025-04-11 DIAGNOSIS — C56.9 MALIGNANT NEOPLASM OF OVARY, UNSPECIFIED LATERALITY (MULTI): ICD-10-CM

## 2025-04-11 PROCEDURE — 96523 IRRIG DRUG DELIVERY DEVICE: CPT

## 2025-04-11 PROCEDURE — 2500000004 HC RX 250 GENERAL PHARMACY W/ HCPCS (ALT 636 FOR OP/ED): Performed by: NURSE PRACTITIONER

## 2025-04-11 RX ORDER — HEPARIN 100 UNIT/ML
500 SYRINGE INTRAVENOUS AS NEEDED
Status: DISCONTINUED | OUTPATIENT
Start: 2025-04-11 | End: 2025-04-11 | Stop reason: HOSPADM

## 2025-04-11 RX ORDER — HEPARIN SODIUM,PORCINE/PF 10 UNIT/ML
50 SYRINGE (ML) INTRAVENOUS AS NEEDED
OUTPATIENT
Start: 2025-04-11

## 2025-04-11 RX ORDER — HEPARIN 100 UNIT/ML
500 SYRINGE INTRAVENOUS AS NEEDED
OUTPATIENT
Start: 2025-04-11

## 2025-04-11 RX ORDER — HEPARIN SODIUM,PORCINE/PF 10 UNIT/ML
50 SYRINGE (ML) INTRAVENOUS AS NEEDED
Status: DISCONTINUED | OUTPATIENT
Start: 2025-04-11 | End: 2025-04-11 | Stop reason: HOSPADM

## 2025-04-11 RX ADMIN — HEPARIN 500 UNITS: 100 SYRINGE at 13:08

## 2025-04-11 ASSESSMENT — PAIN SCALES - GENERAL: PAINLEVEL_OUTOF10: 0-NO PAIN

## 2025-04-15 ENCOUNTER — APPOINTMENT (OUTPATIENT)
Dept: NEUROLOGY | Facility: CLINIC | Age: 70
End: 2025-04-15
Payer: MEDICARE

## 2025-04-17 ENCOUNTER — APPOINTMENT (OUTPATIENT)
Dept: RADIOLOGY | Facility: HOSPITAL | Age: 70
End: 2025-04-17
Payer: MEDICARE

## 2025-04-17 ENCOUNTER — ANESTHESIA (OUTPATIENT)
Dept: OPERATING ROOM | Facility: HOSPITAL | Age: 70
End: 2025-04-17
Payer: MEDICARE

## 2025-04-17 ENCOUNTER — ANESTHESIA EVENT (OUTPATIENT)
Dept: OPERATING ROOM | Facility: HOSPITAL | Age: 70
End: 2025-04-17
Payer: MEDICARE

## 2025-04-17 ENCOUNTER — HOSPITAL ENCOUNTER (OUTPATIENT)
Facility: HOSPITAL | Age: 70
Setting detail: OUTPATIENT SURGERY
Discharge: HOME | End: 2025-04-17
Attending: UROLOGY | Admitting: UROLOGY
Payer: MEDICARE

## 2025-04-17 VITALS
DIASTOLIC BLOOD PRESSURE: 72 MMHG | RESPIRATION RATE: 17 BRPM | TEMPERATURE: 97.7 F | OXYGEN SATURATION: 100 % | HEART RATE: 56 BPM | SYSTOLIC BLOOD PRESSURE: 134 MMHG

## 2025-04-17 DIAGNOSIS — N20.0 NEPHROLITHIASIS: Primary | ICD-10-CM

## 2025-04-17 DIAGNOSIS — N13.30 HYDRONEPHROSIS, UNSPECIFIED HYDRONEPHROSIS TYPE: ICD-10-CM

## 2025-04-17 DIAGNOSIS — G89.18 ACUTE POST-OPERATIVE PAIN: ICD-10-CM

## 2025-04-17 PROBLEM — C72.9 NEUROLOGICAL CANCER (MULTI): Status: ACTIVE | Noted: 2025-04-17

## 2025-04-17 PROBLEM — H54.7 VISION LOSS: Status: ACTIVE | Noted: 2025-04-17

## 2025-04-17 PROBLEM — K21.9 GASTROESOPHAGEAL REFLUX DISEASE: Status: ACTIVE | Noted: 2025-04-17

## 2025-04-17 PROBLEM — K58.9 IRRITABLE BOWEL SYNDROME: Status: ACTIVE | Noted: 2025-04-17

## 2025-04-17 PROBLEM — D64.9 ANEMIA: Status: ACTIVE | Noted: 2025-04-17

## 2025-04-17 PROCEDURE — 2720000007 HC OR 272 NO HCPCS: Performed by: UROLOGY

## 2025-04-17 PROCEDURE — 2500000004 HC RX 250 GENERAL PHARMACY W/ HCPCS (ALT 636 FOR OP/ED): Performed by: UROLOGY

## 2025-04-17 PROCEDURE — C1758 CATHETER, URETERAL: HCPCS | Performed by: UROLOGY

## 2025-04-17 PROCEDURE — 74420 UROGRAPHY RTRGR +-KUB: CPT | Performed by: UROLOGY

## 2025-04-17 PROCEDURE — 76000 FLUOROSCOPY <1 HR PHYS/QHP: CPT | Mod: RT

## 2025-04-17 PROCEDURE — 3700000001 HC GENERAL ANESTHESIA TIME - INITIAL BASE CHARGE: Performed by: UROLOGY

## 2025-04-17 PROCEDURE — 3600000009 HC OR TIME - EACH INCREMENTAL 1 MINUTE - PROCEDURE LEVEL FOUR: Performed by: UROLOGY

## 2025-04-17 PROCEDURE — 2550000001 HC RX 255 CONTRASTS: Mod: JZ | Performed by: UROLOGY

## 2025-04-17 PROCEDURE — 2500000005 HC RX 250 GENERAL PHARMACY W/O HCPCS: Performed by: UROLOGY

## 2025-04-17 PROCEDURE — 52351 CYSTOURETERO & OR PYELOSCOPE: CPT | Performed by: UROLOGY

## 2025-04-17 PROCEDURE — 7100000002 HC RECOVERY ROOM TIME - EACH INCREMENTAL 1 MINUTE: Performed by: UROLOGY

## 2025-04-17 PROCEDURE — 3600000004 HC OR TIME - INITIAL BASE CHARGE - PROCEDURE LEVEL FOUR: Performed by: UROLOGY

## 2025-04-17 PROCEDURE — 7100000010 HC PHASE TWO TIME - EACH INCREMENTAL 1 MINUTE: Performed by: UROLOGY

## 2025-04-17 PROCEDURE — 2500000004 HC RX 250 GENERAL PHARMACY W/ HCPCS (ALT 636 FOR OP/ED): Performed by: NURSE ANESTHETIST, CERTIFIED REGISTERED

## 2025-04-17 PROCEDURE — 7100000009 HC PHASE TWO TIME - INITIAL BASE CHARGE: Performed by: UROLOGY

## 2025-04-17 PROCEDURE — 7100000001 HC RECOVERY ROOM TIME - INITIAL BASE CHARGE: Performed by: UROLOGY

## 2025-04-17 PROCEDURE — 3700000002 HC GENERAL ANESTHESIA TIME - EACH INCREMENTAL 1 MINUTE: Performed by: UROLOGY

## 2025-04-17 PROCEDURE — 2500000001 HC RX 250 WO HCPCS SELF ADMINISTERED DRUGS (ALT 637 FOR MEDICARE OP): Performed by: UROLOGY

## 2025-04-17 PROCEDURE — C1769 GUIDE WIRE: HCPCS | Performed by: UROLOGY

## 2025-04-17 PROCEDURE — 2500000005 HC RX 250 GENERAL PHARMACY W/O HCPCS: Performed by: ANESTHESIOLOGY

## 2025-04-17 PROCEDURE — 2500000004 HC RX 250 GENERAL PHARMACY W/ HCPCS (ALT 636 FOR OP/ED): Mod: JZ | Performed by: STUDENT IN AN ORGANIZED HEALTH CARE EDUCATION/TRAINING PROGRAM

## 2025-04-17 RX ORDER — CELECOXIB 200 MG/1
400 CAPSULE ORAL ONCE
Status: COMPLETED | OUTPATIENT
Start: 2025-04-17 | End: 2025-04-17

## 2025-04-17 RX ORDER — ONDANSETRON HYDROCHLORIDE 2 MG/ML
INJECTION, SOLUTION INTRAVENOUS AS NEEDED
Status: DISCONTINUED | OUTPATIENT
Start: 2025-04-17 | End: 2025-04-17

## 2025-04-17 RX ORDER — FENTANYL CITRATE 50 UG/ML
INJECTION, SOLUTION INTRAMUSCULAR; INTRAVENOUS CONTINUOUS PRN
Status: DISCONTINUED | OUTPATIENT
Start: 2025-04-17 | End: 2025-04-17

## 2025-04-17 RX ORDER — PROPOFOL 10 MG/ML
INJECTION, EMULSION INTRAVENOUS AS NEEDED
Status: DISCONTINUED | OUTPATIENT
Start: 2025-04-17 | End: 2025-04-17

## 2025-04-17 RX ORDER — PHENYLEPHRINE HCL IN 0.9% NACL 1 MG/10 ML
SYRINGE (ML) INTRAVENOUS AS NEEDED
Status: DISCONTINUED | OUTPATIENT
Start: 2025-04-17 | End: 2025-04-17

## 2025-04-17 RX ORDER — PHENAZOPYRIDINE HYDROCHLORIDE 200 MG/1
200 TABLET, FILM COATED ORAL 3 TIMES DAILY PRN
Qty: 10 TABLET | Refills: 0 | Status: SHIPPED | OUTPATIENT
Start: 2025-04-17

## 2025-04-17 RX ORDER — LIDOCAINE HYDROCHLORIDE 10 MG/ML
0.1 INJECTION, SOLUTION EPIDURAL; INFILTRATION; INTRACAUDAL; PERINEURAL ONCE
Status: DISCONTINUED | OUTPATIENT
Start: 2025-04-17 | End: 2025-04-17 | Stop reason: HOSPADM

## 2025-04-17 RX ORDER — MEPERIDINE HYDROCHLORIDE 25 MG/ML
12.5 INJECTION INTRAMUSCULAR; INTRAVENOUS; SUBCUTANEOUS EVERY 10 MIN PRN
Status: DISCONTINUED | OUTPATIENT
Start: 2025-04-17 | End: 2025-04-17 | Stop reason: HOSPADM

## 2025-04-17 RX ORDER — SODIUM CHLORIDE 0.9 G/100ML
INJECTION, SOLUTION IRRIGATION AS NEEDED
Status: DISCONTINUED | OUTPATIENT
Start: 2025-04-17 | End: 2025-04-17 | Stop reason: HOSPADM

## 2025-04-17 RX ORDER — CEPHALEXIN 500 MG/1
500 CAPSULE ORAL 2 TIMES DAILY
Qty: 2 CAPSULE | Refills: 0 | Status: SHIPPED | OUTPATIENT
Start: 2025-04-17 | End: 2025-04-18

## 2025-04-17 RX ORDER — OXYCODONE HYDROCHLORIDE 5 MG/1
5 TABLET ORAL EVERY 4 HOURS PRN
Status: DISCONTINUED | OUTPATIENT
Start: 2025-04-17 | End: 2025-04-17 | Stop reason: HOSPADM

## 2025-04-17 RX ORDER — ONDANSETRON HYDROCHLORIDE 2 MG/ML
4 INJECTION, SOLUTION INTRAVENOUS ONCE AS NEEDED
Status: DISCONTINUED | OUTPATIENT
Start: 2025-04-17 | End: 2025-04-17 | Stop reason: HOSPADM

## 2025-04-17 RX ORDER — GABAPENTIN 300 MG/1
600 CAPSULE ORAL ONCE
Status: DISCONTINUED | OUTPATIENT
Start: 2025-04-17 | End: 2025-04-17 | Stop reason: HOSPADM

## 2025-04-17 RX ORDER — LIDOCAINE HYDROCHLORIDE 20 MG/ML
INJECTION, SOLUTION INFILTRATION; PERINEURAL AS NEEDED
Status: DISCONTINUED | OUTPATIENT
Start: 2025-04-17 | End: 2025-04-17

## 2025-04-17 RX ORDER — MIDAZOLAM HYDROCHLORIDE 1 MG/ML
INJECTION INTRAMUSCULAR; INTRAVENOUS AS NEEDED
Status: DISCONTINUED | OUTPATIENT
Start: 2025-04-17 | End: 2025-04-17

## 2025-04-17 RX ORDER — ACETAMINOPHEN 325 MG/1
975 TABLET ORAL ONCE
Status: COMPLETED | OUTPATIENT
Start: 2025-04-17 | End: 2025-04-17

## 2025-04-17 RX ORDER — KETOROLAC TROMETHAMINE 10 MG/1
10 TABLET, FILM COATED ORAL EVERY 6 HOURS PRN
Qty: 20 TABLET | Refills: 0 | Status: SHIPPED | OUTPATIENT
Start: 2025-04-17 | End: 2025-04-22

## 2025-04-17 RX ORDER — METOCLOPRAMIDE HYDROCHLORIDE 5 MG/ML
10 INJECTION INTRAMUSCULAR; INTRAVENOUS ONCE AS NEEDED
Status: DISCONTINUED | OUTPATIENT
Start: 2025-04-17 | End: 2025-04-17 | Stop reason: HOSPADM

## 2025-04-17 RX ADMIN — SODIUM CHLORIDE, SODIUM LACTATE, POTASSIUM CHLORIDE, AND CALCIUM CHLORIDE: .6; .31; .03; .02 INJECTION, SOLUTION INTRAVENOUS at 14:45

## 2025-04-17 RX ADMIN — PROPOFOL 200 MG: 10 INJECTION, EMULSION INTRAVENOUS at 14:49

## 2025-04-17 RX ADMIN — AMPICILLIN SODIUM 2 G: 2 INJECTION, POWDER, FOR SOLUTION INTRAMUSCULAR; INTRAVENOUS at 13:38

## 2025-04-17 RX ADMIN — ONDANSETRON 4 MG: 2 INJECTION, SOLUTION INTRAMUSCULAR; INTRAVENOUS at 15:12

## 2025-04-17 RX ADMIN — LIDOCAINE HYDROCHLORIDE 80 MG: 20 INJECTION, SOLUTION INFILTRATION; PERINEURAL at 14:48

## 2025-04-17 RX ADMIN — Medication 100 MCG: at 14:59

## 2025-04-17 RX ADMIN — Medication 6 L/MIN: at 15:32

## 2025-04-17 RX ADMIN — CELECOXIB 400 MG: 200 CAPSULE ORAL at 12:58

## 2025-04-17 RX ADMIN — MIDAZOLAM HYDROCHLORIDE 2 MG: 1 INJECTION, SOLUTION INTRAMUSCULAR; INTRAVENOUS at 14:47

## 2025-04-17 RX ADMIN — PIPERACILLIN SODIUM AND TAZOBACTAM SODIUM 3.38 G: 3; .375 INJECTION, SOLUTION INTRAVENOUS at 13:04

## 2025-04-17 RX ADMIN — ACETAMINOPHEN 975 MG: 325 TABLET, FILM COATED ORAL at 12:58

## 2025-04-17 RX ADMIN — DEXAMETHASONE SODIUM PHOSPHATE 4 MG: 4 INJECTION, SOLUTION INTRAMUSCULAR; INTRAVENOUS at 15:12

## 2025-04-17 ASSESSMENT — PAIN SCALES - GENERAL
PAINLEVEL_OUTOF10: 0 - NO PAIN
PAINLEVEL_OUTOF10: 3
PAINLEVEL_OUTOF10: 0 - NO PAIN

## 2025-04-17 ASSESSMENT — PAIN DESCRIPTION - LOCATION: LOCATION: OTHER (COMMENT)

## 2025-04-17 ASSESSMENT — PAIN - FUNCTIONAL ASSESSMENT
PAIN_FUNCTIONAL_ASSESSMENT: 0-10
PAIN_FUNCTIONAL_ASSESSMENT: VAS (VISUAL ANALOG SCALE)

## 2025-04-17 ASSESSMENT — COLUMBIA-SUICIDE SEVERITY RATING SCALE - C-SSRS
6. HAVE YOU EVER DONE ANYTHING, STARTED TO DO ANYTHING, OR PREPARED TO DO ANYTHING TO END YOUR LIFE?: NO
2. HAVE YOU ACTUALLY HAD ANY THOUGHTS OF KILLING YOURSELF?: NO
1. IN THE PAST MONTH, HAVE YOU WISHED YOU WERE DEAD OR WISHED YOU COULD GO TO SLEEP AND NOT WAKE UP?: NO

## 2025-04-17 ASSESSMENT — PAIN DESCRIPTION - ORIENTATION: ORIENTATION: RIGHT

## 2025-04-17 NOTE — ANESTHESIA PREPROCEDURE EVALUATION
Patient: Saba Park    Procedure Information       Date/Time: 04/17/25 3159    Procedure: Cystoscopy; Right Ureteroscopy; Holmium Laser Lithotripsy; Ureteral Stent Insertion (Right: Ureter)    Location: U A OR 05 / Virtual [facility] A OR    Surgeons: Karen Reddy MD            Relevant Problems   Anesthesia (within normal limits)      Cardiac   (+) Cardiac conduction disorder   (+) Essential (primary) hypertension   (+) Hyperlipidemia   (+) Hyperlipidemia, unspecified   (+) Hypertension      Pulmonary (within normal limits)      Neuro  CNS lymphoma  Tremors   (+) Anxiety   (+) Chemotherapy-induced peripheral neuropathy (Multi)   (+) Claustrophobia   (+) Depression   (+) Depression, unspecified   (+) MDD (major depressive disorder)   (+) Major depressive disorder, single episode, severe (Multi)   (+) Mild episode of recurrent major depressive disorder (CMS-HCC)   (+) Sciatica   (+) Sciatica of right side      GI   (+) Gastroesophageal reflux disease   (+) Irritable bowel syndrome      /Renal   (+) Calculus of kidney   (+) Hydronephrosis   (+) Hydronephrosis with renal and ureteral calculus obstruction   (+) Hydronephrosis with urinary obstruction due to ureteral calculus   (+) Hydronephrosis, left   (+) Nephrolithiasis      Liver   (+) Cholecystitis      Endocrine  Thyroid nodule        Hematology   (+) Anemia   (+) Oth types of non-hodg lymph, extrnod and solid organ sites (Multi)      Musculoskeletal  R hemiparesis   (+) Chronic low back pain with sciatica   (+) Deafferentation pain (Chronic analgesic use)   (+) Osteoarthritis of right hip      HEENT   (+) Vision loss      ID   (+) Clostridioides difficile diarrhea   (+) Infection of prosthetic total hip joint   (+) Infection of right prosthetic hip joint   (+) Pyogenic arthritis of hip (Multi)   (+) Pyogenic arthritis, unspecified      GYN   (+) Malignant neoplasm of unspecified ovary (Multi)       Clinical information reviewed:                   NPO  Detail:  No data recorded     Physical Exam    Airway  Mallampati: II     Cardiovascular    Dental    Pulmonary    Abdominal            Anesthesia Plan    History of general anesthesia?: yes  History of complications of general anesthesia?: no    ASA 3     general     intravenous induction   Anesthetic plan and risks discussed with patient.

## 2025-04-17 NOTE — PROGRESS NOTES
SUPPORTIVE AND PALLIATIVE ONCOLOGY OUTPATIENT FOLLOW-UP      SERVICE DATE: 4/29/2025    Subjective   HISTORY OF PRESENT ILLNESS: Saba Park is a 69 y.o. female who presents with history of CNS lymphoma and ovarian cancer. Currently in active surveillance CNS lymphoma through Dr. Harris at [facility]. Patient with recurrence of Ovarian Ca noted in 2 retroperitoneal LN on CT 7/2024. Patient follows with Supportive Oncology/Palliative Care for chronic pain r/t disease history/treatment and further symptom management.       Pain Assessment:  Pain Score:  5  Location:  right hip/leg  Description:      Symptom Assessment:  Pain:a little - intermittent pain in the right hip and leg, more aggravated when she is active on it. Well managed with Methadone 10mg BID  Numbness or Tingling in hands/feet/other: a little - neuropathy with well managed with Methadone and Pregabalin 75mg BID  Sore Muscles/Spasms: none  Headache: none  Dizziness:none  Constipation: a little - has been more manageable recently using laxatives as needed  Diarrhea: none  Nausea: none  Vomiting: none  Lack of Appetite: none   Weight Loss: none  Taste changes: none  Dry Mouth: none  Pain in Mouth/Swallowing: none  Lack of Energy: a little - reports some general fatigue and weakness since kidney stone. Has been ordered PT as she has been more uneasy on her feet and reports her overall tremors have worsened. She has been off of Primidone and Propranolol due to chemotherapy treatment. She saw her neurologist and is awaiting further work-up including EEG. Notes she is going to try a device called [employer]Box KiQ. She wishes she'd be able to get back on her medications but her neurologist does not want her to at this time  Difficulty Sleeping: none  Worrying: none  Anxiety: none  Depression: none  Shortness of breath: none  Other: a little - over the past month has had some auditory hallucinations of band music. She has been keeping track of when this happens and jots  down the time it starts and when is stops. Her neurologist reached out to Dr. Harris at [facility] as patient has history of CNS lymphoma, and is awaiting a brain MRI      Information obtained from: chart review and interview of patient  ______________________________________________________________________        Objective     Narrative & Impression  Interpreted By:  Cecy Morales,   STUDY:  CT CHEST ABDOMEN PELVIS W IV CONTRAST;  2/14/2025 4:00 pm      INDICATION:  Signs/Symptoms:assess treatment response.      ,C56.9 Malignant neoplasm of unspecified ovary (Multi)      COMPARISON:  CT cap 11/14/2024      ACCESSION NUMBER(S):  [id number]      ORDERING CLINICIAN:  ELVIRA CARRASCO      TECHNIQUE:  CT of the chest, abdomen, and pelvis was performed.  Contiguous axial  images were obtained at 3 mm slice thickness through the chest,  abdomen and pelvis. Coronal and sagittal reconstructions at 3 mm  slice thickness were performed. 75 ML of Omnipaque 350 was  administered intravenously without immediate complication.      FINDINGS:  CHEST:      LUNG/PLEURA/LARGE AIRWAYS:      The trachea and central airways are patent without evidence of  endobronchial lesion.      There is no consolidation, pleural effusion, or pneumothorax.      There is no suspicious pulmonary nodule or lung mass.      VESSELS:  Aorta and pulmonary arteries are normal caliber. A right chest wall  MediPort is seen with catheter tip terminating in the right atrium.  Mild atherosclerotic changes are noted of the aorta and branching  vessels.  No coronary artery calcifications are present.      HEART:  The heart is normal in size.  There is no pericardial effusion.      MEDIASTINUM AND NGA:  Interval decrease in size of the previously mentioned cardiophrenic  lymph nodes. No new mediastinal, hilar or axillary lymphadenopathy is  present.  The esophagus is normal.      CHEST WALL AND LOWER NECK:  The soft tissues of the chest wall demonstrate no gross  abnormality.  The thyroid gland is diffusely enlarged.      ABDOMEN:      LIVER:  The liver is normal in size without evidence of focal liver lesions.      BILE DUCTS:  The intrahepatic and extrahepatic biliary ducts are mildly dilated  which can be seen in the setting of post cholecystectomy.      GALLBLADDER:  The gallbladder is surgically absent.      PANCREAS:  The pancreas appears unremarkable without evidence of ductal  dilatation or masses.      SPLEEN:  The spleen is normal in size without focal lesions.      ADRENAL GLANDS:  Bilateral adrenal glands appear normal.      KIDNEYS AND URETERS:  There has been interval transit of the previously noted renal calculi  now with 3 renal calculi terminating within the distal right ureter  measuring 8 mm, 7 mm, and 7 mm causing obstruction and upstream  ureteral dilatation with urothelial thickening. This extends into the  right renal collecting system with moderate to severe central and  peripheral caliectasis. Similar lobulated appearance of the right  kidney with delayed nephrogram appearance compared to the left  kidney. The left kidney enhances within normal limits. Bilateral  hypoattenuating lesions are seen throughout both kidneys, some of  which are too small to characterize, but are statistically favored to  represent simple cysts and are not significantly changed compared to  prior.      PELVIS:      BLADDER:  The urinary bladder is decompressed, limited for evaluation.      REPRODUCTIVE ORGANS:  No pelvic mass is seen.      BOWEL:  The stomach is unremarkable.  There are postsurgical changes in the  rectosigmoid colon and small bowel. The sutures are unremarkable. The  small and large bowel are normal in caliber and demonstrate no wall  thickening.          VESSELS:  There is no aneurysmal dilatation of the abdominal aorta. The IVC  appears normal.      PERITONEUM/RETROPERITONEUM/LYMPH NODES:  There is no free or loculated fluid collection, no  free  intraperitoneal air. The retroperitoneum appears normal.  No  abdominopelvic lymphadenopathy is present.      BONE AND SOFT TISSUE:  No suspicious osseous lesions are identified. Degenerative discogenic  disease is noted in the lower thoracic and lumbar spine. Postsurgical  changes of right total hip arthroplasty. Mild left hip osteoarthrosis  is seen. Multiple vertebral body hemangiomas are again seen. There  are postsurgical changes along the anterior abdominal midline with a  postsurgical seroma again seen measuring up to 1.1 cm.      IMPRESSION:  Ovarian cancer restaging scan as compared to prior CT:  1. Postsurgical changes status post hysterectomy without evidence to  suggest locoregional disease recurrence. No evidence of new  metastatic disease within the chest, abdomen, or pelvis.  2. Obstructive distal right ureteral calculi with moderate to severe  hydronephrosis and delayed right nephrogram consistent with  obstructive uropathy. Urological consultation is recommended.  3. Additional chronic and ancillary findings as described above.      MACRO:  Critical Finding:  See findings. Notification was initiated on  2/16/2025 at 4:08 pm by Dr. Cecy Morales.  (**-YCF-**)      Signed by: Cecy Morales 2/16/2025 4:09 PM  Dictation workstation:   [id number]    PHYSICAL EXAMINATION   Vital Signs:   Vital signs reviewed      4/11/2025    12:54 PM 4/17/2025     3:32 PM 4/17/2025     3:45 PM 4/17/2025     4:00 PM 4/17/2025     4:01 PM 4/22/2025     3:39 PM 4/29/2025    11:37 AM   Vitals   Systolic 108 134 110 134 134 104 121   Diastolic 66 68 81 72 72 69 79   BP Location  Right arm Right arm Right arm Right arm Left arm    Heart Rate 76 62 57 56 56 84 97   Temp 36.1 °C (97 °F) 36.6 °C (97.9 °F)  36.5 °C (97.7 °F) 36.5 °C (97.7 °F)  35.9 °C (96.6 °F)   Resp 16 16 13 15 17 16 16   Weight (lb)      135 136.24   BMI      23.92 kg/m2 24.14 kg/m2   BSA (m2)      1.65 m2 1.66 m2   Visit Report      Report Report        Physical Exam  Vitals reviewed.   Constitutional:       Appearance: Normal appearance.   HENT:      Head: Normocephalic.      Comments: alopecia  Cardiovascular:      Rate and Rhythm: Normal rate and regular rhythm.   Pulmonary:      Effort: Pulmonary effort is normal.   Abdominal:      General: Abdomen is flat.      Palpations: Abdomen is soft.   Musculoskeletal:         General: Normal range of motion.   Skin:     General: Skin is warm and dry.   Neurological:      General: No focal deficit present.      Mental Status: She is alert and oriented to person, place, and time. Mental status is at baseline.      Motor: Weakness present.      Coordination: Coordination abnormal.      Comments: tremors   Psychiatric:         Mood and Affect: Mood normal.         Behavior: Behavior normal.         Thought Content: Thought content normal.         Judgment: Judgment normal.         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 (7/25/2024) . Repeat with increase in Methadone dose or yearly  - Pursued Scrambler Therapy 7/8/24 to 7/18/24 (total of 8 sessions) - noted worsening radiating pain from hip, but slight improvement in numbness/tingling of her foot. Did not complete further sessions due to extreme hip/leg pain  - Continue Pregabalin 75mg BID  - Continue Acetaminophen 500mg [id number] PRN  - Continue Oxycodone 5mg [id number] PRN for breakthrough pain - rarely uses  - Continue Duloxetine  60mg/daily  - Completed program through chiropractor w/o change in pain  - Referral to Symptom Clinic - Dr Silverman - pursued acupuncture with notable change in neuropathy/pain, has not continued  - Continue following with Chronic Pain (Dr. Alarcon) - no follow-up scheduled at this time   - Continue following with podiatry   Intolerances/previously tried:   - Alpha Liproic Acid - not effective  - Gabapentin - caused excessive lethargy     Opioid Use  Medication Management:   - OARRS report reviewed with no aberrant behavior; consistent with  prescriptions/records and patient history  - MED 90.  Overdose Risk Score 100.   This has been discussed with patient.   - We will continue to closely monitor the patient for signs of prescription misuse including UDS, OARRS review and subjective reports at each visit.  - No concurrent benzodiazepine use   - I am a provider who either is or has consulted and collaborated with a provider certified in Hospice and Palliative Medicine and have conducted a face-face visit and examination for this patient.  - Routine Urine Drug Screen: 8/23/2024 appropriately positive for opioids and negative for illicit substances.  - Controlled Substance Agreement: 8/23/2024  - Specifically discussed that controlled substance prescriptions will only be provided by our group as outlined in the completed agreement  - Prescribed naloxone: patient declined  - Red Flags: NA     Constipation  - Continue Senna-S 1-2 tabs BID PRN  - Continue Miralax 17gram 1-2x daily PRN  - Discussed starting Loperamide 2mg PRN diarrhea - MAX 8caps/24 hours     Nausea/Loss of Appetite  - Continue Omeprazole 20mg daily  - Continue Ondansetron 8mg [id number] PRN  - Continue Metoclopramide 10mg j0prybf PRN  - Encouraged good hydration   - Encouraged small more frequent meals  - Encouraged increase in caloric intake and protein supplements  - Referral to dietician (10/3/2024)  - Discussed use of lemon heads to assist with taste  -  Discussed medications to help stimulate appetite - patient declined today     Altered Mood  Chronic anxiety and depression related to health concerns   Home regimen:   - Continue Duloxetine 60mg once daily at bed  - Continue Buspirone 5mg TID  - Continue Buproprion SR 150mg BID  - Following with counselor weekly to every 2 weeks  - Established with Dr. Ugalde - Onco Psychiatry     Tremors/Balance/Coordination  - Continue following with neurologist  - Awaiting insurance approval for Jadyn KiQ device  - Propranolol and Primidone - on hold at this time  - Referral to PT and OT by Dr. Kenny    Next Follow-Up Visit:  Return to clinic in 2-3 months with Ashly Patel CNP    Signature and billing  Medical complexity was moderate level due to due to complexity of problems, extensive data review, and high risk of management/treatment.  Time was spent on the following: Prep Time, Time Directly with Patient/Family/Caregiver, Documentation Time. Total time spent: 35      Data  Diagnostic tests and information reviewed for today's visit:  Most recent labs and imaging results, Medications     Some elements copied from Palliative Care note on 2/7/2025, the elements have been updated and all reflect current decision making from today, 4/29/2025.    Plan of Care discussed with: Patient    SIGNATURE: DEMETRIUS Orourke-CNP    Contact information:  Supportive and Palliative Oncology  Monday-Friday 8 AM-5 PM  Phone:  [phone], press option #5, then option #1.   Or Epic Secure Chat

## 2025-04-17 NOTE — DISCHARGE INSTRUCTIONS
DEPARTMENT OF Urology  DISCHARGE INSTRUCTIONS Ureteroscopy   Outpatient Surgery    C O N F I D E N T I A L   I N F O R M A T I O N    Saba Park    Call [phone] during regular daytime business hours (8:00 am - 5:00 pm) and after 5:00 pm ask for the Urology resident with any questions or concerns.    If it is a life-threatening situation, proceed to the nearest emergency department.        Follow-up appointment:  2 months with an ultrasound and x ray before       Thank you for the opportunity to care for you today.  Your health and healing are very important to us.  We hope we made you feel as comfortable as possible and are committed to your recovery and continued well-being.      The following is a brief overview of your Ureteroscopy Laser Lithotripsy procedure today. Some of the information contained on this summary may be confidential.  This information should be kept in your records and should be shared with your regular doctor.    Physicians:   Dr. Karen Reddy      Procedure performed: look in the kidney and bladder   No kidney stones were seen. Your stent was removed     What to Expect During your Recovery and Home Care  Anesthesia Side Effects   You received  anesthesia today.  You may feel sleepy, tired, or have a sore throat.   You may also feel drowsiness, dizziness, or inability to think clearly.  For your safety, do not drive, drink alcoholic beverages, take any unprescribed medication or make any important decisions for 24 hours.  A responsible adult should be with you for 24 hours.        Activity and Recovery    No heavy lifting today. Rest for the next 24 hours.    Pain Control  Unfortunately, you may experience pain after your procedure.  Frequency and urgency to urinate and mild discomfort are expected. Adequate pain management can include alternative measures to help ease your pain and that can include over the counter Tylenol and a heating pad or toradol which can be taken as  prescribed as needed for breakthrough pain. Do not take more than 4,000mg of Tylenol in a 24-hour period.      Do not take toradol and ibuprofen within 24 hours of each other as they are similar medications.     You may have also been prescribed Pyridium (for burning sensation) and Ditropan(for bladder spasms) for pain control. Pyridium will turn your urine bright orange.    Nausea/Vomiting   Clear liquids are best tolerated at first. Start slow, advance your diet as tolerated to normal foods. Avoid spicy, greasy, heavy foods at first. Also, you may feel nauseous or like you need to vomit if you take any type of medication on an empty stomach.  Call your physician if you are unable to eat or drink and have persistent vomiting.    Signs of Bleeding   You could have some blood in your urine off and on over the next several weeks. Your urine will be light pink to yellow.    Treatment/wound care:   It is okay to shower 24 hours after time of surgery.    Signs of Infection  Signs of infection can include fever, chills, burning sensation with passing of urine, or severe abdominal pain.  If you see any of these occur, please contact your doctor's office at [phone].  Any fever higher than 100.4, especially if associated with an ill feeling, abdominal pain, chills, or nausea should be reported to your surgeon.      Assist in bowel movements/urination  Increase fiber in diet  Increase water (6 to 8 glasses)  Increase walking   Urination should occur within 6 hours of anesthesia  If you have tried these methods and your bladder still feels full and you cannot use the bathroom, please go to your nearest Emergency room/contact your physician.    Additional Instructions:    It is important to drink plenty of water and take medications as prescribed. You may be sent home with Pyridium which turns your urine bright orange. Applying a heating pad to your back will also help with this kind of pain.

## 2025-04-17 NOTE — OP NOTE
Cystoscopy; Right Ureteroscopy; retrograde pyelogram ; Ureteral Stent removal (R) Operative Note     Date: 2025  OR Location: [facility] A OR    Name: Saba Park, : 1955, Age: 69 y.o., MRN: [id number], Sex: female    Diagnosis  Pre-op Diagnosis      * Nephrolithiasis [N20.0]     * Hydronephrosis, unspecified hydronephrosis type [N13.30] Post-op Diagnosis     * Nephrolithiasis [N20.0]     * Hydronephrosis, unspecified hydronephrosis type [N13.30]     Procedures  Cystoscopy; Right Ureteroscopy; retrograde pyelogram ; Ureteral Stent removal  55953 - GA CYSTO/URETERO W/LITHOTRIPSY &INDWELL STENT INSRT    CHG UROGRAPHY RETROGRADE WITH/WO KUB [91129]  GA CYSTOURETHROSCOPY W/URETERAL CATHETERIZATION [65816]  Surgeons      * Karen Reddy - Primary    Resident/Fellow/Other Assistant:  Surgeons and Role:     * Guy Aguero MD - Resident - Assisting    Staff:   Circulator: Noy Chaudhari Person: Yasmeen Connor Circulator: Essence    Anesthesia Staff: Anesthesiologist: Chris Pagan MD  CRNA: DEMETRIUS Alamo-CRNA    Procedure Summary  Anesthesia: General  ASA: III  Estimated Blood Loss: 1L  Intra-op Medications:   Administrations occurring from 1330 to 1445 on 25:   Medication Name Total Dose   ampicillin (Omnipen) 2 g in sodium chloride 0.9%  mL 100 mL   piperacillin-tazobactam (Zosyn) 3.375 g in dextrose (iso) IV 50 mL Cannot be calculated              Anesthesia Record               Intraprocedure I/O Totals       None           Specimen: No specimens collected              Drains and/or Catheters: * None in log *    Tourniquet Times:         Implants:     Findings: no ureteral stones seen. Patent ureter     Indications: Saba Park is an 69 y.o. female who is having surgery for Nephrolithiasis [N20.0]  Hydronephrosis, unspecified hydronephrosis type [N13.30].     The patient was seen in the preoperative area. The risks, benefits, complications, treatment options,  non-operative alternatives, expected recovery and outcomes were discussed with the patient. The possibilities of reaction to medication, pulmonary aspiration, injury to surrounding structures, bleeding, recurrent infection, the need for additional procedures, failure to diagnose a condition, and creating a complication requiring transfusion or operation were discussed with the patient. The patient concurred with the proposed plan, giving informed consent.  The site of surgery was properly noted/marked if necessary per policy. The patient has been actively warmed in preoperative area. Preoperative antibiotics have been ordered and given within 1 hours of incision. Venous thrombosis prophylaxis have been ordered including bilateral sequential compression devices    Procedure Details:       Patient consented to procedure in preoperative area. Risks and benefits discussed. Patient was marked on the right side. Allergies were reviewed and preoperative antibiotics were administered. Patient was brought to the operating room and placed in supine position on the operating room table. A timeout was performed. All were in agreement. Patient underwent general anesthesia without complication. They were repositioned in dorsal lithotomy and prepped and draped in the usual sterile fashion. A 21 fr rigid cystoscope was used to perform cystourethroscopy. Urethra was normal.  UOs were in normal orthotopic position. No masses or stones were identified.  A wire was passed up to the kidney along side the right ureteral stent. The stent was removed. We inserted a dual lumen and injected contrast.     Retrograde pyelogram interpretation:  ureter with normal caliber and course.    A rigid ureteroscope was passed along side the wire. There were no stones seen in the distal ureter. A dual lumen was placed and a second wire was inserted. A flexible ureteroscope was passed up to the kidney. No stones were seen in the kidney. The scope was  slowly withdrawn. There was some mild ureteral edema but no stones. Since the ureter was patent we did not place a stent.    instruments removed. This concluded the procedure. Patient was awoken from anesthesia without complication and transferred to PACU in stable condition.     Complications:  None; patient tolerated the procedure well.    Disposition: PACU - hemodynamically stable.  Condition: stable                 Attending Attestation: Present/Scrubbed for entire procedure    Karen Reddy  Phone Number: [phone]

## 2025-04-17 NOTE — ANESTHESIA PROCEDURE NOTES
Airway  Date/Time: 4/17/2025 2:51 PM  Reason: elective    Airway not difficult    Staffing  Performed: CRNA   Authorized by: Chris Pagan MD    Performed by: DEMETRIUS Alamo-KAZ  Patient location during procedure: OR    Patient Condition  Indications for airway management: anesthesia  Sedation level: deep     Final Airway Details   Preoxygenated: yes  Final airway type: supraglottic airway  Successful airway:   Size: 4  Number of attempts at approach: 1  Number of other approaches attempted: 0

## 2025-04-18 ASSESSMENT — PAIN SCALES - GENERAL: PAINLEVEL_OUTOF10: 0 - NO PAIN

## 2025-04-18 NOTE — ANESTHESIA POSTPROCEDURE EVALUATION
Patient: Saba Park    Procedure Summary       Date: 04/17/25 Room / Location: [facility] A OR 05 / Virtual U A OR    Anesthesia Start: 1445 Anesthesia Stop: 1536    Procedure: Cystoscopy; Right Ureteroscopy; retrograde pyelogram ; Ureteral Stent removal (Right: Ureter) Diagnosis:       Nephrolithiasis      Hydronephrosis, unspecified hydronephrosis type      (Nephrolithiasis [N20.0])      (Hydronephrosis, unspecified hydronephrosis type [N13.30])    Surgeons: Karen Reddy MD Responsible Provider: Chris Pagan MD    Anesthesia Type: general ASA Status: 3            Anesthesia Type: general    Vitals Value Taken Time   /72 04/17/25 16:00   Temp 36.5 °C (97.7 °F) 04/17/25 16:00   Pulse 56 04/17/25 16:00   Resp 15 04/17/25 16:00   SpO2 100 % 04/17/25 16:00       Anesthesia Post Evaluation    Patient location during evaluation: bedside  Patient participation: complete - patient participated  Level of consciousness: awake  Pain management: adequate  Multimodal analgesia pain management approach  Airway patency: patent  Cardiovascular status: stable  Respiratory status: spontaneous ventilation and unassisted  Hydration status: acceptable  Postoperative Nausea and Vomiting: none  Comments: No significant PONV.        There were no known notable events for this encounter.

## 2025-04-22 ENCOUNTER — APPOINTMENT (OUTPATIENT)
Dept: NEUROLOGY | Facility: CLINIC | Age: 70
End: 2025-04-22
Payer: MEDICARE

## 2025-04-22 VITALS
RESPIRATION RATE: 16 BRPM | SYSTOLIC BLOOD PRESSURE: 104 MMHG | HEART RATE: 84 BPM | BODY MASS INDEX: 23.92 KG/M2 | WEIGHT: 135 LBS | DIASTOLIC BLOOD PRESSURE: 69 MMHG

## 2025-04-22 DIAGNOSIS — G25.0 ESSENTIAL TREMOR: Primary | ICD-10-CM

## 2025-04-22 DIAGNOSIS — R44.0 AUDITORY HALLUCINATIONS: ICD-10-CM

## 2025-04-22 DIAGNOSIS — R26.9 GAIT DISORDER: ICD-10-CM

## 2025-04-22 DIAGNOSIS — G20.C PARKINSONISM, UNSPECIFIED PARKINSONISM TYPE (MULTI): ICD-10-CM

## 2025-04-22 PROCEDURE — 3074F SYST BP LT 130 MM HG: CPT | Performed by: NURSE PRACTITIONER

## 2025-04-22 PROCEDURE — 3078F DIAST BP <80 MM HG: CPT | Performed by: NURSE PRACTITIONER

## 2025-04-22 PROCEDURE — 1159F MED LIST DOCD IN RCRD: CPT | Performed by: NURSE PRACTITIONER

## 2025-04-22 PROCEDURE — 1160F RVW MEDS BY RX/DR IN RCRD: CPT | Performed by: NURSE PRACTITIONER

## 2025-04-22 PROCEDURE — 99215 OFFICE O/P EST HI 40 MIN: CPT | Performed by: NURSE PRACTITIONER

## 2025-04-22 PROCEDURE — 1036F TOBACCO NON-USER: CPT | Performed by: NURSE PRACTITIONER

## 2025-04-22 PROCEDURE — 1157F ADVNC CARE PLAN IN RCRD: CPT | Performed by: NURSE PRACTITIONER

## 2025-04-22 ASSESSMENT — UNIFIED PARKINSONS DISEASE RATING SCALE (UPDRS)
FINGER_TAPPING_RIGHT: 2
KINETIC_TREMOR_RIGHTHAND: 2
TOETAPPING_RIGHT: 1
FREEZING_GAIT: 0
AMPLITUDE_RLE: 0
PRONATION_SUPINATION_LEFT: 1
RIGIDITY_NECK: 0
PARKINSONS_MEDS: NO
DYSKINESIAS_PRESENT: NO
LEG_AGILITY_LEFT: 1
POSTURAL_STABILITY: 3
RIGIDITY_LUE: 0
AMPLITUDE_LIP_JAW: 0
RIGIDITY_RUE: 0
AMPLITUDE_LLE: 1
LEG_AGILITY_RIGHT: 1
AMPLITUDE_LUE: 1
RIGIDITY_LLE: 0
HANDMOVEMENTS_RIGHT: 1
AMPLITUDE_RUE: 0
PRONATION_SUPINATION_RIGHT: 1
CONSTANCY_TREMOR_ATREST: 4
KINETIC_TREMOR_LEFTHAND: 2
POSTURAL_TREMOR_LEFTHAND: 2
GAIT: 2
TOTAL_SCORE: 34
TOETAPPING_LEFT: 1
POSTURE: 1
RIGIDITY_RLE: 0
POSTURAL_TREMOR_RIGHTHAND: 1
CHAIR_RISING_SCALE: 1
SPONTANEITY_OF_MOVEMENT: 2
FACIAL_EXPRESSION: 0
SPEECH: 1
LEVODOPA: NO
FINGER_TAPPING_LEFT: 1

## 2025-04-22 ASSESSMENT — ENCOUNTER SYMPTOMS
DEPRESSION: 0
LOSS OF SENSATION IN FEET: 0
OCCASIONAL FEELINGS OF UNSTEADINESS: 1

## 2025-04-22 NOTE — PROGRESS NOTES
Subjective     Saba Park is a 69 y.o. year old female who presents with Tremors, here for follow up visit.      HPI  Last visit 11/25/24 with me:  #Decrease propranolol to 40mg 2 times a day x 1 week. If dizziness is no better, decrease to 20mg 2 times a day x 1 week.   Touch base with me in 1 week via MyChart with blood pressure readings, let me know how dizziness is.    Will continue to slowly wean if not better.  #Monitor blood pressure at home starting tomorrow for 1 week   #Blood Pressure Monitoring: for dizziness and or low blood pressure  #Follow up virtual with me in 6 weeks      Surgery for kidney stones last 2 weeks ago, could not get them all.   Primidone stopped for low RBC and platelets.   Propranolol was lowered for  SBP sitting in the 80s and 90s when standing.  Still dizzy with standing.   Has had a fall in her dark closet, fell on her side and hit her glasses a few months ago, grab bars now in closet.   Denies other falls.     Finished chemo for ovarian cancer.     Tremors are much worse. Very bothersome. Trouble with everything, eating, drinking, writing.   Some days she can get up and OK but as soon as she does anything they worsen.    She feels neuropathy has not worsened, continues on lower dose of Lyrica (used to be 200mg BID)  Starting PT tomorrow.     BPS to review off of propranolol and improved trending mostly 1-teens-130s and HR mostly 70s (sent to scanning)    Ears she thought there was music or  running, like a big band music sound, even the sound. Always in the R ear. But she can clearly hear the music that is not there.   No visual hallucinations.   Dr. Kenny at  is oncologist  [facility] brain cancer MD she sees him yearly (had CNS lymphoma almost 10yrs ago).     Constipation comes and goes   RBD: speaks and moves in her sleep often  Denies anosmia        Other prescriber  Pregabalin 75mg 2 times a day    Previous meds:  Primidone 50mg, 2 tabs nightly - After chemo is  over she can go back to 175 mg nightly dose per Dr. Ng but then had low RBD and platelets so stopped  Propranolol 20mg, 2 tabs 2 times a day stopped for dizziness and hypotension  Topamax-cannot take d/y kidney stones  Gabapentin--already on Lyrica         Patient Health Questionnaire-2 Score: 0            Current Outpatient Medications:     acetaminophen (Tylenol) 325 mg tablet, Take 2 tablets (650 mg) by mouth every 6 hours if needed for mild pain (1 - 3)., Disp: 30 tablet, Rfl: 0    buPROPion SR (Wellbutrin SR) 200 mg 12 hr tablet, Take 1 tablet (200 mg) by mouth 2 times a day. Do not crush, chew, or split., Disp: 180 tablet, Rfl: 1    busPIRone (Buspar) 5 mg tablet, TAKE 1 TABLET BY MOUTH TWICE A DAY, Disp: 180 tablet, Rfl: 1    calcium carbonate (Oscal) 500 mg calcium (1,250 mg) tablet, Take 1 tablet (1,250 mg) by mouth 2 times daily (morning and late afternoon)., Disp: , Rfl:     cholecalciferol (Vitamin D-3) 50 mcg (2,000 unit) capsule, Take 1 capsule (50 mcg) by mouth once daily., Disp: , Rfl:     cyanocobalamin (Vitamin B-12) 250 mcg tablet, Take 1 tablet (250 mcg) by mouth once daily., Disp: , Rfl:     DULoxetine (Cymbalta) 60 mg DR capsule, Take 1 capsule (60 mg) by mouth once daily. Do not crush or chew., Disp: 90 capsule, Rfl: 1    ibuprofen 600 mg tablet, Take 1 tablet (600 mg) by mouth every 6 hours if needed for mild pain (1 - 3)., Disp: 30 tablet, Rfl: 0    ketorolac (Toradol) 10 mg tablet, Take 1 tablet (10 mg) by mouth every 6 hours if needed for moderate pain (4 - 6) for up to 5 days., Disp: 20 tablet, Rfl: 0    methadone (Dolophine) 10 mg tablet, Take 1 tablet (10 mg) by mouth every 12 hours. Do not fill before March 28, 2025., Disp: 60 tablet, Rfl: 0    oxyCODONE (Roxicodone) 5 mg immediate release tablet, Take 1 tablet (5 mg) by mouth every 6 hours if needed for severe pain (7 - 10)., Disp: 4 tablet, Rfl: 0    phenazopyridine (Pyridium) 200 mg tablet, Take 1 tablet (200 mg) by mouth 3  times a day as needed for bladder spasms., Disp: 10 tablet, Rfl: 0    pregabalin (Lyrica) 75 mg capsule, Take 1 capsule (75 mg) by mouth 2 times a day. Do not fill before January 15, 2025., Disp: 60 capsule, Rfl: 5    primidone (Mysoline) 50 mg tablet, Take 2 tablets (100 mg) by mouth once daily at bedtime., Disp: , Rfl:     propranolol (Inderal) 40 mg tablet, Take 1 tablet (40 mg) by mouth once daily., Disp: , Rfl:      Objective   Vitals:    04/22/25 1539   BP: 104/69   BP Location: Left arm   Patient Position: Sitting   BP Cuff Size: Adult   Pulse: 84   Resp: 16   Weight: 61.2 kg (135 lb)                     Physical Exam  Spirals scanned to media    General:  Well developed well-nourished female in no acute distress with good grooming.  Mental Status:  The patient is awake, alert and oriented to time, place and person.  Grossly normal fund of knowledge noted.  Recent and remote memory intact.  Attention span and concentration is within normal limits.   Language:  Speech is clear, language is intact.   Cranial Nerves:  Pupils are equal, round, reactive to light and accommodation bilaterally.  Extraocular muscles are intact.  Cranial nerve 5 and 7 are symmetric bilaterally.  Hearing is intact to voice. Palate elevates symmetrically.  Tongue is midline.  Shoulder shrug is intact.   Motor Exam:  Shows symmetric strength in the upper and lower extremities bilaterally both proximally and distally with normal tone and bulk. Muscle strength 5/5 throughout.   Deep Tendon Reflexes:  Are intact throughout BUE-2+ and BLE 2+  Cerebellar Exam:  Shows no dysmetria or truncal ataxia.  Gait and Station: easily off balance, narrow base, good arm swing, short steps    MDS UPDRS 1st Score: Motor Examination  Is the patient on medication for treating the symptoms of Parkinson's Disease?: No  Is the patient on Levodopa?: No  Speech: 1  Facial Expression: 0  Rigidty Neck: 0  Rigidty RUE: 0 (paratonia makes assessment difficult but  does not feel obviously rigid)  Rigidity - LUE: 0  Rigidity RLE: 0  Rigidity LLE: 0  Finger Tapping Right Hand: 2  Finger Tapping Left Hand: 1  Hand Movements- Right Hand: 1  Hand Movements- Left Hand: 1  Pronatiaon-Supination Movments - Right Hand: 1  Pronatiaon-Supination Movments Left Hand: 1  Toe Tapping Right Foot: 1  Toe Tapping - Left Foot: 1  Leg Agility - Right Le  Leg Agility - Left le  Arising from Chair: 1  Gait: 2  Freezing of Gait: 0  Postural Stability: 3 (inferred)  Posture: 1  Global Spontanteity of Movment ( Body Bradykinesia): 2  Postural Tremor - Right Hand: 1  Postural Tremor - Left hand: 2  Kinetic Tremor - Right hand: 2  Kinetic Tremor - Left hand: 2  Rest Tremor Amplitude - RUE: 0  Rest Tremor Amplitude - LUE: 1  Rest Tremor Amplitude - RLE: 0  Rest Tremor Amplitude - LLE: 1  Rest Tremor Amplitude - Lip/Jaw: 0  Constancy of Rest Tremor: 4  MDS UPDRS Total Score: 34  Were dyskinesias (chorea or dystonia) present during examination?: No        Assessment/Plan   Ms. Saba Park is a 69 y.o. with history of CNS lymphoma and neuropathy and ovarian cancer for which she has completed chemotherapy. Primidone lowered previously per oncologist request and then stopped d/t low RBC and platelets which may be d/t chemo, platelets improving.  Propranolol stopped for OH sx and falls as well as hypotension. Tremor has worsened, is mainly L sided which is her dominant hand.   Gait disorder: Pending PT/ Neuropathy, deconditioning, meds/chemo are likely multifactorial causes.    Auditory hallucinations: new, hearing marching band music.  Concern for brain tumor and I am having her f/u with oncologist monitoring brain imaging  Parkinsonism: at this point [place] PD ---she has a hx of this d/t Abilify, now bradykinesia slightly worse when compared to exam in . Her gait is off balance, but not wide based, narrow (for someone with neuropathy) but arm swing good. Has RBD, constipation, denies anomia.    Never had Teofilo scan, she is on Wellbutrin  ((needs held for 8 days))    Update emailed to Dr. Ng re: above to see if further recs    PLAN  ET: stay off of propranolol d/t SE and primidone can consider restarting if CBC improved with close monitoring  Jadyn kIQ ordered today-paperwork completed, wrist measured, patient signed/consented   Parkinsonism: mildly worse  Consider monitoring vs levodopa trial vs Teofilo scan---would need to hold Wellburtin 8 days  Auditory hallucinations  Check w/ [facility] oncologist monitoring brain imaging-patient will reach out to see if another scan needed  Could be d/t parkinsonism  Gait d/o multifactorial as above  Pending PT        Criteria (8 below) for Jadyn kIQ:   1. The beneficiary has a diagnosis of essential tremor (ET); AND  2. The beneficiary is 18 years or older; AND  3. The TAPS Therapy stimulator is being prescribed to treat the beneficiary's dominant upper  limb; AND  4. The severity of ET symptoms significantly impairs the beneficiary's ability to perform dominant  hand, upper-limb-related Activities of Daily Living (ADLs), as indicated by a score of greater  than or equal to 3 on the Chilo & Adonis Tremor ADL Scale (BF-ADL) for at least one (1)  assessment item for eating, drinking self-care, OR writing (1: no difficulty doing the activity,  4: cannot do the activity alone due to tremor) (Refer to LCD X42820 Appendix A); AND  5. The beneficiary has no contraindications to external upper limb tremor stimulator therapy; AND  6. At least two pharmacological treatment options for the management of ET symptoms have  been either tried and failed at maximal tolerable dosages (i.e., no or limited effect, intolerable  side effects) OR considered and ruled out (e.g., not appropriate in context of the beneficiary's  medical history); AND  7. If medically appropriate, tremor exacerbating medications (eg. stimulants, beta agonists) have  been reduced or eliminated; AND  8. TAPS therapy is  prescribed as an alternative to invasive and/or permanent surgical treatment  options (e.g., deep brain stimulation, magnetic resonance guided focused ultrasound,  radiosurgery).      Diagnoses and all orders for this visit:  Essential tremor  Parkinsonism, unspecified Parkinsonism type (Multi)  Auditory hallucinations  Gait disorder      #Please discuss with cancer MD who was monitoring CNS lymphoma auditory hallucinations (hearing things that are not there, specifically band music) to be sure he does not want to recheck an MRI brain    #Jadyn kIQ watch ordered and will  be calibrated to help with tremor, the company will assist with that.     Info/instructions provided  for Jadyn kIQ    #Follow up next wed that both Dr. Ng and I are here in June        I, AREN Croft, personally performed  a medically appropriate exam, discussion of care/treatment options taking a total time of 41 minutes for today's visit.    Max Croft, AREN-C  Adult/Gerontological Nurse Practitioner   [facility], Department of Neurology  [facility] [place]  [facility]  [address] [place], [place] [id number]  Phone: [phone]  Fax: [phone]

## 2025-04-22 NOTE — PATIENT INSTRUCTIONS
#Please discuss with cancer MD who was monitoring CNS lymphoma auditory hallucinations (hearing things that are not there, specifically band music) to be sure he does not want to recheck an MRI brain    #Jadyn kIQ watch ordered and will  be calibrated to help with tremor, the company will assist with that.     What are the side effects of Jadyn Trio?---let me know if any occur  Discomfort with stimulation (e.g. stinging, sensation of weakness, etc.)  Allergic reaction to electrodes or other materials  Skin irritation, including electrical stimulation burns, redness and/or itching  In the unlikely event that any of the following more significant issues happen, immediately stop using Jadyn kIQ therapy and contact your physician.  Signs of significant persistent skin irritation, sores, electrical stimulation burns, or lesions at the site of the stimulation  Significant and persistent increase in muscle tightness or stiffness  A feeling of chest pressure during stimulation  Swelling of your arm, wrist, or hand    Contraindications include:  Having an implanted electrical medical device, such as a pacemaker, defibrillator, or deep brain stimulator  Having suspected or diagnosed epilepsy or other seizure disorder  Pregnancy  Having swollen, infected, inflamed areas, or skin eruptions, open wounds, or cancerous lesions at or in proximity to the treated wrist    Warnings:  It is recommended that Jadyn therapy not be used while simultaneously:  Using a percutaneous device, such as a needle or catheter, at the site of stimulation  Having implanted metal in the treated wrist  Using a non-invasive device that delivers stimulation (other than Jadyn kIQTM)  Using electronic monitoring devices (e.g., cardiac monitors, ECG alarms, continuous glucose monitors (CGMs)), which may not operate properly when Jadyn kIQ is in use.  Patients for whom high-frequency surgical equipment is simultaneously being used, as this may result in electrical  burns and/or possible damage to Jadyn kIQ.  Do not use Jadyn therapy while:  Sleeping  Driving  Bathing  Operating machinery  Doing any activity in which possible involuntary muscle contractions due to therapy may cause undue risk of injury.  Do not use Jadyn therapy:  Near the head  Directly on the eyes  Covering the mouth  On your upper back  Over the heart area  On diseased skin  Do not use Jadyn therapy on the neck.  Doing so can cause severe muscle spasm, resulting in closure of the airway, difficulty with breathing, or adverse effects on heart rhythm.  Do not use Jadyn therapy on or near the chest.  This may increase the risk of abnormal heart rhythm (arrhythmia), which could cause death.    Do not apply Jadyn kIQ therapy over open wounds or rashes, or over swollen, red, infected, or inflamed areas or skin eruptions (e.g., phlebitis, thrombophlebitis, varicose veins, etc.).  Do not apply Jadyn kIQ therapy over -- or in proximity to -- cancerous lesions.  To prevent electrical shock, do not immerse Jadyn kIQ in water.  Do not wear Jadyn kIQ while performing activities where the Jadyn kIQ device is under water (e.g., swimming or bathing).  To prevent an explosion hazard and possible injury, do not use Jadyn therapy when at a refueling place (e.g., gas station) or near flammable fuel, fumes, or chemicals.  Jadyn therapy provide chronic electrical stimulation, the long-term effects of which are unknown.  Patients who do not wet the entire circumference of their wrist before putting on Jadyn kIQ may experience uncomfortable therapy, skin irritation and/or shock.  Jadyn therapy at an intensity that is too high can cause discomfort in your wrist and/or hand, muscle contraction, or skin irritation, including electrical stimulation burns of the skin, which may cause focal pinpoint edema, erosion, and crusting.  Some may experience skin irritation, an allergic reaction, or hypersensitivity to Jadyn therapy or the band.  Avoid removing  Jadyn kIQ from your wrist during therapy.  To charge the Ajdyn kIQ device, use only the Jadyn kIQ [place] with the power cord, [id number].  The cord on the  may present a choking hazard. Keep away from children.    Cautions:  Use Jadyn therapy only as directed by the Patient Guide or your physician.  Store Jadyn kIQTM to prevent exposure to dust and pests. It is recommended to store the device in the original packaging when not in use or being charged.  To prevent damage to Jadyn kIQ and/or performance issues:  Keep Jadyn kIQ dry. Jadyn kIQ can withstand some splashing.  Do not use in places with high humidity (e.g., the bathroom).  Do not store or transport Jadyn kIQ or its accessories in temperatures that exceed the recommended storage temperature range: -20°C to 45°C (-4°F to 113°F).  Do not operate in temperatures that exceed the recommended operation temperature range of 5°C to 40°C (41°F to 104°F). Temperature extremes can damage the device and accessories.  Do not tamper with, modify, or attempt to perform maintenance or servicing on Jadyn kIQ.  Do not use or wear Jadyn kIQ near shortwave microwave(within 3.5 feet), or medical emitters, such as electrocautery, MRI, CAT scan, electrosurgical unites, or diathermy devices.  To prevent damage, performance issues, increased emissions, or decreased immunity of Jadyn kIQ, only use the accessories recommended by [employer] with the device.    Use Jadyn therapy with caution:  If you tend to bleed following an injury.  Over areas of the skin that lack normal sensation.  If you have atrial fibrillation    Keep out of the reach of children and pets.  Jadyn kIQ is single-patient use by the individual for whom it has been prescribed.    It should not be worn by anyone else, or on any other part of your body.  To avoid interfering with diagnostic assessments, do not wear Jadyn kIQ during x-ray examinations.  To prevent Jadyn kIQ band damage, do not pinch, pull, or twist the Band  with too much force.    Should any technical problem occur that is not covered in the Patient Guide, please contact [email or web address] at [phone] or [email or web address].       #Follow up next wed that both Dr. Ng and I are here in June    Max Croft NP-C  Adult/Gerontological Nurse Practitioner   [facility], Department of Neurology  [facility] [place]  [facility]  [id number] [place] [address]  Phone: [phone]  Fax: [phone]

## 2025-04-23 ENCOUNTER — EVALUATION (OUTPATIENT)
Dept: PHYSICAL THERAPY | Facility: CLINIC | Age: 70
End: 2025-04-23
Payer: MEDICARE

## 2025-04-23 DIAGNOSIS — C56.9 MALIGNANT NEOPLASM OF OVARY, UNSPECIFIED LATERALITY (MULTI): ICD-10-CM

## 2025-04-23 DIAGNOSIS — R26.9 GAIT DISTURBANCE: Primary | ICD-10-CM

## 2025-04-23 PROCEDURE — 97163 PT EVAL HIGH COMPLEX 45 MIN: CPT | Mod: GP

## 2025-04-23 ASSESSMENT — PAIN SCALES - GENERAL: PAINLEVEL_OUTOF10: 0 - NO PAIN

## 2025-04-23 ASSESSMENT — ENCOUNTER SYMPTOMS
DEPRESSION: 1
OCCASIONAL FEELINGS OF UNSTEADINESS: 1
LOSS OF SENSATION IN FEET: 0

## 2025-04-23 ASSESSMENT — PAIN - FUNCTIONAL ASSESSMENT: PAIN_FUNCTIONAL_ASSESSMENT: 0-10

## 2025-04-23 NOTE — PROGRESS NOTES
Physical Therapy    Physical Therapy Evaluation    Patient Name: Saba Park  MRN: [id number]  Today's Date: 4/23/2025    Time Entry:  Time Calculation  Start Time: 1340  Stop Time: 1430  Time Calculation (min): 50 min  PT Evaluation Time Entry  PT Evaluation (Complex) Time Entry: 50                    Visit #1  Insurance;  Medicare  Cert; 4/2/31804 - 7/18/2025  Problem List Items Addressed This Visit           ICD-10-CM       Hematology and Neoplasia    Malignant neoplasm of unspecified ovary (Multi) C56.9    Relevant Orders    Follow Up In Physical Therapy       Symptoms and Signs    Gait disturbance - Primary R26.9    Relevant Orders    Follow Up In Physical Therapy       Assessment  Patient presents with ataxic gait, hx of fall at home, frequent retro pulsion, residual chronic weakness of right leg. Patient treated for brain cancer in 2016 which caused initial right side weakness and gait problems. In recent years patient dealt with ovarian cancer with several episodes of chemo. 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 Last chemo episodes completed in February of 2025, adding to additional weakness and unsteadiness on feet. Patient owns walker, however, does not use it unless attempting outside shopping trips. Patient not safe with cane at this time. Fall risk is high according to all scored functional balance tests. Patient's main goal is to improve energy and balance to ambulate longer distance free of fall risk as well as assistive device. Patient motivated and will benefit from PT in working on achieving her goals. POC discussed and developed with patient's agreement. Insurance authorization is pending.     Plan  Treatment/Interventions: Education/ Instruction, Manual therapy, Neuromuscular re-education, Therapeutic activities, Therapeutic exercises, Gait training  PT Plan: Skilled PT  Certification Period Start Date: 04/23/25  Certification Period End  Date: 07/18/25  Rehab Potential: Fair  Plan of Care Agreement: Patient  Recommended PT frequency; 1-2 sessions per week x 10 sessions    Current Problem  1. Gait disturbance  Follow Up In Physical Therapy      2. Malignant neoplasm of ovary, unspecified laterality (Multi)  Referral to Physical Therapy    Follow Up In Physical Therapy          Subjective   General: Hx of brain tumor and surgery. Residual gait disturbance present since 2014. Diagnosed with ovarian CA in 2020. Had several chemo therapies, surgery and another chemo episode. Finished chemo treatment in February of 2025.  General  Reason for Referral: PT eval and treat; gait distrubance  Referred By: Dr Krista Saravia  Past Medical History Relevant to Rehab: Gait disturbance (Developed tremors sometimes during chemo treatment, sometimes in September of 2024. They are effecting left more than right hand)  General Comment: Have not been in therapy. Had return of ovarian cancer and just finished chemo in February of this year (Treatment took a log of energy out of me)  Precautions: discussed role of assistive device in order to reduce fall risk  Precautions  STEADI Fall Risk Score (The score of 4 or more indicates an increased risk of falling): 10  Vital Signs: NA     Pain:  Pain Assessment: 0-10  0-10 (Numeric) Pain Score: 0 - No pain (Dealing with more of discomfort than pain)  Effect of Pain on Daily Activities: Finding it hard to walk, stand up, go up and down stairs since recent cancer and new episode of chemo treatment  Patient's Stated Pain Goal:  (To get my coordination back and walk better)  Home Living: lives with  in single family home  Stairs to bedroom/bathroom.  assist, he is retired      Prior Function Per Pt/Caregiver Report: independent with mobility and ADLs. Needed walker more during chemo episodes        Objective   Posture: fair     Range of Motion: NA     Strength:  LE  R hip flexor 5-/5; L hip flexors 5/5  R hip extension  5-/5; L hip extension 5/5  R hip abducton 5-/5; L hip abduction 5/5  R hip adductor 5/5; L hip adduction 5/5  R Quads 5-/5; L hip Quads 5/5  R Hamstrings 5-/5  Right Gastrocnemius 4/5   R Anterior Tibialis 4+/5     Flexibility: WFL     Palpation: Partial numbness/paraesthesia of right hip/thigh, with hx of NIKKI and revision surgery      Special Tests: NA     Gait: ataxic and slow gait  Not safe without walker     Balance  TUG = 14 seconds without assistive device   Standing feet apart with eyes open - with moderate sway and no balance loss  Standing with eyes closed - patient looses balance between 5-6 seconds   Standing with eyes open on foam pad - patient retro pulses with need for physical assist to recover balance      Stairs: 1-2 steps into house  One step at a time with rail assist     Bed Mobility: independent with bed mobility and ADLs  Husbands does IADLs     Transfers: arm assist needed  Retro pulsing against chair evident   5 x sit/stand = 27 seconds   Outcome Measures:  ABC confidence scale = 1100   2 minutes walk without assistive aide = 185 feet (two technical falls, therapist needed to use gait belt and re establish balance)  DGI = 8/24       OP EDUCATION:  Outpatient Education  Individual(s) Educated: Patient  Education Provided: Anatomy, Body Mechanics, Fall Risk, Home Exercise Program, POC, Home Safety  Diagnosis and Precautions: Gait disturbance, Hx of falls  Risk and Benefits Discussed with Patient/Caregiver/Other: yes  Patient/Caregiver Demonstrated Understanding: yes  Plan of Care Discussed and Agreed Upon: yes  Patient Response to Education: Patient/Caregiver Verbalized Understanding of Information  Education Comment: POC and HEP configuration    Goals:  Active       PT Problem       PT Goal 1       Start:  04/23/25    Expected End:  07/18/25       Patient will improve confidence in functional balance as evident by ABC scale score over 1250         PT Goal 2       Start:  04/23/25    Expected  End:  07/18/25       Patient will improve ability to multitask with ambulation on level surfaces, evident by DGI increase from 8 over 16/24         PT Goal 3       Start:  04/23/25    Expected End:  07/18/25       Patient will achieve improved dynamic static balance evident by ability to maintain balance while standing and completing standing tasks without significant postural sway or retro pulsion x 5 minutes of standing on floor and on soft uneven surfaces such as foam pads         PT Goal 4       Start:  04/23/25    Expected End:  07/18/25       Patient will demonstrated ability to maintain balance with eyes closed x 10 seconds, without retropulsion          PT Goal 5       Start:  04/23/25    Expected End:  07/18/25       Patient will improve balance/endurance to completed 6 minute instead of 2 minute walk test without need for contact guard or balance loss, while reaching distance over 400 feet or better          Patient Stated Goal 1       Start:  04/23/25    Expected End:  07/18/25       Patient will demonstrated ability to maintain instructed exercises and activities without new falls

## 2025-04-25 ENCOUNTER — PATIENT MESSAGE (OUTPATIENT)
Dept: NEUROLOGY | Facility: CLINIC | Age: 70
End: 2025-04-25
Payer: MEDICARE

## 2025-04-25 DIAGNOSIS — R44.0 AUDITORY HALLUCINATIONS: Primary | ICD-10-CM

## 2025-04-29 ENCOUNTER — OFFICE VISIT (OUTPATIENT)
Dept: PALLIATIVE MEDICINE | Facility: CLINIC | Age: 70
End: 2025-04-29
Payer: MEDICARE

## 2025-04-29 VITALS
TEMPERATURE: 96.6 F | WEIGHT: 136.24 LBS | BODY MASS INDEX: 24.14 KG/M2 | HEART RATE: 97 BPM | SYSTOLIC BLOOD PRESSURE: 121 MMHG | DIASTOLIC BLOOD PRESSURE: 79 MMHG | RESPIRATION RATE: 16 BRPM | OXYGEN SATURATION: 95 %

## 2025-04-29 DIAGNOSIS — R53.83 OTHER FATIGUE: ICD-10-CM

## 2025-04-29 DIAGNOSIS — Z51.5 PALLIATIVE CARE ENCOUNTER: Primary | ICD-10-CM

## 2025-04-29 DIAGNOSIS — T45.1X5A CHEMOTHERAPY-INDUCED PERIPHERAL NEUROPATHY (MULTI): ICD-10-CM

## 2025-04-29 DIAGNOSIS — C56.9 MALIGNANT NEOPLASM OF OVARY, UNSPECIFIED LATERALITY (MULTI): ICD-10-CM

## 2025-04-29 DIAGNOSIS — R53.0 NEOPLASTIC MALIGNANT RELATED FATIGUE: ICD-10-CM

## 2025-04-29 DIAGNOSIS — R53.1 WEAKNESS: ICD-10-CM

## 2025-04-29 DIAGNOSIS — Z79.891 ENCOUNTER FOR MONITORING OPIOID MAINTENANCE THERAPY: ICD-10-CM

## 2025-04-29 DIAGNOSIS — Z51.81 ENCOUNTER FOR MONITORING OPIOID MAINTENANCE THERAPY: ICD-10-CM

## 2025-04-29 DIAGNOSIS — G62.9 NEUROPATHY: ICD-10-CM

## 2025-04-29 DIAGNOSIS — G89.3 CHRONIC PAIN AFTER CANCER TREATMENT: ICD-10-CM

## 2025-04-29 DIAGNOSIS — G62.0 CHEMOTHERAPY-INDUCED PERIPHERAL NEUROPATHY (MULTI): ICD-10-CM

## 2025-04-29 DIAGNOSIS — R63.0 LOSS OF APPETITE: ICD-10-CM

## 2025-04-29 DIAGNOSIS — F33.1 MODERATE EPISODE OF RECURRENT MAJOR DEPRESSIVE DISORDER: ICD-10-CM

## 2025-04-29 DIAGNOSIS — G89.29 OTHER CHRONIC PAIN: ICD-10-CM

## 2025-04-29 DIAGNOSIS — R11.0 NAUSEA: ICD-10-CM

## 2025-04-29 DIAGNOSIS — C85.89: ICD-10-CM

## 2025-04-29 DIAGNOSIS — M79.2 NEUROPATHIC PAIN: ICD-10-CM

## 2025-04-29 DIAGNOSIS — K59.03 DRUG-INDUCED CONSTIPATION: ICD-10-CM

## 2025-04-29 DIAGNOSIS — F41.9 ANXIETY: ICD-10-CM

## 2025-04-29 PROCEDURE — 1159F MED LIST DOCD IN RCRD: CPT | Performed by: NURSE PRACTITIONER

## 2025-04-29 PROCEDURE — 99214 OFFICE O/P EST MOD 30 MIN: CPT | Performed by: NURSE PRACTITIONER

## 2025-04-29 PROCEDURE — 1157F ADVNC CARE PLAN IN RCRD: CPT | Performed by: NURSE PRACTITIONER

## 2025-04-29 PROCEDURE — 3078F DIAST BP <80 MM HG: CPT | Performed by: NURSE PRACTITIONER

## 2025-04-29 PROCEDURE — 1125F AMNT PAIN NOTED PAIN PRSNT: CPT | Performed by: NURSE PRACTITIONER

## 2025-04-29 PROCEDURE — 3074F SYST BP LT 130 MM HG: CPT | Performed by: NURSE PRACTITIONER

## 2025-04-29 RX ORDER — METHADONE HYDROCHLORIDE 10 MG/1
10 TABLET ORAL EVERY 12 HOURS
Qty: 60 TABLET | Refills: 0 | Status: SHIPPED | OUTPATIENT
Start: 2025-04-29 | End: 2025-05-29

## 2025-04-29 ASSESSMENT — PAIN SCALES - GENERAL: PAINLEVEL_OUTOF10: 5

## 2025-05-06 ENCOUNTER — TREATMENT (OUTPATIENT)
Dept: PHYSICAL THERAPY | Facility: CLINIC | Age: 70
End: 2025-05-06
Payer: MEDICARE

## 2025-05-06 DIAGNOSIS — C56.9 MALIGNANT NEOPLASM OF OVARY, UNSPECIFIED LATERALITY (MULTI): ICD-10-CM

## 2025-05-06 DIAGNOSIS — R26.81 UNSTEADINESS ON FEET: Primary | ICD-10-CM

## 2025-05-06 DIAGNOSIS — R26.9 GAIT DISTURBANCE: ICD-10-CM

## 2025-05-06 PROCEDURE — 97112 NEUROMUSCULAR REEDUCATION: CPT | Mod: GP | Performed by: PHYSICAL THERAPIST

## 2025-05-06 PROCEDURE — 97116 GAIT TRAINING THERAPY: CPT | Mod: GP | Performed by: PHYSICAL THERAPIST

## 2025-05-06 ASSESSMENT — PAIN - FUNCTIONAL ASSESSMENT: PAIN_FUNCTIONAL_ASSESSMENT: 0-10

## 2025-05-06 NOTE — PROGRESS NOTES
"Physical Therapy    Physical Therapy Treatment    Patient Name: Saba Park  MRN: [id number]  Today's Date: 5/6/2025  [employer] 13 visits approved  POC end date: 7/22/25  Visit number: 2  Time Entry:   Time Calculation  Start Time: 1533  Stop Time: 1615  Time Calculation (min): 42 min     PT Therapeutic Procedures Time Entry  Neuromuscular Re-Education Time Entry: 27  Gait Training Time Entry: 15       Assessment:  Further testing performed today including 10MWT and FGA. Pt. Scored in a high fall risk category on FGA scoring an 8/30. Educated pt. On my recommendation to utilize a device at all times.  Pt. Declined use of walker but was open to difference devices such as cane/trekking pole. Pt. Abmulating with good fluidity and balance, good sequencing with \"Activator\" trekking pole brand.  Pt. Required 21 seconds to complete the 10MWT, ambulating at a speed of .47 meters/ second which places patient at a risk for falls compared to cut off score of .7 meters/second.  Pt. With decreased walking tolerance requiring 1-2 rests during visit to accommodate for right sided lateral thigh pain.       Plan: establish HEP, promoting SLS       Current Problem  1. Unsteadiness on feet        2. Malignant neoplasm of ovary, unspecified laterality (Multi)  Follow Up In Physical Therapy      3. Gait disturbance  Follow Up In Physical Therapy                Subjective    Pt. Reports that she had neuropathy pretty.  She tried acupuncture and it didn't work.  She had a brain tumor in 2014 and she has ashley on her back on and off for 10 years until now.  She had a gallbladder removed and then right after that she found out the ovarian cancer came back and had chemo in February of 2025. She feels like she walks\" like a drunk\".  She had a fall when she forgot to turn her lights on.  She has an MRI on Friday and EEG on Thursday.  She has essential tremors and they have gotten worse.  They took her off of the two meds, she was on " "propanolol and primidone for her tremors.  She feels that her memory is getting worse.  She has been getting auditory hallucinations, she gets them every day maybe 30 minutes and sometimes a few minutes and sometimes a 1/2 a day.  She had two hip replacements, 1st one and then 2nd on she had an infection, sometimes it is worse than others.     Equipment: uses cane, rollator (used to use these but now she doesn't) She furniture walks sometimes     Precautions: high fall risk, chronic lateral hip pain, claustrophobia, hx. MCI, essential tremor, bilateral peripheral neuropathy, history of CNS lymphoma and ovarian cancer      Vital Signs: not taken     Pain  Pain Assessment  Pain Assessment: 0-10  Pain Type: Chronic pain  Pain Location: Hip  Pain Orientation: Right (along scar from hip replacement)    Objective     Tremor=essential tremor noted in RUE  Coordination=finger opposition mild dysmetria bilateral Ue's, DF/PF WNL on R and mild diadiodyskinesia on L  Vision=contact lenses, uses readers sometimes  Sensation=neuropathy in BLE's  Gait: narrow ANKIT, L hip ER, R knee valgus, ataxia, veering of path without device requiring supervision           Outcome Measures:  FGA - Functional Gait Assessment  Gait level surface: 0  Change in gait speed: 2  Gait with horizontal head turns: 2  Gait with vertical head turns: 0  Gait and pivot turn: 1  Step over obstacle: 0  Gait with narrow base of support: 0  Gait with eyes closed: 0  Ambulating backwards: 1  Steps: 1  FGA Total Score: 7    10MWT:   5/6/25= 21 seconds with trekking pole and supervision assist    Treatments:    Gait training:  -functional mobility 50'x2 with unilateral trekking pole  -functional mobility 50'x1 with unilateral \"activator\" pole  -functional mobility 30'x1 with SPC    Neuro Re-Ed:  The following exercises were completed at supervision level over 20ft. distance  Gait level surface:  Change in gait speed:  Gait with horizontal head turns:  Gait with " vertical head turns:  Gait with pivot turn:  Step over obstacle:  Gait with narrow ANKIT:  Gait with eyes closed:  Ambulating backwards:  Stairs 3-6 inch stairs   -Educated pt. On fall risk cut off score and my recommendation for pt. To utilize a device at all times, discussed recommended option as walker however pt. Reporting that she will not use a walker. Provide pt. Handout for activator trekking pole and walker  -Skilled vestibular specific education was provided to patient on three balance systems including vestibular system, visual system and balance systems and how an impairment of one or more of these systems can cause dizziness or imbalance.  Discussed how vestibular based therapy treatment can   address these deficits.   -educated pt. On motion detecting night-lights to reduce fall risk    OP EDUCATION:  -see treatment       Goals:  Active       PT Problem       PT Goal 1       Start:  04/23/25    Expected End:  07/18/25       Patient will improve confidence in functional balance as evident by ABC scale score over 1250         PT Goal 2       Start:  04/23/25    Expected End:  07/18/25       Patient will improve ability to multitask with ambulation on level surfaces, evident by DGI increase from 8 over 16/24         PT Goal 3       Start:  04/23/25    Expected End:  07/18/25       Patient will achieve improved dynamic static balance evident by ability to maintain balance while standing and completing standing tasks without significant postural sway or retro pulsion x 5 minutes of standing on floor and on soft uneven surfaces such as foam pads         PT Goal 4       Start:  04/23/25    Expected End:  07/18/25       Patient will demonstrated ability to maintain balance with eyes closed x 10 seconds, without retropulsion          PT Goal 5       Start:  04/23/25    Expected End:  07/18/25       Patient will improve balance/endurance to completed 6 minute instead of 2 minute walk test without need for contact  guard or balance loss, while reaching distance over 400 feet or better          Patient Stated Goal 1       Start:  04/23/25    Expected End:  07/18/25       Patient will demonstrated ability to maintain instructed exercises and activities without new falls          10MWT       Start:  05/06/25    Expected End:  07/18/25       Pt. Will improve 10MWT by .13 m/s with LRAD to meet normative value for substantial meaningful change and indicate increased gait speed.

## 2025-05-07 ENCOUNTER — APPOINTMENT (OUTPATIENT)
Dept: BEHAVIORAL HEALTH | Facility: CLINIC | Age: 70
End: 2025-05-07
Payer: MEDICARE

## 2025-05-07 DIAGNOSIS — F41.9 ANXIETY: ICD-10-CM

## 2025-05-07 DIAGNOSIS — F33.0 MILD EPISODE OF RECURRENT MAJOR DEPRESSIVE DISORDER (CMS-HCC): ICD-10-CM

## 2025-05-07 PROBLEM — I45.9 CARDIAC CONDUCTION DISORDER: Status: RESOLVED | Noted: 2017-03-09 | Resolved: 2025-05-07

## 2025-05-07 PROBLEM — F50.812 BINGE-EATING DISORDER, SEVERE: Status: RESOLVED | Noted: 2017-03-23 | Resolved: 2025-05-07

## 2025-05-07 PROCEDURE — 99214 OFFICE O/P EST MOD 30 MIN: CPT | Performed by: PSYCHIATRY & NEUROLOGY

## 2025-05-07 PROCEDURE — 1036F TOBACCO NON-USER: CPT | Performed by: PSYCHIATRY & NEUROLOGY

## 2025-05-07 RX ORDER — INFLUENZA A VIRUS A/VICTORIA/4897/2022 IVR-238 (H1N1) ANTIGEN (FORMALDEHYDE INACTIVATED), INFLUENZA A VIRUS A/CALIFORNIA/122/2022 SAN-022 (H3N2) ANTIGEN (FORMALDEHYDE INACTIVATED), AND INFLUENZA B VIRUS B/MICHIGAN/01/2021 ANTIGEN (FORMALDEHYDE INACTIVATED) 60; 60; 60 UG/.5ML; UG/.5ML; UG/.5ML
INJECTION, SUSPENSION INTRAMUSCULAR
COMMUNITY
Start: 2024-09-26

## 2025-05-07 NOTE — PROGRESS NOTES
"Outpatient Psychiatry FUV      Subjective   Saba Park, a 69 y.o. female, for virtual FUV    Virtual or Telephone Consent    While technically available, the patient was unable or unwilling to consent to connect via audio/video telehealth technology; therefore, I performed this visit using a real-time audio only connection between Saba Park & Anabella Ugalde MD.  Verbal consent was requested and obtained from Saba Park on this date, 05/07/25 for a telehealth visit and the patient's location was confirmed at the time of the visit.    Confirmed to be home for appt today, pt was unable to connect        Assessment/Plan   Patient Discussion:  CONTINUE duloxetine 60mg daily at bedtime   CONTINUE  buspirone 5mg 2x/day  CONTINUE bupropion SR 200mg 2x/day -- consider decreased dose based on neuro work up     RETURN to clinic 2 months     Call with questions/concerns [phone]    Assessment:   69 y.o.  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 AVN R hip dx 2016. 2019 dx with ovarian CA in surveillance s/p resection and chemo. Pt had been see several time in 2015 ahead of MD maternity leave and transferred care to Dr. Nilay Farr, who has since left . 2021 pt re-established care and here for follow up     FUV 5/7/2025  Since last appt, tremor worse, work up per neuro. Also AH of music, notes this predated increase in bupropion. Will consider lowering dose depending on work up, though this increase has seemed to help mood. Follow up 2 months, sooner if needed.     Diagnosis:   MDD, recurrent, p remission  Other spec anxiety r/o adjustment vs part of depression     Treatment Plan/Recommendations:   1. Safety Assessment: denies active thoughts of death/self harm though has had \"dark thoughts\". no h/o SI/SA. passive death wish in the past but not recently . RF include age, W, pain, cancer. PF include " , engaged in care, no hx, no substance abuse, no guns. Currently low imminent risk     2. MDD, other spec anxiety r/o element of MDD vs ISIS  CONTINUE duloxetine 60mg PO QHS   CONTINUE buspar 5mg PO BID for now--consider tapering  CONTINUE bupropion SR 200mg PO BID, monitor anxiety--tremor worse since increase, unclear if related, work up per neuro     Consider resuming therapy, supportive therapy during appt--pt has also   stopped seeing therapist related to worse health and not able to keep up, defer for now     3. 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  elevated  completed chemo carboplatin/taxol -- in remission, next scan 3 months  Had stents for kidney stones  Neuro work up for worsening tremor and AH of music--MRI, EEG     4. Social: lives with , continue to encourage behavioral activation. Does better when out and about but frustrated by physical limitations, has been limited this summer due to health    Reason for Visit:     FUV for depression, anxiety    Subjective:  Last appt 2/2025  Tremor is worse  Looking to get a new device shaneka--watch  Also MRI and EEG    Having AH of big band playing in head    Discussed relation to dosing change in bupropion, notes AH prior to dose change, will consider lowering based on work up findings     Taken off primidone and propranolol in the past that helped tremor but has to    2 surgeries for kidney stones      no SI or thoughts of not wanting to go on  No a/e to meds    Current Medications:    Current Outpatient Medications:     Fluzone High-Dose Triv 24-25 syringe, , Disp: , Rfl:     acetaminophen (Tylenol) 325 mg tablet, Take 2 tablets (650 mg) by mouth every 6 hours if needed for mild pain (1 - 3)., Disp: 30 tablet, Rfl: 0    buPROPion SR (Wellbutrin SR) 200 mg 12 hr tablet, Take 1 tablet (200 mg) by mouth 2 times a day. Do not crush, chew, or split., Disp: 180 tablet, Rfl: 1    busPIRone (Buspar) 5 mg tablet, TAKE 1 TABLET BY MOUTH TWICE A DAY, Disp: 180 tablet, Rfl: 1    calcium carbonate (Oscal) 500 mg calcium (1,250 mg) tablet, Take 1 tablet (1,250 mg) by mouth 2 times daily (morning and late afternoon)., Disp: , Rfl:     cholecalciferol (Vitamin D-3) 50 mcg (2,000 unit) capsule, Take 1 capsule (50 mcg) by mouth once daily., Disp: , Rfl:     cyanocobalamin (Vitamin B-12) 250 mcg tablet, Take 1 tablet (250 mcg) by mouth once daily., Disp: , Rfl:     DULoxetine (Cymbalta) 60 mg DR capsule, Take 1 capsule (60 mg) by mouth once daily. Do not crush or chew., Disp: 90 capsule, Rfl: 1    ibuprofen 600 mg tablet, Take 1 tablet (600 mg) by mouth every 6 hours if needed for mild pain (1 - 3)., Disp: 30 tablet, Rfl: 0    LORazepam (Ativan) 1 mg tablet, Take 1 tab 30mins to 1hr prior to MRI., Disp: 1 tablet, Rfl: 0    methadone (Dolophine) 10 mg tablet, Take 1 tablet (10 mg) by mouth every 12 hours., Disp: 60 tablet, Rfl: 0    oxyCODONE (Roxicodone) 5 mg immediate release tablet, Take 1 tablet (5 mg) by mouth every 6 hours if needed for severe pain (7 - 10)., Disp: 4 tablet, Rfl: 0    phenazopyridine (Pyridium) 200 mg tablet, Take 1 tablet (200 mg) by mouth 3 times a day as needed for bladder spasms., Disp: 10 tablet, Rfl: 0    pregabalin (Lyrica) 75 mg capsule, Take 1 capsule (75 mg) by mouth 2 times a day. Do not fill before January 15, 2025., Disp: 60 capsule, Rfl: 5    primidone (Mysoline) 50 mg tablet, Take 2 tablets  (100 mg) by mouth once daily at bedtime. (Patient not taking: Reported on 4/29/2025), Disp: , Rfl:     propranolol (Inderal) 40 mg tablet, Take 1 tablet (40 mg) by mouth once daily., Disp: , Rfl:   Medical History:  Past Medical History:   Diagnosis Date    Anxiety     Arthritis     Binge eating disorder     Binge-eating disorder, severe 03/23/2017    Cardiac conduction disorder 03/09/2017    Chemotherapy-induced peripheral neuropathy (Multi)     Claustrophobia     Colitis     Depression     Dizziness     Low BP, saw neurology NP Ananya 1/10/25, meds adjusted    Dry eye syndrome     Essential tremor     Family history of malignant neoplasm of breast 02/28/2019    Family history of breast cancer    Gynecologic cancer (Multi) 09/18/2023    HLD (hyperlipidemia)     HTN (hypertension)     Hx antineoplastic chemo     Hydronephrosis with renal and ureteral calculus obstruction     IBS (irritable bowel syndrome)     Infection of right prosthetic hip joint     Kidney stones     Low back pain     Low platelet count (CMS-HCC)     Platelets=48 on 2/14/25    Lymphoma     Neuromuscular disorder (Multi)     OA (osteoarthritis)     Osteopenia     Ovarian cancer (Multi)     Personal history of malignant neoplasm of other parts of uterus 08/14/2020    History of malignant neoplasm of other parts of uterus    Personal history of non-Hodgkin lymphomas 08/14/2020    History of non-Hodgkin's lymphoma    Personal history of other specified conditions 10/28/2014    History of fibrocystic disease of breast    Portal vein thrombosis 09/18/2023    PORTAL VEIN THROMBOSIS I81    Presence of spectacles and contact lenses 12/09/2014    Wears contact lenses    Recurrent malignant neoplasm of ovary (Multi)     follows with oncology, s/p 6 cycles of Carbo/taxol    Right rotator cuff tendonitis     Skin cancer     basal cell    Thyroid nodule     Visual impairment        Surgical History:  Past Surgical History:   Procedure Laterality Date     BRAIN SURGERY      CHOLECYSTECTOMY      EXPLORATORY LAPAROTOMY      EYE SURGERY      laser    HYSTERECTOMY      JOINT REPLACEMENT Right     hip, right THR    LITHOTRIPSY  2013    Renal Lithotripsy    LITHOTRIPSY  2025    LYMPH NODE BIOPSY      OTHER SURGICAL HISTORY  2013    Lithotomy    OTHER SURGICAL HISTORY  2019    Ileostomy closure    TONSILLECTOMY         Family History:  Family History   Problem Relation Name Age of Onset    Breast cancer Mother Mary Foley -  60    Stroke Mother Mary Foley -      Colon cancer Father Sherif Foley -      Cancer Father Sherif Foley -      COPD Father Sherif Foley -      Hearing loss Father Sherif Foley -      Heart disease Father Sherif Foley -      Ovarian cancer Maternal Grandmother      Breast cancer Mother's Sister Zamzam Campbell -  40    Breast cancer Cousin  50       Social History:  Social History     Socioeconomic History    Marital status:      Spouse name: Not on file    Number of children: Not on file    Years of education: Not on file    Highest education level: Not on file   Occupational History    Not on file   Tobacco Use    Smoking status: Never     Passive exposure: Never    Smokeless tobacco: Never   Vaping Use    Vaping status: Never Used   Substance and Sexual Activity    Alcohol use: Not Currently     Comment: 1 glass of wine daily    Drug use: Not Currently     Types: Marijuana     Comment: medical card / gummies    Sexual activity: Defer   Other Topics Concern    Not on file   Social History Narrative    Not on file     Social Drivers of Health     Financial Resource Strain: Low Risk  (2024)    Overall Financial Resource Strain (CARDIA)     Difficulty of Paying Living Expenses: Not hard at all   Food Insecurity: No Food Insecurity (2024)    Hunger Vital Sign     Worried About Running Out of Food in the Last Year: Never true     Ran Out of Food in the  "Last Year: Never true   Transportation Needs: No Transportation Needs (7/25/2024)    PRAPARE - Transportation     Lack of Transportation (Medical): No     Lack of Transportation (Non-Medical): No   Physical Activity: Inactive (7/26/2024)    Exercise Vital Sign     Days of Exercise per Week: 0 days     Minutes of Exercise per Session: 0 min   Stress: Stress Concern Present (7/26/2024)    [place] [employer] - Occupational Stress Questionnaire     Feeling of Stress : To some extent   Social Connections: Moderately Isolated (7/26/2024)    Social Connection and Isolation Panel [NHANES]     Frequency of Communication with Friends and Family: More than three times a week     Frequency of Social Gatherings with Friends and Family: Once a week     Attends Latter day Services: Never     Active Member of Clubs or Organizations: No     Attends Club or Organization Meetings: Never     Marital Status:    Intimate Partner Violence: Not At Risk (7/26/2024)    Humiliation, Afraid, Rape, and Kick questionnaire     Fear of Current or Ex-Partner: No     Emotionally Abused: No     Physically Abused: No     Sexually Abused: No   Housing Stability: Low Risk  (7/25/2024)    Housing Stability Vital Sign     Unable to Pay for Housing in the Last Year: No     Number of Times Moved in the Last Year: 1     Homeless in the Last Year: No              Medical Review Of Systems:  +neuropathy  +pain but better  +alopecia  +worsening tremor    Psychiatric Review Of Systems:  Some improvement in mood  AH of music    Objective   Mental Status Exam:     Appearance: defer.   Attitude: cooperative and engaged.   Behavior: appropriate eye contact via video.   Motor Activity: mild truncal tremor noted.   Speech: defer  Mood: \"it's always something\".   Affect: congruent, appropriate to circumstance  Thought Process: on topic.   Thought Content: no delusions, no SI/HI, less hopeless.   Thought Perception: no AVH.   Cognition: " alert, oriented to person, place, time. attn intact.   Insight: fair.   Judgment: fair.       Vitals:  There were no vitals filed for this visit.  Encounter Date: 02/27/25   ECG 12 Lead   Result Value    Ventricular Rate 72    Atrial Rate 72    GA Interval 146    QRS Duration 78    QT Interval 402    QTC Calculation(Bazett) 440    P Axis 49    R Axis 52    T Axis 47    QRS Count 12    Q Onset 220    P Onset 147    P Offset 197    T Offset 421    QTC Fredericia 427    Narrative    Normal sinus rhythm  Normal ECG  When compared with ECG of 25-JUL-2024 04:45,  No significant change was found  Confirmed by Robbin Mireles ([id number]) on 2/27/2025 1:08:27 PM       Lab Results   Component Value Date    WBC 7.6 03/21/2025    HGB 10.9 (L) 03/21/2025    HCT 35.5 (L) 03/21/2025     (H) 03/21/2025     03/21/2025     Lab Results   Component Value Date    GLUCOSE 98 03/21/2025    CALCIUM 9.5 03/21/2025     03/21/2025    K 4.6 03/21/2025    CO2 29 03/21/2025     03/21/2025    BUN 31 (H) 03/21/2025    CREATININE 1.11 (H) 03/21/2025       Psychotherapy   Time: 16 minutes  Type: supportive  Target: mood, anxiety  Techniques: problem solving, validation  Goal: improved sx management  Follow up: next appt  Response: fair    Via phone         Anabella Ugalde MD   What Type Of Note Output Would You Prefer (Optional)?: Standard Output On A Scale Of 0 To 10 How Would You Rate Your Itching?: 7 How Severe Is Your Itching?: mild

## 2025-05-08 ENCOUNTER — HOSPITAL ENCOUNTER (OUTPATIENT)
Dept: NEUROLOGY | Facility: HOSPITAL | Age: 70
Discharge: HOME | End: 2025-05-08
Payer: MEDICARE

## 2025-05-08 DIAGNOSIS — R44.0 AUDITORY HALLUCINATIONS: ICD-10-CM

## 2025-05-09 ENCOUNTER — TREATMENT (OUTPATIENT)
Dept: PHYSICAL THERAPY | Facility: CLINIC | Age: 70
End: 2025-05-09
Payer: MEDICARE

## 2025-05-09 DIAGNOSIS — C56.9 MALIGNANT NEOPLASM OF OVARY, UNSPECIFIED LATERALITY (MULTI): ICD-10-CM

## 2025-05-09 DIAGNOSIS — R26.9 GAIT DISTURBANCE: ICD-10-CM

## 2025-05-09 PROCEDURE — 97112 NEUROMUSCULAR REEDUCATION: CPT | Mod: GP

## 2025-05-09 NOTE — PROGRESS NOTES
"Physical Therapy    Physical Therapy Treatment    Patient Name: Saba Park  MRN: [id number]  Today's Date: 5/9/2025  [employer] 13 visits approved  POC end date: 7/22/25  Visit number: 3  Time Entry:   Time Calculation  Start Time: 1120  Stop Time: 1200  Time Calculation (min): 40 min     PT Therapeutic Procedures Time Entry  Neuromuscular Re-Education Time Entry: 40       Assessment:  Further testing performed today including 10MWT and FGA. Pt. Scored in a high fall risk category on FGA scoring an 8/30. Educated pt. On my recommendation to utilize a device at all times.  Pt. Declined use of walker but was open to difference devices such as cane/trekking pole. Pt. Abmulating with good fluidity and balance, good sequencing with \"Activator\" trekking pole brand.  Pt. Required 21 seconds to complete the 10MWT, ambulating at a speed of .47 meters/ second which places patient at a risk for falls compared to cut off score of .7 meters/second.  Pt. With decreased walking tolerance requiring 1-2 rests during visit to accommodate for right sided lateral thigh pain.       Plan: establish HEP, promoting SLS       Current Problem  1. Malignant neoplasm of ovary, unspecified laterality (Multi)  Follow Up In Physical Therapy      2. Gait disturbance  Follow Up In Physical Therapy                Subjective    Pt. Reports that she had neuropathy pretty.  She tried acupuncture and it didn't work.  She had a brain tumor in 2014 and she has ashley on her back on and off for 10 years until now.  She had a gallbladder removed and then right after that she found out the ovarian cancer came back and had chemo in February of 2025. She feels like she walks\" like a drunk\".  She had a fall when she forgot to turn her lights on.  She has an MRI on Friday and EEG on Thursday.  She has essential tremors and they have gotten worse.  They took her off of the two meds, she was on propanolol and primidone for her tremors.  She feels that her " "memory is getting worse.  She has been getting auditory hallucinations, she gets them every day maybe 30 minutes and sometimes a few minutes and sometimes a 1/2 a day.  She had two hip replacements, 1st one and then 2nd on she had an infection, sometimes it is worse than others.     Equipment: uses cane, rollator (used to use these but now she doesn't) She furniture walks sometimes     Precautions: high fall risk, chronic lateral hip pain, claustrophobia, hx. MCI, essential tremor, bilateral peripheral neuropathy, history of CNS lymphoma and ovarian cancer      Vital Signs: not taken     Pain       Objective     Tremor=essential tremor noted in RUE  Coordination=finger opposition mild dysmetria bilateral Ue's, DF/PF WNL on R and mild diadiodyskinesia on L  Vision=contact lenses, uses readers sometimes  Sensation=neuropathy in BLE's  Gait: narrow ANKIT, L hip ER, R knee valgus, ataxia, veering of path without device requiring supervision           Outcome Measures:       10MWT:   5/6/25= 21 seconds with trekking pole and supervision assist    Treatments:    Gait training:  -functional mobility 50'x2 with unilateral trekking pole  -functional mobility 50'x1 with unilateral \"activator\" pole    Neuro Re-Ed:  The following exercises were completed at supervision level over 20ft. distance  Gait level surface:  Change in gait speed:  Gait with horizontal head turns:  Gait with vertical head turns:  Gait with pivot turn:  Step over obstacle: - Not today  Gait with narrow ANKIT:  Gait with eyes closed:  Ambulating backwards:  Stairs 3-6 inch stairs   -Educated pt. On fall risk cut off score and my recommendation for pt. To utilize a device at all times, discussed recommended option as walker however pt. Reporting that she will not use a walker. Provide pt. Handout for activator trekking pole and walker  -Skilled vestibular specific education was provided to patient on three balance systems including vestibular system, visual " system and balance systems and how an impairment of one or more of these systems can cause dizziness or imbalance.  Discussed how vestibular based therapy treatment can   address these deficits.   -educated pt. On motion detecting night-lights to reduce fall risk    OP EDUCATION:  -see treatment       Goals:  Active       PT Problem       PT Goal 1       Start:  04/23/25    Expected End:  07/18/25       Patient will improve confidence in functional balance as evident by ABC scale score over 1250         PT Goal 2       Start:  04/23/25    Expected End:  07/18/25       Patient will improve ability to multitask with ambulation on level surfaces, evident by DGI increase from 8 over 16/24         PT Goal 3       Start:  04/23/25    Expected End:  07/18/25       Patient will achieve improved dynamic static balance evident by ability to maintain balance while standing and completing standing tasks without significant postural sway or retro pulsion x 5 minutes of standing on floor and on soft uneven surfaces such as foam pads         PT Goal 4       Start:  04/23/25    Expected End:  07/18/25       Patient will demonstrated ability to maintain balance with eyes closed x 10 seconds, without retropulsion          PT Goal 5       Start:  04/23/25    Expected End:  07/18/25       Patient will improve balance/endurance to completed 6 minute instead of 2 minute walk test without need for contact guard or balance loss, while reaching distance over 400 feet or better          Patient Stated Goal 1       Start:  04/23/25    Expected End:  07/18/25       Patient will demonstrated ability to maintain instructed exercises and activities without new falls          10MWT       Start:  05/06/25    Expected End:  07/18/25       Pt. Will improve 10MWT by .13 m/s with LRAD to meet normative value for substantial meaningful change and indicate increased gait speed.

## 2025-05-10 ENCOUNTER — PATIENT MESSAGE (OUTPATIENT)
Dept: NEUROLOGY | Facility: CLINIC | Age: 70
End: 2025-05-10
Payer: MEDICARE

## 2025-05-13 ENCOUNTER — TREATMENT (OUTPATIENT)
Dept: PHYSICAL THERAPY | Facility: CLINIC | Age: 70
End: 2025-05-13
Payer: MEDICARE

## 2025-05-13 DIAGNOSIS — R26.81 UNSTEADINESS ON FEET: Primary | ICD-10-CM

## 2025-05-13 DIAGNOSIS — R59.0 SUBMANDIBULAR LYMPHADENOPATHY: ICD-10-CM

## 2025-05-13 DIAGNOSIS — R26.9 GAIT DISTURBANCE: ICD-10-CM

## 2025-05-13 DIAGNOSIS — C56.9 MALIGNANT NEOPLASM OF OVARY, UNSPECIFIED LATERALITY (MULTI): ICD-10-CM

## 2025-05-13 PROCEDURE — 97110 THERAPEUTIC EXERCISES: CPT | Mod: GP | Performed by: PHYSICAL THERAPIST

## 2025-05-13 PROCEDURE — 97112 NEUROMUSCULAR REEDUCATION: CPT | Mod: GP | Performed by: PHYSICAL THERAPIST

## 2025-05-13 PROCEDURE — 97116 GAIT TRAINING THERAPY: CPT | Mod: GP | Performed by: PHYSICAL THERAPIST

## 2025-05-13 ASSESSMENT — PAIN SCALES - GENERAL: PAINLEVEL_OUTOF10: 2

## 2025-05-13 ASSESSMENT — PAIN - FUNCTIONAL ASSESSMENT: PAIN_FUNCTIONAL_ASSESSMENT: 0-10

## 2025-05-13 NOTE — PROGRESS NOTES
"Physical Therapy    Physical Therapy Treatment    Patient Name: Saba Park  MRN: [id number]  Today's Date: 5/13/2025  [employer] 13 visits approved  POC end date: 7/22/25  Visit number: 4  Time Entry:   Time Calculation  Start Time: 1215  Stop Time: 1300  Time Calculation (min): 45 min     PT Therapeutic Procedures Time Entry  Neuromuscular Re-Education Time Entry: 25  Gait Training Time Entry: 15       Assessment:  Today's visit was spent establishing home program. The patient was challenged with vestibular challenges with corner balance, benefiting from cues to utilize \"eyes before head\" as a form of vestibular substitution.  Pt. Required CGA-min-A during side stepping over paulino with x1 LOB posteriorly into mat table.  Pt. Was unable to perform cone taps due to ataxia in BLE's and impaired SLS.       Plan: review HEP as needed, resisted side stepping       Current Problem  1. Unsteadiness on feet        2. Malignant neoplasm of ovary, unspecified laterality (Multi)  Follow Up In Physical Therapy      3. Gait disturbance  Follow Up In Physical Therapy      4. Submandibular lymphadenopathy                    Subjective    Pt. Reports that not much is new. She got two hiking poles.       Precautions: high fall risk, chronic lateral hip pain, claustrophobia, hx. MCI, essential tremor, bilateral peripheral neuropathy, history of CNS lymphoma and ovarian cancer. She got an EEG but everything was normal.      Vital Signs: not taken     Pain  Pain Assessment  Pain Assessment: 0-10  0-10 (Numeric) Pain Score: 2  Pain Type: Chronic pain  Pain Location: Hip  Pain Orientation: Right    Objective     Observation: pt. Arrived to visit with bilateral trekking poles           Treatments:    There Ex: (5'x1 billed with gait training)  -seated hip isometrics 45\" x5 reps     Gait training:  -functional mobility with single trekking pole 30'x1, 50'x1  -functional mobility outdoors with single trekking pole 40'x1, on uneven " "concrete pavement/ramp  -PT adjusted pt. Trekking pole height bilaterally for energy conservation and to improve body mechanics, discussed utilizing one pole and to promote hands free ambulation (utilizing purse with water bottle villalobos) to reduce fall risk    Neuro Re-Ed:  MTS EO in corner 30\"x2  FT EO horizontal/vertical HT x10 each direction (eyes before head)  FA EC 30\"x1  -lunge steps over paulino x10 each side with CGA->min-A with LOB x1 posteriorly   -attempted cone taps however pt. Unable to complete due to decreased SLS and ataxia in BLE's  OP EDUCATION:  -see treatment       Goals:  Active       PT Problem       PT Goal 1       Start:  04/23/25    Expected End:  07/18/25       Patient will improve confidence in functional balance as evident by ABC scale score over 1250         PT Goal 2       Start:  04/23/25    Expected End:  07/18/25       Patient will improve ability to multitask with ambulation on level surfaces, evident by DGI increase from 8 over 16/24         PT Goal 3       Start:  04/23/25    Expected End:  07/18/25       Patient will achieve improved dynamic static balance evident by ability to maintain balance while standing and completing standing tasks without significant postural sway or retro pulsion x 5 minutes of standing on floor and on soft uneven surfaces such as foam pads         PT Goal 4       Start:  04/23/25    Expected End:  07/18/25       Patient will demonstrated ability to maintain balance with eyes closed x 10 seconds, without retropulsion          PT Goal 5       Start:  04/23/25    Expected End:  07/18/25       Patient will improve balance/endurance to completed 6 minute instead of 2 minute walk test without need for contact guard or balance loss, while reaching distance over 400 feet or better          Patient Stated Goal 1       Start:  04/23/25    Expected End:  07/18/25       Patient will demonstrated ability to maintain instructed exercises and activities without new " falls          10MWT       Start:  05/06/25    Expected End:  07/18/25       Pt. Will improve 10MWT by .13 m/s with LRAD to meet normative value for substantial meaningful change and indicate increased gait speed.

## 2025-05-16 ENCOUNTER — APPOINTMENT (OUTPATIENT)
Dept: HEMATOLOGY/ONCOLOGY | Facility: CLINIC | Age: 70
End: 2025-05-16
Payer: MEDICARE

## 2025-05-16 ENCOUNTER — INFUSION (OUTPATIENT)
Dept: HEMATOLOGY/ONCOLOGY | Facility: CLINIC | Age: 70
End: 2025-05-16
Payer: MEDICARE

## 2025-05-16 VITALS
HEART RATE: 76 BPM | SYSTOLIC BLOOD PRESSURE: 114 MMHG | TEMPERATURE: 97.2 F | RESPIRATION RATE: 16 BRPM | DIASTOLIC BLOOD PRESSURE: 70 MMHG

## 2025-05-16 DIAGNOSIS — C56.9 MALIGNANT NEOPLASM OF OVARY, UNSPECIFIED LATERALITY (MULTI): ICD-10-CM

## 2025-05-16 LAB
ANION GAP SERPL CALC-SCNC: 13 MMOL/L (ref 10–20)
BUN SERPL-MCNC: 21 MG/DL (ref 6–23)
CALCIUM SERPL-MCNC: 9.3 MG/DL (ref 8.6–10.6)
CANCER AG125 SERPL-ACNC: 6.3 U/ML (ref 0–30.2)
CHLORIDE SERPL-SCNC: 106 MMOL/L (ref 98–107)
CO2 SERPL-SCNC: 27 MMOL/L (ref 21–32)
CREAT SERPL-MCNC: 0.92 MG/DL (ref 0.5–1.05)
EGFRCR SERPLBLD CKD-EPI 2021: 68 ML/MIN/1.73M*2
ERYTHROCYTE [DISTWIDTH] IN BLOOD BY AUTOMATED COUNT: 13.2 % (ref 11.5–14.5)
GLUCOSE SERPL-MCNC: 94 MG/DL (ref 74–99)
HCT VFR BLD AUTO: 36 % (ref 36–46)
HGB BLD-MCNC: 11.4 G/DL (ref 12–16)
MAGNESIUM SERPL-MCNC: 1.8 MG/DL (ref 1.6–2.4)
MCH RBC QN AUTO: 31.4 PG (ref 26–34)
MCHC RBC AUTO-ENTMCNC: 31.7 G/DL (ref 32–36)
MCV RBC AUTO: 99 FL (ref 80–100)
NRBC BLD-RTO: ABNORMAL /100{WBCS}
PLATELET # BLD AUTO: 168 X10*3/UL (ref 150–450)
POTASSIUM SERPL-SCNC: 4 MMOL/L (ref 3.5–5.3)
RBC # BLD AUTO: 3.63 X10*6/UL (ref 4–5.2)
SODIUM SERPL-SCNC: 142 MMOL/L (ref 136–145)
TSH SERPL-ACNC: 0.74 MIU/L (ref 0.44–3.98)
WBC # BLD AUTO: 4.7 X10*3/UL (ref 4.4–11.3)

## 2025-05-16 PROCEDURE — 84443 ASSAY THYROID STIM HORMONE: CPT | Performed by: INTERNAL MEDICINE

## 2025-05-16 PROCEDURE — 83735 ASSAY OF MAGNESIUM: CPT

## 2025-05-16 PROCEDURE — 86304 IMMUNOASSAY TUMOR CA 125: CPT

## 2025-05-16 PROCEDURE — 36591 DRAW BLOOD OFF VENOUS DEVICE: CPT

## 2025-05-16 PROCEDURE — 2500000004 HC RX 250 GENERAL PHARMACY W/ HCPCS (ALT 636 FOR OP/ED): Mod: JZ | Performed by: NURSE PRACTITIONER

## 2025-05-16 PROCEDURE — 80048 BASIC METABOLIC PNL TOTAL CA: CPT | Performed by: UROLOGY

## 2025-05-16 PROCEDURE — 85027 COMPLETE CBC AUTOMATED: CPT | Performed by: UROLOGY

## 2025-05-16 RX ORDER — HEPARIN SODIUM,PORCINE/PF 10 UNIT/ML
50 SYRINGE (ML) INTRAVENOUS AS NEEDED
OUTPATIENT
Start: 2025-05-16

## 2025-05-16 RX ORDER — HEPARIN 100 UNIT/ML
500 SYRINGE INTRAVENOUS AS NEEDED
Status: DISCONTINUED | OUTPATIENT
Start: 2025-05-16 | End: 2025-05-16 | Stop reason: HOSPADM

## 2025-05-16 RX ORDER — HEPARIN 100 UNIT/ML
500 SYRINGE INTRAVENOUS AS NEEDED
OUTPATIENT
Start: 2025-05-16

## 2025-05-16 RX ORDER — HEPARIN SODIUM,PORCINE/PF 10 UNIT/ML
50 SYRINGE (ML) INTRAVENOUS AS NEEDED
Status: DISCONTINUED | OUTPATIENT
Start: 2025-05-16 | End: 2025-05-16 | Stop reason: HOSPADM

## 2025-05-16 RX ADMIN — HEPARIN 500 UNITS: 100 SYRINGE at 16:00

## 2025-05-16 ASSESSMENT — PAIN SCALES - GENERAL: PAINLEVEL_OUTOF10: 0-NO PAIN

## 2025-05-19 ENCOUNTER — OFFICE VISIT (OUTPATIENT)
Dept: GYNECOLOGIC ONCOLOGY | Facility: CLINIC | Age: 70
End: 2025-05-19
Payer: MEDICARE

## 2025-05-19 VITALS
WEIGHT: 135.36 LBS | OXYGEN SATURATION: 95 % | BODY MASS INDEX: 23.98 KG/M2 | SYSTOLIC BLOOD PRESSURE: 143 MMHG | RESPIRATION RATE: 16 BRPM | HEART RATE: 77 BPM | DIASTOLIC BLOOD PRESSURE: 85 MMHG | TEMPERATURE: 97.2 F

## 2025-05-19 DIAGNOSIS — C83.390 CNS LYMPHOMA: ICD-10-CM

## 2025-05-19 DIAGNOSIS — C56.9 MALIGNANT NEOPLASM OF OVARY, UNSPECIFIED LATERALITY (MULTI): Primary | ICD-10-CM

## 2025-05-19 DIAGNOSIS — Z85.43 HISTORY OF OVARIAN CANCER: ICD-10-CM

## 2025-05-19 DIAGNOSIS — Z09 ENCOUNTER FOR FOLLOW-UP EXAMINATION AFTER COMPLETED TREATMENT FOR CONDITIONS OTHER THAN MALIGNANT NEOPLASM: ICD-10-CM

## 2025-05-19 DIAGNOSIS — Z93.2 ILEOSTOMY PRESENT (MULTI): ICD-10-CM

## 2025-05-19 PROCEDURE — 3077F SYST BP >= 140 MM HG: CPT | Performed by: OBSTETRICS & GYNECOLOGY

## 2025-05-19 PROCEDURE — 1036F TOBACCO NON-USER: CPT | Performed by: OBSTETRICS & GYNECOLOGY

## 2025-05-19 PROCEDURE — 3079F DIAST BP 80-89 MM HG: CPT | Performed by: OBSTETRICS & GYNECOLOGY

## 2025-05-19 PROCEDURE — 1126F AMNT PAIN NOTED NONE PRSNT: CPT | Performed by: OBSTETRICS & GYNECOLOGY

## 2025-05-19 PROCEDURE — 99214 OFFICE O/P EST MOD 30 MIN: CPT | Performed by: OBSTETRICS & GYNECOLOGY

## 2025-05-19 PROCEDURE — 1160F RVW MEDS BY RX/DR IN RCRD: CPT | Performed by: OBSTETRICS & GYNECOLOGY

## 2025-05-19 PROCEDURE — 1159F MED LIST DOCD IN RCRD: CPT | Performed by: OBSTETRICS & GYNECOLOGY

## 2025-05-19 RX ORDER — HEPARIN SODIUM,PORCINE/PF 10 UNIT/ML
50 SYRINGE (ML) INTRAVENOUS AS NEEDED
OUTPATIENT
Start: 2025-05-19

## 2025-05-19 RX ORDER — HEPARIN 100 UNIT/ML
500 SYRINGE INTRAVENOUS AS NEEDED
OUTPATIENT
Start: 2025-05-19

## 2025-05-19 ASSESSMENT — PAIN SCALES - GENERAL: PAINLEVEL_OUTOF10: 0-NO PAIN

## 2025-05-19 NOTE — PROGRESS NOTES
Patient ID: Saba Park is a 69 y.o. female.  Referring Physician: No referring provider defined for this encounter.  Primary Care Provider: DEMETRIUS Orourke-CNP    Subjective    Overall feeling well    Frustrated with tremors in the left hand.  Still working with neurology and will be trying new device very soon    Appetite is okay    No changes to bowel or bladder habits     is normal          Objective    BSA: 1.65 meters squared  /85 (BP Location: Right arm, Patient Position: Sitting, BP Cuff Size: Adult)   Pulse 77   Temp 36.2 °C (97.2 °F) (Temporal)   Resp 16   Wt 61.4 kg (135 lb 5.8 oz)   SpO2 95%   BMI 23.98 kg/m²      Physical Exam  Vitals and nursing note reviewed.   Constitutional:       Appearance: Normal appearance.   HENT:      Head: Normocephalic.   Eyes:      Pupils: Pupils are equal, round, and reactive to light.   Cardiovascular:      Rate and Rhythm: Normal rate and regular rhythm.   Pulmonary:      Effort: Pulmonary effort is normal.      Breath sounds: Normal breath sounds.   Abdominal:      General: Abdomen is flat.      Palpations: Abdomen is soft.      Tenderness: There is no abdominal tenderness.   Musculoskeletal:         General: Normal range of motion.      Cervical back: Normal range of motion and neck supple.   Skin:     General: Skin is warm and dry.   Neurological:      Mental Status: She is alert and oriented to person, place, and time.   Psychiatric:         Mood and Affect: Mood normal.         Behavior: Behavior normal.         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 Questionable invasion into the uterus.  Questionable mesenteric implants.  It looks like one of them is small bowel mesentery, maybe omentum.   The rest look more omental to me. kidney stone.   Complex lesion in her left kidney presumably cystic but can't rule out solid area.   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  Felt to be undebulkable on laparoscopy, plan for NACT  - 3 cycles NACT  -Genetics: SLX-4 gene of unk significance.  - May 2019 debulking, included ileostomy.  - 3 additional cycles chemo - carbo/taxol  - 8/26/19 last chemotherapy  8/2024 recurrence - mediastinal and internal mammary Lns  - bx of LN c/w recurrent high grade serous carcinoma  9/2024 started carbo/taxol  - MANE after 6 cycles       Malignant neoplasm of unspecified ovary (Multi)   12/1/2022 Initial Diagnosis    Malignant neoplasm of unspecified ovary (Multi)     9/17/2024 - 1/27/2025 Chemotherapy    PACLitaxel / CARBOplatin, 21 Day Cycles - Gyn          Problem List Items Addressed This Visit           ICD-10-CM    Malignant neoplasm of unspecified ovary (Multi) - Primary C56.9       Treatment Plans       No treatment plans exist              No obvious evidence of recurrent disease based on symptoms physical exam and  level.  She will continue care with neurology.  Continue Mediport flushes at minoff.  Follow-up 3 months with  before the visit  Ordered DEXA scan for baseline bone density

## 2025-05-20 ENCOUNTER — TREATMENT (OUTPATIENT)
Dept: PHYSICAL THERAPY | Facility: CLINIC | Age: 70
End: 2025-05-20
Payer: MEDICARE

## 2025-05-20 DIAGNOSIS — R26.9 GAIT DISTURBANCE: ICD-10-CM

## 2025-05-20 DIAGNOSIS — C56.9 MALIGNANT NEOPLASM OF OVARY, UNSPECIFIED LATERALITY (MULTI): ICD-10-CM

## 2025-05-20 PROCEDURE — 97112 NEUROMUSCULAR REEDUCATION: CPT | Mod: GP

## 2025-05-20 NOTE — PROGRESS NOTES
"Physical Therapy    Physical Therapy Treatment    Patient Name: Saba Park  MRN: [id number]  Today's Date: 5/20/2025  [employer] 13 visits approved  POC end date: 7/22/25  Visit number: 5  Time Entry:   Time Calculation  Start Time: 1430  Stop Time: 1515  Time Calculation (min): 45 min     PT Therapeutic Procedures Time Entry  Neuromuscular Re-Education Time Entry: 45       Assessment:  Today's visit was spent establishing home program. The patient was challenged with vestibular challenges with corner balance, benefiting from cues to utilize \"eyes before head\" as a form of vestibular substitution.  Pt. Required CGA-min-A during side stepping over paulino with x1 LOB posteriorly into mat table.  Pt. Was unable to perform cone taps due to ataxia in BLE's and impaired SLS.       Plan: review HEP as needed, resisted side stepping       Current Problem  1. Malignant neoplasm of ovary, unspecified laterality (Multi)  Follow Up In Physical Therapy      2. Gait disturbance  Follow Up In Physical Therapy                  Subjective    Pt. Reports that not much is new. She got two hiking poles.       Precautions: high fall risk, chronic lateral hip pain, claustrophobia, hx. MCI, essential tremor, bilateral peripheral neuropathy, history of CNS lymphoma and ovarian cancer. She got an EEG but everything was normal.      Vital Signs: not taken     Pain       Objective     Observation: pt. Arrived to visit with bilateral trekking poles           Treatments:    There Ex: (5'x1 billed with gait training)  -seated hip isometrics 45\" x5 reps     Gait training:  -functional mobility with single trekking pole 30'x1, 50'x1  -functional mobility outdoors with single trekking pole 40'x1, on uneven concrete pavement/ramp  -PT adjusted pt. Trekking pole height bilaterally for energy conservation and to improve body mechanics, discussed utilizing one pole and to promote hands free ambulation (utilizing purse with water bottle villalobos) " "to reduce fall risk    Neuro Re-Ed:  MTS EO in corner 30\"x2  FT EO horizontal/vertical HT x10 each direction (eyes before head)  FA EC 30\"x1  -lunge steps over paulino x10 each side with CGA->min-A with LOB x1 posteriorly   -attempted cone taps however pt. Unable to complete due to decreased SLS and ataxia in BLE's  Pictures re printed and provided for HEP    OP EDUCATION:  -see treatment       Goals:  Active       PT Problem       PT Goal 1       Start:  04/23/25    Expected End:  07/18/25       Patient will improve confidence in functional balance as evident by ABC scale score over 1250         PT Goal 2       Start:  04/23/25    Expected End:  07/18/25       Patient will improve ability to multitask with ambulation on level surfaces, evident by DGI increase from 8 over 16/24         PT Goal 3       Start:  04/23/25    Expected End:  07/18/25       Patient will achieve improved dynamic static balance evident by ability to maintain balance while standing and completing standing tasks without significant postural sway or retro pulsion x 5 minutes of standing on floor and on soft uneven surfaces such as foam pads         PT Goal 4       Start:  04/23/25    Expected End:  07/18/25       Patient will demonstrated ability to maintain balance with eyes closed x 10 seconds, without retropulsion          PT Goal 5       Start:  04/23/25    Expected End:  07/18/25       Patient will improve balance/endurance to completed 6 minute instead of 2 minute walk test without need for contact guard or balance loss, while reaching distance over 400 feet or better          Patient Stated Goal 1       Start:  04/23/25    Expected End:  07/18/25       Patient will demonstrated ability to maintain instructed exercises and activities without new falls          10MWT       Start:  05/06/25    Expected End:  07/18/25       Pt. Will improve 10MWT by .13 m/s with LRAD to meet normative value for substantial meaningful change and indicate " increased gait speed.

## 2025-05-21 ENCOUNTER — TELEPHONE (OUTPATIENT)
Dept: UROLOGY | Facility: CLINIC | Age: 70
End: 2025-05-21
Payer: MEDICARE

## 2025-05-21 DIAGNOSIS — R44.0 AUDITORY HALLUCINATIONS: Primary | ICD-10-CM

## 2025-05-21 NOTE — TELEPHONE ENCOUNTER
Call returned, provided radiology number to schedule Xray and Ultrasound that was ordered by Dr. Reddy.

## 2025-05-22 ENCOUNTER — TREATMENT (OUTPATIENT)
Dept: PHYSICAL THERAPY | Facility: CLINIC | Age: 70
End: 2025-05-22
Payer: MEDICARE

## 2025-05-22 DIAGNOSIS — R26.9 GAIT DISTURBANCE: ICD-10-CM

## 2025-05-22 DIAGNOSIS — C56.9 MALIGNANT NEOPLASM OF OVARY, UNSPECIFIED LATERALITY (MULTI): ICD-10-CM

## 2025-05-22 PROCEDURE — 97112 NEUROMUSCULAR REEDUCATION: CPT | Mod: GP

## 2025-05-22 PROCEDURE — 97116 GAIT TRAINING THERAPY: CPT | Mod: GP

## 2025-05-22 NOTE — PROGRESS NOTES
"Physical Therapy    Physical Therapy Treatment    Patient Name: Saba Park  MRN: [id number]  Today's Date: 5/22/2025  [employer] 13 visits approved  POC end date: 7/22/25  Visit number: 5  Time Entry:   Time Calculation  Start Time: 1520  Stop Time: 1605  Time Calculation (min): 45 min     PT Therapeutic Procedures Time Entry  Neuromuscular Re-Education Time Entry: 25  Gait Training Time Entry: 20       Assessment:  Today's visit was spent establishing home program. The patient was challenged with vestibular challenges with corner balance, benefiting from cues to utilize \"eyes before head\" as a form of vestibular substitution.  Pt. Required CGA-min-A during side stepping over paulino with x1 LOB posteriorly into mat table.  Pt. Was unable to perform cone taps due to ataxia in BLE's and impaired SLS.       Plan: review HEP as needed, resisted side stepping       Current Problem  1. Malignant neoplasm of ovary, unspecified laterality (Multi)  Follow Up In Physical Therapy      2. Gait disturbance  Follow Up In Physical Therapy                  Subjective    Pt. Reports that not much is new. She got two hiking poles.       Precautions: high fall risk, chronic lateral hip pain, claustrophobia, hx. MCI, essential tremor, bilateral peripheral neuropathy, history of CNS lymphoma and ovarian cancer. She got an EEG but everything was normal.      Vital Signs: not taken     Pain; NA       Objective     Observation: pt. Arrived to visit with bilateral trekking poles           Treatments:    There Ex: (5'x1 billed with gait training)  -seated hip isometrics 45\" x5 reps     Gait training:  -functional mobility with single trekking pole 30'x1, 50'x1  -functional mobility outdoors with single trekking pole 40'x1, on uneven concrete pavement/ramp  -PT adjusted pt. Trekking pole height bilaterally for energy conservation and to improve body mechanics, discussed utilizing one pole and to promote hands free ambulation " (utilizing purse with water bottle villalobos) to reduce fall risk  Stair training; reciprocal ascending and descending with walking stick and rail    Neuro Re-Ed:  Weight shifting in // bars  Balancing on foot on foam pad in 10 seconds intervals  Walking backwards in // bars    OP EDUCATION:  -see treatment       Goals:  Active       PT Problem       PT Goal 1       Start:  04/23/25    Expected End:  07/18/25       Patient will improve confidence in functional balance as evident by ABC scale score over 1250         PT Goal 2       Start:  04/23/25    Expected End:  07/18/25       Patient will improve ability to multitask with ambulation on level surfaces, evident by DGI increase from 8 over 16/24         PT Goal 3       Start:  04/23/25    Expected End:  07/18/25       Patient will achieve improved dynamic static balance evident by ability to maintain balance while standing and completing standing tasks without significant postural sway or retro pulsion x 5 minutes of standing on floor and on soft uneven surfaces such as foam pads         PT Goal 4       Start:  04/23/25    Expected End:  07/18/25       Patient will demonstrated ability to maintain balance with eyes closed x 10 seconds, without retropulsion          PT Goal 5       Start:  04/23/25    Expected End:  07/18/25       Patient will improve balance/endurance to completed 6 minute instead of 2 minute walk test without need for contact guard or balance loss, while reaching distance over 400 feet or better          Patient Stated Goal 1       Start:  04/23/25    Expected End:  07/18/25       Patient will demonstrated ability to maintain instructed exercises and activities without new falls          10MWT       Start:  05/06/25    Expected End:  07/18/25       Pt. Will improve 10MWT by .13 m/s with LRAD to meet normative value for substantial meaningful change and indicate increased gait speed.

## 2025-05-23 ENCOUNTER — PATIENT MESSAGE (OUTPATIENT)
Dept: BEHAVIORAL HEALTH | Facility: CLINIC | Age: 70
End: 2025-05-23
Payer: MEDICARE

## 2025-05-23 DIAGNOSIS — F33.0 MILD EPISODE OF RECURRENT MAJOR DEPRESSIVE DISORDER: ICD-10-CM

## 2025-05-23 RX ORDER — BUPROPION HYDROCHLORIDE 150 MG/1
150 TABLET, EXTENDED RELEASE ORAL 2 TIMES DAILY
Qty: 180 TABLET | Refills: 1 | Status: SHIPPED | OUTPATIENT
Start: 2025-05-23 | End: 2025-11-19

## 2025-05-23 NOTE — PATIENT COMMUNICATION
Spoke with patient, reviewed MRI and EEG normal. Discussed timeline of dose increase and AH, and may be culprit. Agreed to decrease to previous dose of bupropion SR 150mg PO BID. Pt will update MD if improvement or not in the few weeks. Next appt 7/16/25, can move up if necessary. Will call if further concerns.

## 2025-05-27 ENCOUNTER — TREATMENT (OUTPATIENT)
Dept: PHYSICAL THERAPY | Facility: CLINIC | Age: 70
End: 2025-05-27
Payer: MEDICARE

## 2025-05-27 DIAGNOSIS — C56.9 MALIGNANT NEOPLASM OF OVARY, UNSPECIFIED LATERALITY (MULTI): ICD-10-CM

## 2025-05-27 DIAGNOSIS — R26.9 GAIT DISTURBANCE: Primary | ICD-10-CM

## 2025-05-27 PROCEDURE — 97110 THERAPEUTIC EXERCISES: CPT | Mod: GP | Performed by: PHYSICAL THERAPIST

## 2025-05-27 ASSESSMENT — PAIN SCALES - GENERAL: PAINLEVEL_OUTOF10: 3

## 2025-05-27 ASSESSMENT — PAIN - FUNCTIONAL ASSESSMENT: PAIN_FUNCTIONAL_ASSESSMENT: 0-10

## 2025-05-27 NOTE — PROGRESS NOTES
Physical Therapy    Physical Therapy Treatment    Patient Name: Saba Park  MRN: [id number]  Today's Date: 5/27/2025  [employer] 13 visits approved  POC end date: 7/22/25  Visit number: 6  Time Entry:   Time Calculation  Start Time: 1404  Stop Time: 1445  Time Calculation (min): 41 min     PT Therapeutic Procedures Time Entry  Therapeutic Exercise Time Entry: 41       Assessment:  Today's visit was spent establishing HEP focusing on gluteal strengthening. Pt. With chronic right hip pain in gluteal region since her hip replacement with revision.  The patient presents with significant flexibility deficits in her quadriceps and hip flexor on the right side. Pt. Is likley compensating for gluteal weakness with hip flexors musculature.        Plan: review HEP as needed, dry needling, balance on uneven surfaces       Current Problem  1. Gait disturbance  Follow Up In Physical Therapy      2. Malignant neoplasm of ovary, unspecified laterality (Multi)  Follow Up In Physical Therapy                  Subjective    Pt. Reports that she did a lot of walking yesterday so her hip hurts. She went to [employer] and held onto the cart.      Precautions: high fall risk, chronic lateral hip pain, claustrophobia, hx. MCI, essential tremor, bilateral peripheral neuropathy, history of CNS lymphoma and ovarian cancer. She got an EEG but everything was normal.      Vital Signs: not taken     Pain  Pain Assessment  Pain Assessment: 0-10  0-10 (Numeric) Pain Score: 3  Pain Type: Chronic pain  Pain Location: Hip  Pain Orientation: Right    Objective     Observation: pt. Arrived to visit with bilateral trekking poles           Treatments:    There Ex: (5'x1 billed with gait training)  -hip extensions x10 each side with YTB on thighs with BUE support  -squats with YTB on thighs x10  -lateral walkout with RTB x3 to each side with (4/10 pain in right hip)  -bridges 1x10 with RTB on thighs  -supine hip flexor stretch with active knee  flexion 20  -attempted prone quad stretch however pt. Did not tolerate due to pain    Neuro Re-Ed: (4'x1 billed with there ex)  -sit<>Stands with FA on foam x10 with VC for inc. Ant. WS      OP EDUCATION:  -see treatment  -hip ext with RTB thighs, bridges with RTB thighs, hip flexor stretch, squats with YTB  -corner balance MTS EO, FT EO with horizontal/vertical HT, FA EC       Goals:  Active       PT Problem       PT Goal 1       Start:  04/23/25    Expected End:  07/18/25       Patient will improve confidence in functional balance as evident by ABC scale score over 1250         PT Goal 2       Start:  04/23/25    Expected End:  07/18/25       Patient will improve ability to multitask with ambulation on level surfaces, evident by DGI increase from 8 over 16/24         PT Goal 3       Start:  04/23/25    Expected End:  07/18/25       Patient will achieve improved dynamic static balance evident by ability to maintain balance while standing and completing standing tasks without significant postural sway or retro pulsion x 5 minutes of standing on floor and on soft uneven surfaces such as foam pads         PT Goal 4       Start:  04/23/25    Expected End:  07/18/25       Patient will demonstrated ability to maintain balance with eyes closed x 10 seconds, without retropulsion          PT Goal 5       Start:  04/23/25    Expected End:  07/18/25       Patient will improve balance/endurance to completed 6 minute instead of 2 minute walk test without need for contact guard or balance loss, while reaching distance over 400 feet or better          Patient Stated Goal 1       Start:  04/23/25    Expected End:  07/18/25       Patient will demonstrated ability to maintain instructed exercises and activities without new falls          10MWT       Start:  05/06/25    Expected End:  07/18/25       Pt. Will improve 10MWT by .13 m/s with LRAD to meet normative value for substantial meaningful change and indicate increased gait speed.

## 2025-05-28 ENCOUNTER — TELEPHONE (OUTPATIENT)
Dept: ADMISSION | Facility: HOSPITAL | Age: 70
End: 2025-05-28

## 2025-05-28 DIAGNOSIS — G89.29 OTHER CHRONIC PAIN: ICD-10-CM

## 2025-05-28 DIAGNOSIS — T45.1X5A CHEMOTHERAPY-INDUCED PERIPHERAL NEUROPATHY (MULTI): ICD-10-CM

## 2025-05-28 DIAGNOSIS — G62.0 CHEMOTHERAPY-INDUCED PERIPHERAL NEUROPATHY (MULTI): ICD-10-CM

## 2025-05-28 RX ORDER — METHADONE HYDROCHLORIDE 10 MG/1
10 TABLET ORAL EVERY 12 HOURS
Qty: 60 TABLET | Refills: 0 | Status: SHIPPED | OUTPATIENT
Start: 2025-05-28 | End: 2025-06-27

## 2025-05-28 NOTE — TELEPHONE ENCOUNTER
Refill Request  Methadone 10mg every 12 hrs     Preferred Pharmacy  [facility] in [facility]

## 2025-05-28 NOTE — TELEPHONE ENCOUNTER
OAR[facility] report reviewed and reflects  prescription history, no aberrancy noted. Per OARRS, patient last filled Methadone 10 mg 4/29/25, 30 day supply. Per last visit with Arin Hirsch 4/29/25 patient to continue medication. Patient with follow up visit scheduled with Ashly Patel 7/25/25. Patient updated that medication will be sent to  Pharmacy. Refill request routed to provider.

## 2025-05-29 ENCOUNTER — TREATMENT (OUTPATIENT)
Dept: PHYSICAL THERAPY | Facility: CLINIC | Age: 70
End: 2025-05-29
Payer: MEDICARE

## 2025-05-29 DIAGNOSIS — C56.9 MALIGNANT NEOPLASM OF OVARY, UNSPECIFIED LATERALITY (MULTI): ICD-10-CM

## 2025-05-29 DIAGNOSIS — R26.9 GAIT DISTURBANCE: Primary | ICD-10-CM

## 2025-05-29 PROCEDURE — 97110 THERAPEUTIC EXERCISES: CPT | Mod: GP | Performed by: PHYSICAL THERAPIST

## 2025-05-29 PROCEDURE — 20560 NDL INSJ W/O NJX 1 OR 2 MUSC: CPT | Mod: GP | Performed by: PHYSICAL THERAPIST

## 2025-05-29 NOTE — PROGRESS NOTES
Physical Therapy    Physical Therapy Treatment    Patient Name: Saba Park  MRN: [id number]  Today's Date: 5/30/2025  [employer] 13 visits approved  POC end date: 7/22/25  Visit number: 7  Time Entry:   Time Calculation  Start Time: 1404  Stop Time: 1445  Time Calculation (min): 41 min     PT Therapeutic Procedures Time Entry  Therapeutic Exercise Time Entry: 12  Needle Insertion w/o Injection 1 or 2: 29       Assessment:  Pt. Continues to present with constant right hip pain. Dry needling performed to chronic scar and gluteus medius musculature. With no adverse effects noted.  Pt. With significant scar tissue restriction along middle and distal portions of her scar.    Pt. Presents with significant hip flexor tightness on her right side.       Plan: review HEP as needed, dry needling as needed, balance on uneven surfaces       Current Problem  1. Gait disturbance  Follow Up In Physical Therapy      2. Malignant neoplasm of ovary, unspecified laterality (Multi)  Follow Up In Physical Therapy                  Subjective    Pt. Reports that she did a lot of walking yesterday so her hip hurts. She went to [employer] and held onto the cart. She was sore on Wednesday  but the soreness was less today, she had more      Precautions: high fall risk, chronic lateral hip pain, claustrophobia, hx. MCI, essential tremor, bilateral peripheral neuropathy, history of CNS lymphoma and ovarian cancer. She got an EEG but everything was normal.      Vital Signs: not taken     Pain: 2/10 right gluteal pain       Objective     Observation: pt. Arrived to visit with bilateral [employer]           Treatments:    There Ex:   -bridges 1x10 with RTB on thighs  -supine hip flexor stretch with active knee flexion 20      DN:  -.25x.30 needles along chronic NIKKI scar x8 needles  -.25x.50 needles glut medius x1 needle  .25x.50 needles TFL x1 needle  -The rationale, potential benefits, and risks for dry needling (DN) were discussed  with patient. Reviewed precautions to dry needling and written/verbal consent obtained by patient (see chart for written consent). No contraindications present. His/Her questions were answered and he/she agreed to dry needling treatment today. Patient advised that following dry needling he may feel muscle soreness lasting 24-48 hours, and that this is normal.  Patient advised remain hydrated following dry needling. Patient advised to avoid cold pack/ice and NSAIDs for 24-48 hours following DN, if tolerable, to allow normal inflammatory process to occur. Patient may resume normal activity and exercise following DN as able. Patient instructed to please contact the office if any adverse symptoms occur outside of typical muscle soreness.    Neuro Re-Ed: (4'x1 billed with there ex)  -sit<>Stands with FA on foam x10 with VC for inc. Ant. WS      OP EDUCATION:  -see treatment  -hip ext with RTB thighs, bridges with RTB thighs, hip flexor stretch, squats with YTB  -corner balance MTS EO, FT EO with horizontal/vertical HT, FA EC       Goals:  Active       PT Problem       PT Goal 1       Start:  04/23/25    Expected End:  07/18/25       Patient will improve confidence in functional balance as evident by ABC scale score over 1250         PT Goal 2       Start:  04/23/25    Expected End:  07/18/25       Patient will improve ability to multitask with ambulation on level surfaces, evident by DGI increase from 8 over 16/24         PT Goal 3       Start:  04/23/25    Expected End:  07/18/25       Patient will achieve improved dynamic static balance evident by ability to maintain balance while standing and completing standing tasks without significant postural sway or retro pulsion x 5 minutes of standing on floor and on soft uneven surfaces such as foam pads         PT Goal 4       Start:  04/23/25    Expected End:  07/18/25       Patient will demonstrated ability to maintain balance with eyes closed x 10 seconds, without  retropulsion          PT Goal 5       Start:  04/23/25    Expected End:  07/18/25       Patient will improve balance/endurance to completed 6 minute instead of 2 minute walk test without need for contact guard or balance loss, while reaching distance over 400 feet or better          Patient Stated Goal 1       Start:  04/23/25    Expected End:  07/18/25       Patient will demonstrated ability to maintain instructed exercises and activities without new falls          10MWT       Start:  05/06/25    Expected End:  07/18/25       Pt. Will improve 10MWT by .13 m/s with LRAD to meet normative value for substantial meaningful change and indicate increased gait speed.

## 2025-06-02 ENCOUNTER — TELEPHONE (OUTPATIENT)
Dept: GYNECOLOGIC ONCOLOGY | Facility: HOSPITAL | Age: 70
End: 2025-06-02
Payer: MEDICARE

## 2025-06-02 DIAGNOSIS — Z85.43 HISTORY OF OVARIAN CANCER: ICD-10-CM

## 2025-06-02 DIAGNOSIS — C56.9 MALIGNANT NEOPLASM OF OVARY, UNSPECIFIED LATERALITY (MULTI): ICD-10-CM

## 2025-06-02 NOTE — TELEPHONE ENCOUNTER
Patient called requesting changes in port flush appointments.    Port flush appointment requests entered.   Patient also requesting letter clearing her to receive massage therapy.    Massage letter completed and mailed to patients home.

## 2025-06-03 ENCOUNTER — TREATMENT (OUTPATIENT)
Dept: PHYSICAL THERAPY | Facility: CLINIC | Age: 70
End: 2025-06-03
Payer: MEDICARE

## 2025-06-03 ENCOUNTER — HOSPITAL ENCOUNTER (OUTPATIENT)
Dept: RADIOLOGY | Facility: CLINIC | Age: 70
Discharge: HOME | End: 2025-06-03
Payer: MEDICARE

## 2025-06-03 DIAGNOSIS — R26.81 UNSTEADINESS ON FEET: Primary | ICD-10-CM

## 2025-06-03 DIAGNOSIS — R26.9 GAIT DISTURBANCE: ICD-10-CM

## 2025-06-03 DIAGNOSIS — C56.9 MALIGNANT NEOPLASM OF OVARY, UNSPECIFIED LATERALITY (MULTI): ICD-10-CM

## 2025-06-03 DIAGNOSIS — N20.0 NEPHROLITHIASIS: ICD-10-CM

## 2025-06-03 PROCEDURE — 76770 US EXAM ABDO BACK WALL COMP: CPT

## 2025-06-03 PROCEDURE — 76770 US EXAM ABDO BACK WALL COMP: CPT | Performed by: RADIOLOGY

## 2025-06-03 PROCEDURE — 97110 THERAPEUTIC EXERCISES: CPT | Mod: GP | Performed by: PHYSICAL THERAPIST

## 2025-06-03 PROCEDURE — 97112 NEUROMUSCULAR REEDUCATION: CPT | Mod: GP | Performed by: PHYSICAL THERAPIST

## 2025-06-03 NOTE — PROGRESS NOTES
Physical Therapy    Physical Therapy Treatment    Patient Name: Saba Park  MRN: [id number]  Today's Date: 6/3/2025  [employer] 13 visits approved  POC end date: 7/22/25  Visit number: 9  Primary dx=unsteadiness on feet  Time Entry:   Time Calculation  Start Time: 1222  Stop Time: 1308  Time Calculation (min): 46 min     PT Therapeutic Procedures Time Entry  Neuromuscular Re-Education Time Entry: 32  Therapeutic Exercise Time Entry: 14       Assessment:  Ms. Park was challenged with vertical and oblique head turns with gait, however pt. Improved with verbal cues for increased Ant. WS through activation of core and gripping toes into the ground.  The patient required min-A during paulino negotiation due to LOB in posterior direction.  Pt. Will benefit from further physical therapy focusing on error augmentation and promoting anterior WS.       Plan: review HEP, dry needling as needed       Current Problem  1. Malignant neoplasm of ovary, unspecified laterality (Multi)  Follow Up In Physical Therapy      2. Gait disturbance  Follow Up In Physical Therapy                    Subjective    Pt. Reports that her hip on the right side feels a little bit better since she was here last.  She has been doing her stretches.  She is going to [place] in the middle of the month, she is visiting a friend.      Precautions: high fall risk, chronic lateral hip pain, claustrophobia, hx. MCI, essential tremor, bilateral peripheral neuropathy, history of CNS lymphoma and ovarian cancer. She got an EEG but everything was normal.      Vital Signs: not taken     Pain: 1/10 right gluteal pain       Objective     Observation: pt. Arrived to visit with bilateral trekking poles          Treatments:    There Ex:   -recumbent biking with BUE's/BLE's L1-1.5 for 9'x1 to improve LE strengthening, VC to keep RPM's at 90  -provided printed handout for HEP    Neuro Re-Ed:  -functional mobility with horizontal HT 30'x1  -functional mobility  with vertical HT 30'6 with VC to  big toe down with SBA  -functional mobility with diagonal head turns 20'x2 each direction with SBA  -3-6inch paulino negotiation with Cga->min-A x6 laps (VC for inc. Ant WS)    OP EDUCATION:  -see treatment  -hip ext with RTB thighs, bridges with RTB thighs, hip flexor stretch, squats with YTB  -corner balance MTS EO, FT EO with horizontal/vertical HT, FA EC  -functional mobility with vertical/diagonal HT       Goals:  Active       PT Problem       PT Goal 1       Start:  04/23/25    Expected End:  07/18/25       Patient will improve confidence in functional balance as evident by ABC scale score over 1250         PT Goal 2       Start:  04/23/25    Expected End:  07/18/25       Patient will improve ability to multitask with ambulation on level surfaces, evident by DGI increase from 8 over 16/24         PT Goal 3       Start:  04/23/25    Expected End:  07/18/25       Patient will achieve improved dynamic static balance evident by ability to maintain balance while standing and completing standing tasks without significant postural sway or retro pulsion x 5 minutes of standing on floor and on soft uneven surfaces such as foam pads         PT Goal 4       Start:  04/23/25    Expected End:  07/18/25       Patient will demonstrated ability to maintain balance with eyes closed x 10 seconds, without retropulsion          PT Goal 5       Start:  04/23/25    Expected End:  07/18/25       Patient will improve balance/endurance to completed 6 minute instead of 2 minute walk test without need for contact guard or balance loss, while reaching distance over 400 feet or better          Patient Stated Goal 1       Start:  04/23/25    Expected End:  07/18/25       Patient will demonstrated ability to maintain instructed exercises and activities without new falls          10MWT       Start:  05/06/25    Expected End:  07/18/25       Pt. Will improve 10MWT by .13 m/s with LRAD to meet normative  value for substantial meaningful change and indicate increased gait speed.

## 2025-06-04 DIAGNOSIS — N20.0 NEPHROLITHIASIS: ICD-10-CM

## 2025-06-05 ENCOUNTER — TREATMENT (OUTPATIENT)
Dept: PHYSICAL THERAPY | Facility: CLINIC | Age: 70
End: 2025-06-05
Payer: MEDICARE

## 2025-06-05 DIAGNOSIS — C56.9 MALIGNANT NEOPLASM OF OVARY, UNSPECIFIED LATERALITY (MULTI): ICD-10-CM

## 2025-06-05 DIAGNOSIS — R26.9 GAIT DISTURBANCE: Primary | ICD-10-CM

## 2025-06-05 PROCEDURE — 97110 THERAPEUTIC EXERCISES: CPT | Mod: GP | Performed by: PHYSICAL THERAPIST

## 2025-06-05 PROCEDURE — 20560 NDL INSJ W/O NJX 1 OR 2 MUSC: CPT | Mod: GP | Performed by: PHYSICAL THERAPIST

## 2025-06-05 ASSESSMENT — PAIN - FUNCTIONAL ASSESSMENT: PAIN_FUNCTIONAL_ASSESSMENT: 0-10

## 2025-06-05 ASSESSMENT — PAIN SCALES - GENERAL: PAINLEVEL_OUTOF10: 2

## 2025-06-05 NOTE — PROGRESS NOTES
Physical Therapy    Physical Therapy Treatment    Patient Name: Saba Park  MRN: [id number]  Today's Date: 6/5/2025  [employer] 13 visits approved  POC end date: 7/22/25  Visit number: 10  Primary dx=unsteadiness on feet  Time Entry:   Time Calculation  Start Time: 1105  Stop Time: 1215  Time Calculation (min): 70 min     PT Therapeutic Procedures Time Entry  Therapeutic Exercise Time Entry: 40  Needle Insertion w/o Injection 1 or 2: 30       Assessment:  Ms. Park is responding well to dry needling with reduced pain levels since beginning of POC.   Pt. With active trigger point with muscle twitch response in glut med today.  The patient had numbness that radiated to her lateral leg which was alleviated following needling of glut med today. Time was spent reviewing stretches for low back.   Pt. Responded well to repeated lumbar flexion with reduced low back pain following this exercise and demonstrating worsening of radicular symptoms in anterior lower leg with repeated lumbar extensions indicating flexion bias.         Plan: review HEP, dry needling as needed       Current Problem  1. Gait disturbance  Follow Up In Physical Therapy      2. Malignant neoplasm of ovary, unspecified laterality (Multi)  Follow Up In Physical Therapy                  Subjective    Pt. Reports that she wants to do needling again.  The next day she was very sore after she was here the last time, her back was sore..  Her right hip doesn't feel as sensitive as it used to.       Precautions: high fall risk, chronic lateral hip pain, claustrophobia, hx. MCI, essential tremor, bilateral peripheral neuropathy, history of CNS lymphoma and ovarian cancer. She got an EEG but everything was normal.      Vital Signs: not taken     Pain: 1/10 right gluteal pain  Pain Assessment  Pain Assessment: 0-10  0-10 (Numeric) Pain Score: 2  Pain Type: Chronic pain  Pain Location: Hip  Pain Orientation: Right    Objective     Observation: pt.  "Arrived to visit with bilateral [employer]          Treatments:    There Ex:   -provided printed handout for HEP  -DKTC 30\"x2  -LTR 1'x1  -isometric hip adduction  -bridges x10 with 3\" hold  -repeated lumbar extension x10 (worsening of radicular pain)  -repeated flexion with stability ball 2x10      DN:  -.25x.30 needles along chronic NIKKI scar x8 needles  .25x.50 needles glut med x1 needle  -The rationale, potential benefits, and risks for dry needling (DN) were discussed with patient. Reviewed precautions to dry needling and written/verbal consent obtained by patient (see chart for written consent). No contraindications present. His/Her questions were answered and he/she agreed to dry needling treatment today. Patient advised that following dry needling he may feel muscle soreness lasting 24-48 hours, and that this is normal.  Patient advised remain hydrated following dry needling. Patient advised to avoid cold pack/ice and NSAIDs for 24-48 hours following DN, if tolerable, to allow normal inflammatory process to occur. Patient may resume normal activity and exercise following DN as able. Patient instructed to please contact the office if any adverse symptoms occur outside of typical muscle soreness.    OP EDUCATION:  -see treatment  -hip ext with RTB thighs, bridges with RTB thighs, hip flexor stretch, squats with YTB  -corner balance MTS EO, FT EO with horizontal/vertical HT, FA EC  -functional mobility with vertical/diagonal HT       Goals:  Active       PT Problem       PT Goal 1       Start:  04/23/25    Expected End:  07/18/25       Patient will improve confidence in functional balance as evident by ABC scale score over 1250         PT Goal 2       Start:  04/23/25    Expected End:  07/18/25       Patient will improve ability to multitask with ambulation on level surfaces, evident by DGI increase from 8 over 16/24         PT Goal 3       Start:  04/23/25    Expected End:  07/18/25       Patient will " achieve improved dynamic static balance evident by ability to maintain balance while standing and completing standing tasks without significant postural sway or retro pulsion x 5 minutes of standing on floor and on soft uneven surfaces such as foam pads         PT Goal 4       Start:  04/23/25    Expected End:  07/18/25       Patient will demonstrated ability to maintain balance with eyes closed x 10 seconds, without retropulsion          PT Goal 5       Start:  04/23/25    Expected End:  07/18/25       Patient will improve balance/endurance to completed 6 minute instead of 2 minute walk test without need for contact guard or balance loss, while reaching distance over 400 feet or better          Patient Stated Goal 1       Start:  04/23/25    Expected End:  07/18/25       Patient will demonstrated ability to maintain instructed exercises and activities without new falls          10MWT       Start:  05/06/25    Expected End:  07/18/25       Pt. Will improve 10MWT by .13 m/s with LRAD to meet normative value for substantial meaningful change and indicate increased gait speed.

## 2025-06-15 NOTE — PROGRESS NOTES
68yo F with LEFT ureteral stone s/p stent placement. s/p bilateral ureteroscopy and lithotripsy. s/p stent removal. some fragments present, small. Now with one fragment in right ureter  Now s/p Right ureteroscopy/LL/Stent    Last visit 3/19/25  - left ureteral stone s/p bilateral ureteroscopy and lithotripsy. s/p stent removal in 2020  -right ureteral stone now s/p right ureteroscopy, retrograde pyelogram, holmium laser lithotripsy, stone basketing and ureteral stent insertion on 3/13/25  -repeat UCX  -plan on repeat right ureteroscopy for 2nd look- will be scheduled in a timely manner  -r/b/a discussed in detail, all questions answered    Today's visit:  #nephrolithiasis   - left ureteral stone s/p bilateral ureteroscopy and lithotripsy. s/p stent removal in 2020  -right ureteral stone now s/p right ureteroscopy, retrograde pyelogram, holmium laser lithotripsy, stone basketing and ureteral stent insertion on 3/13/25  -2nd look ureteroscopy was done on 4/17/25- nos tone seen and was left without a stent    Renal US, 6/3/25: Hydronephrosis seen previously has improved. Non obstructive punctate right upper pole stone.    -stone analysis came back as calcium oxalate stones   Oncology, primary care notes reviewed    Had cervical cancer   -recurrent ovarian cancer s/p 5 cycles of chemotherapy   --Dr. Krista Kenny manages ovarian ca     CT chest, abd, pelvis, 2/14/25, personally reviewed/interpreted:  1. Postsurgical changes status post hysterectomy without evidence to  suggest locoregional disease recurrence. No evidence of new  metastatic disease within the chest, abdomen, or pelvis.  2. Obstructive distal right ureteral calculi with moderate to severe  hydronephrosis and delayed right nephrogram consistent with  obstructive uropathy. Urological consultation is recommended.      Medications:    Current Outpatient Medications:     acetaminophen (Tylenol) 325 mg tablet, Take 2 tablets (650 mg) by mouth every 6 hours if  needed for mild pain (1 - 3)., Disp: 30 tablet, Rfl: 0    buPROPion SR (Wellbutrin SR) 150 mg 12 hr tablet, Take 1 tablet (150 mg) by mouth 2 times a day. Do not crush, chew, or split., Disp: 180 tablet, Rfl: 1    busPIRone (Buspar) 5 mg tablet, TAKE 1 TABLET BY MOUTH TWICE A DAY, Disp: 180 tablet, Rfl: 1    calcium carbonate (Oscal) 500 mg calcium (1,250 mg) tablet, Take 1 tablet by mouth 2 times daily (morning and late afternoon)., Disp: , Rfl:     cholecalciferol (Vitamin D-3) 50 mcg (2,000 unit) capsule, Take 1 capsule (50 mcg) by mouth once daily., Disp: , Rfl:     cyanocobalamin (Vitamin B-12) 250 mcg tablet, Take 1 tablet (250 mcg) by mouth once daily., Disp: , Rfl:     DULoxetine (Cymbalta) 60 mg DR capsule, Take 1 capsule (60 mg) by mouth once daily. Do not crush or chew., Disp: 90 capsule, Rfl: 1    Fluzone High-Dose Triv 24-25 syringe, , Disp: , Rfl:     ibuprofen 600 mg tablet, Take 1 tablet (600 mg) by mouth every 6 hours if needed for mild pain (1 - 3)., Disp: 30 tablet, Rfl: 0    LORazepam (Ativan) 1 mg tablet, Take 1 tab 30mins to 1hr prior to MRI., Disp: 1 tablet, Rfl: 0    methadone (Dolophine) 10 mg tablet, Take 1 tablet (10 mg) by mouth every 12 hours., Disp: 60 tablet, Rfl: 0    oxyCODONE (Roxicodone) 5 mg immediate release tablet, Take 1 tablet (5 mg) by mouth every 6 hours if needed for severe pain (7 - 10)., Disp: 4 tablet, Rfl: 0    pregabalin (Lyrica) 75 mg capsule, Take 1 capsule (75 mg) by mouth 2 times a day. Do not fill before January 15, 2025., Disp: 60 capsule, Rfl: 5    propranolol (Inderal) 40 mg tablet, Take 1 tablet (40 mg) by mouth once daily., Disp: , Rfl:     Allergy:  Allergies   Allergen Reactions    Erythromycin GI Upset        Exam  CONSTITUTIONAL:        No acute distress    HEAD:        Normocephalic and atraumatic    CHEST / RESPIRATORY      no excess work of breathing, no respiratory distress,    ABDOMEN / GASTROINTESTINAL:        Abdomen nondistended           Assessment/Plan  - left ureteral stone s/p bilateral ureteroscopy and lithotripsy. s/p stent removal in 2020  -right ureteral stone now s/p right ureteroscopy, retrograde pyelogram, holmium laser lithotripsy, stone basketing and ureteral stent insertion on 3/13/25    -hydration and dietary changes  -warning signs reviewed    KUB and renal US in 1 year    G 2211  Visit complexity inherent to evaluation and management (E&M) associated with medical care services that serve as the continuing focal point for all needed health care services and/or with medical care services that are part of ongoing care related to a patient's single, serious condition or a complex condition.      Scribe Attestation  By signing my name below, IHelen Scribe   attest that this documentation has been prepared under the direction and in the presence of Karen Reddy MD.

## 2025-06-17 ENCOUNTER — APPOINTMENT (OUTPATIENT)
Dept: UROLOGY | Facility: CLINIC | Age: 70
End: 2025-06-17
Payer: MEDICARE

## 2025-06-17 VITALS — TEMPERATURE: 96.8 F

## 2025-06-17 DIAGNOSIS — N20.0 NEPHROLITHIASIS: ICD-10-CM

## 2025-06-17 PROCEDURE — G2211 COMPLEX E/M VISIT ADD ON: HCPCS | Performed by: UROLOGY

## 2025-06-17 PROCEDURE — 1159F MED LIST DOCD IN RCRD: CPT | Performed by: UROLOGY

## 2025-06-17 PROCEDURE — 1036F TOBACCO NON-USER: CPT | Performed by: UROLOGY

## 2025-06-17 PROCEDURE — 1126F AMNT PAIN NOTED NONE PRSNT: CPT | Performed by: UROLOGY

## 2025-06-17 PROCEDURE — 99213 OFFICE O/P EST LOW 20 MIN: CPT | Performed by: UROLOGY

## 2025-06-17 ASSESSMENT — PAIN SCALES - GENERAL: PAINLEVEL_OUTOF10: 0-NO PAIN

## 2025-06-20 ENCOUNTER — APPOINTMENT (OUTPATIENT)
Dept: HEMATOLOGY/ONCOLOGY | Facility: CLINIC | Age: 70
End: 2025-06-20
Payer: MEDICARE

## 2025-06-24 ENCOUNTER — APPOINTMENT (OUTPATIENT)
Dept: UROLOGY | Facility: CLINIC | Age: 70
End: 2025-06-24
Payer: MEDICARE

## 2025-06-25 ENCOUNTER — TREATMENT (OUTPATIENT)
Dept: PHYSICAL THERAPY | Facility: CLINIC | Age: 70
End: 2025-06-25
Payer: MEDICARE

## 2025-06-25 DIAGNOSIS — C56.9 MALIGNANT NEOPLASM OF OVARY, UNSPECIFIED LATERALITY (MULTI): ICD-10-CM

## 2025-06-25 DIAGNOSIS — R26.9 GAIT DISTURBANCE: Primary | ICD-10-CM

## 2025-06-25 PROCEDURE — 97110 THERAPEUTIC EXERCISES: CPT | Mod: GP | Performed by: PHYSICAL THERAPIST

## 2025-06-25 PROCEDURE — 97112 NEUROMUSCULAR REEDUCATION: CPT | Mod: GP | Performed by: PHYSICAL THERAPIST

## 2025-06-25 NOTE — PROGRESS NOTES
Physical Therapy    Physical Therapy Treatment/Progress Check    Patient Name: Saba Park  MRN: [id number]  Today's Date: 6/26/2025  [employer] 13 visits approved  POC end date: 7/22/25  Visit number: 11   Primary dx=unsteadiness on feet  Time Entry:   Time Calculation  Start Time: 1435  Stop Time: 1515  Time Calculation (min): 40 min     PT Therapeutic Procedures Time Entry  Therapeutic Exercise Time Entry: 10  Neuromuscular Re-Education Time Entry: 30       Assessment:  Ms. Park continues to demonstrate trunk ataxia which is impacting her static and dynamic balance. With core activation through therabands or weighted ball, pt. Presents with improved postural control.  Added core strengthening exercises to HEP. Pt. Tolerated gluteal strengthening with no increase in right hip pain during or after visit.       Plan: review HEP, dry needling as needed, resisted ambulation       Current Problem  1. Gait disturbance  Follow Up In Physical Therapy      2. Malignant neoplasm of ovary, unspecified laterality (Multi)  Follow Up In Physical Therapy                    Subjective    Pt. Reports that she went on her trip. At the airport she got a chair.  She fel like she did really well and her friends said she is doing better.  Someone was with her in the sand. She didn't bring her hiking stick.  Her hip is feeling better.  Her pain right now is chronic and it is a low 2/10.       Precautions: high fall risk, chronic lateral hip pain, claustrophobia, hx. MCI, essential tremor, bilateral peripheral neuropathy, history of CNS lymphoma and ovarian cancer. She got an EEG but everything was normal.      Vital Signs: not taken     Pain: 2/10 right gluteal pain       Objective     Observation: pt. Arrived to visit with bilateral trekking poles          Treatments:  (Full session performed with gripping socks due to pt. Wearing flip flops by accident to today's visit)  Neuro Re-Ed:  -lateral lunges over paulino on foam  "x10 each side with CGA  -forward lunges over paulino on foam x10 each x10 each side with CGA   -lateral lunges over paulino on foam x10 with weighted ball with CGA  -band walking with band around BUE's with alternating resisted serratus punches with GTB 30'x2  -educated pt. On proper shoewear to reduce fall risk and discouraged use of flip flops and discussed proper shoes such as a sandal with support around the back of the ankle as pt. Wore flip flops today    There Ex:  -single leg bridges x5 each side  -quadruped with bilateral knee lift 10\"x3    OP EDUCATION:  -see treatment  -hip ext with RTB thighs, bridges with RTB thighs, hip flexor stretch, squats with YTB  -corner balance MTS EO, FT EO with horizontal/vertical HT, FA EC  -functional mobility with vertical/diagonal HT  -single leg bridges  -Quadruped knee lift with core activation       Goals:  Active       PT Problem       PT Goal 1       Start:  04/23/25    Expected End:  07/18/25       Patient will improve confidence in functional balance as evident by ABC scale score over 1250         PT Goal 2       Start:  04/23/25    Expected End:  07/18/25       Patient will improve ability to multitask with ambulation on level surfaces, evident by DGI increase from 8 over 16/24         PT Goal 3       Start:  04/23/25    Expected End:  07/18/25       Patient will achieve improved dynamic static balance evident by ability to maintain balance while standing and completing standing tasks without significant postural sway or retro pulsion x 5 minutes of standing on floor and on soft uneven surfaces such as foam pads         PT Goal 4       Start:  04/23/25    Expected End:  07/18/25       Patient will demonstrated ability to maintain balance with eyes closed x 10 seconds, without retropulsion          PT Goal 5       Start:  04/23/25    Expected End:  07/18/25       Patient will improve balance/endurance to completed 6 minute instead of 2 minute walk test without need " for contact guard or balance loss, while reaching distance over 400 feet or better          Patient Stated Goal 1       Start:  04/23/25    Expected End:  07/18/25       Patient will demonstrated ability to maintain instructed exercises and activities without new falls          10MWT       Start:  05/06/25    Expected End:  07/18/25       Pt. Will improve 10MWT by .13 m/s with LRAD to meet normative value for substantial meaningful change and indicate increased gait speed.

## 2025-06-26 ENCOUNTER — HOSPITAL ENCOUNTER (OUTPATIENT)
Dept: RADIOLOGY | Facility: CLINIC | Age: 70
Discharge: HOME | End: 2025-06-26
Payer: MEDICARE

## 2025-06-26 ENCOUNTER — TELEPHONE (OUTPATIENT)
Dept: ADMISSION | Facility: HOSPITAL | Age: 70
End: 2025-06-26
Payer: MEDICARE

## 2025-06-26 DIAGNOSIS — G62.0 CHEMOTHERAPY-INDUCED PERIPHERAL NEUROPATHY (MULTI): ICD-10-CM

## 2025-06-26 DIAGNOSIS — T45.1X5A CHEMOTHERAPY-INDUCED PERIPHERAL NEUROPATHY (MULTI): ICD-10-CM

## 2025-06-26 DIAGNOSIS — C56.9 MALIGNANT NEOPLASM OF OVARY, UNSPECIFIED LATERALITY (MULTI): ICD-10-CM

## 2025-06-26 DIAGNOSIS — Z09 ENCOUNTER FOR FOLLOW-UP EXAMINATION AFTER COMPLETED TREATMENT FOR CONDITIONS OTHER THAN MALIGNANT NEOPLASM: ICD-10-CM

## 2025-06-26 DIAGNOSIS — G89.29 OTHER CHRONIC PAIN: ICD-10-CM

## 2025-06-26 PROCEDURE — 77080 DXA BONE DENSITY AXIAL: CPT

## 2025-06-27 ENCOUNTER — INFUSION (OUTPATIENT)
Dept: HEMATOLOGY/ONCOLOGY | Facility: CLINIC | Age: 70
End: 2025-06-27
Payer: MEDICARE

## 2025-06-27 VITALS
OXYGEN SATURATION: 98 % | RESPIRATION RATE: 18 BRPM | DIASTOLIC BLOOD PRESSURE: 74 MMHG | TEMPERATURE: 96.7 F | HEART RATE: 72 BPM | SYSTOLIC BLOOD PRESSURE: 126 MMHG

## 2025-06-27 DIAGNOSIS — C56.9 MALIGNANT NEOPLASM OF OVARY, UNSPECIFIED LATERALITY (MULTI): ICD-10-CM

## 2025-06-27 PROCEDURE — 2500000004 HC RX 250 GENERAL PHARMACY W/ HCPCS (ALT 636 FOR OP/ED): Performed by: OBSTETRICS & GYNECOLOGY

## 2025-06-27 PROCEDURE — 96523 IRRIG DRUG DELIVERY DEVICE: CPT

## 2025-06-27 RX ORDER — HEPARIN SODIUM,PORCINE/PF 10 UNIT/ML
50 SYRINGE (ML) INTRAVENOUS AS NEEDED
Status: DISCONTINUED | OUTPATIENT
Start: 2025-06-27 | End: 2025-06-27 | Stop reason: HOSPADM

## 2025-06-27 RX ORDER — HEPARIN 100 UNIT/ML
500 SYRINGE INTRAVENOUS AS NEEDED
Status: DISCONTINUED | OUTPATIENT
Start: 2025-06-27 | End: 2025-06-27 | Stop reason: HOSPADM

## 2025-06-27 RX ORDER — HEPARIN 100 UNIT/ML
500 SYRINGE INTRAVENOUS AS NEEDED
OUTPATIENT
Start: 2025-06-27

## 2025-06-27 RX ORDER — HEPARIN SODIUM,PORCINE/PF 10 UNIT/ML
50 SYRINGE (ML) INTRAVENOUS AS NEEDED
OUTPATIENT
Start: 2025-06-27

## 2025-06-27 RX ORDER — METHADONE HYDROCHLORIDE 10 MG/1
10 TABLET ORAL EVERY 12 HOURS
Qty: 60 TABLET | Refills: 0 | Status: SHIPPED | OUTPATIENT
Start: 2025-06-27 | End: 2025-07-27

## 2025-06-27 RX ADMIN — HEPARIN 500 UNITS: 100 SYRINGE at 13:39

## 2025-06-27 ASSESSMENT — PAIN SCALES - GENERAL: PAINLEVEL_OUTOF10: 0-NO PAIN

## 2025-06-27 NOTE — TELEPHONE ENCOUNTER
Saba called back to follow up on Methadone script.  She is requesting a call back once this medication is sent to [facility].  Okay to leave voicemail on message as she is walking into an appointment.

## 2025-07-01 ENCOUNTER — TREATMENT (OUTPATIENT)
Dept: PHYSICAL THERAPY | Facility: CLINIC | Age: 70
End: 2025-07-01
Payer: MEDICARE

## 2025-07-01 DIAGNOSIS — C56.9 MALIGNANT NEOPLASM OF OVARY, UNSPECIFIED LATERALITY (MULTI): ICD-10-CM

## 2025-07-01 DIAGNOSIS — R26.9 GAIT DISTURBANCE: Primary | ICD-10-CM

## 2025-07-01 PROCEDURE — 97530 THERAPEUTIC ACTIVITIES: CPT | Mod: GP | Performed by: PHYSICAL THERAPIST

## 2025-07-01 PROCEDURE — 97112 NEUROMUSCULAR REEDUCATION: CPT | Mod: GP | Performed by: PHYSICAL THERAPIST

## 2025-07-01 NOTE — PROGRESS NOTES
Physical Therapy    Physical Therapy Treatment/Progress Check    Patient Name: Saba Park  MRN: [id number]  Today's Date: 7/1/2025  [employer] required  POC end date: 7/22/25  Visit number: 12   Primary dx=unsteadiness on feet  Time Entry:   Time Calculation  Start Time: 1405  Stop Time: 1446  Time Calculation (min): 41 min     PT Therapeutic Procedures Time Entry  Neuromuscular Re-Education Time Entry: 16  Therapeutic Activity Time Entry: 25       Assessment:  Progress check completed today to determine need for continued physical therapy and assess progress towards long term goals.  Ms. Park has improved in her functional strength, gait speed, static and dynamic balance since starting physical therapy.  The patient exceeded all of her long term goals for the TUG, 10MWT, 5x sit to stand test making more than a statistical improvement in all of these areas.  Ms. Park also improved to being able to complete the full 6MWT indicating improved functional mobility endurance.  Ms. Park improved on her score on the FGA from a 7/30 to a 11/30 today, meeting MCID for significant change.  New goal established for FGA due to pt. Making quicker progress than initially anticipated.  The patient is also reporting reduced lateral hip pain with an excellent response to dry needling performed during POC and as a result has improved her standing and walking endurance.The patient shows potential to continue to improve her balance and reduce fall risk in her home and the community.       Plan: review HEP, dry needling as needed, resisted ambulation       Current Problem  1. Gait disturbance  Follow Up In Physical Therapy      2. Malignant neoplasm of ovary, unspecified laterality (Multi)  Follow Up In Physical Therapy                Subjective    Pt. Reports that she was very sore the last time she was here. It lasted a couple of days.  She feels that her hip is doing better.       Precautions: high fall  risk, chronic lateral hip pain, claustrophobia, hx. MCI, essential tremor, bilateral peripheral neuropathy, history of CNS lymphoma and ovarian cancer. She got an EEG but everything was normal.      Vital Signs: not taken     Pain: 2/10 right gluteal pain       Objective     Observation: pt. Arrived to visit with bilateral trekking poles    Outcome Measures:     10MWT:   25=11.6 seconds no device  25= 21 seconds with trekking pole and supervision assist    5x sit<>Stands:  25=14.8 seconds  IE=27 seconds    TU25=10.5 seconds  IE=14 seconds    6MWT= 906 ft. With no device        FGA - Functional Gait Assessment  Gait level surface: 2  Change in gait speed: 2  Gait with horizontal head turns: 1  Gait with vertical head turns: 1  Gait and pivot turn: 1  Step over obstacle: 1  Gait with narrow base of support: 1  Gait with eyes closed: 0  Ambulating backwards: 0  Steps: 2  FGA Total Score: 11  FGA - Functional Gait Assessment  25=30  IE= 7    Treatments:    Neuro Re-Ed:  The following exercises were completed at supervision level over 20ft. distance  Gait level surface:  Change in gait speed:  Gait with horizontal head turns:  Gait with vertical head turns:  Gait with pivot turn:  Step over obstacle:  Gait with narrow ANKIT:  Gait with eyes closed:  Ambulating backwards:  Stairs 3-6 inch stairs   Educated pt. On fall risk cut off score.     There Act:  -sit<>Stands x8 with no UE support  -functional mobility no device 30'x1, 20'x1     OP EDUCATION:  -see treatment  -hip ext with RTB thighs, bridges with RTB thighs, hip flexor stretch, squats with YTB  -corner balance MTS EO, FT EO with horizontal/vertical HT, FA EC  -functional mobility with vertical/diagonal HT  -single leg bridges  -Quadruped knee lift with core activation       Goals:  Active       PT Problem       PT Goal 1       Start:  25    Expected End:  25       Patient will improve confidence in functional balance as evident  by ABC scale score over 1250         PT Goal 2       Start:  25    Expected End:  25       Patient will improve ability to multitask with ambulation on level surfaces, evident by DGI increase from 8 over          PT Goal 3       Start:  25    Expected End:  25       Patient will achieve improved dynamic static balance evident by ability to maintain balance while standing and completing standing tasks without significant postural sway or retro pulsion x 5 minutes of standing on floor and on soft uneven surfaces such as foam pads         PT Goal 4       Start:  25    Expected End:  25       Patient will demonstrated ability to maintain balance with eyes closed x 10 seconds, without retropulsion          PT Goal 5       Start:  25    Expected End:  25       Patient will improve balance/endurance to completed 6 minute instead of 2 minute walk test without need for contact guard or balance loss, while reaching distance over 400 feet or better          Patient Stated Goal 1       Start:  25    Expected End:  25       Patient will demonstrated ability to maintain instructed exercises and activities without new falls          10MWT       Start:  25    Expected End:  25       Pt. Will improve 10MWT by .13 m/s with LRAD to meet normative value for substantial meaningful change and indicate increased gait speed.              Insurance Authorization Information  Date of Evaluation: 25    Onset Date: 2025    Referring Physician: Krista Kenny     Surgery in the Last 3 months:  no    CPT Codes: Therapeutic Exercise: 85318, Therapeutic Activity: 07346, Neuromuscular Re-Education: 14142, Manual Therapy: 91770, Gait Trainin, and Self-Care/Home Management Trainin dry [id number], [id number]    Diagnosis:   Problem List Items Addressed This Visit           ICD-10-CM       Hematology and Neoplasia    Malignant neoplasm of unspecified  ovary (Multi) C56.9       Symptoms and Signs    Gait disturbance - Primary R26.9          Functional Outcome:  FGA - Functional Gait Assessment  Gait level surface: 2  Change in gait speed: 2  Gait with horizontal head turns: 1  Gait with vertical head turns: 1  Gait and pivot turn: 1  Step over obstacle: 1  Gait with narrow base of support: 1  Gait with eyes closed: 0  Ambulating backwards: 0  Steps: 2  FGA Total Score: 11    OT / PT Evaluation complexity:  high    Which of the following best describes the primary reason of therapy: Developing age appropriate skills which were previously undeveloped or keeping functions which are at risk of being lost.    Visits Requested: 8    Date Range: 90 days    Select all conditions that apply: None of these apply     Re-evaluation only:  Short term goals Met: 2/2    Long term goals Met: 2/5    Krista Alarcon, PT

## 2025-07-02 ENCOUNTER — TELEPHONE (OUTPATIENT)
Dept: NEUROLOGY | Facility: CLINIC | Age: 70
End: 2025-07-02
Payer: MEDICARE

## 2025-07-02 NOTE — TELEPHONE ENCOUNTER
I called [employer] for an update for Saba. They were waiting for the updated auth from her insurance. The auth is now approved.  As per their policy patients rent the [employer]. The stimulator is $100.00 month and wrist band is $100 every 3 months. Yearly total is $1500 total. They will be shipping to Saba by end of week.

## 2025-07-09 ENCOUNTER — TREATMENT (OUTPATIENT)
Dept: PHYSICAL THERAPY | Facility: CLINIC | Age: 70
End: 2025-07-09
Payer: MEDICARE

## 2025-07-09 DIAGNOSIS — R26.9 GAIT DISTURBANCE: ICD-10-CM

## 2025-07-09 DIAGNOSIS — C56.9 MALIGNANT NEOPLASM OF OVARY, UNSPECIFIED LATERALITY (MULTI): ICD-10-CM

## 2025-07-09 PROCEDURE — 97112 NEUROMUSCULAR REEDUCATION: CPT | Mod: GP | Performed by: PHYSICAL THERAPIST

## 2025-07-09 PROCEDURE — 97110 THERAPEUTIC EXERCISES: CPT | Mod: GP | Performed by: PHYSICAL THERAPIST

## 2025-07-09 NOTE — PROGRESS NOTES
"Physical Therapy    Physical Therapy Treatment/Progress Check    Patient Name: Saba Park  MRN: [id number]  Today's Date: 7/9/2025  [employer] 13 visits approved  POC end date: 7/22/25  Visit number: 13   Primary dx=unsteadiness on feet  Time Entry:   Time Calculation  Start Time: 1035  Stop Time: 1120  Time Calculation (min): 45 min     PT Therapeutic Procedures Time Entry  Therapeutic Exercise Time Entry: 9  Neuromuscular Re-Education Time Entry: 36       Assessment:  Ms. Park is presents with significantly improved dynamic balance when carrying weights, suspect this adds proprioceptive input and facilitates core activation which in turn is improving proximal trunk control. Pt. With right sided low back pain following weighted exercsies today. Pt. Pain was reduced with repeated prone extensions. Educated pt. On utilizing repeated extension to \"feed\" her symptoms to reduce pain and prevent worsening of low back pain.  Pt. Was challenged with dual cognitive task of blaze pods during dynamic balance, however with practice the patient imrpoved.        Plan: review HEP, dry needling as needed, dynamic balance with dual motor/cog tasks       Current Problem  1. Malignant neoplasm of ovary, unspecified laterality (Multi)  Follow Up In Physical Therapy      2. Gait disturbance  Follow Up In Physical Therapy                  Subjective    Pt. Reports that she is getting a dog and they pick her up tomorrow.  She is really sore in her hip today.  She cut her lawn yesterday on her tractor and picked up sticks. Today she is still a little sore in her hip and back.     Precautions: high fall risk, chronic lateral hip pain, claustrophobia, hx. MCI, essential tremor, bilateral peripheral neuropathy, history of CNS lymphoma and ovarian cancer. She got an EEG but everything was normal.      Vital Signs: not taken     Pain: 3/10 right gluteal pain       Objective     Observation: pt. Arrived to visit with bilateral " "trekking poles          Treatments:      Neuro Re-Ed:  -blaze pods color catch x4 pods, x2 pods on 4 inch step and 6-inch with x2 pods on each side 1'x2, red=right, blue left with CGA (mod VC for proper foot placement)  -obstacle course with x1 paulino with XL foam, regular foam, paulino, x4 river rocks , 4-inch step x6 laps (x1 lap with weighted ball)  -F lunges into foam with 8# weighted ball in BUE's x10 on each side with SBA  -lunges F onto foam without weights x5 each side with CGA (inc. Ataxia noted)    There Ex:  -repeated lumbar flexion on diagonal to the L only x10 with 5\" hold  -prone extension x10 reps (dec. Pain)  -educated pt. On completing repeated lumbar extension either in standing or prone to reduce stress/strain on lumbar spine, discussed extension directional preference and recommendation to \"feed\" symptoms to reduce pain. Provided pt. With picture explanation of lumbar spine and anatomy/physiology of discs    OP EDUCATION:  -see treatment  -hip ext with RTB thighs, bridges with RTB thighs, hip flexor stretch, squats with YTB  -corner balance MTS EO, FT EO with horizontal/vertical HT, FA EC  -functional mobility with vertical/diagonal HT  -single leg bridges  -Quadruped knee lift with core activation       Goals:  Active       PT Problem       PT Goal 1 (Progressing)       Start:  04/23/25    Expected End:  07/18/25       Patient will improve confidence in functional balance as evident by ABC scale score over 1250         PT Goal 2 (Progressing)       Start:  04/23/25    Expected End:  07/18/25       Pt. Will improve FGA by MCID of 4 points compared to normative values (addendum by Krista Alarcon)  IE=7/30 7/1/25=11/30      Goal Note       FGA initial evaluation=7/30 7/2/25=11/30  NEW GOAL 7/2/25=pt. Will improve FGA by additional 4 points to meet MCID compared to normative values.              PT Goal 3 (Met)       Start:  04/23/25    Expected End:  07/18/25    Resolved:  07/02/25    Patient will " achieve improved dynamic static balance evident by ability to maintain balance while standing and completing standing tasks without significant postural sway or retro pulsion x 5 minutes of standing on floor and on soft uneven surfaces such as foam pads         PT Goal 4 (Progressing)       Start:  04/23/25    Expected End:  07/18/25       Patient will demonstrated ability to maintain balance with eyes closed x 10 seconds, without retropulsion          PT Goal 5 (Met)       Start:  04/23/25    Expected End:  07/18/25    Resolved:  07/02/25    Patient will improve balance/endurance to completed 6 minute instead of 2 minute walk test without need for contact guard or balance loss, while reaching distance over 400 feet or better          Patient Stated Goal 1       Start:  04/23/25    Expected End:  07/18/25       Patient will demonstrated ability to maintain instructed exercises and activities without new falls          10MWT (Met)       Start:  05/06/25    Expected End:  07/18/25    Resolved:  07/02/25    Pt. Will improve 10MWT by .13 m/s with LRAD to meet normative value for substantial meaningful change and indicate increased gait speed.       Goal Note       IE=21 seconds with trekking pole  7/2/25=11.6 seconds no device

## 2025-07-14 ENCOUNTER — APPOINTMENT (OUTPATIENT)
Dept: BEHAVIORAL HEALTH | Facility: CLINIC | Age: 70
End: 2025-07-14
Payer: MEDICARE

## 2025-07-14 DIAGNOSIS — F41.9 ANXIETY: ICD-10-CM

## 2025-07-14 DIAGNOSIS — F33.0 MILD EPISODE OF RECURRENT MAJOR DEPRESSIVE DISORDER: ICD-10-CM

## 2025-07-14 PROCEDURE — 1159F MED LIST DOCD IN RCRD: CPT | Performed by: PSYCHIATRY & NEUROLOGY

## 2025-07-14 PROCEDURE — 1036F TOBACCO NON-USER: CPT | Performed by: PSYCHIATRY & NEUROLOGY

## 2025-07-14 PROCEDURE — 99214 OFFICE O/P EST MOD 30 MIN: CPT | Performed by: PSYCHIATRY & NEUROLOGY

## 2025-07-14 PROCEDURE — 1160F RVW MEDS BY RX/DR IN RCRD: CPT | Performed by: PSYCHIATRY & NEUROLOGY

## 2025-07-14 RX ORDER — BUSPIRONE HYDROCHLORIDE 5 MG/1
5 TABLET ORAL 2 TIMES DAILY
Qty: 180 TABLET | Refills: 1 | Status: SHIPPED | OUTPATIENT
Start: 2025-07-14

## 2025-07-14 RX ORDER — DULOXETIN HYDROCHLORIDE 60 MG/1
60 CAPSULE, DELAYED RELEASE ORAL DAILY
Qty: 90 CAPSULE | Refills: 1 | Status: SHIPPED | OUTPATIENT
Start: 2025-07-14

## 2025-07-14 NOTE — PROGRESS NOTES
"Outpatient Psychiatry FUV      Subjective   Saba Park, a 70 y.o. female, for virtual FUV    Virtual or Telephone Consent    While technically available, the patient was unable or unwilling to consent to connect via audio/video telehealth technology; therefore, I performed this visit using a real-time audio only connection between Saba Park & Anabella Ugalde MD.  Verbal consent was requested and obtained from Saba Park on this date, 07/14/25 for a telehealth visit and the patient's location was confirmed at the time of the visit.    Confirmed to be home for appt today, pt was unable to connect        Assessment/Plan   Patient Discussion:  CONTINUE duloxetine 60mg daily at bedtime   CONTINUE  buspirone 5mg 2x/day  CONTINUE bupropion SR 150mg 2x/day -- consider further decrease next appt based on AH     RETURN to clinic 9/11 at 4:30PM virtual FUV     Call with questions/concerns [phone]    Assessment:   70 y.o.  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 AVN R hip dx 2016. 2019 dx with ovarian CA in surveillance s/p resection and chemo. Pt had been see several time in 2015 ahead of MD maternity leave and transferred care to Dr. Nilay Farr, who has since left . 2021 pt re-established care and here for follow up     FUV 7/14/2025  Since last appt, tremor worse, work up per neuro. Also  of music, notes this predated increase in bupropion. Will consider lowering dose depending on work up, though this increase has seemed to help mood. Follow up 2 months, sooner if needed.     Diagnosis:   MDD, recurrent, p remission  Other spec anxiety r/o adjustment vs part of depression     Treatment Plan/Recommendations:   1. Safety Assessment: denies active thoughts of death/self harm though has had \"dark thoughts\". no h/o SI/SA. passive death wish in the past but not recently . RF include age, W, " pain, cancer. PF include , engaged in care, no hx, no substance abuse, no guns. Currently low imminent risk     2. MDD, other spec anxiety r/o element of MDD vs ISIS  CONTINUE duloxetine 60mg PO QHS   CONTINUE buspar 5mg PO BID for now--consider tapering  CONTINUE bupropion SR 200mg PO BID, monitor anxiety--tremor worse since increase, unclear if related, work up per neuro     Consider resuming therapy, supportive therapy during appt--pt has also   stopped seeing therapist related to worse health and not able to keep up, defer for now     3. 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  elevated  completed chemo carboplatin/taxol -- in remission, next scan 3 months  Had stents for kidney stones  Neuro work up for worsening tremor and AH of music--MRI, EEG     4. Social: lives with , continue to encourage behavioral activation. Does better when out and about but frustrated by physical limitations, has been limited this summer due to health    Reason for Visit:     FUV for depression, anxiety    Subjective:  Last appt 5/2025    Since then had discussed if bupropion increase related to AH of music  Did decrease and was initially better but now will hear music again, usually when alone, not during conversation. Not as frequent  Due to improvement in mood, prefers to monitor at current dose and decide next visit whether to lower dose    Did get pedro luis watch--waiting  to see  Continues with physical therapy--has been helpful    no SI or thoughts of not wanting to go on  No a/e to meds    Current Medications:    Current Outpatient Medications:     acetaminophen (Tylenol) 325 mg tablet, Take 2 tablets (650 mg) by mouth every 6 hours if needed for mild pain (1 - 3)., Disp: 30 tablet, Rfl: 0    buPROPion SR (Wellbutrin SR) 150 mg 12 hr tablet, Take 1 tablet (150 mg) by mouth 2 times a day. Do not crush, chew, or split., Disp: 180 tablet, Rfl: 1    busPIRone (Buspar) 5 mg tablet, TAKE 1 TABLET BY MOUTH TWICE A DAY, Disp: 180 tablet, Rfl: 1    calcium carbonate (Oscal) 500 mg calcium (1,250 mg) tablet, Take 1 tablet by mouth 2 times daily (morning and late afternoon)., Disp: , Rfl:     cholecalciferol (Vitamin D-3) 50 mcg (2,000 unit) capsule, Take 1 capsule (50 mcg) by mouth once daily., Disp: , Rfl:     cyanocobalamin (Vitamin B-12) 250 mcg tablet, Take 1 tablet (250 mcg) by mouth once daily., Disp: , Rfl:     DULoxetine (Cymbalta) 60 mg DR capsule, Take 1 capsule (60 mg) by mouth once daily. Do not crush or chew., Disp: 90 capsule, Rfl: 1    Fluzone High-Dose Triv 24-25 syringe, , Disp: , Rfl:     ibuprofen 600 mg tablet, Take 1 tablet (600 mg) by mouth every 6 hours if needed for mild pain (1 - 3)., Disp: 30 tablet, Rfl: 0    LORazepam (Ativan) 1 mg tablet, Take 1 tab 30mins to 1hr prior to MRI., Disp: 1 tablet, Rfl: 0    methadone (Dolophine) 10 mg tablet, Take 1 tablet (10 mg) by mouth every 12 hours., Disp: 60 tablet, Rfl: 0    oxyCODONE (Roxicodone) 5 mg immediate release tablet, Take 1 tablet (5 mg) by mouth every 6 hours if needed for severe pain (7 - 10)., Disp: 4 tablet, Rfl: 0    pregabalin (Lyrica) 75 mg capsule, Take 1 capsule (75 mg) by mouth 2 times a day. Do not fill before January 15, 2025., Disp: 60 capsule, Rfl: 5    propranolol (Inderal) 40 mg tablet, Take 1 tablet (40 mg) by mouth once daily., Disp: , Rfl:   Medical History:  Past Medical History:   Diagnosis  Date    Anxiety     Arthritis     Binge eating disorder     Binge-eating disorder, severe 03/23/2017    Cardiac conduction disorder 03/09/2017    Chemotherapy-induced peripheral neuropathy (Multi)     Claustrophobia     Colitis     Depression     Dizziness     Low BP, saw neurology NP Ananya 1/10/25, meds adjusted    Dry eye syndrome     Essential tremor     Family history of malignant neoplasm of breast 02/28/2019    Family history of breast cancer    Gynecologic cancer (Multi) 09/18/2023    HLD (hyperlipidemia)     HTN (hypertension)     Hx antineoplastic chemo     Hydronephrosis with renal and ureteral calculus obstruction     IBS (irritable bowel syndrome)     Infection of right prosthetic hip joint     Kidney stones     Low back pain     Low platelet count     Platelets=48 on 2/14/25    Lymphoma     Neuromuscular disorder (Multi)     OA (osteoarthritis)     Osteopenia     Ovarian cancer (Multi)     Personal history of malignant neoplasm of other parts of uterus 08/14/2020    History of malignant neoplasm of other parts of uterus    Personal history of non-Hodgkin lymphomas 08/14/2020    History of non-Hodgkin's lymphoma    Personal history of other specified conditions 10/28/2014    History of fibrocystic disease of breast    Portal vein thrombosis 09/18/2023    PORTAL VEIN THROMBOSIS I81    Presence of spectacles and contact lenses 12/09/2014    Wears contact lenses    Recurrent malignant neoplasm of ovary (Multi)     follows with oncology, s/p 6 cycles of Carbo/taxol    Right rotator cuff tendonitis     Skin cancer     basal cell    Thyroid nodule     Visual impairment        Surgical History:  Past Surgical History:   Procedure Laterality Date    BRAIN SURGERY      CHOLECYSTECTOMY      EXPLORATORY LAPAROTOMY      EYE SURGERY      laser    HYSTERECTOMY      JOINT REPLACEMENT Right     hip, right THR    LITHOTRIPSY  08/19/2013    Renal Lithotripsy    LITHOTRIPSY  03/13/2025    LYMPH NODE BIOPSY      OTHER  SURGICAL HISTORY  2013    Lithotomy    OTHER SURGICAL HISTORY  2019    Ileostomy closure    TONSILLECTOMY         Family History:  Family History   Problem Relation Name Age of Onset    Breast cancer Mother Mary Foley -  60    Stroke Mother Mary Foley -      Colon cancer Father Sherif Foley -      Cancer Father Sherif Foley -      COPD Father Sherif Foley -      Hearing loss Father Sherif Foley -      Heart disease Father Sherif Foley -      Ovarian cancer Maternal Grandmother      Breast cancer Mother's Sister Zamzam Campbell -  40    Breast cancer Cousin  50       Social History:  Social History     Socioeconomic History    Marital status:      Spouse name: Not on file    Number of children: Not on file    Years of education: Not on file    Highest education level: Not on file   Occupational History    Not on file   Tobacco Use    Smoking status: Never     Passive exposure: Never    Smokeless tobacco: Never   Vaping Use    Vaping status: Never Used   Substance and Sexual Activity    Alcohol use: Not Currently     Comment: 1 glass of wine daily    Drug use: Not Currently     Types: Marijuana     Comment: medical card / gummies    Sexual activity: Defer   Other Topics Concern    Not on file   Social History Narrative    Not on file     Social Drivers of Health     Financial Resource Strain: Low Risk  (2024)    Overall Financial Resource Strain (CARDIA)     Difficulty of Paying Living Expenses: Not hard at all   Food Insecurity: No Food Insecurity (2024)    Hunger Vital Sign     Worried About Running Out of Food in the Last Year: Never true     Ran Out of Food in the Last Year: Never true   Transportation Needs: No Transportation Needs (2024)    PRAPARE - Transportation     Lack of Transportation (Medical): No     Lack of Transportation (Non-Medical): No   Physical Activity: Inactive (2024)    Exercise Vital Sign     " Days of Exercise per Week: 0 days     Minutes of Exercise per Session: 0 min   Stress: Stress Concern Present (7/26/2024)    [employer] - Occupational Stress Questionnaire     Feeling of Stress : To some extent   Social Connections: Moderately Isolated (7/26/2024)    Social Connection and Isolation Panel [NHANES]     Frequency of Communication with Friends and Family: More than three times a week     Frequency of Social Gatherings with Friends and Family: Once a week     Attends Spiritism Services: Never     Active Member of Clubs or Organizations: No     Attends Club or Organization Meetings: Never     Marital Status:    Intimate Partner Violence: Not At Risk (7/26/2024)    Humiliation, Afraid, Rape, and Kick questionnaire     Fear of Current or Ex-Partner: No     Emotionally Abused: No     Physically Abused: No     Sexually Abused: No   Housing Stability: Low Risk  (7/25/2024)    Housing Stability Vital Sign     Unable to Pay for Housing in the Last Year: No     Number of Times Moved in the Last Year: 1     Homeless in the Last Year: No              Medical Review Of Systems:  +neuropathy  +pain but better  +alopecia  +worsening tremor    Psychiatric Review Of Systems:  improvement in mood  AH of music--less frequent    Objective   Mental Status Exam:     Appearance: appropriate g/h.   Attitude: cooperative and engaged.   Behavior: appropriate eye contact via video.   Motor Activity: mild truncal tremor noted.   Speech: regular rate/volume. No dysarthria, no aphasia  Mood: \"good right now\"   Affect: congruent, appropriate to circumstance  Thought Process: on topic.   Thought Content: no delusions, no SI/HI,   Thought Perception: no AVH.   Cognition: alert, oriented to person, place, time. attn intact.   Insight: fair.   Judgment: fair.       Vitals:  There were no vitals filed for this visit.  Encounter Date: 02/27/25   ECG 12 Lead   Result Value    Ventricular Rate 72    " Atrial Rate 72    IL Interval 146    QRS Duration 78    QT Interval 402    QTC Calculation(Bazett) 440    P Axis 49    R Axis 52    T Axis 47    QRS Count 12    Q Onset 220    P Onset 147    P Offset 197    T Offset 421    QTC Fredericia 427    Narrative    Normal sinus rhythm  Normal ECG  When compared with ECG of 25-JUL-2024 04:45,  No significant change was found  Confirmed by Robbin Mireels ([id number]) on 2/27/2025 1:08:27 PM       Lab Results   Component Value Date    WBC 4.7 05/16/2025    HGB 11.4 (L) 05/16/2025    HCT 36.0 05/16/2025    MCV 99 05/16/2025     05/16/2025     Lab Results   Component Value Date    GLUCOSE 94 05/16/2025    CALCIUM 9.3 05/16/2025     05/16/2025    K 4.0 05/16/2025    CO2 27 05/16/2025     05/16/2025    BUN 21 05/16/2025    CREATININE 0.92 05/16/2025       Psychotherapy   Time: 12 minutes  Type: supportive  Target: mood, anxiety  Techniques: problem solving, validation  Goal: improved sx management  Follow up: next appt  Response: therese Ugalde MD

## 2025-07-15 ENCOUNTER — TREATMENT (OUTPATIENT)
Dept: PHYSICAL THERAPY | Facility: CLINIC | Age: 70
End: 2025-07-15
Payer: MEDICARE

## 2025-07-15 DIAGNOSIS — G89.29 CHRONIC BILATERAL LOW BACK PAIN WITH RIGHT-SIDED SCIATICA: ICD-10-CM

## 2025-07-15 DIAGNOSIS — R26.81 UNSTEADINESS ON FEET: Primary | ICD-10-CM

## 2025-07-15 DIAGNOSIS — C56.9 MALIGNANT NEOPLASM OF OVARY, UNSPECIFIED LATERALITY (MULTI): ICD-10-CM

## 2025-07-15 DIAGNOSIS — M25.551 RIGHT HIP PAIN: ICD-10-CM

## 2025-07-15 DIAGNOSIS — M54.41 CHRONIC BILATERAL LOW BACK PAIN WITH RIGHT-SIDED SCIATICA: ICD-10-CM

## 2025-07-15 DIAGNOSIS — R26.9 GAIT DISTURBANCE: ICD-10-CM

## 2025-07-15 PROCEDURE — 97110 THERAPEUTIC EXERCISES: CPT | Mod: GP | Performed by: PHYSICAL THERAPIST

## 2025-07-15 PROCEDURE — 97112 NEUROMUSCULAR REEDUCATION: CPT | Mod: GP | Performed by: PHYSICAL THERAPIST

## 2025-07-15 PROCEDURE — 20561 NDL INSJ W/O NJX 3+ MUSC: CPT | Mod: GP | Performed by: PHYSICAL THERAPIST

## 2025-07-16 ENCOUNTER — APPOINTMENT (OUTPATIENT)
Dept: BEHAVIORAL HEALTH | Facility: CLINIC | Age: 70
End: 2025-07-16
Payer: MEDICARE

## 2025-07-16 ENCOUNTER — APPOINTMENT (OUTPATIENT)
Dept: PHYSICAL THERAPY | Facility: CLINIC | Age: 70
End: 2025-07-16
Payer: MEDICARE

## 2025-07-21 ENCOUNTER — TREATMENT (OUTPATIENT)
Dept: PHYSICAL THERAPY | Facility: CLINIC | Age: 70
End: 2025-07-21
Payer: MEDICARE

## 2025-07-21 DIAGNOSIS — C56.9 MALIGNANT NEOPLASM OF OVARY, UNSPECIFIED LATERALITY (MULTI): ICD-10-CM

## 2025-07-21 DIAGNOSIS — M54.31 SCIATICA OF RIGHT SIDE: ICD-10-CM

## 2025-07-21 DIAGNOSIS — R26.9 GAIT DISTURBANCE: ICD-10-CM

## 2025-07-21 DIAGNOSIS — R26.81 UNSTEADINESS ON FEET: Primary | ICD-10-CM

## 2025-07-21 DIAGNOSIS — M25.551 RIGHT HIP PAIN: ICD-10-CM

## 2025-07-21 DIAGNOSIS — R42 DIZZINESS: ICD-10-CM

## 2025-07-21 PROCEDURE — 20561 NDL INSJ W/O NJX 3+ MUSC: CPT | Mod: GP | Performed by: PHYSICAL THERAPIST

## 2025-07-21 PROCEDURE — 97112 NEUROMUSCULAR REEDUCATION: CPT | Mod: GP | Performed by: PHYSICAL THERAPIST

## 2025-07-21 NOTE — PROGRESS NOTES
Physical Therapy    Physical Therapy Treatment/Progress Check    Patient Name: Saba Park  MRN: [id number]  Today's Date: 7/21/2025  [employer] 13 visits approved  POC end date: 7/22/25  Visit number: 15   Primary dx=unsteadiness on feet  Time Entry:   Time Calculation  Start Time: 1352  Stop Time: 1435  Time Calculation (min): 43 min     PT Therapeutic Procedures Time Entry  Neuromuscular Re-Education Time Entry: 23  Needle Insertion w/o Injection 3+ Muscles: 20       Assessment:  Ms. Park is demonstrating good response to dry needling with reduced low back pain and hip pain in days after dry needling. Pt. Arrived today with flare up of right hip and low back pain after walking longer distance than usual.  Dry needling performed today on lumbar spine and right hip, no adverse effects during or after needling noted. The patient demonstrated dizziness with 360' turns today, however with verbal cues to attend to visual and sensory cues the patient improved with practice. The patient did not demonstrate positive head thrust or dizziness during VORx1 today, therefore pt. Does not require vestibular adaptation and will benefit from continued balance and habituation exercises.       Plan: review HEP, dry needling as needed, dynamic balance with dual motor/cog tasks       Current Problem  1. Unsteadiness on feet        2. Malignant neoplasm of ovary, unspecified laterality (Multi)  Follow Up In Physical Therapy      3. Gait disturbance  Follow Up In Physical Therapy      4. Dizziness        5. Sciatica of right side        6. Right hip pain                      Subjective    Pt. Reports that she felt better for a couple of days after she was here. She walked her dog the other day in her yard she did 3-4 loops and she has an acre. She was hurting bad on Saturday and today it is better.      Precautions: high fall risk, chronic lateral hip pain, claustrophobia, hx. MCI, essential tremor, bilateral peripheral  "neuropathy, history of CNS lymphoma and ovarian cancer. She got an EEG but everything was normal.      Vital Signs: not taken     Pain: 3/10 right gluteal pain       Objective     Observation: pt. Arrived to visit with bilateral trekking poles    Head thrust L=neg.  Head thrust R=neg.        Treatments:      Neuro Re-Ed:    -VORx1 horizontal seated 30\"X1 no dizzy  -VORx1 vertical seated 30\"x1 no dizzy  -360' turns to the L and R x2 each side for habituation  -bosu lunges with opposite UE reaches with GTB  in BUE's x10 each side with SBA  -blaze pods with 180' turns 1'x1 with SBA color catch, red=right, blue=left       DN: (pay per service)  -.25x. 0.50 needles x4 along lumbar multifidi, prone perpendicular on R side only  .25x 0.50 needles x8 along bilateral lumbar paraspinals, prone perpendicular  -.25x0.50 needles x3 in glut med, glu min, and piriformis on R side in SL perpendicular technique  -The rationale, potential benefits, and risks for dry needling (DN) were discussed with patient. Reviewed precautions to dry needling and written/verbal consent obtained by patient (see chart for written consent). No contraindications present. His/Her questions were answered and he/she agreed to dry needling treatment today. Patient advised that following dry needling he may feel muscle soreness lasting 24-48 hours, and that this is normal.  Patient advised remain hydrated following dry needling. Patient advised to avoid cold pack/ice and NSAIDs for 24-48 hours following DN, if tolerable, to allow normal inflammatory process to occur. Patient may resume normal activity and exercise following DN as able. Patient instructed to please contact the office if any adverse symptoms occur outside of typical muscle soreness.           OP EDUCATION:  -see treatment  -hip ext with RTB thighs, bridges with RTB thighs, hip flexor stretch, squats with YTB  -corner balance MTS EO, FT EO with horizontal/vertical HT, FA EC  -functional " mobility with vertical/diagonal HT  -single leg bridges  -Quadruped knee lift with core activation  -walking program for HEP        Goals:  Active       PT Problem       PT Goal 1 (Progressing)       Start:  04/23/25    Expected End:  07/18/25       Patient will improve confidence in functional balance as evident by ABC scale score over 1250         PT Goal 2 (Progressing)       Start:  04/23/25    Expected End:  07/18/25       Pt. Will improve FGA by MCID of 4 points compared to normative values (addendum by Krista Alarcon)  IE=7/30 7/1/25=11/30      Goal Note       FGA initial evaluation=7/30 7/2/25=11/30  NEW GOAL 7/2/25=pt. Will improve FGA by additional 4 points to meet MCID compared to normative values.              PT Goal 3 (Met)       Start:  04/23/25    Expected End:  07/18/25    Resolved:  07/02/25    Patient will achieve improved dynamic static balance evident by ability to maintain balance while standing and completing standing tasks without significant postural sway or retro pulsion x 5 minutes of standing on floor and on soft uneven surfaces such as foam pads         PT Goal 4 (Progressing)       Start:  04/23/25    Expected End:  07/18/25       Patient will demonstrated ability to maintain balance with eyes closed x 10 seconds, without retropulsion          PT Goal 5 (Met)       Start:  04/23/25    Expected End:  07/18/25    Resolved:  07/02/25    Patient will improve balance/endurance to completed 6 minute instead of 2 minute walk test without need for contact guard or balance loss, while reaching distance over 400 feet or better          Patient Stated Goal 1       Start:  04/23/25    Expected End:  07/18/25       Patient will demonstrated ability to maintain instructed exercises and activities without new falls          10MWT (Met)       Start:  05/06/25    Expected End:  07/18/25    Resolved:  07/02/25    Pt. Will improve 10MWT by .13 m/s with LRAD to meet normative value for substantial  meaningful change and indicate increased gait speed.       Goal Note       IE=21 seconds with trekking pole  7/2/25=11.6 seconds no device

## 2025-07-23 ENCOUNTER — APPOINTMENT (OUTPATIENT)
Dept: NEUROLOGY | Facility: CLINIC | Age: 70
End: 2025-07-23
Payer: MEDICARE

## 2025-07-23 VITALS
DIASTOLIC BLOOD PRESSURE: 72 MMHG | WEIGHT: 132 LBS | RESPIRATION RATE: 16 BRPM | HEART RATE: 84 BPM | BODY MASS INDEX: 23.39 KG/M2 | SYSTOLIC BLOOD PRESSURE: 115 MMHG

## 2025-07-23 DIAGNOSIS — R44.0 AUDITORY HALLUCINATIONS: ICD-10-CM

## 2025-07-23 DIAGNOSIS — G25.0 ESSENTIAL TREMOR: Primary | ICD-10-CM

## 2025-07-23 PROCEDURE — 1159F MED LIST DOCD IN RCRD: CPT | Performed by: NURSE PRACTITIONER

## 2025-07-23 PROCEDURE — 1160F RVW MEDS BY RX/DR IN RCRD: CPT | Performed by: NURSE PRACTITIONER

## 2025-07-23 PROCEDURE — 3078F DIAST BP <80 MM HG: CPT | Performed by: NURSE PRACTITIONER

## 2025-07-23 PROCEDURE — 99215 OFFICE O/P EST HI 40 MIN: CPT | Performed by: NURSE PRACTITIONER

## 2025-07-23 PROCEDURE — 3074F SYST BP LT 130 MM HG: CPT | Performed by: NURSE PRACTITIONER

## 2025-07-23 PROCEDURE — 1036F TOBACCO NON-USER: CPT | Performed by: NURSE PRACTITIONER

## 2025-07-23 ASSESSMENT — ENCOUNTER SYMPTOMS
DEPRESSION: 0
LOSS OF SENSATION IN FEET: 0
OCCASIONAL FEELINGS OF UNSTEADINESS: 1

## 2025-07-23 NOTE — PROGRESS NOTES
"Subjective     Saba Park is a 70 y.o. year old female who presents with Tremors, here for follow up visit.      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     Info/instructions provided  for Jadyn kIQ    #Follow up next wed that both Dr. Ng and I are here in June    She received the CalaWatch, she feels it is very simple. She has turned up watch until uncomfortable but not noticing any benefit.     Hears big band music--negative EEG, negative MRI brain w/ oncologist Dr. Kenny-[facility] brain cancer MD she sees him yearly (had CNS lymphoma almost 10yrs ago).  Her psychiatrist lowered Wellbutrin and this helped.     Finished chemo for ovarian cancer.     Tremors are much worse L>R (does not notice the R). Very bothersome. Trouble with everything, eating, drinking, writing.   Some days she can get up and OK but as soon as she does anything they worsen.    Going to PT and helping balance, she can really tell a difference.  No Falls.  She also has neuropathy, continues on lower dose of Lyrica (used to be 200mg BID)    Word finding, some forgetfulness worse since chemo.    Other prescriber  Pregabalin 75mg 2 times a day    Previous meds:  Primidone 50mg, 2 tabs nightly - After chemo is over she can go back to 175 mg nightly dose per Dr. Ng but then had low RBC, WBC and platelets so stopped  Propranolol 20mg, 2 tabs 2 times a day stopped for dizziness and hypotension  Topamax-cannot take d/y kidney stones  Gabapentin--already on Lyrica      Prior workup:  5/9/25:  Impression   This is a normal awake and sleep routine EEG. One paroxysmal hallucination event of \"bands playing\" was captured without EEG correlate. No " epileptiform discharges or lateralizing signs seen.   A full report will be scanned into the patient's chart at a later time.       MRI brain WOW 5/9/25:  RESULT:   Postoperative change: Prior postoperative changes related to left   parietal lobe biopsy with susceptibility reflecting prior blood   degradation byproducts and mild surrounding T2/flair parenchymal   hyperintensity in the left periatrial white matter, likely representing   gliosis.   Acute Change:  No evidence of an acute intracranial process.   Hemorrhage:  No expansile or space occupying acute hemorrhage.   Mass Lesion/ Mass Effect:  No evidence of an intracranial mass or   extra-axial fluid collection.  No abnormal intracranial enhancement   following contrast administration.  No significant mass effect.   Chronic Change:  Scattered punctate foci of increased T2 and FLAIR signal   are noted in the supratentorial white matter which is a nonspecific   finding, but likely represents minimal chronic microvascular ischemia.   Parenchyma:  There is mild generalized parenchymal volume loss.   Ventricles:  Ventriculomegaly corresponds to the degree of parenchymal   volume loss.   Skull Base:  Hypothalamic and pituitary region are grossly normal.   Craniocervical junction is normal. No significant marrow replacement   process.   Vasculature: Major intracranial arteries and dural venous sinuses   demonstrate typical flow voids, suggesting patency by spin echo criteria.   Other:  The paranasal sinuses and mastoid air cells are clear.  The   orbits and extracranial soft tissues are unremarkable.          Patient Health Questionnaire-2 Score: 0            Current Outpatient Medications:     buPROPion SR (Wellbutrin SR) 150 mg 12 hr tablet, Take 1 tablet (150 mg) by mouth 2 times a day. Do not crush, chew, or split. (Patient taking differently: Take 100 mg by mouth 2 times a day. Do not crush, chew, or split.), Disp: 180 tablet, Rfl: 1    acetaminophen (Tylenol)  325 mg tablet, Take 2 tablets (650 mg) by mouth every 6 hours if needed for mild pain (1 - 3)., Disp: 30 tablet, Rfl: 0    busPIRone (Buspar) 5 mg tablet, Take 1 tablet (5 mg) by mouth 2 times a day., Disp: 180 tablet, Rfl: 1    calcium carbonate (Oscal) 500 mg calcium (1,250 mg) tablet, Take 1 tablet by mouth 2 times daily (morning and late afternoon)., Disp: , Rfl:     cholecalciferol (Vitamin D-3) 50 mcg (2,000 unit) capsule, Take 1 capsule (50 mcg) by mouth once daily., Disp: , Rfl:     cyanocobalamin (Vitamin B-12) 250 mcg tablet, Take 1 tablet (250 mcg) by mouth once daily., Disp: , Rfl:     DULoxetine (Cymbalta) 60 mg DR capsule, Take 1 capsule (60 mg) by mouth once daily. Do not crush or chew., Disp: 90 capsule, Rfl: 1    Fluzone High-Dose Triv 24-25 syringe, , Disp: , Rfl:     ibuprofen 600 mg tablet, Take 1 tablet (600 mg) by mouth every 6 hours if needed for mild pain (1 - 3)., Disp: 30 tablet, Rfl: 0    LORazepam (Ativan) 1 mg tablet, Take 1 tab 30mins to 1hr prior to MRI., Disp: 1 tablet, Rfl: 0    methadone (Dolophine) 10 mg tablet, Take 1 tablet (10 mg) by mouth every 12 hours., Disp: 60 tablet, Rfl: 0    oxyCODONE (Roxicodone) 5 mg immediate release tablet, Take 1 tablet (5 mg) by mouth every 6 hours if needed for severe pain (7 - 10)., Disp: 4 tablet, Rfl: 0    pregabalin (Lyrica) 75 mg capsule, Take 1 capsule (75 mg) by mouth 2 times a day. Do not fill before January 15, 2025., Disp: 60 capsule, Rfl: 5     Objective   Vitals:    07/23/25 1516   BP: 115/72   BP Location: Right arm   Patient Position: Sitting   BP Cuff Size: Adult   Pulse: 84   Resp: 16   Weight: 59.9 kg (132 lb)               Physical Exam    LUE kinetic and postural 2+ and shakes holding anything  LUE 1+ resting tremor  RUE postural 1+ and no kinetic    MDS UPDRS 1st Score:          Assessment/Plan   Ms. Saba Park is a 70 y.o. with history of kidney, stones, neuropathy (d/t chemo), CNS lymphoma and neuropathy and ovarian  "cancer for which she has completed chemotherapy.    Gait disorder: Pending PT/ Neuropathy, deconditioning, meds/chemo are likely multifactorial causes.    ET: stay off of propranolol d/t SE and primidone can consider restarting if CBC improved with close monitoring  Jadyn kIQ so far not providing benefit   She will try 1 more week and return it if higher settings not helping  Primidone stopped for low WBC/RBC/Plt which was likely d/t checmo.Previously >250mg primidone gave her SE noted \"loopy\" and still had tremor when eating and doing makeup but she does not feel they were that bad  Plan to restart once labs stabalize per Dr. Ng who also d/w pt today and rec DBS as next step after that  If DBS reconsidered should do educational visit with her  again  Parkinsonism: mildly worse last assessment and will re-eval next visit. She has a hx of this d/t Abilify, now bradykinesia slightly worse when compared to exam in 2023. Her gait is off balance, but not wide based, narrow (for someone with neuropathy) but arm swing good. Has RBD, constipation, denies anomia.   Consider monitoring vs levodopa trial vs Teofilo scan---would need to hold Wellburtin 8 days  Auditory hallucinations: Negative brain MRI, negative EEG, improved with lowering Wellbutrin with psychiatry  Could be d/t parkinsonism-Never had Teofilo scan, she is on Wellbutrin  ((needs held for 8 days))  Gait d/o multifactorial as above  Pending PT      There are no diagnoses linked to this encounter.      #2-3 months when blood cells are normal can retry primidone, if lowers blood cells again then we will stop but will closely once you start     I made a reminder to reach out to you 8/15/25 to review labs and possibly start primidone    #If not helpful will try DBS    If you want to repeat DBS educational visit with your  I would recommend.     #Return the Jadyn kIQ if not helping----you have 30 days     #Deep Brain Stimulation (DBS) helps treat tremor in " essential tremor. It is best for the hand tremor, though can also help head and voice tremor too. DBS stimulation can worsen balance/falls and speech - if these are major issues, then DBS may not be the best treatment. After DBS surgery, people are usually able to reduce and sometimes eliminate their tremor medications. It may take up to 6-12 months to optimize DBS settings after surgery. There are the steps needed prior to getting DBS  1) Neuropsychological testing (several hours of cognitive testing to evaluate for mild cognitive impairment or dementia)  2 Consultation with the neurosurgeon  3) Discussion of your case at our bi-monthly DBS meeting to evaluate if you are a candidate    We may ask that you see psychiatry if there are significant mood issues (anxiety/depression) prior to surgery.     More information can be found at:  -<https://essentialtremor.org/resource/deep-brain-stimulation/>  -<https://www.aans.org/en/Patients/Neurosurgical-Conditions-and-Treatments/Deep-Brain-Stimulation>       #We briefly discussed MRI Guided Focused Ultrasound    #Follow up in 4M on a Wed when Dr. Ng is here with me        I, AREN Croft, personally performed  a medically appropriate exam, discussion of care/treatment (DBS, MRIGFUS, primidone) options taking a total time of 42 minutes for today's visit.    Max Croft NP-C  Adult/Gerontological Nurse Practitioner   [facility], Department of Neurology  [facility] [place]  [facility]  [address] [place] [address]  Phone: [phone]  Fax: [phone]

## 2025-07-23 NOTE — PATIENT INSTRUCTIONS
#2-3 months when blood cells are normal can retry primidone, if lowers blood cells again then we will stop but will closely once you start     I made a reminder to reach out to you 8/15/25 to review labs and possibly start primidone    #If not helpful will try DBS    If you want to repeat DBS educational visit with your  I would recommend.     #Return the Jadyn kIQ if not helping----you have 30 days     #Deep Brain Stimulation (DBS) helps treat tremor in essential tremor. It is best for the hand tremor, though can also help head and voice tremor too. DBS stimulation can worsen balance/falls and speech - if these are major issues, then DBS may not be the best treatment. After DBS surgery, people are usually able to reduce and sometimes eliminate their tremor medications. It may take up to 6-12 months to optimize DBS settings after surgery. There are the steps needed prior to getting DBS  1) Neuropsychological testing (several hours of cognitive testing to evaluate for mild cognitive impairment or dementia)  2 Consultation with the neurosurgeon  3) Discussion of your case at our bi-monthly DBS meeting to evaluate if you are a candidate    We may ask that you see psychiatry if there are significant mood issues (anxiety/depression) prior to surgery.     More information can be found at:  -<https://essentialtremor.org/resource/deep-brain-stimulation/>  -<https://www.aans.org/en/Patients/Neurosurgical-Conditions-and-Treatments/Deep-Brain-Stimulation>     #We briefly discussed MRI Guided Focused Ultrasound    #Follow up in 4M on a Wed when Dr. Ng is here with me        Max Croft, NP-C  Adult/Gerontological Nurse Practitioner   [facility], Department of Neurology  [facility] [place]  [facility]  [address] [place] [address]  Phone: [phone]  Fax: [phone]

## 2025-07-24 ENCOUNTER — TELEPHONE (OUTPATIENT)
Dept: ADMISSION | Facility: HOSPITAL | Age: 70
End: 2025-07-24
Payer: MEDICARE

## 2025-07-24 DIAGNOSIS — G62.0 CHEMOTHERAPY-INDUCED PERIPHERAL NEUROPATHY (MULTI): ICD-10-CM

## 2025-07-24 DIAGNOSIS — T45.1X5A CHEMOTHERAPY-INDUCED PERIPHERAL NEUROPATHY (MULTI): ICD-10-CM

## 2025-07-24 DIAGNOSIS — G89.29 OTHER CHRONIC PAIN: ICD-10-CM

## 2025-07-24 RX ORDER — METHADONE HYDROCHLORIDE 10 MG/1
10 TABLET ORAL EVERY 12 HOURS
Qty: 60 TABLET | Refills: 0 | Status: SHIPPED | OUTPATIENT
Start: 2025-07-26 | End: 2025-08-25

## 2025-07-24 NOTE — TELEPHONE ENCOUNTER
OARRS reviewed and no aberrancy noted. Prescription pended to provider to approve.    I left patient a message we will send refill today,  on 7/26 but also reminded her that she is scheduled to see Ashly in clinic tomorrow at 1pm.

## 2025-07-24 NOTE — TELEPHONE ENCOUNTER
Refill Request  Methadone 10mg every 12 hrs    Preferred Pharmacy  [facility] #[id number]     Patient states in message, she knows not due until next week has enough, just calling ahead of time to stay on track

## 2025-07-25 ENCOUNTER — INFUSION (OUTPATIENT)
Dept: HEMATOLOGY/ONCOLOGY | Facility: CLINIC | Age: 70
End: 2025-07-25
Payer: MEDICARE

## 2025-07-25 ENCOUNTER — OFFICE VISIT (OUTPATIENT)
Dept: PALLIATIVE MEDICINE | Facility: CLINIC | Age: 70
End: 2025-07-25
Payer: MEDICARE

## 2025-07-25 ENCOUNTER — TELEPHONE (OUTPATIENT)
Dept: ADMISSION | Facility: HOSPITAL | Age: 70
End: 2025-07-25

## 2025-07-25 VITALS
WEIGHT: 130.6 LBS | HEART RATE: 96 BPM | OXYGEN SATURATION: 97 % | RESPIRATION RATE: 18 BRPM | BODY MASS INDEX: 23.14 KG/M2 | TEMPERATURE: 97.3 F | DIASTOLIC BLOOD PRESSURE: 85 MMHG | SYSTOLIC BLOOD PRESSURE: 135 MMHG

## 2025-07-25 DIAGNOSIS — G62.0 CHEMOTHERAPY-INDUCED PERIPHERAL NEUROPATHY (MULTI): ICD-10-CM

## 2025-07-25 DIAGNOSIS — Z85.43 HISTORY OF OVARIAN CANCER: ICD-10-CM

## 2025-07-25 DIAGNOSIS — G89.3 CHRONIC PAIN AFTER CANCER TREATMENT: Primary | ICD-10-CM

## 2025-07-25 DIAGNOSIS — C56.9 MALIGNANT NEOPLASM OF OVARY, UNSPECIFIED LATERALITY (MULTI): ICD-10-CM

## 2025-07-25 DIAGNOSIS — F33.1 MODERATE EPISODE OF RECURRENT MAJOR DEPRESSIVE DISORDER: ICD-10-CM

## 2025-07-25 DIAGNOSIS — T45.1X5A CHEMOTHERAPY-INDUCED PERIPHERAL NEUROPATHY (MULTI): ICD-10-CM

## 2025-07-25 DIAGNOSIS — Z85.72 HISTORY OF LYMPHOMA: ICD-10-CM

## 2025-07-25 DIAGNOSIS — G89.3 CANCER RELATED PAIN: ICD-10-CM

## 2025-07-25 DIAGNOSIS — Z51.5 PALLIATIVE CARE ENCOUNTER: ICD-10-CM

## 2025-07-25 LAB — CANCER AG125 SERPL-ACNC: 5.4 U/ML (ref 0–30.2)

## 2025-07-25 PROCEDURE — 99214 OFFICE O/P EST MOD 30 MIN: CPT

## 2025-07-25 PROCEDURE — 2500000004 HC RX 250 GENERAL PHARMACY W/ HCPCS (ALT 636 FOR OP/ED): Performed by: OBSTETRICS & GYNECOLOGY

## 2025-07-25 PROCEDURE — 86304 IMMUNOASSAY TUMOR CA 125: CPT

## 2025-07-25 PROCEDURE — 3079F DIAST BP 80-89 MM HG: CPT

## 2025-07-25 PROCEDURE — 1159F MED LIST DOCD IN RCRD: CPT

## 2025-07-25 PROCEDURE — 1036F TOBACCO NON-USER: CPT

## 2025-07-25 PROCEDURE — 3075F SYST BP GE 130 - 139MM HG: CPT

## 2025-07-25 PROCEDURE — 1125F AMNT PAIN NOTED PAIN PRSNT: CPT

## 2025-07-25 PROCEDURE — 36591 DRAW BLOOD OFF VENOUS DEVICE: CPT

## 2025-07-25 RX ORDER — HEPARIN SODIUM,PORCINE/PF 10 UNIT/ML
50 SYRINGE (ML) INTRAVENOUS AS NEEDED
OUTPATIENT
Start: 2025-07-25

## 2025-07-25 RX ORDER — HEPARIN 100 UNIT/ML
500 SYRINGE INTRAVENOUS AS NEEDED
OUTPATIENT
Start: 2025-07-25

## 2025-07-25 RX ORDER — HEPARIN 100 UNIT/ML
500 SYRINGE INTRAVENOUS AS NEEDED
Status: DISCONTINUED | OUTPATIENT
Start: 2025-07-25 | End: 2025-07-25 | Stop reason: HOSPADM

## 2025-07-25 RX ORDER — PREGABALIN 75 MG/1
75 CAPSULE ORAL 2 TIMES DAILY
Qty: 60 CAPSULE | Refills: 5 | Status: SHIPPED | OUTPATIENT
Start: 2025-07-25 | End: 2026-01-21

## 2025-07-25 RX ORDER — DICLOFENAC SODIUM 10 MG/G
4 GEL TOPICAL 4 TIMES DAILY PRN
Qty: 450 G | Refills: 2 | Status: SHIPPED | OUTPATIENT
Start: 2025-07-25 | End: 2025-10-23

## 2025-07-25 RX ORDER — HEPARIN SODIUM,PORCINE/PF 10 UNIT/ML
50 SYRINGE (ML) INTRAVENOUS AS NEEDED
Status: DISCONTINUED | OUTPATIENT
Start: 2025-07-25 | End: 2025-07-25 | Stop reason: HOSPADM

## 2025-07-25 RX ADMIN — HEPARIN 500 UNITS: 100 SYRINGE at 14:49

## 2025-07-25 ASSESSMENT — PAIN SCALES - GENERAL: PAINLEVEL_OUTOF10: 2

## 2025-07-25 NOTE — PROGRESS NOTES
SUPPORTIVE AND PALLIATIVE ONCOLOGY OUTPATIENT FOLLOW-UP      SERVICE DATE: 4/29/2025    Subjective   HISTORY OF PRESENT ILLNESS: Saba Park is a 70 y.o. female who presents with history of CNS lymphoma and ovarian cancer. Currently in active surveillance CNS lymphoma through Dr. Harris at [facility]. Patient with recurrence of Ovarian Ca noted in 2 retroperitoneal LN on CT 7/2024. Patient follows with Supportive Oncology/Palliative Care for chronic pain r/t disease history/treatment and further symptom management.       Pain Assessment:  Pain Score:  5  Location:  right hip/leg  Education- changes to current [facility]    Symptom Assessment:  Pain:a little - intermittent pain in the right hip and leg, more aggravated when she is active on it.  Numbness or Tingling in hands/feet/other: a little - neuropathy with well managed with Methadone and Pregabalin 75mg BID  Sore Muscles/Spasms: none  Headache: none  Dizziness:none  Constipation: a little - has been more manageable recently using laxatives as needed  Diarrhea: none  Nausea: none  Vomiting: none  Lack of Appetite: none   Weight Loss: none  Taste changes: none  Dry Mouth: none  Pain in Mouth/Swallowing: none  Lack of Energy: a little - starting to be more social  Difficulty Sleeping: none  Worrying: none  Anxiety: none  Depression: none  Shortness of breath: none  Other: a little - over the past month has had some auditory hallucinations of band music. She has been keeping track of when this happens and jots down the time it starts and when is stops. Her neurologist reached out to Dr. Harris at [facility] as patient has history of CNS lymphoma, and is awaiting a brain MRI      Information obtained from: chart review and interview of patient  ______________________________________________________________________        Objective   No results found. However, due to the size of the patient record, not all encounters were searched. Please check Results Review for a complete set  of results.    No results found. However, due to the size of the patient record, not all encounters were searched. Please check Results Review for a complete set of results.    PHYSICAL EXAMINATION   Vital Signs:   Vital signs reviewed      4/29/2025    11:37 AM 5/16/2025     3:56 PM 5/19/2025     3:43 PM 6/17/2025     2:06 PM 6/27/2025     1:17 PM 7/23/2025     3:16 PM 7/25/2025     1:06 PM   Vitals   Systolic 121 114 143  126 115 135   Diastolic 79 70 85  74 72 85   BP Location   Right arm   Right arm    Heart Rate 97 76 77  72 84 96   Temp 35.9 °C (96.6 °F) 36.2 °C (97.2 °F) 36.2 °C (97.2 °F) 36 °C (96.8 °F) 35.9 °C (96.7 °F)  36.3 °C (97.3 °F)   Resp 16 16 16  18 16 18   Weight (lb) 136.24  135.36   132 130.6   BMI 24.14 kg/m2  23.98 kg/m2   23.39 kg/m2 23.14 kg/m2   BSA (m2) 1.66 m2  1.65 m2   1.63 m2 1.62 m2   Visit Report Report  Report Report  Report Report       Physical Exam  Vitals reviewed.   Constitutional:       Appearance: Normal appearance.   HENT:      Mouth/Throat:      Mouth: Mucous membranes are dry.     Eyes:      Extraocular Movements: Extraocular movements intact.      Pupils: Pupils are equal, round, and reactive to light.       Cardiovascular:      Rate and Rhythm: Normal rate.   Pulmonary:      Effort: Pulmonary effort is normal.   Abdominal:      Palpations: Abdomen is soft.     Musculoskeletal:         General: Normal range of motion.     Skin:     General: Skin is warm and dry.     Neurological:      General: No focal deficit present.      Mental Status: She is alert and oriented to person, place, and time.      Motor: Weakness present.      Coordination: Coordination abnormal.      Comments: tremors   Psychiatric:         Mood and Affect: Mood normal.         Behavior: Behavior normal.         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 Repeat with increase in Methadone dose or yearly  Pregabalin 75mg BID  Acetaminophen 500mg [id number] PRN  Oxycodone 5mg [id number] PRN for breakthrough pain - rarely uses  Duloxetine 60mg/daily  Start Voltaren 4g gel QID to R hip  S/p [facility] 7/8/24 to 7/18/24 (total of 8 sessions) - noted worsening radiating pain from hip, but slight improvement in numbness/tingling of her foot. Did not complete further sessions due to extreme hip/leg pain  Referral to Symptom Clinic - Dr Silverman - pursued acupuncture with notable change in neuropathy/pain, has not continued  Continue following with Chronic Pain (Dr. Alarcon) - no follow-up scheduled at this time   Continue following with podiatry   Intolerances/previously tried:   - Alpha Liproic Acid - not effective  - Gabapentin - caused excessive lethargy     Opioid Use  Medication Management:   - OARRS report reviewed with no aberrant behavior; consistent with  prescriptions/records and patient history  - MED 90.  Overdose Risk Score 100.   This has been discussed with patient.   - We will continue to closely monitor the patient for signs of prescription misuse including UDS, OARRS review and subjective reports at each visit.  - No concurrent benzodiazepine use   - I am a provider who either is or has consulted and collaborated with a provider certified in Hospice and Palliative Medicine and have conducted a face-face visit and examination for this patient.  - Routine Urine Drug Screen: 8/23/2024 appropriately positive for opioids and negative for illicit substances.- complete next visit  - Controlled Substance  Agreement: 8/23/2024 - repeat next visit  - Specifically discussed that controlled substance prescriptions will only be provided by our group as outlined in the completed agreement  - Prescribed naloxone: patient declined  - Red Flags: NA     Constipation  - Continue Senna-S 1-2 tabs BID PRN  - Continue Miralax 17gram 1-2x daily PRN  - Discussed starting Loperamide 2mg PRN diarrhea - MAX 8caps/24 hours     Nausea/Loss of Appetite  Omeprazole 20mg daily  Ondansetron 8mg [id number] PRN  Metoclopramide 10mg [id number] PRN  Encouraged good hydration   Encouraged small more frequent meals  Referral to dietician (10/3/2024)  Discussed use of lemon heads to assist with taste  Discussed medications to help stimulate appetite - patient declined today     Altered Mood- medications prescribed by Dr. Ugalde ([facility])  Chronic anxiety and depression related to health concerns   Home regimen:    Duloxetine 60mg once daily at bed  Buspirone 5mg TID  uproprion SR 150mg BID  Following with counselor weekly to every 2 weeks       Tremors/Balance/Coordination  Continue following with neurologist  Awaiting insurance approval for MoxtraQ device  Propranolol and Primidone - on hold at this time  Referral to PT and OT by Dr. Kenny    Next Follow-Up Visit:  Return to clinic in 3 months at  [facility]    Signature and billing  Medical complexity was moderate level due to due to complexity of problems, extensive data review, and high risk of management/treatment.  Time was spent on the following: Prep Time, Time Directly with Patient/Family/Caregiver, Documentation Time. Total time spent: 35min      Data  Diagnostic tests and information reviewed for today's visit:  Most recent labs and imaging results, Medications    7/25/25: changes to plan indicated in bold. Continue all other regimens as listed.    Some elements copied from Palliative Care note on 4/29/2025, the elements have been updated and all reflect current decision making  from today, 7/25/2025.    Plan of Care discussed with: Patient    SIGNATURE: DANILO Britt    Contact information:  [facility]  Monday-Friday 8 AM-5 PM  Phone:  [phone], press option #5, then option #1.   Or Epic Secure Chat

## 2025-07-25 NOTE — TELEPHONE ENCOUNTER
Patient had visit today with Ashly Patel, was told should still have refills for the Pregabalin.  Patient spoke with the pharmacy and confirmed that the last refill was used, need new script sent in   Please sent in Pregabalin (Lyrica) 75mg 2 times daily to [facility] [id number] [facility]

## 2025-07-29 ENCOUNTER — TREATMENT (OUTPATIENT)
Dept: PHYSICAL THERAPY | Facility: CLINIC | Age: 70
End: 2025-07-29
Payer: MEDICARE

## 2025-07-29 DIAGNOSIS — R26.81 UNSTEADINESS ON FEET: Primary | ICD-10-CM

## 2025-07-29 DIAGNOSIS — R26.9 GAIT DISTURBANCE: ICD-10-CM

## 2025-07-29 DIAGNOSIS — C56.9 MALIGNANT NEOPLASM OF OVARY, UNSPECIFIED LATERALITY (MULTI): ICD-10-CM

## 2025-07-29 PROCEDURE — 97112 NEUROMUSCULAR REEDUCATION: CPT | Mod: GP | Performed by: PHYSICAL THERAPIST

## 2025-07-29 NOTE — PROGRESS NOTES
Physical Therapy    Physical Therapy Treatment/Progress Check    Patient Name: Saba Park  MRN: [id number]  Today's Date: 8/1/2025  [employer] 13 visits approved  POC end date: 8/13/25  Visit number: 16   Primary dx=unsteadiness on feet  Time Entry:   Time Calculation  Start Time: 1619  Stop Time: 1700  Time Calculation (min): 41 min     PT Therapeutic Procedures Time Entry  Neuromuscular Re-Education Time Entry: 41       Assessment:  Mr. Park is demonstrating good response to dry needling with reduced low back pain with this intervention. Overall the patient has increased her walking endurance and is ambulating up to 10 minutes before her back/hip start to hurt.  The patient is demonstrating excellent improvements with her static and dynamic balance with reduced loses of balance in a posterior direction.  Pt, has met 2/2 of her short term goals and 3/6 of her long term goals. Another goal was added today for the 6MWT for the patient to improve to an unlimited community Ambulator category compared to normative values.      Plan: review HEP, dry needling as needed, dynamic balance with dual motor/cog tasks       Current Problem  1. Unsteadiness on feet        2. Malignant neoplasm of ovary, unspecified laterality (Multi)  Follow Up In Physical Therapy      3. Gait disturbance  Follow Up In Physical Therapy                    Subjective    Pt. Reports that at the end of the day she gets pain in her low back and she takes a motrin.  Her pain in her low back is a 3/10, needling helped last time.  She can't make it past 10 minutes of walking before her hip/back start to hurt.  She gets pain in her left knee when she bends it, sometimes really bad and sometimes not much.     Precautions: high fall risk, chronic lateral hip pain, claustrophobia, hx. MCI, essential tremor, bilateral peripheral neuropathy, history of CNS lymphoma and ovarian cancer. She got an EEG but everything was normal.      Vital Signs: not  taken     Pain: 3/10 right gluteal pain       Objective     Observation: pt. Arrived to visit with bilateral trekking poles        Treatments:      Neuro Re-Ed:  -agility ladder   -out<>out in<>in x2 laps each direction   -forward diagonal back/same x2 laps  -resisted side steps at pulley system 15'x2 each side with 7.5# pounds with CGA  -resisted ambulation F/B with 7.5 resistance at pulley with CGA  -resisted lateral walkout oblique hold 10'x2 each direction with GTB      OP EDUCATION:  -see treatment  -hip ext with RTB thighs, bridges with RTB thighs, hip flexor stretch, squats with YTB  -corner balance MTS EO, FT EO with horizontal/vertical HT, FA EC  -functional mobility with vertical/diagonal HT  -single leg bridges  -Quadruped knee lift with core activation  -walking program for HEP   Access Code: [email or web address]  URL: [email or web address]  Date: 07/29/2025  Prepared by: Krista Alarcon    Exercises  - Shoulder Extension with Resistance  - 2 x weekly - 2 sets - 10 reps - 3 hold  - Standing Shoulder Row with Anchored Resistance  - 2 x weekly - 2 sets - 10 reps       Goals:  Active       PT Problem       PT Goal 1 (Progressing)       Start:  04/23/25    Expected End:  10/27/25       Patient will improve confidence in functional balance as evident by ABC scale score over 1250         PT Goal 2 (Progressing)       Start:  04/23/25    Expected End:  10/27/25       Pt. Will improve FGA by MCID of 4 points compared to normative values (addendum by Krista Alarcon)  IE=7/30 7/1/25=11/30         PT Goal 3 (Met)       Start:  04/23/25    Expected End:  07/18/25    Resolved:  07/02/25    Patient will achieve improved dynamic static balance evident by ability to maintain balance while standing and completing standing tasks without significant postural sway or retro pulsion x 5 minutes of standing on floor and on soft uneven surfaces such as foam pads         PT Goal 4 (Progressing)       Start:  04/23/25     Expected End:  10/27/25       Patient will demonstrated ability to maintain balance with eyes closed x 10 seconds, without retropulsion          PT Goal 5 (Met)       Start:  04/23/25    Expected End:  07/18/25    Resolved:  07/02/25    Patient will improve balance/endurance to completed 6 minute instead of 2 minute walk test without need for contact guard or balance loss, while reaching distance over 400 feet or better          Patient Stated Goal 1 (Met)       Start:  04/23/25    Expected End:  07/18/25    Resolved:  08/01/25    Patient will demonstrated ability to maintain instructed exercises and activities without new falls          10MWT (Met)       Start:  05/06/25    Expected End:  07/18/25    Resolved:  07/02/25    Pt. Will improve 10MWT by .13 m/s with LRAD to meet normative value for substantial meaningful change and indicate increased gait speed.       Goal Note       IE=21 seconds with trekking pole  7/2/25=11.6 seconds no device              PT Goal 5       Start:  08/01/25    Expected End:  10/27/25       Pt. Will improve 6MWT by 58.2 meters to meet AUDREY for geriatric population compared to normative values and indicate improved functional mobility speed.               Insurance Authorization Information  Date of Evaluation: 7/29/2025    Onset Date: 1/30/2025    Referring Physician: Krista Kenny     Surgery in the Last 3 months:  no    CPT Codes: ALLOWED CPT CODES: THEREX (79321), THERE-ACT (86510), NMR  (24065), MANUAL (09622), GAIT (66299), and DRY NEEDLING (44716)    Diagnosis:   Problem List Items Addressed This Visit           ICD-10-CM       Hematology and Neoplasia    Malignant neoplasm of unspecified ovary (Multi) C56.9       Symptoms and Signs    Unsteadiness on feet - Primary R26.81    Gait disturbance R26.9          Functional Outcome:       OT / PT Evaluation complexity:  high    Which of the following best describes the primary reason of therapy: Improving, restoring, or adapting  functional mobility or skills    Visits Requested: 8    Date Range: 90 days    Re-evaluation only:  Short term goals Met: 2/2    Long term goals Met: 3/6    Krista Alarcon, PT

## 2025-07-31 ENCOUNTER — APPOINTMENT (OUTPATIENT)
Dept: PHYSICAL THERAPY | Facility: CLINIC | Age: 70
End: 2025-07-31
Payer: MEDICARE

## 2025-08-06 ENCOUNTER — TREATMENT (OUTPATIENT)
Dept: PHYSICAL THERAPY | Facility: CLINIC | Age: 70
End: 2025-08-06
Payer: MEDICARE

## 2025-08-06 DIAGNOSIS — R26.81 UNSTEADINESS ON FEET: ICD-10-CM

## 2025-08-06 DIAGNOSIS — C56.9 MALIGNANT NEOPLASM OF OVARY, UNSPECIFIED LATERALITY (MULTI): ICD-10-CM

## 2025-08-06 DIAGNOSIS — R26.9 GAIT DISTURBANCE: Primary | ICD-10-CM

## 2025-08-06 PROCEDURE — 97110 THERAPEUTIC EXERCISES: CPT | Mod: GP | Performed by: PHYSICAL THERAPIST

## 2025-08-06 PROCEDURE — 20561 NDL INSJ W/O NJX 3+ MUSC: CPT | Mod: GP | Performed by: PHYSICAL THERAPIST

## 2025-08-06 NOTE — PROGRESS NOTES
Physical Therapy    Physical Therapy Treatment    Patient Name: Saba Park  MRN: [id number]  Today's Date: 8/6/2025  [employer] 13 visits approved  POC end date: 8/13/25  Visit number: 17   Primary dx=unsteadiness on feet  Time Entry:   Time Calculation  Start Time: 1351  Stop Time: 1430  Time Calculation (min): 39 min     PT Therapeutic Procedures Time Entry  Therapeutic Exercise Time Entry: 10  Needle Insertion w/o Injection 3+ Muscles: 29       Assessment:  Mr. Park returned today s/p fall with exacerbation of her right sided low back and hip pain as a result.  The patient was now showing any central signs or red flags since this fall.  Dry needling was performed on lumbar paraspinals and lumbar multifidi in addition to right glut med/minimus and right piriformis with no adverse events during or after treatment.  Unable to determine if pt. Is presenting with a directional preference for her low back pain and will require further evaluation next visit.  Balance exercises were held today due to pt. With high levels of back/hip pain.  Patient's progress in dynamic balance will be slowed down due to this set back.        Plan: review HEP, dry needling as needed, dynamic balance with dual motor/cog tasks       Current Problem  1. Gait disturbance  Follow Up In Physical Therapy      2. Malignant neoplasm of ovary, unspecified laterality (Multi)  Follow Up In Physical Therapy                    Subjective    Pt. Reports that she was washing her sliding doors and she was outside, she got a new driveway put in and she went to get off of it and she started falling and she has a table and chairs and she went to reach for the chair and her hand went in between the metal frame and she fell on the celemnt. She hit her head and she hurt her right side on the entire right side.  She had a bruise on her left arm. She mostly hurt her back and leg on her right side. Yesterday it went up to there shoulder and neck. She  "has been using ice on her right side and on her back. She has noticed her neck hurts a lot.  She denies headaches, double vision, blurry vision.    Precautions: high fall risk, chronic lateral hip pain, claustrophobia, hx. MCI, essential tremor, bilateral peripheral neuropathy, history of CNS lymphoma and ovarian cancer. She got an EEG but everything was normal.      Vital Signs: not taken     Pain: Her back and hip pain are a 7/10 right now.         Objective     Observation: pt. Arrived to visit with bilateral trekking poles        Treatments:      DN: 29 min.  -.25x.50 needles R glut med x1 needle  -.25x.50 needle in R piriformis  -25x50 needle R  glut minimus x2 needles  -.25x50 needle x16 needles in bilateral lumbar piriformis and multifidi  -The rationale, potential benefits, and risks for dry needling (DN) were discussed with patient. Reviewed precautions to dry needling and written/verbal consent obtained by patient (see chart for written consent). No contraindications present. His/Her questions were answered and he/she agreed to dry needling treatment today. Patient advised that following dry needling he may feel muscle soreness lasting 24-48 hours, and that this is normal.  Patient advised remain hydrated following dry needling. Patient advised to avoid cold pack/ice and NSAIDs for 24-48 hours following DN, if tolerable, to allow normal inflammatory process to occur. Patient may resume normal activity and exercise following DN as able. Patient instructed to please contact the office if any adverse symptoms occur outside of typical muscle soreness.    There Ex: 10'x1  -seated piriformis stretch 30\"x1  -DKTC 1'x1  -supine LTR 1'x1         OP EDUCATION:  -see treatment  -hip ext with RTB thighs, bridges with RTB thighs, hip flexor stretch, squats with YTB  -corner balance MTS EO, FT EO with horizontal/vertical HT, FA EC  -functional mobility with vertical/diagonal HT  -single leg bridges  -Quadruped knee lift " with core activation  -walking program for HEP   Access Code: [email or web address]  URL: [email or web address]  Date: 07/29/2025  Prepared by: Krista Alarcon    Exercises  - Shoulder Extension with Resistance  - 2 x weekly - 2 sets - 10 reps - 3 hold  - Standing Shoulder Row with Anchored Resistance  - 2 x weekly - 2 sets - 10 reps       Goals:  Active       PT Problem       PT Goal 1 (Progressing)       Start:  04/23/25    Expected End:  10/27/25       Patient will improve confidence in functional balance as evident by ABC scale score over 1250         PT Goal 2 (Progressing)       Start:  04/23/25    Expected End:  10/27/25       Pt. Will improve FGA by MCID of 4 points compared to normative values (addendum by Krista Alarcon)  IE=7/30 7/1/25=11/30         PT Goal 3 (Met)       Start:  04/23/25    Expected End:  07/18/25    Resolved:  07/02/25    Patient will achieve improved dynamic static balance evident by ability to maintain balance while standing and completing standing tasks without significant postural sway or retro pulsion x 5 minutes of standing on floor and on soft uneven surfaces such as foam pads         PT Goal 4 (Progressing)       Start:  04/23/25    Expected End:  10/27/25       Patient will demonstrated ability to maintain balance with eyes closed x 10 seconds, without retropulsion          PT Goal 5 (Met)       Start:  04/23/25    Expected End:  07/18/25    Resolved:  07/02/25    Patient will improve balance/endurance to completed 6 minute instead of 2 minute walk test without need for contact guard or balance loss, while reaching distance over 400 feet or better          Patient Stated Goal 1 (Met)       Start:  04/23/25    Expected End:  07/18/25    Resolved:  08/01/25    Patient will demonstrated ability to maintain instructed exercises and activities without new falls          10MWT (Met)       Start:  05/06/25    Expected End:  07/18/25    Resolved:  07/02/25    Pt. Will improve 10MWT  by .13 m/s with LRAD to meet normative value for substantial meaningful change and indicate increased gait speed.       Goal Note       IE=21 seconds with trekking pole  7/2/25=11.6 seconds no device              PT Goal 5 (Progressing)       Start:  08/01/25    Expected End:  10/27/25       Pt. Will improve 6MWT to >944 ft. To meet cut off score for unlimited community Ambulator category compared to normative values and indicate improved functional mobility speed.                 Insurance Authorization Information  Date of Evaluation: 8/6/2025    Onset Date: 1/30/2025    Referring Physician: Krista Kenny     Surgery in the Last 3 months:  no    CPT Codes: ALLOWED CPT CODES: THEREX (65364), THERE-ACT (74739), NMR  (66242), MANUAL (07073), GAIT (37281), and DRY NEEDLING (98715)    Diagnosis:   Problem List Items Addressed This Visit           ICD-10-CM       Hematology and Neoplasia    Malignant neoplasm of unspecified ovary (Multi) C56.9       Symptoms and Signs    Gait disturbance - Primary R26.9          Functional Outcome:       OT / PT Evaluation complexity:  high    Which of the following best describes the primary reason of therapy: Improving, restoring, or adapting functional mobility or skills    Visits Requested: 8    Date Range: 90 days    Re-evaluation only:  Short term goals Met: 2/2    Long term goals Met: 3/6    Krista Alarcon, PT

## 2025-08-11 ENCOUNTER — APPOINTMENT (OUTPATIENT)
Dept: BEHAVIORAL HEALTH | Facility: HOSPITAL | Age: 70
End: 2025-08-11
Payer: MEDICARE

## 2025-08-11 DIAGNOSIS — G25.0 ESSENTIAL TREMOR: Primary | ICD-10-CM

## 2025-08-13 ENCOUNTER — TREATMENT (OUTPATIENT)
Dept: PHYSICAL THERAPY | Facility: CLINIC | Age: 70
End: 2025-08-13
Payer: MEDICARE

## 2025-08-13 DIAGNOSIS — R26.81 UNSTEADINESS ON FEET: Primary | ICD-10-CM

## 2025-08-13 DIAGNOSIS — C56.9 MALIGNANT NEOPLASM OF OVARY, UNSPECIFIED LATERALITY (MULTI): ICD-10-CM

## 2025-08-13 DIAGNOSIS — R26.9 GAIT DISTURBANCE: ICD-10-CM

## 2025-08-13 PROCEDURE — 97112 NEUROMUSCULAR REEDUCATION: CPT | Mod: GP | Performed by: PHYSICAL THERAPIST

## 2025-08-21 ENCOUNTER — INFUSION (OUTPATIENT)
Dept: HEMATOLOGY/ONCOLOGY | Facility: CLINIC | Age: 70
End: 2025-08-21
Payer: MEDICARE

## 2025-08-21 ENCOUNTER — RESULTS FOLLOW-UP (OUTPATIENT)
Dept: NEUROLOGY | Facility: CLINIC | Age: 70
End: 2025-08-21

## 2025-08-21 ENCOUNTER — OFFICE VISIT (OUTPATIENT)
Dept: GYNECOLOGIC ONCOLOGY | Facility: CLINIC | Age: 70
End: 2025-08-21
Payer: MEDICARE

## 2025-08-21 VITALS
SYSTOLIC BLOOD PRESSURE: 126 MMHG | BODY MASS INDEX: 23.19 KG/M2 | RESPIRATION RATE: 18 BRPM | DIASTOLIC BLOOD PRESSURE: 83 MMHG | OXYGEN SATURATION: 97 % | TEMPERATURE: 98.1 F | HEART RATE: 78 BPM | WEIGHT: 130.9 LBS

## 2025-08-21 DIAGNOSIS — C78.00 MALIGNANT NEOPLASM METASTATIC TO LUNG, UNSPECIFIED LATERALITY (MULTI): ICD-10-CM

## 2025-08-21 DIAGNOSIS — C56.9 MALIGNANT NEOPLASM OF OVARY, UNSPECIFIED LATERALITY (MULTI): ICD-10-CM

## 2025-08-21 DIAGNOSIS — G25.0 ESSENTIAL TREMOR: Primary | ICD-10-CM

## 2025-08-21 DIAGNOSIS — R22.2 NODULE OF SKIN OF ABDOMEN: ICD-10-CM

## 2025-08-21 DIAGNOSIS — C56.9 MALIGNANT NEOPLASM OF OVARY, UNSPECIFIED LATERALITY (MULTI): Primary | ICD-10-CM

## 2025-08-21 LAB
ALBUMIN SERPL BCP-MCNC: 4.3 G/DL (ref 3.4–5)
ALP SERPL-CCNC: 96 U/L (ref 33–136)
ALT SERPL W P-5'-P-CCNC: 15 U/L (ref 7–45)
ANION GAP SERPL CALC-SCNC: 14 MMOL/L (ref 10–20)
AST SERPL W P-5'-P-CCNC: 11 U/L (ref 9–39)
BASOPHILS # BLD AUTO: 0.03 X10*3/UL (ref 0–0.1)
BASOPHILS NFR BLD AUTO: 0.5 %
BILIRUB SERPL-MCNC: 0.4 MG/DL (ref 0–1.2)
BUN SERPL-MCNC: 25 MG/DL (ref 6–23)
CALCIUM SERPL-MCNC: 9.4 MG/DL (ref 8.6–10.6)
CHLORIDE SERPL-SCNC: 103 MMOL/L (ref 98–107)
CO2 SERPL-SCNC: 30 MMOL/L (ref 21–32)
CREAT SERPL-MCNC: 1.01 MG/DL (ref 0.5–1.05)
EGFRCR SERPLBLD CKD-EPI 2021: 60 ML/MIN/1.73M*2
EOSINOPHIL # BLD AUTO: 0.03 X10*3/UL (ref 0–0.7)
EOSINOPHIL NFR BLD AUTO: 0.5 %
ERYTHROCYTE [DISTWIDTH] IN BLOOD BY AUTOMATED COUNT: 13.2 % (ref 11.5–14.5)
GLUCOSE SERPL-MCNC: 76 MG/DL (ref 74–99)
HCT VFR BLD AUTO: 40.6 % (ref 36–46)
HGB BLD-MCNC: 12.9 G/DL (ref 12–16)
IMM GRANULOCYTES # BLD AUTO: 0 X10*3/UL (ref 0–0.7)
IMM GRANULOCYTES NFR BLD AUTO: 0 % (ref 0–0.9)
LYMPHOCYTES # BLD AUTO: 1.84 X10*3/UL (ref 1.2–4.8)
LYMPHOCYTES NFR BLD AUTO: 29.3 %
MCH RBC QN AUTO: 30.5 PG (ref 26–34)
MCHC RBC AUTO-ENTMCNC: 31.8 G/DL (ref 32–36)
MCV RBC AUTO: 96 FL (ref 80–100)
MONOCYTES # BLD AUTO: 0.37 X10*3/UL (ref 0.1–1)
MONOCYTES NFR BLD AUTO: 5.9 %
NEUTROPHILS # BLD AUTO: 4.01 X10*3/UL (ref 1.2–7.7)
NEUTROPHILS NFR BLD AUTO: 63.8 %
NRBC BLD-RTO: ABNORMAL /100{WBCS}
PLATELET # BLD AUTO: 186 X10*3/UL (ref 150–450)
POTASSIUM SERPL-SCNC: 4.5 MMOL/L (ref 3.5–5.3)
PROT SERPL-MCNC: 6.2 G/DL (ref 6.4–8.2)
RBC # BLD AUTO: 4.23 X10*6/UL (ref 4–5.2)
SODIUM SERPL-SCNC: 142 MMOL/L (ref 136–145)
WBC # BLD AUTO: 6.3 X10*3/UL (ref 4.4–11.3)

## 2025-08-21 PROCEDURE — 36591 DRAW BLOOD OFF VENOUS DEVICE: CPT

## 2025-08-21 PROCEDURE — 99214 OFFICE O/P EST MOD 30 MIN: CPT | Performed by: NURSE PRACTITIONER

## 2025-08-21 PROCEDURE — 3079F DIAST BP 80-89 MM HG: CPT | Performed by: NURSE PRACTITIONER

## 2025-08-21 PROCEDURE — 1159F MED LIST DOCD IN RCRD: CPT | Performed by: NURSE PRACTITIONER

## 2025-08-21 PROCEDURE — 1036F TOBACCO NON-USER: CPT | Performed by: NURSE PRACTITIONER

## 2025-08-21 PROCEDURE — 1125F AMNT PAIN NOTED PAIN PRSNT: CPT | Performed by: NURSE PRACTITIONER

## 2025-08-21 PROCEDURE — G2211 COMPLEX E/M VISIT ADD ON: HCPCS | Performed by: NURSE PRACTITIONER

## 2025-08-21 PROCEDURE — 3074F SYST BP LT 130 MM HG: CPT | Performed by: NURSE PRACTITIONER

## 2025-08-21 PROCEDURE — 85025 COMPLETE CBC W/AUTO DIFF WBC: CPT | Performed by: NURSE PRACTITIONER

## 2025-08-21 PROCEDURE — 80053 COMPREHEN METABOLIC PANEL: CPT | Performed by: NURSE PRACTITIONER

## 2025-08-21 RX ORDER — HEPARIN 100 UNIT/ML
500 SYRINGE INTRAVENOUS AS NEEDED
Status: DISCONTINUED | OUTPATIENT
Start: 2025-08-21 | End: 2025-08-21 | Stop reason: HOSPADM

## 2025-08-21 RX ORDER — HEPARIN SODIUM,PORCINE/PF 10 UNIT/ML
50 SYRINGE (ML) INTRAVENOUS AS NEEDED
OUTPATIENT
Start: 2025-08-21

## 2025-08-21 RX ORDER — PRIMIDONE 50 MG/1
TABLET ORAL
Qty: 315 TABLET | Refills: 3 | Status: SHIPPED | OUTPATIENT
Start: 2025-08-21

## 2025-08-21 RX ORDER — HEPARIN 100 UNIT/ML
500 SYRINGE INTRAVENOUS AS NEEDED
OUTPATIENT
Start: 2025-08-21

## 2025-08-21 RX ORDER — HEPARIN SODIUM,PORCINE/PF 10 UNIT/ML
50 SYRINGE (ML) INTRAVENOUS AS NEEDED
Status: DISCONTINUED | OUTPATIENT
Start: 2025-08-21 | End: 2025-08-21 | Stop reason: HOSPADM

## 2025-08-21 ASSESSMENT — PAIN SCALES - GENERAL: PAINLEVEL_OUTOF10: 2

## 2025-08-26 ENCOUNTER — TELEPHONE (OUTPATIENT)
Dept: ADMISSION | Facility: HOSPITAL | Age: 70
End: 2025-08-26
Payer: MEDICARE

## 2025-08-26 ENCOUNTER — TREATMENT (OUTPATIENT)
Dept: PHYSICAL THERAPY | Facility: CLINIC | Age: 70
End: 2025-08-26
Payer: MEDICARE

## 2025-08-26 DIAGNOSIS — G89.29 OTHER CHRONIC PAIN: ICD-10-CM

## 2025-08-26 DIAGNOSIS — T45.1X5A CHEMOTHERAPY-INDUCED PERIPHERAL NEUROPATHY (MULTI): ICD-10-CM

## 2025-08-26 DIAGNOSIS — G62.0 CHEMOTHERAPY-INDUCED PERIPHERAL NEUROPATHY (MULTI): ICD-10-CM

## 2025-08-26 RX ORDER — METHADONE HYDROCHLORIDE 10 MG/1
10 TABLET ORAL EVERY 12 HOURS
Qty: 60 TABLET | Refills: 0 | Status: SHIPPED | OUTPATIENT
Start: 2025-08-26 | End: 2025-09-25

## 2025-08-26 ASSESSMENT — 10 METER WALK TEST (10METWT): AVERAGE: 13.5

## 2025-08-29 ENCOUNTER — HOSPITAL ENCOUNTER (OUTPATIENT)
Dept: RADIOLOGY | Facility: CLINIC | Age: 70
Discharge: HOME | End: 2025-08-29
Payer: MEDICARE

## 2025-08-29 ENCOUNTER — APPOINTMENT (OUTPATIENT)
Dept: HEMATOLOGY/ONCOLOGY | Facility: CLINIC | Age: 70
End: 2025-08-29
Payer: MEDICARE

## 2025-08-29 DIAGNOSIS — E04.1 THYROID NODULE: ICD-10-CM

## 2025-08-29 PROCEDURE — 76536 US EXAM OF HEAD AND NECK: CPT

## 2025-09-03 ENCOUNTER — APPOINTMENT (OUTPATIENT)
Dept: NEUROLOGY | Facility: CLINIC | Age: 70
End: 2025-09-03
Payer: MEDICARE

## 2025-09-03 ENCOUNTER — TELEPHONE (OUTPATIENT)
Dept: GYNECOLOGIC ONCOLOGY | Facility: HOSPITAL | Age: 70
End: 2025-09-03

## 2025-09-03 DIAGNOSIS — Z12.31 SCREENING MAMMOGRAM FOR BREAST CANCER: ICD-10-CM

## 2025-09-05 ENCOUNTER — HOSPITAL ENCOUNTER (OUTPATIENT)
Dept: RADIOLOGY | Facility: CLINIC | Age: 70
Discharge: HOME | End: 2025-09-05
Payer: MEDICARE

## 2025-09-05 ENCOUNTER — TELEPHONE (OUTPATIENT)
Dept: GYNECOLOGIC ONCOLOGY | Facility: HOSPITAL | Age: 70
End: 2025-09-05
Payer: MEDICARE

## 2025-09-05 DIAGNOSIS — C56.9 MALIGNANT NEOPLASM OF OVARY, UNSPECIFIED LATERALITY (MULTI): ICD-10-CM

## 2025-09-05 DIAGNOSIS — C78.00 MALIGNANT NEOPLASM METASTATIC TO LUNG, UNSPECIFIED LATERALITY (MULTI): ICD-10-CM

## 2025-09-05 DIAGNOSIS — R22.2 NODULE OF SKIN OF ABDOMEN: ICD-10-CM

## 2025-09-05 PROCEDURE — 71260 CT THORAX DX C+: CPT

## 2025-09-05 PROCEDURE — 2550000001 HC RX 255 CONTRASTS: Performed by: NURSE PRACTITIONER

## 2025-09-05 RX ADMIN — IOHEXOL 75 ML: 350 INJECTION, SOLUTION INTRAVENOUS at 12:00

## 2025-09-09 ENCOUNTER — APPOINTMENT (OUTPATIENT)
Dept: GYNECOLOGIC ONCOLOGY | Facility: CLINIC | Age: 70
End: 2025-09-09
Payer: MEDICARE

## 2025-09-10 ENCOUNTER — APPOINTMENT (OUTPATIENT)
Dept: GYNECOLOGIC ONCOLOGY | Facility: HOSPITAL | Age: 70
End: 2025-09-10
Payer: MEDICARE

## 2025-09-12 ENCOUNTER — APPOINTMENT (OUTPATIENT)
Facility: CLINIC | Age: 70
End: 2025-09-12
Payer: MEDICARE

## 2025-10-03 ENCOUNTER — APPOINTMENT (OUTPATIENT)
Dept: HEMATOLOGY/ONCOLOGY | Facility: CLINIC | Age: 70
End: 2025-10-03
Payer: MEDICARE

## 2025-11-07 ENCOUNTER — APPOINTMENT (OUTPATIENT)
Dept: HEMATOLOGY/ONCOLOGY | Facility: CLINIC | Age: 70
End: 2025-11-07
Payer: MEDICARE

## 2025-12-03 ENCOUNTER — APPOINTMENT (OUTPATIENT)
Dept: NEUROLOGY | Facility: CLINIC | Age: 70
End: 2025-12-03
Payer: MEDICARE

## 2025-12-12 ENCOUNTER — APPOINTMENT (OUTPATIENT)
Dept: HEMATOLOGY/ONCOLOGY | Facility: CLINIC | Age: 70
End: 2025-12-12
Payer: MEDICARE

## 2026-06-17 ENCOUNTER — APPOINTMENT (OUTPATIENT)
Dept: UROLOGY | Facility: CLINIC | Age: 71
End: 2026-06-17
Payer: MEDICARE

## (undated) DEVICE — URETERAL STENT WITH SIDE HOLES 6FX26CM
Type: IMPLANTABLE DEVICE | Site: URETER | Status: NON-FUNCTIONAL
Brand: TRIA™ SOFT

## (undated) DEVICE — HEMOSTAT, ARISTA, ABSORBABLE, 3 GRAMS

## (undated) DEVICE — TOWEL, SURGICAL, NEURO, O/R, 16 X 26, BLUE, STERILE

## (undated) DEVICE — GUIDEWIRE, DUAL SENSOR, .035 X 150 STRAIGHT,  3CM

## (undated) DEVICE — Device

## (undated) DEVICE — DRAPE, SHEET, ENDOSCOPY, GENERAL, FENESTRATED, ARMBOARD COVER, 98 X 123.5 IN, DISPOSABLE, LF, STERILE

## (undated) DEVICE — COLLECTION BAG, FLUID, NON-STERILE

## (undated) DEVICE — IRRIGATION KIT, PUMPING, SINGLE ACTION, SYRINGE, 10 CC

## (undated) DEVICE — TUBE SET, PNEUMOLAR HEATED, SMOKE EVACU, HIGH-FLOW

## (undated) DEVICE — CATHETER, DUAL LUMEN, UDC/10/50 M

## (undated) DEVICE — CARE KIT, LAPAROSCOPIC, ADVANCED

## (undated) DEVICE — GLOVE, SURGICAL, PROTEXIS PI MICRO, 7.0, PF, LF

## (undated) DEVICE — GUIDEWIRE, ZIPWIRE, STANDARD, 0.035 X 150CM, ANGLED TIP

## (undated) DEVICE — STAPLER, SKIN PROXIMATE, 35 WIDE

## (undated) DEVICE — ADAPTER, UROLOK

## (undated) DEVICE — TROCAR, KII OPTICAL BLADELESS 5MM Z THREAD 100MM LNGTH

## (undated) DEVICE — ADHESIVE, SKIN, LIQUIBAND EXCEED

## (undated) DEVICE — CATHETER, URETERAL, OPEN END, 5 FR, 70 CM

## (undated) DEVICE — CLIP, ENDO APPLIER LIGAMAX 5MM

## (undated) DEVICE — CATHETER, URETHRAL, ROBNEL, 14 FR, 16 IN, LF, RED

## (undated) DEVICE — SUTURE, MONOCRYL, 4-0, 18 IN, PS2, UNDYED

## (undated) DEVICE — SUTURE, VICRYL, 0, 27 IN, UR-6, VIOLET

## (undated) DEVICE — FIBER, MOSES 200 DFL

## (undated) DEVICE — SHEARS, CURVED 5MM, ENDOPATH

## (undated) DEVICE — BASKET, STONE, RETRIEVAL, NITINOL (4WIRE, 1.9 0 TIP)

## (undated) DEVICE — SCOPE, LITHOVUE SUD ONLY, GLOBAL STANDARD

## (undated) DEVICE — PUMP, STRYKERFLOW 2 & HANDPIECE W/10FT. IRRIGATION TUBING

## (undated) DEVICE — SCOPE WARMER, LAPAROSCOPE, BAG ONLY, LF

## (undated) DEVICE — TROCAR SYSTEM, BALLOON, KII GELPORT, 12 X 100MM

## (undated) DEVICE — GLOVE, SURGICAL, PROTEXIS PI BLUE W/NEUTHERA, 8.0, PF, LF

## (undated) DEVICE — SOLUTION, IRRIGATION, SODIUM CHLORIDE 0.9%, 1000 ML, POUR BOTTLE